# Patient Record
Sex: MALE | Race: WHITE | NOT HISPANIC OR LATINO | Employment: FULL TIME | URBAN - METROPOLITAN AREA
[De-identification: names, ages, dates, MRNs, and addresses within clinical notes are randomized per-mention and may not be internally consistent; named-entity substitution may affect disease eponyms.]

---

## 2017-02-01 ENCOUNTER — OFFICE VISIT (OUTPATIENT)
Dept: FAMILY MEDICINE | Facility: CLINIC | Age: 37
End: 2017-02-01
Payer: COMMERCIAL

## 2017-02-01 VITALS
WEIGHT: 315 LBS | HEART RATE: 73 BPM | HEIGHT: 77 IN | TEMPERATURE: 97.6 F | SYSTOLIC BLOOD PRESSURE: 140 MMHG | DIASTOLIC BLOOD PRESSURE: 92 MMHG | BODY MASS INDEX: 37.19 KG/M2 | OXYGEN SATURATION: 97 %

## 2017-02-01 DIAGNOSIS — F32.A DEPRESSION, UNSPECIFIED DEPRESSION TYPE: ICD-10-CM

## 2017-02-01 DIAGNOSIS — R03.0 ELEVATED BLOOD PRESSURE READING WITHOUT DIAGNOSIS OF HYPERTENSION: ICD-10-CM

## 2017-02-01 DIAGNOSIS — M54.42 CHRONIC LEFT-SIDED LOW BACK PAIN WITH LEFT-SIDED SCIATICA: ICD-10-CM

## 2017-02-01 DIAGNOSIS — G89.29 CHRONIC LEFT-SIDED LOW BACK PAIN WITH LEFT-SIDED SCIATICA: ICD-10-CM

## 2017-02-01 DIAGNOSIS — E66.812 OBESITY, CLASS II, BMI 35-39.9: ICD-10-CM

## 2017-02-01 DIAGNOSIS — Z23 NEED FOR PROPHYLACTIC VACCINATION AND INOCULATION AGAINST INFLUENZA: ICD-10-CM

## 2017-02-01 DIAGNOSIS — R79.89 ELEVATED TSH: ICD-10-CM

## 2017-02-01 DIAGNOSIS — R94.5 ABNORMAL RESULTS OF LIVER FUNCTION STUDIES: Primary | ICD-10-CM

## 2017-02-01 DIAGNOSIS — L40.50 PSORIATIC ARTHRITIS (H): ICD-10-CM

## 2017-02-01 DIAGNOSIS — F41.1 ANXIETY STATE: ICD-10-CM

## 2017-02-01 LAB
ALBUMIN SERPL-MCNC: 4.3 G/DL (ref 3.4–5)
ALP SERPL-CCNC: 80 U/L (ref 40–150)
ALT SERPL W P-5'-P-CCNC: 135 U/L (ref 0–70)
ANION GAP SERPL CALCULATED.3IONS-SCNC: 10 MMOL/L (ref 3–14)
AST SERPL W P-5'-P-CCNC: 51 U/L (ref 0–45)
BILIRUB SERPL-MCNC: 0.5 MG/DL (ref 0.2–1.3)
BUN SERPL-MCNC: 13 MG/DL (ref 7–30)
CALCIUM SERPL-MCNC: 9.2 MG/DL (ref 8.5–10.1)
CHLORIDE SERPL-SCNC: 100 MMOL/L (ref 94–109)
CO2 SERPL-SCNC: 26 MMOL/L (ref 20–32)
CREAT SERPL-MCNC: 0.87 MG/DL (ref 0.66–1.25)
CREAT UR-MCNC: 135 MG/DL
GFR SERPL CREATININE-BSD FRML MDRD: ABNORMAL ML/MIN/1.7M2
GLUCOSE SERPL-MCNC: 104 MG/DL (ref 70–99)
MICROALBUMIN UR-MCNC: 14 MG/L
MICROALBUMIN/CREAT UR: 10.74 MG/G CR (ref 0–17)
POTASSIUM SERPL-SCNC: 4.2 MMOL/L (ref 3.4–5.3)
PROT SERPL-MCNC: 7.9 G/DL (ref 6.8–8.8)
SODIUM SERPL-SCNC: 136 MMOL/L (ref 133–144)
TSH SERPL DL<=0.005 MIU/L-ACNC: 2.03 MU/L (ref 0.4–4)

## 2017-02-01 PROCEDURE — 84443 ASSAY THYROID STIM HORMONE: CPT | Performed by: NURSE PRACTITIONER

## 2017-02-01 PROCEDURE — 90471 IMMUNIZATION ADMIN: CPT | Performed by: NURSE PRACTITIONER

## 2017-02-01 PROCEDURE — 36415 COLL VENOUS BLD VENIPUNCTURE: CPT | Performed by: NURSE PRACTITIONER

## 2017-02-01 PROCEDURE — 90686 IIV4 VACC NO PRSV 0.5 ML IM: CPT | Performed by: NURSE PRACTITIONER

## 2017-02-01 PROCEDURE — 99214 OFFICE O/P EST MOD 30 MIN: CPT | Mod: 25 | Performed by: NURSE PRACTITIONER

## 2017-02-01 PROCEDURE — 82043 UR ALBUMIN QUANTITATIVE: CPT | Performed by: NURSE PRACTITIONER

## 2017-02-01 PROCEDURE — 82977 ASSAY OF GGT: CPT | Performed by: NURSE PRACTITIONER

## 2017-02-01 PROCEDURE — 80053 COMPREHEN METABOLIC PANEL: CPT | Performed by: NURSE PRACTITIONER

## 2017-02-01 RX ORDER — OXYCODONE AND ACETAMINOPHEN 5; 325 MG/1; MG/1
1 TABLET ORAL EVERY 6 HOURS PRN
Qty: 20 TABLET | Refills: 0 | Status: SHIPPED | OUTPATIENT
Start: 2017-02-01 | End: 2017-02-27

## 2017-02-01 RX ORDER — NAPROXEN 500 MG/1
500 TABLET ORAL 2 TIMES DAILY WITH MEALS
Qty: 60 TABLET | Refills: 1 | Status: SHIPPED | OUTPATIENT
Start: 2017-02-01 | End: 2017-06-07

## 2017-02-01 ASSESSMENT — ANXIETY QUESTIONNAIRES
5. BEING SO RESTLESS THAT IT IS HARD TO SIT STILL: SEVERAL DAYS
1. FEELING NERVOUS, ANXIOUS, OR ON EDGE: NOT AT ALL
GAD7 TOTAL SCORE: 3
7. FEELING AFRAID AS IF SOMETHING AWFUL MIGHT HAPPEN: NOT AT ALL
IF YOU CHECKED OFF ANY PROBLEMS ON THIS QUESTIONNAIRE, HOW DIFFICULT HAVE THESE PROBLEMS MADE IT FOR YOU TO DO YOUR WORK, TAKE CARE OF THINGS AT HOME, OR GET ALONG WITH OTHER PEOPLE: NOT DIFFICULT AT ALL
2. NOT BEING ABLE TO STOP OR CONTROL WORRYING: NOT AT ALL
3. WORRYING TOO MUCH ABOUT DIFFERENT THINGS: NOT AT ALL
6. BECOMING EASILY ANNOYED OR IRRITABLE: SEVERAL DAYS

## 2017-02-01 ASSESSMENT — PATIENT HEALTH QUESTIONNAIRE - PHQ9: 5. POOR APPETITE OR OVEREATING: SEVERAL DAYS

## 2017-02-01 NOTE — MR AVS SNAPSHOT
After Visit Summary   2/1/2017    Lakhwinder Jones    MRN: [id number]           Patient Information     Date Of Birth          1980        Visit Information        Provider Department      2/1/2017 9:20 AM Rhonda Rodriguez APRN CNP [facility]        Today's Diagnoses     Abnormal results of liver function studies    -  1     Chronic left-sided low back pain with left-sided sciatica         Psoriatic arthritis (H)         Elevated blood pressure reading without diagnosis of hypertension         Elevated TSH         Anxiety state         Depression, unspecified depression type         Need for prophylactic vaccination and inoculation against influenza           Care Instructions    Based on your medical history and these are the current health maintenance or preventive care services that you are due for (some may have been done at this visit)  Health Maintenance Due   Topic Date Due     URINE DRUG SCREEN Q1 YR  10/13/1996     INFLUENZA VACCINE (SYSTEM ASSIGNED)  09/01/2016         At [facility], we strive to deliver an exceptional experience to you, every time we see you.    If you receive a survey in the mail, please send us back your thoughts. We really do value your feedback.    Your care team's suggested websites for health information:  Www.oragenics.org : Up to date and easily searchable information on multiple topics.  Www.medlineplus.gov : medication info, interactive tutorials, watch real surgeries online  Www.familydoctor.org : good info from the Academy of Family Physicians  Www.cdc.gov : public health info, travel advisories, epidemics (H1N1)  Www.aap.org : children's health info, normal development, vaccinations  Www.health.[facility].[place].us : [place] dept of health, public health issues in [place], N1N1    How to contact your care team:   Team Radha/Spirit [phone]         Pharmacy [phone]    Dr. Chester, Jacinta Prasad PA-C, Dr. Muñoz,  "Stephie YARBROUGH CNP, Sandi Cornelius PA-C, Dr. Vides, and LINNEA Serrano CNP    Team RNs: Lavern & Steph      Clinic hours  M-Th 7 am-7 pm   Fri 7 am-5 pm.   Urgent care M-F 11 am-9 pm,   Sat/Sun 9 am-5 pm.  Pharmacy M-Th 8 am-8 pm Fri 8 am-6 pm  Sat/Sun 9 am-5 pm.     All password changes, disabled accounts, or ID changes in [employer]/MyHealth will be done by our Access Services Department.    If you need help with your account or password, call: [phone]. Clinic staff no longer has the ability to change passwords.       * Sciatica    Sciatica (\"Lumbar Radiculopathy\") causes a pain that spreads from the lower back down into the buttock, hip and leg. Sometimes leg pain can occur without any back pain. Sciatica is due to irritation or pressure on a spinal nerve as it comes out of the spinal canal. This is most often due to a bulge or rupture of a nearby spinal disk (the cartilage cushion between each spinal bone), which presses on a nearby nerve. Other causes include spinal stenosis (narrowing of the spinal canal) and spasm of the piriformis muscle (a muscle in the buttocks that the sciatic nerve passes through).  Sciatica may begin after a sudden twisting/bending force (such as in a car accident), or sometimes after a simple awkward movement. In either case, muscle spasm is commonly present and contributes to the pain.  The diagnosis of sciatica is made from the symptoms and physical exam. Unless you had a physical injury (such as a car accident or fall), X-rays are usually not ordered for the initial evaluation of sciatica because the nerves and disks cannot be seen on an X-ray. Most sciatica (80-90%) gets better with time.  What can I do about my low back pain?  There are three main things you can do to ease low back pain and help it go away.    Use heat or cold packs.    Take medicine as directed.    Use positions, movements and exercises. Stay active! Too much rest can make your symptoms " worse.  Using heat or cold packs  Try cold packs or gentle heat to ease your pain. Use whichever gives the most relief. Apply the cold pack or heat for 15 minutes at a time, as often as needed.  Taking medicine  If taking over-the-counter medicine:    Take ibuprofen (Advil, Motrin) 600 mg. three times a day as needed for pain.  OR    Take Aleve (naproxen sodium) 220 to 440 mg. two times a day as needed for pain  If your doctor prescribed a muscle relaxant (cyclobenzapine 10 mg.):    Take one half ( ) to 1 tablet at bedtime    Do not drive when taking this medicine. This drug may make you sleepy.  Using positions, movements and exercises  Research tells us that moving your joints and muscles can help you recover from back pain. Such activity should be simple and gentle.  Use the positions below as well as walking to help relieve your discomfort. Try taking a short walk every 3 to 4 hours during the day. Walk for a few minutes inside your home or take longer walks outside, on a treadmill or at a mall. Slowly increase the amount of time you walk. Expect discomfort when you begin, but it should lessen as your back starts to recover.  Finding a position that is comfortable  When your back pain is new, you may find that certain positions will ease your pain. Gently try each of the following positions until you find one that eases your pain. Once you find a position of comfort, use it as often as you like while you recover. Return to your daily routine as soon as possible.     Lie on your back with your legs bent. You can do this by placing a pillow under your knees or lie on the floor and rest your lower legs on the seat of a chair.    Lie on your side with your knees bent and place a pillow between your knees.    Lie on your stomach over pillows.  When should I call my doctor?  Your back pain should improve over the first couple of weeks. As it improves, you should be able to return to your normal activities. But call  your doctor if:    You have a sudden change in your ability to control? your bladder or bowels.    You begin to feel tingling in your groin or legs.    The pain spreads down your leg and into your foot.    Your toes, feet or leg muscles begin to feel weak.    You feel generally unwell or sick.    Your pain gets worse.    6352-2707 [employer]. [address] [place] [address]. All rights reserved. This information is not intended as a substitute for professional medical care. Always follow your healthcare professional's instructions.              Follow-ups after your visit        Additional Services     ORTHO  REFERRAL       [facility] is referring you to the Orthopedic  Services at [facility].       The  Representative will assist you in the coordination of your Orthopedic and Musculoskeletal Care as prescribed by your physician.    The  Representative will call you within 1 business day to help schedule your appointment, or you may contact the  Representative at:    All areas ~ [phone]     Type of Referral : Non Surgical - consider injection for chronic low back pain      Timeframe requested: Routine    Coverage of these services is subject to the terms and limitations of your health insurance plan.  Please call member services at your health plan with any benefit or coverage questions.      If X-rays, CT or MRI's have been performed, please contact the facility where they were done to arrange for , prior to your scheduled appointment.  Please bring this referral request to your appointment and present it to your specialist.                  Who to contact     If you have questions or need follow up information about today's clinic visit or your schedule please contact [facility] directly at [phone].  Normal or non-critical lab and imaging results will be communicated  "to you by [employer], letter or phone within 4 business days after the clinic has received the results. If you do not hear from us within 7 days, please contact the clinic through [employer] or phone. If you have a critical or abnormal lab result, we will notify you by phone as soon as possible.  Submit refill requests through [employer] or call your pharmacy and they will forward the refill request to us. Please allow 3 business days for your refill to be completed.          Additional Information About Your Visit        [employer] Information     [employer] gives you secure access to your electronic health record. If you see a primary care provider, you can also send messages to your care team and make appointments. If you have questions, please call your primary care clinic.  If you do not have a primary care provider, please call [phone] and they will assist you.        Care EveryWhere ID     This is your Care EveryWhere ID. This could be used by other organizations to access your [facility] medical records  [id number]        Your Vitals Were     Pulse Temperature Height BMI (Body Mass Index) Pulse Oximetry       73 97.6  F (36.4  C) (Oral) 6' 5\" (1.956 m) 39.29 kg/m2 97%        Blood Pressure from Last 3 Encounters:   02/01/17 140/92   12/06/16 134/86   10/17/16 145/85    Weight from Last 3 Encounters:   02/01/17 331 lb 6.4 oz (150.322 kg)   12/06/16 320 lb 6.4 oz (145.332 kg)   10/17/16 332 lb 3.2 oz (150.685 kg)              We Performed the Following     Albumin Random Urine Quantitative     Comprehensive metabolic panel (BMP + Alb, Alk Phos, ALT, AST, Total. Bili, TP)     HC FLU VAC PRESRV FREE QUAD SPLIT VIR 3+YRS IM     ORTHO  REFERRAL     TSH with free T4 reflex          Where to get your medicines      These medications were sent to [facility] [place] - [address]  [address], [place] [address]     Phone:  [phone]    - naproxen 500 MG tablet    "   Some of these will need a paper prescription and others can be bought over the counter.  Ask your nurse if you have questions.     Bring a paper prescription for each of these medications    - oxyCODONE-acetaminophen 5-325 MG per tablet       Primary Care Provider Office Phone # Fax #    Simona Prasad PA-C [phone]       [facility] [address]  [facility] [address]        Thank you!     Thank you for choosing [facility]  for your care. Our goal is always to provide you with excellent care. Hearing back from our patients is one way we can continue to improve our services. Please take a few minutes to complete the written survey that you may receive in the mail after your visit with us. Thank you!             Your Updated Medication List - Protect others around you: Learn how to safely use, store and throw away your medicines at www.disposemymeds.org.          This list is accurate as of: 2/1/17 10:15 AM.  Always use your most recent med list.                   Brand Name Dispense Instructions for use    citalopram 40 MG tablet    celeXA    90 tablet    Take 1 tablet (40 mg) by mouth daily       LORazepam 1 MG tablet    ATIVAN    30 tablet    Take 1 tablet (1 mg) by mouth 2 times daily as needed for anxiety       methocarbamol 750 MG tablet    ROBAXIN    60 tablet    Take 1 tablet (750 mg) by mouth 3 times daily as needed for muscle spasms       methylPREDNISolone 4 MG tablet    MEDROL DOSEPAK    21 tablet    Follow package instructions       naproxen 500 MG tablet    NAPROSYN    60 tablet    Take 1 tablet (500 mg) by mouth 2 times daily (with meals)       * oxyCODONE-acetaminophen 5-325 MG per tablet    PERCOCET    20 tablet    Take 1-2 tablets by mouth every 6 hours as needed for moderate to severe pain       * oxyCODONE-acetaminophen 5-325 MG per tablet    PERCOCET    20 tablet    Take 1 tablet by mouth every 6 hours as needed for  pain (maximum 6 tablets per day)       * Notice:  This list has 2 medication(s) that are the same as other medications prescribed for you. Read the directions carefully, and ask your doctor or other care provider to review them with you.

## 2017-02-01 NOTE — PATIENT INSTRUCTIONS
"Based on your medical history and these are the current health maintenance or preventive care services that you are due for (some may have been done at this visit)  Health Maintenance Due   Topic Date Due     URINE DRUG SCREEN Q1 YR  10/13/1996     INFLUENZA VACCINE (SYSTEM ASSIGNED)  09/01/2016         At [facility], we strive to deliver an exceptional experience to you, every time we see you.    If you receive a survey in the mail, please send us back your thoughts. We really do value your feedback.    Your care team's suggested websites for health information:  Www.[employer].org : Up to date and easily searchable information on multiple topics.  Www.medlineplus.gov : medication info, interactive tutorials, watch real surgeries online  Www.familydoctor.org : good info from the Academy of Family Physicians  Www.cdc.gov : public health info, travel advisories, epidemics (H1N1)  Www.aap.org : children's health info, normal development, vaccinations  Www.health.[facility].[place].us : [place] dept of health, public health issues in [place], N1N1    How to contact your care team:   Chan Garcia/Tee [phone]         Pharmacy [phone]    Dr. Chester, Jacinta Prasad PA-C, Dr. Muñoz, Stephie YARBROUGH CNP, Sandi Cornelius PA-C, Dr. Vides, and LINNEA Serrano CNP    Team RNs: Lavern & Steph      Clinic hours  M-Th 7 am-7 pm   Fri 7 am-5 pm.   Urgent care M-F 11 am-9 pm,   Sat/Sun 9 am-5 pm.  Pharmacy M-Th 8 am-8 pm Fri 8 am-6 pm  Sat/Sun 9 am-5 pm.     All password changes, disabled accounts, or ID changes in [facility] [employer]/MyHealth will be done by our Access Services Department.    If you need help with your account or password, call: [phone]. Clinic staff no longer has the ability to change passwords.       * Sciatica    Sciatica (\"Lumbar Radiculopathy\") causes a pain that spreads from the lower back down into the buttock, hip and leg. Sometimes leg pain can occur without any back pain. " Sciatica is due to irritation or pressure on a spinal nerve as it comes out of the spinal canal. This is most often due to a bulge or rupture of a nearby spinal disk (the cartilage cushion between each spinal bone), which presses on a nearby nerve. Other causes include spinal stenosis (narrowing of the spinal canal) and spasm of the piriformis muscle (a muscle in the buttocks that the sciatic nerve passes through).  Sciatica may begin after a sudden twisting/bending force (such as in a car accident), or sometimes after a simple awkward movement. In either case, muscle spasm is commonly present and contributes to the pain.  The diagnosis of sciatica is made from the symptoms and physical exam. Unless you had a physical injury (such as a car accident or fall), X-rays are usually not ordered for the initial evaluation of sciatica because the nerves and disks cannot be seen on an X-ray. Most sciatica (80-90%) gets better with time.  What can I do about my low back pain?  There are three main things you can do to ease low back pain and help it go away.    Use heat or cold packs.    Take medicine as directed.    Use positions, movements and exercises. Stay active! Too much rest can make your symptoms worse.  Using heat or cold packs  Try cold packs or gentle heat to ease your pain. Use whichever gives the most relief. Apply the cold pack or heat for 15 minutes at a time, as often as needed.  Taking medicine  If taking over-the-counter medicine:    Take ibuprofen (Advil, Motrin) 600 mg. three times a day as needed for pain.  OR    Take Aleve (naproxen sodium) 220 to 440 mg. two times a day as needed for pain  If your doctor prescribed a muscle relaxant (cyclobenzapine 10 mg.):    Take one half ( ) to 1 tablet at bedtime    Do not drive when taking this medicine. This drug may make you sleepy.  Using positions, movements and exercises  Research tells us that moving your joints and muscles can help you recover from back  pain. Such activity should be simple and gentle.  Use the positions below as well as walking to help relieve your discomfort. Try taking a short walk every 3 to 4 hours during the day. Walk for a few minutes inside your home or take longer walks outside, on a treadmill or at a mall. Slowly increase the amount of time you walk. Expect discomfort when you begin, but it should lessen as your back starts to recover.  Finding a position that is comfortable  When your back pain is new, you may find that certain positions will ease your pain. Gently try each of the following positions until you find one that eases your pain. Once you find a position of comfort, use it as often as you like while you recover. Return to your daily routine as soon as possible.     Lie on your back with your legs bent. You can do this by placing a pillow under your knees or lie on the floor and rest your lower legs on the seat of a chair.    Lie on your side with your knees bent and place a pillow between your knees.    Lie on your stomach over pillows.  When should I call my doctor?  Your back pain should improve over the first couple of weeks. As it improves, you should be able to return to your normal activities. But call your doctor if:    You have a sudden change in your ability to control? your bladder or bowels.    You begin to feel tingling in your groin or legs.    The pain spreads down your leg and into your foot.    Your toes, feet or leg muscles begin to feel weak.    You feel generally unwell or sick.    Your pain gets worse.    3280-4150 [employer]. [address] [place] [address], [place] [address]. All rights reserved. This information is not intended as a substitute for professional medical care. Always follow your healthcare professional's instructions.

## 2017-02-01 NOTE — PROGRESS NOTES
SUBJECTIVE:                                                    Lakhwinder Jones is a 36 year old male who presents to clinic today for the following health issues:    Concern: Recheck liver enzymes. Patient had elevated ALT of 79 on 6/3/16. He denies alcohol use, drug use other than his prescribed prescriptions, no supplement use.  No RUQ abdominal pain, nausea, vomiting, early satiety, skin changes.    Back Pain Follow Up       Description:   Location of pain:  bilateral  Character of pain: sharp and cramping  Pain radiation: radiates below the knee   Since last visit, pain is:  unchanged  New numbness or weakness in legs, not attributed to pain:  YES- feels weaker in the morning    Intensity: Currently 7/10, moderate, severe    History:   Pain interferes with job: YES  Therapies tried without relief: yoga, cold and heat  Therapies tried with relief: Naproxen         Accompanying Signs & Symptoms:  Risk of Fracture:  None  Risk of Cauda Equina:  None  Risk of Infection:  None  Risk of Cancer:  None       Patient has had steroid injections in the past with good response, is interested in another injection now.      Psoriatic arthritis- has generalized  psoriatic lesions with arthritic changes.      Amount of exercise or physical activity: 6-7 days/week for an average of 45-60 minutes    Problems taking medications regularly: No    Medication side effects: none    Diet: regular (no restrictions)      Problem list and histories reviewed & adjusted, as indicated.  Additional history: as documented    BP Readings from Last 3 Encounters:   02/01/17 140/92   12/06/16 134/86   10/17/16 145/85    Wt Readings from Last 3 Encounters:   02/01/17 331 lb 6.4 oz (150.322 kg)   12/06/16 320 lb 6.4 oz (145.332 kg)   10/17/16 332 lb 3.2 oz (150.685 kg)                  Labs reviewed in EPIC  Problem list, Medication list, Allergies, and Medical/Social/Surgical histories reviewed in EPIC and updated as  "appropriate.    ROS:  Constitutional, HEENT, cardiovascular, pulmonary, gi and gu systems are negative, except as otherwise noted.    OBJECTIVE:                                                    /92 mmHg  Pulse 73  Temp(Src) 97.6  F (36.4  C) (Oral)  Ht 6' 5\" (1.956 m)  Wt 331 lb 6.4 oz (150.322 kg)  BMI 39.29 kg/m2  SpO2 97%  Body mass index is 39.29 kg/(m^2).  GENERAL: healthy, alert and no distress  EYES: Eyes grossly normal to inspection, PERRL and conjunctivae and sclerae normal  HENT: ear canals and TM's normal, nose and mouth without ulcers or lesions  NECK: no adenopathy, no asymmetry, masses, or scars and thyroid normal to palpation  RESP: lungs clear to auscultation - no rales, rhonchi or wheezes  CV: regular rate and rhythm, normal S1 S2, no S3 or S4, no murmur, click or rub, no peripheral edema and peripheral pulses strong  ABDOMEN: soft, nontender, no hepatosplenomegaly, no masses and bowel sounds normal  MS: no gross musculoskeletal defects noted, no edema  SKIN: generalized erythematous psoriatic plaques otherwise, no concerning skin lesions  NEURO: Normal strength and tone, mentation intact and speech normal  BACK: no CVA tenderness, left sided  paralumbar tenderness and bilateral SI joint TTP, SLR+ on left    Diagnostic Test Results:  Results for orders placed or performed in visit on 02/01/17 (from the past 24 hour(s))   Albumin Random Urine Quantitative   Result Value Ref Range    Creatinine Urine 135 mg/dL    Albumin Urine mg/L 14 mg/L    Albumin Urine mg/g Cr 10.74 0 - 17 mg/g Cr        ASSESSMENT/PLAN:                                                        BP Screening:   Last 3 BP Readings:    BP Readings from Last 3 Encounters:   02/01/17 140/92   12/06/16 134/86   10/17/16 145/85       The following was recommended to the patient:  Re-screen within 4 weeks and recommend lifestyle modifications    BMI:   Estimated body mass index is 39.29 kg/(m^2) as calculated from the " "following:    Height as of this encounter: 6' 5\" (1.956 m).    Weight as of this encounter: 331 lb 6.4 oz (150.322 kg).   Weight management plan: Discussed healthy diet and exercise guidelines and patient will follow up in 6 months in clinic to re-evaluate.      1. Abnormal results of liver function studies  Recheck LFT's today.  - Comprehensive metabolic panel (BMP + Alb, Alk Phos, ALT, AST, Total. Bili, TP)    2. Chronic left-sided low back pain with left-sided sciatica      The patient was advised to apply ice for the first 2-3 days and then switch to heat  (avoid sleeping on heating pad).  Lifting mechanics discussed  Analgesics such as Tylenol and/or ibuprofen, see epic for orders.  Sacroiliac exercises, instruction sheet given  Aerobic exercise program, this was strongly encouraged as one of the best ways to manage chronic back moshe  Referring to Orthopedics for possible steroid injection.  Patient was instructed to f/u if symptoms worsen or fail to improve as anticipated    - ORTHO  REFERRAL    3. Psoriatic arthritis (H)  Patient advised to schedule back with Rheumatology.  - oxyCODONE-acetaminophen (PERCOCET) 5-325 MG per tablet; Take 1 tablet by mouth every 6 hours as needed for pain (maximum 6 tablets per day)  Dispense: 20 tablet; Refill: 0      4. Obesity, Class II, BMI 35-39.9 (H)  .Benefits of weight loss reviewed in detail, encouraged him to cut back on the carbohydrates in the diet, consume more fruits and vegetables, drink plenty of water, avoid fruit juices, sodas, get 150 min moderate exercise/week.  Recheck weight in 6 months.    5. Elevated blood pressure reading without diagnosis of hypertension  Recheck BP in 3 weeks.  - Albumin Random Urine Quantitative  -CMP ordered    6. Elevated TSH  Recheck TSH today.  - TSH with free T4 reflex    7. Anxiety state  Well controlled, BISMARK-7=3.  Continue present management.    8. Depression, unspecified depression type  Well controlled, PHQ-9=3.  " Continue present management.    9. Need for prophylactic vaccination and inoculation against influenza    - HC FLU VAC PRESRV FREE QUAD SPLIT VIR 3+YRS IM  - ADMIN 1st VACCINE    See Patient Instructions    LINNEA Krishna [facility]

## 2017-02-01 NOTE — NURSING NOTE
.Injectable Influenza Immunization Documentation    1.  Is the person to be vaccinated sick today?  No    2. Does the person to be vaccinated have an allergy to eggs or to a component of the vaccine?  No    3. Has the person to be vaccinated today ever had a serious reaction to influenza vaccine in the past?  No    4. Has the person to be vaccinated ever had Guillain-[place] syndrome?  No     Form completed by Nikole Thompson MA

## 2017-02-01 NOTE — NURSING NOTE
"Chief Complaint   Patient presents with     Labs Only     Back Pain     Refill Request       Initial /90 mmHg  Pulse 73  Temp(Src) 97.6  F (36.4  C) (Oral)  Ht 6' 5\" (1.956 m)  Wt 331 lb 6.4 oz (150.322 kg)  BMI 39.29 kg/m2  SpO2 97% Estimated body mass index is 39.29 kg/(m^2) as calculated from the following:    Height as of this encounter: 6' 5\" (1.956 m).    Weight as of this encounter: 331 lb 6.4 oz (150.322 kg).  BP completed using cuff size: felipe Thompson MA      "

## 2017-02-02 LAB — GGT SERPL-CCNC: 176 U/L (ref 0–75)

## 2017-02-02 ASSESSMENT — ANXIETY QUESTIONNAIRES: GAD7 TOTAL SCORE: 3

## 2017-02-02 ASSESSMENT — PATIENT HEALTH QUESTIONNAIRE - PHQ9: SUM OF ALL RESPONSES TO PHQ QUESTIONS 1-9: 3

## 2017-02-06 ENCOUNTER — RADIANT APPOINTMENT (OUTPATIENT)
Dept: ULTRASOUND IMAGING | Facility: CLINIC | Age: 37
End: 2017-02-06
Attending: NURSE PRACTITIONER
Payer: COMMERCIAL

## 2017-02-06 DIAGNOSIS — R94.5 ABNORMAL RESULTS OF LIVER FUNCTION STUDIES: Primary | ICD-10-CM

## 2017-02-06 DIAGNOSIS — R16.0 HEPATOMEGALY: ICD-10-CM

## 2017-02-06 PROBLEM — E66.812 OBESITY, CLASS II, BMI 35-39.9: Status: ACTIVE | Noted: 2017-02-06

## 2017-02-06 PROCEDURE — 76705 ECHO EXAM OF ABDOMEN: CPT | Performed by: RADIOLOGY

## 2017-02-10 ENCOUNTER — MYC MEDICAL ADVICE (OUTPATIENT)
Dept: FAMILY MEDICINE | Facility: CLINIC | Age: 37
End: 2017-02-10

## 2017-02-27 ENCOUNTER — OFFICE VISIT (OUTPATIENT)
Dept: GASTROENTEROLOGY | Facility: CLINIC | Age: 37
End: 2017-02-27
Attending: INTERNAL MEDICINE
Payer: COMMERCIAL

## 2017-02-27 VITALS
TEMPERATURE: 97.8 F | WEIGHT: 315 LBS | HEART RATE: 66 BPM | HEIGHT: 77 IN | DIASTOLIC BLOOD PRESSURE: 77 MMHG | OXYGEN SATURATION: 99 % | SYSTOLIC BLOOD PRESSURE: 128 MMHG | BODY MASS INDEX: 37.19 KG/M2

## 2017-02-27 DIAGNOSIS — L40.50 PSORIATIC ARTHRITIS (H): ICD-10-CM

## 2017-02-27 DIAGNOSIS — E66.812 OBESITY, CLASS II, BMI 35-39.9: Primary | ICD-10-CM

## 2017-02-27 DIAGNOSIS — R79.89 ABNORMAL LIVER FUNCTION TESTS: ICD-10-CM

## 2017-02-27 DIAGNOSIS — E66.812 OBESITY, CLASS II, BMI 35-39.9: ICD-10-CM

## 2017-02-27 LAB
ALBUMIN SERPL-MCNC: 4.3 G/DL (ref 3.4–5)
ALP SERPL-CCNC: 75 U/L (ref 40–150)
ALT SERPL W P-5'-P-CCNC: 72 U/L (ref 0–70)
ANION GAP SERPL CALCULATED.3IONS-SCNC: 10 MMOL/L (ref 3–14)
AST SERPL W P-5'-P-CCNC: 22 U/L (ref 0–45)
BILIRUB SERPL-MCNC: 0.6 MG/DL (ref 0.2–1.3)
BUN SERPL-MCNC: 11 MG/DL (ref 7–30)
CALCIUM SERPL-MCNC: 8.8 MG/DL (ref 8.5–10.1)
CHLORIDE SERPL-SCNC: 105 MMOL/L (ref 94–109)
CO2 SERPL-SCNC: 27 MMOL/L (ref 20–32)
CREAT SERPL-MCNC: 0.94 MG/DL (ref 0.66–1.25)
ERYTHROCYTE [DISTWIDTH] IN BLOOD BY AUTOMATED COUNT: 11.7 % (ref 10–15)
FERRITIN SERPL-MCNC: 191 NG/ML (ref 26–388)
GFR SERPL CREATININE-BSD FRML MDRD: ABNORMAL ML/MIN/1.7M2
GLUCOSE SERPL-MCNC: 98 MG/DL (ref 70–99)
HAV IGG SER QL IA: NORMAL
HCT VFR BLD AUTO: 43.6 % (ref 40–53)
HGB BLD-MCNC: 15.1 G/DL (ref 13.3–17.7)
IGA SERPL-MCNC: 328 MG/DL (ref 70–380)
IGG SERPL-MCNC: 917 MG/DL (ref 695–1620)
IGM SERPL-MCNC: 64 MG/DL (ref 60–265)
INR PPP: 1.05 (ref 0.86–1.14)
IRON SATN MFR SERPL: 28 % (ref 15–46)
IRON SERPL-MCNC: 85 UG/DL (ref 35–180)
MCH RBC QN AUTO: 30.6 PG (ref 26.5–33)
MCHC RBC AUTO-ENTMCNC: 34.6 G/DL (ref 31.5–36.5)
MCV RBC AUTO: 88 FL (ref 78–100)
PLATELET # BLD AUTO: 237 10E9/L (ref 150–450)
POTASSIUM SERPL-SCNC: 4 MMOL/L (ref 3.4–5.3)
PROT SERPL-MCNC: 7.7 G/DL (ref 6.8–8.8)
RBC # BLD AUTO: 4.93 10E12/L (ref 4.4–5.9)
SODIUM SERPL-SCNC: 142 MMOL/L (ref 133–144)
TIBC SERPL-MCNC: 302 UG/DL (ref 240–430)
WBC # BLD AUTO: 6.8 10E9/L (ref 4–11)

## 2017-02-27 PROCEDURE — 83516 IMMUNOASSAY NONANTIBODY: CPT | Mod: 91 | Performed by: INTERNAL MEDICINE

## 2017-02-27 PROCEDURE — 81332 SERPINA1 GENE: CPT | Performed by: INTERNAL MEDICINE

## 2017-02-27 PROCEDURE — 83516 IMMUNOASSAY NONANTIBODY: CPT | Performed by: INTERNAL MEDICINE

## 2017-02-27 PROCEDURE — 86038 ANTINUCLEAR ANTIBODIES: CPT | Performed by: INTERNAL MEDICINE

## 2017-02-27 PROCEDURE — 85610 PROTHROMBIN TIME: CPT | Performed by: INTERNAL MEDICINE

## 2017-02-27 PROCEDURE — 83550 IRON BINDING TEST: CPT | Performed by: INTERNAL MEDICINE

## 2017-02-27 PROCEDURE — 36415 COLL VENOUS BLD VENIPUNCTURE: CPT | Performed by: INTERNAL MEDICINE

## 2017-02-27 PROCEDURE — 99212 OFFICE O/P EST SF 10 MIN: CPT | Mod: ZF

## 2017-02-27 PROCEDURE — 85027 COMPLETE CBC AUTOMATED: CPT | Performed by: INTERNAL MEDICINE

## 2017-02-27 PROCEDURE — 80053 COMPREHEN METABOLIC PANEL: CPT | Performed by: INTERNAL MEDICINE

## 2017-02-27 PROCEDURE — 86708 HEPATITIS A ANTIBODY: CPT | Performed by: INTERNAL MEDICINE

## 2017-02-27 PROCEDURE — 82784 ASSAY IGA/IGD/IGG/IGM EACH: CPT | Performed by: INTERNAL MEDICINE

## 2017-02-27 PROCEDURE — 82728 ASSAY OF FERRITIN: CPT | Performed by: INTERNAL MEDICINE

## 2017-02-27 PROCEDURE — 83540 ASSAY OF IRON: CPT | Performed by: INTERNAL MEDICINE

## 2017-02-27 PROCEDURE — 82103 ALPHA-1-ANTITRYPSIN TOTAL: CPT | Performed by: INTERNAL MEDICINE

## 2017-02-27 ASSESSMENT — PAIN SCALES - GENERAL: PAINLEVEL: NO PAIN (0)

## 2017-02-27 NOTE — LETTER
2/27/2017      RE: Lakhwinder Jones  [address]  [facility] [address]       [facility]    Hepatology New Patient Visit    Referring provider:  Rhonda Rodriguez      36 year old male    Chief complaint:  Abnormal liver function tests    HPI:  Patient presents to clinic with his fiance for evaluation of abnormal liver function tests.  Abnormal liver function tests were first noticed in 2009 but were checked in the context of heavy alcohol use.  Liver function tests were last noted to be normal in 2005.  The patient has previously tested negative for hepatitis B and C in the past as recently as 12/2015.  He underwent a liver ultrasound last month which showed hepatomegaly and changes consistent with hepatic steatosis.      Patient is feeling well.  He denies any signs or symptoms specific to liver disease.      The patient denies jaundice, abdominal distention, lower extremity edema, lethargy or confusion.      No history of melanoma, hematemesis or hematochezia.      The patient denies any fevers, sweats or chills.      The patient does report an 8-pound intentional weight loss since early 02/2017.  This has been achieved through a combination of dietary modification and regular exercise.  He has weighed approximately 330 pounds in the last several years.  His ideal weight is around 220 pounds.      History of psoriatic arthritis noted.  Diagnosed at age 19.  History of methotrexate use in 2016.  History of PUVA therapy.  The patient has never used biologic agents.  Distribution is in the knees, elbows and hands.     The patient denies any new medications or antibiotics over the last 6-12 months.  He is currently taking apple cider vinegar and turmeric supplements to help with his liver function.  He was on methotrexate for a short period in early 2016 but this was discontinued due to abnormal liver tests.  In addition, the patient did not feel it helped his psoriasis.      The  patient last drank alcohol 4 weeks ago.  Prior to this, he was drinking twice a week, approximately 5-6 beers each time.  He does note a prior history of alcohol abuse.  Up until 7 years ago, he was drinking a half a liter of vodka every day.  He has had 1 DUI in the past.      The patient is a rare smoker.  He smokes 2 cigarettes per month.  He has a distant history of cocaine use in 2000.  He occasionally smokes marijuana.  He denies any history of intravenous drug use.  The patient has 4 tattoos, all done by professionals.  He denies any history of blood transfusions.       Medical hx Surgical hx   Past Medical History   Diagnosis Date     Anxiety      Back pain      Obesity      Psoriatic arthritis (H)       Past Surgical History   Procedure Laterality Date     Testicle surgery       23 yo     Right elbow surgey       12 yo     Nose surgery       3 times          Medications  Prior to Admission medications    Medication Sig Start Date End Date Taking? Authorizing Provider   citalopram (CELEXA) 40 MG tablet Take 1 tablet (40 mg) by mouth daily 12/6/16  Yes Simona Prasad PA-C   naproxen (NAPROSYN) 500 MG tablet Take 1 tablet (500 mg) by mouth 2 times daily (with meals)  Patient not taking: Reported on 2/27/2017 2/1/17   Rhonda Rodriguez APRN CNP   LORazepam (ATIVAN) 1 MG tablet Take 1 tablet (1 mg) by mouth 2 times daily as needed for anxiety  Patient not taking: Reported on 2/27/2017 12/6/16   Simona Prasad PA-C       Allergies  Allergies   Allergen Reactions     Tramadol Other (See Comments)     Shellfish-Derived Products        Family hx Social hx   Family History   Problem Relation Age of Onset     Obesity Mother      DIABETES Father      Coronary Artery Disease Father      Hypertension Father      Hyperlipidemia Father      Depression Father      Anxiety Disorder Father      MENTAL ILLNESS Father      Substance Abuse Father      Breast Cancer Maternal Grandmother       "Depression Maternal Grandmother      MENTAL ILLNESS Maternal Grandmother      Substance Abuse Maternal Grandmother      Prostate Cancer Maternal Grandfather      DIABETES Paternal Grandmother      Breast Cancer Paternal Grandmother      Depression Paternal Grandmother      MENTAL ILLNESS Paternal Grandmother      DIABETES Paternal Grandfather      Asthma Brother      DIABETES Paternal Uncle      CEREBROVASCULAR DISEASE No family hx of      Anesthesia Reaction No family hx of      OSTEOPOROSIS No family hx of      Genetic Disorder No family hx of      Thyroid Disease No family hx of      Liver Disease No family hx of       Social History   Substance Use Topics     Smoking status: Former Smoker     Types: Cigarettes     Smokeless tobacco: Never Used      Comment: 1-2 cigarettes per day to cope with stress     Alcohol use 4.8 oz/week     8 Standard drinks or equivalent per week      Comment: not for a month     Lives in [place] with fiancee and dog.  ([place], age 1.5 years).  Works as salesman (furniture).       Review of systems  A 10-point review of systems was negative.    Examination  /77  Pulse 66  Temp 97.8  F (36.6  C) (Oral)  Ht 1.956 m (6' 5\")  Wt (!) 146.8 kg (323 lb 9.6 oz)  SpO2 99%  BMI 38.37 kg/m2  Body mass index is 38.37 kg/(m^2).    Gen- well, NAD, A+Ox3, normal color  Eye- EOMI  ENT- MMM, normal oropharynx  Lym- no palpable lymphadenopathy  CVS- S1, S2 normal, no added sounds, RRR  RS- CTA  Abd- obese, soft, non-tender, palpable liver below R costal margin, no splenomegaly or ascites on palpation or percussion, BS+  Extr- pulses good, no DAYLIN  MS- nail pitting bilaterally, no clubbing  Neuro- A+Ox3, no asterixis  Skin- psoriatic plaques on lower trunk, extremities and scalp, no jaundice  Psych- normal mood    Laboratory  Lab Results   Component Value Date     02/01/2017    POTASSIUM 4.2 02/01/2017    CHLORIDE 100 02/01/2017    CO2 26 02/01/2017    BUN 13 02/01/2017    CR 0.87 " 02/01/2017       Lab Results   Component Value Date    BILITOTAL 0.5 02/01/2017     02/01/2017    AST 51 02/01/2017    ALKPHOS 80 02/01/2017       Lab Results   Component Value Date    ALBUMIN 4.3 02/01/2017    PROTTOTAL 7.9 02/01/2017        Lab Results   Component Value Date    WBC 6.4 06/03/2016    HGB 14.8 06/03/2016    MCV 87 06/03/2016     06/03/2016     Results for MICHELLE AMOS (MRN [id number]) as of 2/27/2017 09:09   Ref. Range 12/18/2015 09:07   Hep B Surface Agn Latest Ref Range: NR  Nonreactive   Hepatitis B Surface Antibody Latest Ref Range: <8.00 m[IU]/mL 252.17 (H)   Hepatitis B Core Joanne Latest Ref Range: NR  Nonreactive   Hepatitis C Antibody Latest Ref Range: NR  Nonreactive...       Radiology  Liver U/S 2/6/17 reviewed    Assessment  36-year-old obese male with a history of psoriatic arthritis who presents for further evaluation and management of abnormal liver function tests most likely related to fatty liver disease.  No clinical, biochemical or radiographic evidence of cirrhosis.  Will rule out other etiologies of chronic liver disease including celiac disease, autoimmune hepatitis, hemochromatosis and alpha-1 antitrypsin deficiency particularly in the context of history of immune disease.  Will repeat liver function tests today in the context of recent weight loss to evaluate for improvement.    We discussed the importance of ongoing weight loss with diet and exercise for overall health in addition to improvement of potential fatty liver disease.  We also discussed the potential need for liver biopsy if any of the above testing returned positive.      Biologic therapy for psoriatic arthritis is not absolutely contraindicated in patients with chronic liver disease if liver function tests are closely monitored at the time of initiation    Plan  1.  Check CMP, INR, CBC  2.  Check HAV Ab, anti-TTG ab, A1-AT phenotype  3.  Check ASMA, DANYA, serum immunoglobulins  4.  Weight loss  with diet and exercise  5.  Discontinue turmeric and apple cider vinegar  6.  OK to take ibuprofen or naproxen for joint symptoms  7.  Follow-up in 6 months    Devyn Torres MD  Hepatology  [facility]

## 2017-02-27 NOTE — MR AVS SNAPSHOT
After Visit Summary   2/27/2017    Lakhwinder Jones    MRN: [id number]           Patient Information     Date Of Birth          1980        Visit Information        Provider Department      2/27/2017 9:00 AM Devyn Duenas MD [facility] Hepatology        Today's Diagnoses     Obesity, Class II, BMI 35-39.9 (H)    -  1    Abnormal liver function tests        Psoriatic arthritis (H)           Follow-ups after your visit        Follow-up notes from your care team     Return in about 6 months (around 8/27/2017).      Your next 10 appointments already scheduled     Feb 27, 2017 11:00 AM CST   Lab with  LAB    [facility] Lab ([facility])    [address] [place] [address]  [address] [facility] [address]   [phone]            Aug 28, 2017  8:00 AM CDT   Lab with  LAB   [facility] ([facility])    [address] [place] [id number]   [phone]            Aug 28, 2017  9:00 AM CDT   (Arrive by 8:45 AM)   Return General Liver with Devyn Britt MD   [facility] Hepatology ([facility])    [address] [place] [address]   [phone]              Future tests that were ordered for you today     Open Future Orders        Priority Expected Expires Ordered    IgA Routine  3/29/2017 2/27/2017    IgG Routine  3/29/2017 2/27/2017    Alpha 1 antitryp vasiliy reflex to pheno Routine  3/29/2017 2/27/2017    Tissue transglutaminase camila IgA and IgG Routine  3/29/2017 2/27/2017    Comprehensive metabolic panel Routine  3/29/2017 2/27/2017    CBC with platelets Routine  3/29/2017 2/27/2017    Hepatitis Antibody A IgG Routine  3/29/2017 2/27/2017    F Actin EAI with reflex Routine  3/29/2017 2/27/2017    Antinuclear antibody screen by EIA Routine  3/29/2017 2/27/2017    IRON Routine  3/29/2017 2/27/2017    IRON AND IRON BINDING CAPACITY Routine  3/29/2017 2/27/2017    FERRITIN  "Routine  3/29/2017 2/27/2017    INR Routine  3/29/2017 2/27/2017    IgM Routine  3/29/2017 2/27/2017            Who to contact     If you have questions or need follow up information about today's clinic visit or your schedule please contact [facility] HEPATOLOGY directly at [phone].  Normal or non-critical lab and imaging results will be communicated to you by MyChart, letter or phone within 4 business days after the clinic has received the results. If you do not hear from us within 7 days, please contact the clinic through [employer] or phone. If you have a critical or abnormal lab result, we will notify you by phone as soon as possible.  Submit refill requests through [employer] or call your pharmacy and they will forward the refill request to us. Please allow 3 business days for your refill to be completed.          Additional Information About Your Visit        [employer] Information     [employer] gives you secure access to your electronic health record. If you see a primary care provider, you can also send messages to your care team and make appointments. If you have questions, please call your primary care clinic.  If you do not have a primary care provider, please call [phone] and they will assist you.        Care EveryWhere ID     This is your Care EveryWhere ID. This could be used by other organizations to access your [facility] medical records  [id number]        Your Vitals Were     Pulse Temperature Height Pulse Oximetry BMI (Body Mass Index)       66 97.8  F (36.6  C) (Oral) 1.956 m (6' 5\") 99% 38.37 kg/m2        Blood Pressure from Last 3 Encounters:   02/27/17 128/77   02/01/17 (!) 140/92   12/06/16 134/86    Weight from Last 3 Encounters:   02/27/17 (!) 146.8 kg (323 lb 9.6 oz)   02/01/17 (!) 150.3 kg (331 lb 6.4 oz)   12/06/16 (!) 145.3 kg (320 lb 6.4 oz)               Primary Care Provider Office Phone # Fax #    Simona Prasad PA-C [phone]       [facility] [address]  [facility] [address]        Thank you!     Thank you for choosing [facility] HEPATOLOGY  for your care. Our goal is always to provide you with excellent care. Hearing back from our patients is one way we can continue to improve our services. Please take a few minutes to complete the written survey that you may receive in the mail after your visit with us. Thank you!             Your Updated Medication List - Protect others around you: Learn how to safely use, store and throw away your medicines at www.disposemymeds.org.          This list is accurate as of: 2/27/17 10:00 AM.  Always use your most recent med list.                   Brand Name Dispense Instructions for use    citalopram 40 MG tablet    celeXA    90 tablet    Take 1 tablet (40 mg) by mouth daily       LORazepam 1 MG tablet    ATIVAN    30 tablet    Take 1 tablet (1 mg) by mouth 2 times daily as needed for anxiety       naproxen 500 MG tablet    NAPROSYN    60 tablet    Take 1 tablet (500 mg) by mouth 2 times daily (with meals)

## 2017-02-27 NOTE — NURSING NOTE
Chief Complaint   Patient presents with     Consult     Abnormal Liver Tests   Pt roomed, vitals, meds, and allergies reviewed with pt. Pt ready for provider.  Sammy Rodriguez, CMA

## 2017-02-27 NOTE — PROGRESS NOTES
[facility]    Hepatology New Patient Visit    Referring provider:  Rhonda Rodriguez      36 year old male    Chief complaint:  Abnormal liver function tests    HPI:  Patient presents to clinic with his fiance for evaluation of abnormal liver function tests.  Abnormal liver function tests were first noticed in 2009 but were checked in the context of heavy alcohol use.  Liver function tests were last noted to be normal in 2005.  The patient has previously tested negative for hepatitis B and C in the past as recently as 12/2015.  He underwent a liver ultrasound last month which showed hepatomegaly and changes consistent with hepatic steatosis.      Patient is feeling well.  He denies any signs or symptoms specific to liver disease.      The patient denies jaundice, abdominal distention, lower extremity edema, lethargy or confusion.      No history of melanoma, hematemesis or hematochezia.      The patient denies any fevers, sweats or chills.      The patient does report an 8-pound intentional weight loss since early 02/2017.  This has been achieved through a combination of dietary modification and regular exercise.  He has weighed approximately 330 pounds in the last several years.  His ideal weight is around 220 pounds.      History of psoriatic arthritis noted.  Diagnosed at age 19.  History of methotrexate use in 2016.  History of PUVA therapy.  The patient has never used biologic agents.  Distribution is in the knees, elbows and hands.     The patient denies any new medications or antibiotics over the last 6-12 months.  He is currently taking apple cider vinegar and turmeric supplements to help with his liver function.  He was on methotrexate for a short period in early 2016 but this was discontinued due to abnormal liver tests.  In addition, the patient did not feel it helped his psoriasis.      The patient last drank alcohol 4 weeks ago.  Prior to this, he was drinking twice a week,  approximately 5-6 beers each time.  He does note a prior history of alcohol abuse.  Up until 7 years ago, he was drinking a half a liter of vodka every day.  He has had 1 DUI in the past.      The patient is a rare smoker.  He smokes 2 cigarettes per month.  He has a distant history of cocaine use in 2000.  He occasionally smokes marijuana.  He denies any history of intravenous drug use.  The patient has 4 tattoos, all done by professionals.  He denies any history of blood transfusions.       Medical hx Surgical hx   Past Medical History   Diagnosis Date     Anxiety      Back pain      Obesity      Psoriatic arthritis (H)       Past Surgical History   Procedure Laterality Date     Testicle surgery       21 yo     Right elbow surgey       12 yo     Nose surgery       3 times          Medications  Prior to Admission medications    Medication Sig Start Date End Date Taking? Authorizing Provider   citalopram (CELEXA) 40 MG tablet Take 1 tablet (40 mg) by mouth daily 12/6/16  Yes Simona Prasad PA-C   naproxen (NAPROSYN) 500 MG tablet Take 1 tablet (500 mg) by mouth 2 times daily (with meals)  Patient not taking: Reported on 2/27/2017 2/1/17   Rhonda Rodriguez, LINNEA CNP   LORazepam (ATIVAN) 1 MG tablet Take 1 tablet (1 mg) by mouth 2 times daily as needed for anxiety  Patient not taking: Reported on 2/27/2017 12/6/16   Simona Prasad PA-C       Allergies  Allergies   Allergen Reactions     Tramadol Other (See Comments)     Shellfish-Derived Products        Family hx Social hx   Family History   Problem Relation Age of Onset     Obesity Mother      DIABETES Father      Coronary Artery Disease Father      Hypertension Father      Hyperlipidemia Father      Depression Father      Anxiety Disorder Father      MENTAL ILLNESS Father      Substance Abuse Father      Breast Cancer Maternal Grandmother      Depression Maternal Grandmother      MENTAL ILLNESS Maternal Grandmother      Substance  "Abuse Maternal Grandmother      Prostate Cancer Maternal Grandfather      DIABETES Paternal Grandmother      Breast Cancer Paternal Grandmother      Depression Paternal Grandmother      MENTAL ILLNESS Paternal Grandmother      DIABETES Paternal Grandfather      Asthma Brother      DIABETES Paternal Uncle      CEREBROVASCULAR DISEASE No family hx of      Anesthesia Reaction No family hx of      OSTEOPOROSIS No family hx of      Genetic Disorder No family hx of      Thyroid Disease No family hx of      Liver Disease No family hx of       Social History   Substance Use Topics     Smoking status: Former Smoker     Types: Cigarettes     Smokeless tobacco: Never Used      Comment: 1-2 cigarettes per day to cope with stress     Alcohol use 4.8 oz/week     8 Standard drinks or equivalent per week      Comment: not for a month     Lives in [place] with fiancee and dog.  ([place], age 1.5 years).  Works as salesman (furniture).       Review of systems  A 10-point review of systems was negative.    Examination  /77  Pulse 66  Temp 97.8  F (36.6  C) (Oral)  Ht 1.956 m (6' 5\")  Wt (!) 146.8 kg (323 lb 9.6 oz)  SpO2 99%  BMI 38.37 kg/m2  Body mass index is 38.37 kg/(m^2).    Gen- well, NAD, A+Ox3, normal color  Eye- EOMI  ENT- MMM, normal oropharynx  Lym- no palpable lymphadenopathy  CVS- S1, S2 normal, no added sounds, RRR  RS- CTA  Abd- obese, soft, non-tender, palpable liver below R costal margin, no splenomegaly or ascites on palpation or percussion, BS+  Extr- pulses good, no DAYLIN  MS- nail pitting bilaterally, no clubbing  Neuro- A+Ox3, no asterixis  Skin- psoriatic plaques on lower trunk, extremities and scalp, no jaundice  Psych- normal mood    Laboratory  Lab Results   Component Value Date     02/01/2017    POTASSIUM 4.2 02/01/2017    CHLORIDE 100 02/01/2017    CO2 26 02/01/2017    BUN 13 02/01/2017    CR 0.87 02/01/2017       Lab Results   Component Value Date    BILITOTAL 0.5 02/01/2017    ALT " 135 02/01/2017    AST 51 02/01/2017    ALKPHOS 80 02/01/2017       Lab Results   Component Value Date    ALBUMIN 4.3 02/01/2017    PROTTOTAL 7.9 02/01/2017        Lab Results   Component Value Date    WBC 6.4 06/03/2016    HGB 14.8 06/03/2016    MCV 87 06/03/2016     06/03/2016     Results for MICHELLE AMOS (MRN [id number]) as of 2/27/2017 09:09   Ref. Range 12/18/2015 09:07   Hep B Surface Agn Latest Ref Range: NR  Nonreactive   Hepatitis B Surface Antibody Latest Ref Range: <8.00 m[IU]/mL 252.17 (H)   Hepatitis B Core Joanne Latest Ref Range: NR  Nonreactive   Hepatitis C Antibody Latest Ref Range: NR  Nonreactive...       Radiology  Liver U/S 2/6/17 reviewed    Assessment  36-year-old obese male with a history of psoriatic arthritis who presents for further evaluation and management of abnormal liver function tests most likely related to fatty liver disease.  No clinical, biochemical or radiographic evidence of cirrhosis.  Will rule out other etiologies of chronic liver disease including celiac disease, autoimmune hepatitis, hemochromatosis and alpha-1 antitrypsin deficiency particularly in the context of history of immune disease.  Will repeat liver function tests today in the context of recent weight loss to evaluate for improvement.    We discussed the importance of ongoing weight loss with diet and exercise for overall health in addition to improvement of potential fatty liver disease.  We also discussed the potential need for liver biopsy if any of the above testing returned positive.      Biologic therapy for psoriatic arthritis is not absolutely contraindicated in patients with chronic liver disease if liver function tests are closely monitored at the time of initiation    Plan  1.  Check CMP, INR, CBC  2.  Check HAV Ab, anti-TTG ab, A1-AT phenotype  3.  Check ASMA, DANYA, serum immunoglobulins  4.  Weight loss with diet and exercise  5.  Discontinue turmeric and apple cider vinegar  6.  OK to take  ibuprofen or naproxen for joint symptoms  7.  Follow-up in 6 months    Devyn Torres MD  Hepatology  [facility]

## 2017-02-28 LAB
ANA SER QL IA: NORMAL
TTG IGA SER-ACNC: 1 U/ML
TTG IGG SER-ACNC: NORMAL U/ML

## 2017-03-01 LAB — SMA IGG SER-ACNC: 9

## 2017-03-03 LAB
A1AT PHENOTYP SERPL-IMP: ABNORMAL
A1AT SERPL-MCNC: 104 MG/DL
A1AT SS SERPL-MCNC: ABNORMAL G/L
A1AT SZ SERPL-MCNC: ABNORMAL G/L
A1AT ZZ SERPL-MCNC: ABNORMAL G/L
SPECIMEN SOURCE: ABNORMAL

## 2017-04-23 ENCOUNTER — RADIANT APPOINTMENT (OUTPATIENT)
Dept: GENERAL RADIOLOGY | Facility: CLINIC | Age: 37
End: 2017-04-23
Payer: COMMERCIAL

## 2017-04-23 ENCOUNTER — OFFICE VISIT (OUTPATIENT)
Dept: URGENT CARE | Facility: URGENT CARE | Age: 37
End: 2017-04-23
Payer: COMMERCIAL

## 2017-04-23 VITALS
SYSTOLIC BLOOD PRESSURE: 145 MMHG | OXYGEN SATURATION: 98 % | BODY MASS INDEX: 37.95 KG/M2 | TEMPERATURE: 97.5 F | HEART RATE: 69 BPM | WEIGHT: 315 LBS | DIASTOLIC BLOOD PRESSURE: 95 MMHG

## 2017-04-23 DIAGNOSIS — S69.91XA HAND INJURY, RIGHT, INITIAL ENCOUNTER: ICD-10-CM

## 2017-04-23 DIAGNOSIS — S69.91XA HAND INJURY, RIGHT, INITIAL ENCOUNTER: Primary | ICD-10-CM

## 2017-04-23 PROCEDURE — 99213 OFFICE O/P EST LOW 20 MIN: CPT | Performed by: PHYSICIAN ASSISTANT

## 2017-04-23 PROCEDURE — 73130 X-RAY EXAM OF HAND: CPT | Mod: RT

## 2017-04-23 RX ORDER — HYDROCODONE BITARTRATE AND ACETAMINOPHEN 5; 325 MG/1; MG/1
1 TABLET ORAL EVERY 6 HOURS PRN
Qty: 12 TABLET | Refills: 0 | Status: SHIPPED | OUTPATIENT
Start: 2017-04-23 | End: 2017-10-31 | Stop reason: ALTCHOICE

## 2017-04-23 ASSESSMENT — PAIN SCALES - GENERAL: PAINLEVEL: EXTREME PAIN (8)

## 2017-04-23 NOTE — MR AVS SNAPSHOT
After Visit Summary   4/23/2017    Lakhwinder Jones    MRN: [id number]           Patient Information     Date Of Birth          1980        Visit Information        Provider Department      4/23/2017 1:35 PM Favio Watts PA [facility]        Today's Diagnoses     Hand injury, right, initial encounter    -  1      Care Instructions      Hand Sprain  A sprain is a stretching or tearing of the ligaments that hold a joint together. There are no broken bones. Sprains take 3 to 6 weeks to heal. A sprained hand may be treated with a splint or elastic wrap for support.  Home care    Keep your arm elevated to reduce pain and swelling. This is most important during the first 48 hours.    Apply an ice pack over the injured area for 15 to 20 minutes every 3 to 6 hours. You should do this for the first 24 to 48 hours. You can make an ice pack by filling a plastic bag that seals at the top with ice cubes and then wrapping it with a thin towel. Continue the use of ice packs for relief of pain and swelling as needed. As the ice melts, be careful to avoid getting any wrap or splint wet. After 48 hours, apply heat (warm shower or warm bath) for 15 to 20 minutes several times a day, or alternate ice and heat.    You may use over-the-counter pain medicine to control pain, unless another pain medicine was prescribed. If you have chronic liver or kidney disease or ever had a stomach ulcer or GI bleeding, talk with your healthcare provider before using these medicines.    If you were given a splint or elastic wrap, wear it until your pain improves.  Follow-up care  Follow up with your healthcare provider as advised. Sometimes fractures don t show up on the first X-ray. Bruises and sprains can sometimes hurt as much as a fracture. These injuries can take time to heal completely. If your symptoms don t improve or they get worse, talk with your healthcare provider. You may need a repeat  X-ray.  When to seek medical advice  Call your healthcare provider right away if any of these occur:    Pain or swelling increases    Fingers or hand becomes cold, blue, numb, or tingly    0165-2819 [employer]. [address] [place] [address], [place] [address]. All rights reserved. This information is not intended as a substitute for professional medical care. Always follow your healthcare professional's instructions.              Follow-ups after your visit        Follow-up notes from your care team     Return if symptoms worsen or fail to improve.      Your next 10 appointments already scheduled     Aug 28, 2017  8:00 AM CDT   Lab with  LAB   [facility] ([facility])    [address] [place] [id number] [phone]            Aug 28, 2017  9:00 AM CDT   (Arrive by 8:45 AM)   Return General Liver with Devyn Britt MD   [facility] ([facility])    [address] [place] [id number] [phone]              Who to contact     If you have questions or need follow up information about today's clinic visit or your schedule please contact [facility] directly at [phone].  Normal or non-critical lab and imaging results will be communicated to you by LIFT12hart, letter or phone within 4 business days after the clinic has received the results. If you do not hear from us within 7 days, please contact the clinic through [email or web address] or phone. If you have a critical or abnormal lab result, we will notify you by phone as soon as possible.  Submit refill requests through [employer] or call your pharmacy and they will forward the refill request to us. Please allow 3 business days for your refill to be completed.          Additional Information About Your Visit        [employer] Information     [employer] gives you secure access to your electronic health record. If you see a primary care  provider, you can also send messages to your care team and make appointments. If you have questions, please call your primary care clinic.  If you do not have a primary care provider, please call [phone] and they will assist you.        Care EveryWhere ID     This is your Care EveryWhere ID. This could be used by other organizations to access your [place] medical records  [id number]        Your Vitals Were     Pulse Temperature Pulse Oximetry BMI (Body Mass Index)          69 97.5  F (36.4  C) (Oral) 98% 37.95 kg/m2         Blood Pressure from Last 3 Encounters:   04/23/17 (!) 145/95   02/27/17 128/77   02/01/17 (!) 140/92    Weight from Last 3 Encounters:   04/23/17 (!) 320 lb (145.2 kg)   02/27/17 (!) 323 lb 9.6 oz (146.8 kg)   02/01/17 (!) 331 lb 6.4 oz (150.3 kg)                 Today's Medication Changes          These changes are accurate as of: 4/23/17  2:06 PM.  If you have any questions, ask your nurse or doctor.               Start taking these medicines.        Dose/Directions    HYDROcodone-acetaminophen 5-325 MG per tablet   Commonly known as:  NORCO   Used for:  Hand injury, right, initial encounter   Started by:  Favio Watts PA        Dose:  1 tablet   Take 1 tablet by mouth every 6 hours as needed for moderate to severe pain   Quantity:  12 tablet   Refills:  0       order for DME   Used for:  Hand injury, right, initial encounter   Started by:  Favio Watts PA        Dose:  1 Device   1 Device by Device route once for 1 dose Right wrist brace - use as instructed   Quantity:  1 Device   Refills:  0            Where to get your medicines      Some of these will need a paper prescription and others can be bought over the counter.  Ask your nurse if you have questions.     Bring a paper prescription for each of these medications     HYDROcodone-acetaminophen 5-325 MG per tablet       You don't need a prescription for these medications     order for DME                 Primary Care Provider Office Phone # Fax #    Simona Prasad PA-C [phone]       [facility] [address]  [facility] [address]        Thank you!     Thank you for choosing [facility]  for your care. Our goal is always to provide you with excellent care. Hearing back from our patients is one way we can continue to improve our services. Please take a few minutes to complete the written survey that you may receive in the mail after your visit with us. Thank you!             Your Updated Medication List - Protect others around you: Learn how to safely use, store and throw away your medicines at www.disposemymeds.org.          This list is accurate as of: 4/23/17  2:06 PM.  Always use your most recent med list.                   Brand Name Dispense Instructions for use    citalopram 40 MG tablet    celeXA    90 tablet    Take 1 tablet (40 mg) by mouth daily       HYDROcodone-acetaminophen 5-325 MG per tablet    NORCO    12 tablet    Take 1 tablet by mouth every 6 hours as needed for moderate to severe pain       LORazepam 1 MG tablet    ATIVAN    30 tablet    Take 1 tablet (1 mg) by mouth 2 times daily as needed for anxiety       naproxen 500 MG tablet    NAPROSYN    60 tablet    Take 1 tablet (500 mg) by mouth 2 times daily (with meals)       order for DME     1 Device    1 Device by Device route once for 1 dose Right wrist brace - use as instructed

## 2017-04-23 NOTE — PATIENT INSTRUCTIONS
Hand Sprain  A sprain is a stretching or tearing of the ligaments that hold a joint together. There are no broken bones. Sprains take 3 to 6 weeks to heal. A sprained hand may be treated with a splint or elastic wrap for support.  Home care    Keep your arm elevated to reduce pain and swelling. This is most important during the first 48 hours.    Apply an ice pack over the injured area for 15 to 20 minutes every 3 to 6 hours. You should do this for the first 24 to 48 hours. You can make an ice pack by filling a plastic bag that seals at the top with ice cubes and then wrapping it with a thin towel. Continue the use of ice packs for relief of pain and swelling as needed. As the ice melts, be careful to avoid getting any wrap or splint wet. After 48 hours, apply heat (warm shower or warm bath) for 15 to 20 minutes several times a day, or alternate ice and heat.    You may use over-the-counter pain medicine to control pain, unless another pain medicine was prescribed. If you have chronic liver or kidney disease or ever had a stomach ulcer or GI bleeding, talk with your healthcare provider before using these medicines.    If you were given a splint or elastic wrap, wear it until your pain improves.  Follow-up care  Follow up with your healthcare provider as advised. Sometimes fractures don t show up on the first X-ray. Bruises and sprains can sometimes hurt as much as a fracture. These injuries can take time to heal completely. If your symptoms don t improve or they get worse, talk with your healthcare provider. You may need a repeat X-ray.  When to seek medical advice  Call your healthcare provider right away if any of these occur:    Pain or swelling increases    Fingers or hand becomes cold, blue, numb, or tingly    [id number] [employer]. [address] [place] [address], [place] [address]. All rights reserved. This information is not intended as a substitute for professional medical care. Always follow your  healthcare professional's instructions.

## 2017-04-23 NOTE — NURSING NOTE
"Chief Complaint   Patient presents with     Hand Injury     fell down stairs today       Initial BP (!) 145/95 (BP Location: Left arm, Patient Position: Chair, Cuff Size: Adult Large)  Pulse 69  Temp 97.5  F (36.4  C) (Oral)  Wt (!) 320 lb (145.2 kg)  SpO2 98%  BMI 37.95 kg/m2 Estimated body mass index is 37.95 kg/(m^2) as calculated from the following:    Height as of 2/27/17: 6' 5\" (1.956 m).    Weight as of this encounter: 320 lb (145.2 kg).  Medication Reconciliation: complete  Cara Lancaster CMA    "

## 2017-04-23 NOTE — PROGRESS NOTES
SUBJECTIVE:                                                    Lakhwinder Jones is a 36 year old male who presents to clinic today for the following health issues:      Musculoskeletal problem/pain      Duration: today    Description  Location: right hand    Intensity:  8/10    Accompanying signs and symptoms: radiation of pain to up to the forearm from the wrist, numbness, tingling and swelling    History  Previous similar problem: no   Previous evaluation:  none    Precipitating or alleviating factors:  Trauma or overuse: YES  Aggravating factors include: overuse    Therapies tried and outcome: rest/inactivity, ice and acetaminophen      fell down a couple stairs and hit hand on concrete            Allergies   Allergen Reactions     Tramadol Other (See Comments)     Shellfish-Derived Products        Past Medical History:   Diagnosis Date     Anxiety      Back pain     chronic     Obesity      Psoriatic arthritis (H)          Current Outpatient Prescriptions on File Prior to Visit:  naproxen (NAPROSYN) 500 MG tablet Take 1 tablet (500 mg) by mouth 2 times daily (with meals)   citalopram (CELEXA) 40 MG tablet Take 1 tablet (40 mg) by mouth daily   LORazepam (ATIVAN) 1 MG tablet Take 1 tablet (1 mg) by mouth 2 times daily as needed for anxiety     No current facility-administered medications on file prior to visit.     Social History   Substance Use Topics     Smoking status: Former Smoker     Types: Cigarettes     Smokeless tobacco: Never Used      Comment: 1-2 cigarettes per day to cope with stress     Alcohol use 4.8 oz/week     8 Standard drinks or equivalent per week      Comment: not for a month       ROS:  Gen: np fevers  Musculoskel: + as above  Skin: as above    OBJECTIVE:  BP (!) 145/95 (BP Location: Left arm, Patient Position: Chair, Cuff Size: Adult Large)  Pulse 69  Temp 97.5  F (36.4  C) (Oral)  Wt (!) 320 lb (145.2 kg)  SpO2 98%  BMI 37.95 kg/m2   General:   awake, alert, and cooperative.  NAD.    Head: Normocephalic, atraumatic.  Eyes: Conjunctiva clear,   MS:  Rt hand, ROMmildly decreased to flexion 2ns and 3rd digits, wrist mild decrease ROM, +  TTP , swelling and bruising around the 1st and 2nd MCs. cap refill intact,    Neuro: Alert and oriented - normal speech.    XR no fx/fb    My  account not currently working    ASSESSMENT:    ICD-10-CM    1. Hand injury, right, initial encounter S69.91XA XR Hand Right G/E 3 Views     HYDROcodone-acetaminophen (NORCO) 5-325 MG per tablet     order for DME           PLAN:   Advised about symptoms which might herald more serious problems.

## 2017-06-07 ENCOUNTER — OFFICE VISIT (OUTPATIENT)
Dept: FAMILY MEDICINE | Facility: CLINIC | Age: 37
End: 2017-06-07
Payer: COMMERCIAL

## 2017-06-07 VITALS
SYSTOLIC BLOOD PRESSURE: 125 MMHG | BODY MASS INDEX: 37.19 KG/M2 | HEART RATE: 72 BPM | DIASTOLIC BLOOD PRESSURE: 80 MMHG | OXYGEN SATURATION: 98 % | HEIGHT: 77 IN | TEMPERATURE: 97.2 F | WEIGHT: 315 LBS

## 2017-06-07 DIAGNOSIS — E66.812 OBESITY, CLASS II, BMI 35-39.9: ICD-10-CM

## 2017-06-07 DIAGNOSIS — L40.50 PSORIATIC ARTHRITIS (H): ICD-10-CM

## 2017-06-07 DIAGNOSIS — M54.41 ACUTE RIGHT-SIDED LOW BACK PAIN WITH RIGHT-SIDED SCIATICA: Primary | ICD-10-CM

## 2017-06-07 PROCEDURE — 99214 OFFICE O/P EST MOD 30 MIN: CPT | Performed by: PREVENTIVE MEDICINE

## 2017-06-07 RX ORDER — CYCLOBENZAPRINE HCL 5 MG
5 TABLET ORAL 3 TIMES DAILY PRN
Qty: 30 TABLET | Refills: 1 | Status: SHIPPED | OUTPATIENT
Start: 2017-06-07 | End: 2017-07-25

## 2017-06-07 RX ORDER — GABAPENTIN 300 MG/1
300 CAPSULE ORAL AT BEDTIME
Qty: 30 CAPSULE | Refills: 1 | Status: SHIPPED | OUTPATIENT
Start: 2017-06-07 | End: 2018-02-20

## 2017-06-07 RX ORDER — PREDNISONE 20 MG/1
20 TABLET ORAL 2 TIMES DAILY
Qty: 10 TABLET | Refills: 0 | Status: SHIPPED | OUTPATIENT
Start: 2017-06-07 | End: 2017-10-31

## 2017-06-07 ASSESSMENT — PAIN SCALES - GENERAL: PAINLEVEL: EXTREME PAIN (8)

## 2017-06-07 NOTE — PATIENT INSTRUCTIONS
At [facility], we strive to deliver an exceptional experience to you, every time we see you.    If you receive a survey in the mail, please send us back your thoughts. We really do value your feedback.    Thank you for visiting [facility]    Normal or non-critical lab and imaging results will be communicated to you by MyChart, letter or phone within 7 days.  If you do not hear from us within 10 days, please call the clinic. If you have a critical or abnormal lab result, we will notify you by phone as soon as possible.     If you have any questions regarding your visit please contact:     Team Radha[facility]Spirit  Clinic Hours Telephone Number   Dr. Nemo Suárez   7am-7pm  Monday through Thursday  7am-5pm Friday [phone]  Lavern MONTALVO RN   Pharmacy 8:30am-9pm Monday-Friday    9am-5pm Saturday-Sunday [phone]   Urgent Care 11am-9pm Monday-Friday        9am-5pm Saturday-Sunday [phone]     After hours, weekend or if you need to make an appointment with your primary provider please call [phone].   After Hours nurse advise: call [facility]: [phone]    Use [employer] (secure email communication and access to your chart) to send your primary care provider a message or make an appointment. Ask someone on your Team how to sign up for [employer]. To log on to [place] or for more information in [place] please visit the website at [email or web address].   As of October 8, 2013, all password changes, disabled accounts, or ID changes in [employer]/MyHealth will be done by our Access Services Department.   If you need help with your account or password, call: [phone]. Clinic staff no longer has the ability to change passwords.

## 2017-06-07 NOTE — PROGRESS NOTES
SUBJECTIVE:                                                    Lakhwinder Jones is a 36 year old male who presents to clinic today for the following health issues:      Back Pain      Duration: x 3 days        Specific cause: lifting    Description:   Location of pain: low back right and middle of back right  Character of pain: sharp, cramping and constant  Pain radiation:radiates into the right buttocks and radiates into the right leg  New numbness or weakness in legs, not attributed to pain:  no     Intensity: Currently 8/10    History:   Pain interferes with job: YES  History of back problems: previous herniated disc  Any previous MRI or X-rays: Yes- at [facility].  Date 2015  Sees a specialist for back pain:  No  Therapies tried without relief: cold, heat, NSAIDs, rest and stretch    Alleviating factors:   Improved by: muscle relaxants, opioids and stretch      Precipitating factors:  Worsened by: Bending and standing    Functional and Psychosocial Screen ([facility] Antonio STarT Back):      Not performed today       Accompanying Signs & Symptoms:  Risk of Fracture:  None  Risk of Cauda Equina:  None  Risk of Infection:  None  Risk of Cancer:  None  Risk of Ankylosing Spondylitis:  Onset at age <35, male, AND morning back stiffness. no            Radiates down to right knee, started after lifting furniture.  The patient does not have any focal weakness or numbness, no urine or stool incontinence, no hematuria, no saddle anesthesia and no gait abnormalities.   No fever or chills, no emesis.   Pain is increasing. Has had cortisone injections in his back in the past and that helped quite a bit.     Problem list and histories reviewed & adjusted, as indicated.  Additional history: as documented    Patient Active Problem List   Diagnosis     Anxiety state     Chronic back pain     Depression     Panic disorder without agoraphobia     Psoriasis     CARDIOVASCULAR SCREENING; LDL GOAL LESS THAN 160     Abnormal results of liver  function studies     Psoriatic arthritis (H)     Obesity, Class II, BMI 35-39.9 (H)     Past Surgical History:   Procedure Laterality Date     NOSE SURGERY      3 times     right elbow surgey      14 yo     TESTICLE SURGERY      23 yo       Social History   Substance Use Topics     Smoking status: Former Smoker     Types: Cigarettes     Smokeless tobacco: Never Used      Comment: 1-2 cigarettes per day to cope with stress     Alcohol use 4.8 oz/week     8 Standard drinks or equivalent per week      Comment: not for a month     Family History   Problem Relation Age of Onset     Obesity Mother      DIABETES Father      Coronary Artery Disease Father      Hypertension Father      Hyperlipidemia Father      Depression Father      Anxiety Disorder Father      MENTAL ILLNESS Father      Substance Abuse Father      Breast Cancer Maternal Grandmother      Depression Maternal Grandmother      MENTAL ILLNESS Maternal Grandmother      Substance Abuse Maternal Grandmother      Prostate Cancer Maternal Grandfather      DIABETES Paternal Grandmother      Breast Cancer Paternal Grandmother      Depression Paternal Grandmother      MENTAL ILLNESS Paternal Grandmother      DIABETES Paternal Grandfather      Asthma Brother      DIABETES Paternal Uncle      CEREBROVASCULAR DISEASE No family hx of      Anesthesia Reaction No family hx of      OSTEOPOROSIS No family hx of      Genetic Disorder No family hx of      Thyroid Disease No family hx of      Liver Disease No family hx of          Current Outpatient Prescriptions   Medication Sig Dispense Refill     predniSONE (DELTASONE) 20 MG tablet Take 1 tablet (20 mg) by mouth 2 times daily 10 tablet 0     cyclobenzaprine (FLEXERIL) 5 MG tablet Take 1 tablet (5 mg) by mouth 3 times daily as needed for muscle spasms (Will cause sedation) 30 tablet 1     gabapentin (NEURONTIN) 300 MG capsule Take 1 capsule (300 mg) by mouth At Bedtime 30 capsule 1     HYDROcodone-acetaminophen (NORCO) 5-325  "MG per tablet Take 1 tablet by mouth every 6 hours as needed for moderate to severe pain 12 tablet 0     citalopram (CELEXA) 40 MG tablet Take 1 tablet (40 mg) by mouth daily 90 tablet 1     Allergies   Allergen Reactions     Tramadol Other (See Comments)     Shellfish-Derived Products      BP Readings from Last 3 Encounters:   06/07/17 125/80   04/23/17 (!) 145/95   02/27/17 128/77    Wt Readings from Last 3 Encounters:   06/07/17 (!) 322 lb (146.1 kg)   04/23/17 (!) 320 lb (145.2 kg)   02/27/17 (!) 323 lb 9.6 oz (146.8 kg)                    Reviewed and updated as needed this visit by clinical staff  Tobacco  Allergies  Meds       Reviewed and updated as needed this visit by Provider         ROS:  Constitutional, neuro, ENT, endocrine, pulmonary, cardiac, gastrointestinal, genitourinary, musculoskeletal, integument and psychiatric systems are negative, except as otherwise noted.    OBJECTIVE:                                                    /80  Pulse 72  Temp 97.2  F (36.2  C) (Oral)  Ht 6' 5\" (1.956 m)  Wt (!) 322 lb (146.1 kg)  SpO2 98%  BMI 38.18 kg/m2  Body mass index is 38.18 kg/(m^2).  GENERAL APPEARANCE: healthy, alert and antalgic gait  EYES: Eyes grossly normal to inspection and conjunctivae and sclerae normal  NECK: no adenopathy, no asymmetry, masses, or scars and trachea midline and normal to palpation  RESP: lungs clear to auscultation - no rales, rhonchi or wheezes  CV: regular rates and rhythm and normal S1 S2, no S3 or S4  ABDOMEN: No rebound or guarding   MS: extremities normal- no gross deformities noted and peripheral pulses normal  SKIN: Generalized Psoriatic plaques   NEURO: Normal strength and tone, mentation intact, speech normal, DTR symmetrically normal in lower extremities and Difficulty with heel walk  PSYCH: mentation appears normal    Lumbar spine: No swelling, bruising, erythema atrophy or deformity.  Tenderness noted on palpation of spinous processes.  Range of " motion of the lumbar spine is decreased in all directions without focal deficit.  Strength is full.  SLR POSITIVE right side.  Distal motor and sensory exam is intact.  Reflexes are 2+ and symmetrical.  Distal pulses intact. No sacroiliac tenderness bilaterally.  Great toe extension normal.       Diagnostic test results:  Diagnostic Test Results:  No results found for this or any previous visit (from the past 24 hour(s)).     ASSESSMENT/PLAN:                                                    1. Acute right-sided low back pain with right-sided sciatica  -Sedation side effect of medication reviewed  -Alternating heat and ice  -Work note provided  -ER precautions reviewed, if increased pain, incontinence, focal weakness then needs to be seen in ER  - predniSONE (DELTASONE) 20 MG tablet; Take 1 tablet (20 mg) by mouth 2 times daily  Dispense: 10 tablet; Refill: 0  - cyclobenzaprine (FLEXERIL) 5 MG tablet; Take 1 tablet (5 mg) by mouth 3 times daily as needed for muscle spasms (Will cause sedation)  Dispense: 30 tablet; Refill: 1  - gabapentin (NEURONTIN) 300 MG capsule; Take 1 capsule (300 mg) by mouth At Bedtime  Dispense: 30 capsule; Refill: 1  - ORTHO  REFERRAL  - [facility]    2. Psoriatic arthritis (H)  -Past history  -Generalized rash  -Not on any medication at this time  -Needs to establish with DERM     3. Obesity, Class II, BMI 35-39.9 (H)  -Weight stable    I ended our visit today by discussing the patient's diagnoses and recommended treatment. Please refer to today's diagnoses and orders for further details. I briefly discussed the pathophysiology of these conditions and outlined their expected course. I discussed the warning symptoms and signs that indicate an atypical course that would need urgent or emergent care. I also discussed self care strategies for symptom relief.  Patient voiced complete understanding of plan of care and was in full agreement to proceed. After  visit summary discussed and handed to patient.    Common side effects of medications prescribed at this visit were discussed with the patient. Severe side effects, including current applicable black box warnings, were discussed.     Total time spent was 25 minutes, and more than 50% of face to face time was spent in counseling and/or coordination of care regarding principles of care, medication management, and appropriate interventional options.      Follow up with Provider - As needed      Jessy Muñoz MD MPH    [facility]

## 2017-06-07 NOTE — MR AVS SNAPSHOT
After Visit Summary   6/7/2017    Lakhwinder Jones    MRN: [id number]           Patient Information     Date Of Birth          1980        Visit Information        Provider Department      6/7/2017 9:20 AM Jessy Muñoz MD [facility]        Today's Diagnoses     Acute right-sided low back pain with right-sided sciatica    -  1    Psoriatic arthritis (H)        Obesity, Class II, BMI 35-39.9 (H)          Care Instructions    At [facility], we strive to deliver an exceptional experience to you, every time we see you.    If you receive a survey in the mail, please send us back your thoughts. We really do value your feedback.    Thank you for visiting [facility]    Normal or non-critical lab and imaging results will be communicated to you by MyChart, letter or phone within 7 days.  If you do not hear from us within 10 days, please call the clinic. If you have a critical or abnormal lab result, we will notify you by phone as soon as possible.     If you have any questions regarding your visit please contact:     Team Radha/Spirit  Clinic Hours Telephone Number   Dr. Nemo Suárez   7am-7pm  Monday through Thursday  7am-5pm Friday [phone]  Lavern MONTALVO RN   Pharmacy 8:30am-9pm Monday-Friday    9am-5pm Saturday-Sunday [phone]   Urgent Care 11am-9pm Monday-Friday        9am-5pm Saturday-Sunday [phone]     After hours, weekend or if you need to make an appointment with your primary provider please call [phone].   After Hours nurse advise: call [facility]: [phone]    Use Stampt (secure email communication and access to your chart) to send your primary care provider a message or make an appointment. Ask someone on your Team how to sign up for Stampt. To log on to [employer] or for more information in  [employer] please visit the website at [email or web address].   As of October 8, 2013, all password changes, disabled accounts, or ID changes in [employer]/MyHealth will be done by our Access Services Department.   If you need help with your account or password, call: [phone]. Clinic staff no longer has the ability to change passwords.             Follow-ups after your visit        Additional Services     [facility] PT, HAND, AND CHIROPRACTIC REFERRAL       **This order will print in the [facility] Scheduling Office**    Physical Therapy, Hand Therapy and Chiropractic Care are available through:    *[facility]  *[facility]  *[facility]    Call one number to schedule at any of the above locations: [phone].    Your provider has referred you to: As Indicated:     Indication/Reason for Referral: Low Back Pain  Onset of Illness: 3 days, has had back pain in 2015 as well  Therapy Orders: Evaluate and Treat  Special Programs: None  Special Request: None    Jose Antonio Brady      Additional Comments for the Therapist or Chiropractor:     Please be aware that coverage of these services is subject to the terms and limitations of your health insurance plan.  Call member services at your health plan with any benefit or coverage questions.      Please bring the following to your appointment:    *Your personal calendar for scheduling future appointments  *Comfortable clothing            ORTHO  REFERRAL       [facility] is referring you to the Orthopedic  Services at [facility].       The  Representative will assist you in the coordination of your Orthopedic and Musculoskeletal Care as prescribed by your physician.    The  Representative will call you within 1 business day to help schedule your appointment, or you may contact the  Representative at:    All areas ~ [phone]     Type of Referral  : Spine: Lumbar  **Choose Medical Spine Specialist (unless patient was seen by a Medical Spine Specialist within the past 6 months).**  Surgical Evaluation is advised if the patient presents with one or more of the following red flags: Evidence of Spinal Tumor, Infection or Fracture, Cauda Equina Syndrome, Sudden or Progressive Weakness, Loss of Bowel or Bladder Control, or any other documented emergent neurological condition resulting from a Lumbar Spinal Condition. Medical Spine Specialist        Timeframe requested: Within 2 weeks    Coverage of these services is subject to the terms and limitations of your health insurance plan.  Please call member services at your health plan with any benefit or coverage questions.      If X-rays, CT or MRI's have been performed, please contact the facility where they were done to arrange for , prior to your scheduled appointment.  Please bring this referral request to your appointment and present it to your specialist.                  Your next 10 appointments already scheduled     Aug 28, 2017  8:00 AM CDT   Lab with  LAB   [facility] Lab ([facility])    [address] [place] [address]  [address] [facility] [address] [phone]            Aug 28, 2017  9:00 AM CDT   (Arrive by 8:45 AM)   Return General Liver with Devyn Britt MD   [facility] Hepatology ([facility])    [address] [place] [address] [phone]              Who to contact     If you have questions or need follow up information about today's clinic visit or your schedule please contact [facility] directly at [phone].  Normal or non-critical lab and imaging results will be communicated to you by MyChart, letter or phone within 4 business days after the clinic has received the results. If you do not hear from us within 7 days, please contact the clinic through MyChart or phone. If you have a  "critical or abnormal lab result, we will notify you by phone as soon as possible.  Submit refill requests through [facility] or call your pharmacy and they will forward the refill request to us. Please allow 3 business days for your refill to be completed.          Additional Information About Your Visit        [employer] Information     [facility] gives you secure access to your electronic health record. If you see a primary care provider, you can also send messages to your care team and make appointments. If you have questions, please call your primary care clinic.  If you do not have a primary care provider, please call [phone] and they will assist you.        Care EveryWhere ID     This is your Care EveryWhere ID. This could be used by other organizations to access your [place] medical records  [id number]        Your Vitals Were     Pulse Temperature Height Pulse Oximetry BMI (Body Mass Index)       72 97.2  F (36.2  C) (Oral) 6' 5\" (1.956 m) 98% 38.18 kg/m2        Blood Pressure from Last 3 Encounters:   06/07/17 125/80   04/23/17 (!) 145/95   02/27/17 128/77    Weight from Last 3 Encounters:   06/07/17 (!) 322 lb (146.1 kg)   04/23/17 (!) 320 lb (145.2 kg)   02/27/17 (!) 323 lb 9.6 oz (146.8 kg)              We Performed the Following     [facility] PT, HAND, AND CHIROPRACTIC REFERRAL     ORTHO  REFERRAL          Today's Medication Changes          These changes are accurate as of: 6/7/17  9:41 AM.  If you have any questions, ask your nurse or doctor.               Start taking these medicines.        Dose/Directions    cyclobenzaprine 5 MG tablet   Commonly known as:  FLEXERIL   Used for:  Acute right-sided low back pain with right-sided sciatica   Started by:  Jessy Muñoz MD        Dose:  5 mg   Take 1 tablet (5 mg) by mouth 3 times daily as needed for muscle spasms (Will cause sedation)   Quantity:  30 tablet   Refills:  1       gabapentin 300 MG capsule   Commonly known as:  NEURONTIN   Used for:  " Acute right-sided low back pain with right-sided sciatica   Started by:  Jessy Muñoz MD        Dose:  300 mg   Take 1 capsule (300 mg) by mouth At Bedtime   Quantity:  30 capsule   Refills:  1       predniSONE 20 MG tablet   Commonly known as:  DELTASONE   Used for:  Acute right-sided low back pain with right-sided sciatica   Started by:  Jessy Muñoz MD        Dose:  20 mg   Take 1 tablet (20 mg) by mouth 2 times daily   Quantity:  10 tablet   Refills:  0         Stop taking these medicines if you haven't already. Please contact your care team if you have questions.     LORazepam 1 MG tablet   Commonly known as:  ATIVAN   Stopped by:  Jessy Muñoz MD           naproxen 500 MG tablet   Commonly known as:  NAPROSYN   Stopped by:  Jessy Muñoz MD                Where to get your medicines      These medications were sent to [facility] - [place], [place] - [address]  [address], [facility] [address]     Phone:  [phone]     cyclobenzaprine 5 MG tablet    gabapentin 300 MG capsule    predniSONE 20 MG tablet                Primary Care Provider Office Phone # Fax #    Simona Prasad PA-C [phone]       [facility] [address]  [facility] [address]        Thank you!     Thank you for choosing [facility]  for your care. Our goal is always to provide you with excellent care. Hearing back from our patients is one way we can continue to improve our services. Please take a few minutes to complete the written survey that you may receive in the mail after your visit with us. Thank you!             Your Updated Medication List - Protect others around you: Learn how to safely use, store and throw away your medicines at www.disposemymeds.org.          This list is accurate as of: 6/7/17  9:41 AM.  Always use your most recent med list.                   Brand Name Dispense Instructions for use     citalopram 40 MG tablet    celeXA    90 tablet    Take 1 tablet (40 mg) by mouth daily       cyclobenzaprine 5 MG tablet    FLEXERIL    30 tablet    Take 1 tablet (5 mg) by mouth 3 times daily as needed for muscle spasms (Will cause sedation)       gabapentin 300 MG capsule    NEURONTIN    30 capsule    Take 1 capsule (300 mg) by mouth At Bedtime       HYDROcodone-acetaminophen 5-325 MG per tablet    NORCO    12 tablet    Take 1 tablet by mouth every 6 hours as needed for moderate to severe pain       predniSONE 20 MG tablet    DELTASONE    10 tablet    Take 1 tablet (20 mg) by mouth 2 times daily

## 2017-06-07 NOTE — NURSING NOTE
"Chief Complaint   Patient presents with     Back Pain       Initial /80  Pulse 72  Temp 97.2  F (36.2  C) (Oral)  Ht 6' 5\" (1.956 m)  Wt (!) 322 lb (146.1 kg)  SpO2 98%  BMI 38.18 kg/m2 Estimated body mass index is 38.18 kg/(m^2) as calculated from the following:    Height as of this encounter: 6' 5\" (1.956 m).    Weight as of this encounter: 322 lb (146.1 kg).  Medication Reconciliation: complete   Stefanie DALY        "

## 2017-06-07 NOTE — LETTER
[address] [address]  [phone]  Dept: [phone]      6/7/2017    Re: Lakhwinder Jones      TO WHOM IT MAY CONCERN:    Lakhwinder Jones  was seen on 6/7/17.  Please excuse him  until 6/9/17 due to illness.    Cordially,          Jessy Muñoz MD MPH    [facility]

## 2017-07-18 ENCOUNTER — RADIANT APPOINTMENT (OUTPATIENT)
Dept: GENERAL RADIOLOGY | Facility: CLINIC | Age: 37
End: 2017-07-18
Attending: PHYSICIAN ASSISTANT
Payer: COMMERCIAL

## 2017-07-18 ENCOUNTER — OFFICE VISIT (OUTPATIENT)
Dept: FAMILY MEDICINE | Facility: CLINIC | Age: 37
End: 2017-07-18
Payer: COMMERCIAL

## 2017-07-18 VITALS
WEIGHT: 315 LBS | TEMPERATURE: 97.7 F | BODY MASS INDEX: 37.19 KG/M2 | SYSTOLIC BLOOD PRESSURE: 135 MMHG | HEART RATE: 74 BPM | RESPIRATION RATE: 16 BRPM | OXYGEN SATURATION: 98 % | DIASTOLIC BLOOD PRESSURE: 86 MMHG | HEIGHT: 77 IN

## 2017-07-18 DIAGNOSIS — R10.32 LLQ ABDOMINAL PAIN: ICD-10-CM

## 2017-07-18 DIAGNOSIS — R19.7 DIARRHEA OF PRESUMED INFECTIOUS ORIGIN: ICD-10-CM

## 2017-07-18 DIAGNOSIS — R10.9 FLANK PAIN: ICD-10-CM

## 2017-07-18 DIAGNOSIS — R10.32 LLQ ABDOMINAL PAIN: Primary | ICD-10-CM

## 2017-07-18 DIAGNOSIS — R11.0 NAUSEA: ICD-10-CM

## 2017-07-18 LAB
ALBUMIN UR-MCNC: NEGATIVE MG/DL
APPEARANCE UR: CLEAR
BILIRUB UR QL STRIP: NEGATIVE
COLOR UR AUTO: YELLOW
ERYTHROCYTE [DISTWIDTH] IN BLOOD BY AUTOMATED COUNT: 12.7 % (ref 10–15)
GLUCOSE UR STRIP-MCNC: NEGATIVE MG/DL
HCT VFR BLD AUTO: 42.2 % (ref 40–53)
HGB BLD-MCNC: 15.4 G/DL (ref 13.3–17.7)
HGB UR QL STRIP: NEGATIVE
KETONES UR STRIP-MCNC: NEGATIVE MG/DL
LEUKOCYTE ESTERASE UR QL STRIP: NEGATIVE
MCH RBC QN AUTO: 31.2 PG (ref 26.5–33)
MCHC RBC AUTO-ENTMCNC: 36.5 G/DL (ref 31.5–36.5)
MCV RBC AUTO: 85 FL (ref 78–100)
NITRATE UR QL: NEGATIVE
PH UR STRIP: 6 PH (ref 5–7)
PLATELET # BLD AUTO: 203 10E9/L (ref 150–450)
RBC # BLD AUTO: 4.94 10E12/L (ref 4.4–5.9)
RBC #/AREA URNS AUTO: NORMAL /HPF (ref 0–2)
SP GR UR STRIP: 1.01 (ref 1–1.03)
URN SPEC COLLECT METH UR: NORMAL
UROBILINOGEN UR STRIP-ACNC: 0.2 EU/DL (ref 0.2–1)
WBC # BLD AUTO: 8.2 10E9/L (ref 4–11)
WBC #/AREA URNS AUTO: NORMAL /HPF (ref 0–2)

## 2017-07-18 PROCEDURE — 99214 OFFICE O/P EST MOD 30 MIN: CPT | Performed by: PHYSICIAN ASSISTANT

## 2017-07-18 PROCEDURE — 85027 COMPLETE CBC AUTOMATED: CPT | Performed by: PHYSICIAN ASSISTANT

## 2017-07-18 PROCEDURE — 74020 XR ABDOMEN 2 VW: CPT

## 2017-07-18 PROCEDURE — 81001 URINALYSIS AUTO W/SCOPE: CPT | Performed by: PHYSICIAN ASSISTANT

## 2017-07-18 PROCEDURE — 36415 COLL VENOUS BLD VENIPUNCTURE: CPT | Performed by: PHYSICIAN ASSISTANT

## 2017-07-18 RX ORDER — ALUMINA, MAGNESIA, AND SIMETHICONE 2400; 2400; 240 MG/30ML; MG/30ML; MG/30ML
30 SUSPENSION ORAL EVERY 4 HOURS PRN
Qty: 355 ML | Refills: 0 | Status: SHIPPED | OUTPATIENT
Start: 2017-07-18 | End: 2017-10-31

## 2017-07-18 RX ORDER — ONDANSETRON 4 MG/1
4-8 TABLET, ORALLY DISINTEGRATING ORAL EVERY 8 HOURS PRN
Qty: 20 TABLET | Refills: 1 | Status: SHIPPED | OUTPATIENT
Start: 2017-07-18 | End: 2017-10-31

## 2017-07-18 ASSESSMENT — PAIN SCALES - GENERAL: PAINLEVEL: EXTREME PAIN (8)

## 2017-07-18 NOTE — PATIENT INSTRUCTIONS
Mylanta take 30 ml every 4 hours until better  Zofran take 1-2 tablets every 6 hours as needed for nausea    Drink more water to stay hydrated    Follow up if not better in 3-5 days   Viral Gastroenteritis (Adult)    Gastroenteritis is commonly called the stomach flu. It is most often caused by a virus that affects the stomach and intestinal tract and usually lasts from 2 to 7 days. Common viruses causing gastroenteritis include norovirus, rotavirus, and hepatitis A. Non-viral causes of gastroenteritis include bacteria, parasites, and toxins.  The danger from repeated vomiting or diarrhea is dehydration. This is the loss of too much fluid from the body. When this occurs, body fluids must be replaced. Antibiotics do not help with this illness because it is usually viral.Simple home treatment will be helpful.  Symptoms of viral gastroenteritis may include:    Watery, loose stools    Stomach pain or abdominal cramps    Fever and chills    Nausea and vomiting    Loss of bowel control    Headache  Home care  Gastroenteritis is transmitted by contact with the stool or vomit of an infected person. This can occur from person to person or from contact with a contaminated surface.  Follow these guidelines when caring for yourself at home:    If symptoms are severe, rest at home for the next 24 hours or until you are feeling better.    Wash your hands with soap and water or use alcohol-based  to prevent the spread of infection. Wash your hands after touching anyone who is sick.    Wash your hands or use alcohol-based  after using the toilet and before meals. Clean the toilet after each use.  Remember these tips when preparing food:    People with diarrhea should not prepare or serve food to others. When preparing foods, wash your hands before and after.    Wash your hands after using cutting boards, countertops, knives, or utensils that have been in contact with raw food.    Keep uncooked meats away from  cooked and ready-to-eat foods.  Medicine  You may use acetaminophen or NSAID medicines like ibuprofen or naproxen to control fever unless another medicine was given. If you have chronic liver or kidney disease, talk with your healthcare provider before using these medicines. Also talk with your provider if you've had a stomach ulcer or gastrointestinal bleeding. Don't give aspirin to anyone under 18 years of age who is ill with a fever. It may cause severe liver damage. Don't use NSAIDS is you are already taking one for another condition (like arthritis) or are on aspirin (such as for heart disease or after a stroke).  If medicine for vomiting or diarrhea are prescribed, take these only as directed. Do not take over-the-counter medicines for vomiting or diarrhea unless instructed by your healthcare provider.  Diet  Follow these guidelines for food:    Water and liquids are important so you don't get dehydrated. Drink a small amount at a time or suck on ice chips if you are vomiting.    If you eat, avoid fatty, greasy, spicy, or fried foods.    Don't eat dairy if you have diarrhea. This can make diarrhea worse.    Avoid tobacco, alcohol, and caffeine which may worsen symptoms.  During the first 24 hours (the first full day), follow the diet below:    Beverages. Sports drinks, soft drinks without caffeine, ginger ale, mineral water (plain or flavored), decaffeinated tea and coffee. If you are very dehydrated, sports drinks aren't a good choice. They have too much sugar and not enough electrolytes. In this case, commercially available products called oral rehydration solutions, are best.    Soups. Eat clear broth, consommé, and bouillon.    Desserts. Eat gelatin, popsicles, and fruit juice bars.  During the next 24 hours (the second day), you may add the following to the above:    Hot cereal, plain toast, bread, rolls, and crackers    Plain noodles, rice, mashed potatoes, chicken noodle or rice soup    Unsweetened  canned fruit (avoid pineapple), bananas    Limit fat intake to less than 15 grams per day. Do this by avoiding margarine, butter, oils, mayonnaise, sauces, gravies, fried foods, peanut butter, meat, poultry, and fish.    Limit fiber and avoid raw or cooked vegetables, fresh fruits (except bananas), and bran cereals.    Limit caffeine and chocolate. Don't use spices or seasonings other than salt.    Limit dairy products.    Avoid alcohol.  During the next 24 hours:    Gradually resume a normal diet as you feel better and your symptoms improve.    If at any time it starts getting worse again, go back to clear liquids until you feel better.  Follow-up care  Follow up with your healthcare provider, or as advised. Call your provider if you don't get better within 24 hours or if diarrhea lasts more than a week. Also follow up if you are unable to keep down liquids and get dehydrated. If a stool (diarrhea) sample was taken, call as directed for the results.  Call 911  Call 911 if any of these occur:    Trouble breathing    Chest pain    Confused    Severe drowsiness or trouble awakening    Fainting or loss of consciousness    Rapid heart rate    Seizure    Stiff neck  When to seek medical advice  Call your healthcare provider right away if any of these occur:    Abdominal pain that gets worse    Continued vomiting (unable to keep liquids down)    Frequent diarrhea (more than 5 times a day)    Blood in vomit or stool (black or red color)    Dark urine, reduced urine output, or extreme thirst    Weakness or dizziness    Drowsiness    Fever of 100.4 F (38 C) oral or higher that does not get better with fever medicine    New rash  Date Last Reviewed: 1/3/2016    [id number] [employer]. [address] [place] [address], [place] [address]. All rights reserved. This information is not intended as a substitute for professional medical care. Always follow your healthcare professional's instructions.
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
## 2017-07-18 NOTE — MR AVS SNAPSHOT
After Visit Summary   7/18/2017    Lakhwinder Jones    MRN: [id number]           Patient Information     Date Of Birth          1980        Visit Information        Provider Department      7/18/2017 9:20 AM Simona Prasad PA-C [facility]        Today's Diagnoses     LLQ abdominal pain    -  1    Flank pain        Diarrhea of presumed infectious origin        Nausea          Care Instructions    Mylanta take 30 ml every 4 hours until better  Zofran take 1-2 tablets every 6 hours as needed for nausea    Drink more water to stay hydrated    Follow up if not better in 3-5 days   Viral Gastroenteritis (Adult)    Gastroenteritis is commonly called the stomach flu. It is most often caused by a virus that affects the stomach and intestinal tract and usually lasts from 2 to 7 days. Common viruses causing gastroenteritis include norovirus, rotavirus, and hepatitis A. Non-viral causes of gastroenteritis include bacteria, parasites, and toxins.  The danger from repeated vomiting or diarrhea is dehydration. This is the loss of too much fluid from the body. When this occurs, body fluids must be replaced. Antibiotics do not help with this illness because it is usually viral.Simple home treatment will be helpful.  Symptoms of viral gastroenteritis may include:    Watery, loose stools    Stomach pain or abdominal cramps    Fever and chills    Nausea and vomiting    Loss of bowel control    Headache  Home care  Gastroenteritis is transmitted by contact with the stool or vomit of an infected person. This can occur from person to person or from contact with a contaminated surface.  Follow these guidelines when caring for yourself at home:    If symptoms are severe, rest at home for the next 24 hours or until you are feeling better.    Wash your hands with soap and water or use alcohol-based  to prevent the spread of infection. Wash your hands after touching anyone who  is sick.    Wash your hands or use alcohol-based  after using the toilet and before meals. Clean the toilet after each use.  Remember these tips when preparing food:    People with diarrhea should not prepare or serve food to others. When preparing foods, wash your hands before and after.    Wash your hands after using cutting boards, countertops, knives, or utensils that have been in contact with raw food.    Keep uncooked meats away from cooked and ready-to-eat foods.  Medicine  You may use acetaminophen or NSAID medicines like ibuprofen or naproxen to control fever unless another medicine was given. If you have chronic liver or kidney disease, talk with your healthcare provider before using these medicines. Also talk with your provider if you've had a stomach ulcer or gastrointestinal bleeding. Don't give aspirin to anyone under 18 years of age who is ill with a fever. It may cause severe liver damage. Don't use NSAIDS is you are already taking one for another condition (like arthritis) or are on aspirin (such as for heart disease or after a stroke).  If medicine for vomiting or diarrhea are prescribed, take these only as directed. Do not take over-the-counter medicines for vomiting or diarrhea unless instructed by your healthcare provider.  Diet  Follow these guidelines for food:    Water and liquids are important so you don't get dehydrated. Drink a small amount at a time or suck on ice chips if you are vomiting.    If you eat, avoid fatty, greasy, spicy, or fried foods.    Don't eat dairy if you have diarrhea. This can make diarrhea worse.    Avoid tobacco, alcohol, and caffeine which may worsen symptoms.  During the first 24 hours (the first full day), follow the diet below:    Beverages. Sports drinks, soft drinks without caffeine, ginger ale, mineral water (plain or flavored), decaffeinated tea and coffee. If you are very dehydrated, sports drinks aren't a good choice. They have too much sugar and  not enough electrolytes. In this case, commercially available products called oral rehydration solutions, are best.    Soups. Eat clear broth, consommé, and bouillon.    Desserts. Eat gelatin, popsicles, and fruit juice bars.  During the next 24 hours (the second day), you may add the following to the above:    Hot cereal, plain toast, bread, rolls, and crackers    Plain noodles, rice, mashed potatoes, chicken noodle or rice soup    Unsweetened canned fruit (avoid pineapple), bananas    Limit fat intake to less than 15 grams per day. Do this by avoiding margarine, butter, oils, mayonnaise, sauces, gravies, fried foods, peanut butter, meat, poultry, and fish.    Limit fiber and avoid raw or cooked vegetables, fresh fruits (except bananas), and bran cereals.    Limit caffeine and chocolate. Don't use spices or seasonings other than salt.    Limit dairy products.    Avoid alcohol.  During the next 24 hours:    Gradually resume a normal diet as you feel better and your symptoms improve.    If at any time it starts getting worse again, go back to clear liquids until you feel better.  Follow-up care  Follow up with your healthcare provider, or as advised. Call your provider if you don't get better within 24 hours or if diarrhea lasts more than a week. Also follow up if you are unable to keep down liquids and get dehydrated. If a stool (diarrhea) sample was taken, call as directed for the results.  Call 911  Call 911 if any of these occur:    Trouble breathing    Chest pain    Confused    Severe drowsiness or trouble awakening    Fainting or loss of consciousness    Rapid heart rate    Seizure    Stiff neck  When to seek medical advice  Call your healthcare provider right away if any of these occur:    Abdominal pain that gets worse    Continued vomiting (unable to keep liquids down)    Frequent diarrhea (more than 5 times a day)    Blood in vomit or stool (black or red color)    Dark urine, reduced urine output, or  "extreme thirst    Weakness or dizziness    Drowsiness    Fever of 100.4 F (38 C) oral or higher that does not get better with fever medicine    New rash  Date Last Reviewed: 1/3/2016    4456-6167 [employer]. [address] [place] [address], [place] [address]. All rights reserved. This information is not intended as a substitute for professional medical care. Always follow your healthcare professional's instructions.                Follow-ups after your visit        Who to contact     If you have questions or need follow up information about today's clinic visit or your schedule please contact [facility] directly at [phone].  Normal or non-critical lab and imaging results will be communicated to you by MyChart, letter or phone within 4 business days after the clinic has received the results. If you do not hear from us within 7 days, please contact the clinic through [employer] or phone. If you have a critical or abnormal lab result, we will notify you by phone as soon as possible.  Submit refill requests through [employer] or call your pharmacy and they will forward the refill request to us. Please allow 3 business days for your refill to be completed.          Additional Information About Your Visit        [employer] Information     [employer] gives you secure access to your electronic health record. If you see a primary care provider, you can also send messages to your care team and make appointments. If you have questions, please call your primary care clinic.  If you do not have a primary care provider, please call [phone] and they will assist you.        Care EveryWhere ID     This is your Care EveryWhere ID. This could be used by other organizations to access your [place] medical records  [id number]        Your Vitals Were     Pulse Temperature Respirations Height Pulse Oximetry BMI (Body Mass Index)    74 97.7  F (36.5  C) (Oral) 16 6' 5\" (1.956 m) 98% 37.76 kg/m2       Blood " Pressure from Last 3 Encounters:   07/18/17 135/86   06/07/17 125/80   04/23/17 (!) 145/95    Weight from Last 3 Encounters:   07/18/17 (!) 318 lb 6.4 oz (144.4 kg)   06/07/17 (!) 322 lb (146.1 kg)   04/23/17 (!) 320 lb (145.2 kg)              We Performed the Following     CBC with platelets     UA with Microscopic          Today's Medication Changes          These changes are accurate as of: 7/18/17 10:28 AM.  If you have any questions, ask your nurse or doctor.               Start taking these medicines.        Dose/Directions    alum & mag hydroxide-simethicone 400-400-40 MG/5ML Susp suspension   Commonly known as:  MYLANTA ES/MAALOX  ES   Used for:  Nausea, Diarrhea of presumed infectious origin   Started by:  Simona Prasad PA-C        Dose:  30 mL   Take 30 mLs by mouth every 4 hours as needed for indigestion   Quantity:  355 mL   Refills:  0       ondansetron 4 MG ODT tab   Commonly known as:  ZOFRAN ODT   Used for:  Nausea   Started by:  Simona Prasad PA-C        Dose:  4-8 mg   Take 1-2 tablets (4-8 mg) by mouth every 8 hours as needed for nausea   Quantity:  20 tablet   Refills:  1            Where to get your medicines      [place] [facility] [place] [address]  [address], [facility] [address]     Phone:  [phone]     alum & mag hydroxide-simethicone 400-400-40 MG/5ML Susp suspension    ondansetron 4 MG ODT tab                Primary Care Provider Office Phone # Fax #    Simona Prasad PA-C [phone]       [facility] [address]  [facility] [address]        Equal Access to Services     KENTON POLLOCK AH: Morro larose Soomaali, waaxda luqadaha, qaybta kaalchristen davey, adina keller ah. [facility] [phone].    ATENCIÓN: Si habla español, tiene a rose disposición servicios gratuitos de asistencia lingüística.  Mary Beth kennedy [phone].    We comply with applicable federal civil rights laws and [place] laws. We do not discriminate on the basis of race, color, national origin, age, disability sex, sexual orientation or gender identity.            Thank you!     Thank you for choosing [facility]  for your care. Our goal is always to provide you with excellent care. Hearing back from our patients is one way we can continue to improve our services. Please take a few minutes to complete the written survey that you may receive in the mail after your visit with us. Thank you!             Your Updated Medication List - Protect others around you: Learn how to safely use, store and throw away your medicines at www.disposemymeds.org.          This list is accurate as of: 7/18/17 10:28 AM.  Always use your most recent med list.                   Brand Name Dispense Instructions for use Diagnosis    alum & mag hydroxide-simethicone 400-400-40 MG/5ML Susp suspension    MYLANTA ES/MAALOX  ES    355 mL    Take 30 mLs by mouth every 4 hours as needed for indigestion    Nausea, Diarrhea of presumed infectious origin       citalopram 40 MG tablet    celeXA    90 tablet    Take 1 tablet (40 mg) by mouth daily    Anxiety state       cyclobenzaprine 5 MG tablet    FLEXERIL    30 tablet    Take 1 tablet (5 mg) by mouth 3 times daily as needed for muscle spasms (Will cause sedation)    Acute right-sided low back pain with right-sided sciatica       gabapentin 300 MG capsule    NEURONTIN    30 capsule    Take 1 capsule (300 mg) by mouth At Bedtime    Acute right-sided low back pain with right-sided sciatica       HYDROcodone-acetaminophen 5-325 MG per tablet    NORCO    12 tablet    Take 1 tablet by mouth every 6 hours as needed for moderate to severe pain    Hand injury, right, initial encounter       ondansetron 4 MG ODT tab    ZOFRAN ODT    20 tablet    Take 1-2 tablets (4-8 mg) by mouth every 8 hours as needed for nausea     Nausea       predniSONE 20 MG tablet    DELTASONE    10 tablet    Take 1 tablet (20 mg) by mouth 2 times daily    Acute right-sided low back pain with right-sided sciatica

## 2017-07-18 NOTE — NURSING NOTE
"Chief Complaint   Patient presents with     Abdominal Pain     intense with diarrhea, times 4 days       Initial /86 (BP Location: Left arm, Patient Position: Chair, Cuff Size: Adult Large)  Pulse 74  Temp 97.7  F (36.5  C) (Oral)  Resp 16  Ht 6' 5\" (1.956 m)  Wt (!) 318 lb 6.4 oz (144.4 kg)  SpO2 98%  BMI 37.76 kg/m2 Estimated body mass index is 37.76 kg/(m^2) as calculated from the following:    Height as of this encounter: 6' 5\" (1.956 m).    Weight as of this encounter: 318 lb 6.4 oz (144.4 kg).  Medication Reconciliation: complete   Joceline Maldonado CMA      "

## 2017-07-25 DIAGNOSIS — M54.41 ACUTE RIGHT-SIDED LOW BACK PAIN WITH RIGHT-SIDED SCIATICA: ICD-10-CM

## 2017-07-26 RX ORDER — CYCLOBENZAPRINE HCL 5 MG
TABLET ORAL
Qty: 30 TABLET | Refills: 1 | Status: SHIPPED | OUTPATIENT
Start: 2017-07-26 | End: 2017-10-31

## 2017-07-26 NOTE — TELEPHONE ENCOUNTER
cyclobenzaprine (FLEXERIL) 5 MG tablet      Last Written Prescription Date:  6/7/17  Last Fill Quantity: 30,   # refills: 1  Last Office Visit with [facility], [facility] or [facility] prescribing provider: 7/18/17  Future Office visit:       Routing refill request to provider for review/approval because:  Drug not on the [facility], [facility] or [facility] refill protocol or controlled substance          Reinaldo Faarax  [facility]

## 2017-08-21 ENCOUNTER — OFFICE VISIT (OUTPATIENT)
Dept: FAMILY MEDICINE | Facility: CLINIC | Age: 37
End: 2017-08-21
Payer: COMMERCIAL

## 2017-08-21 VITALS
SYSTOLIC BLOOD PRESSURE: 150 MMHG | BODY MASS INDEX: 37.82 KG/M2 | HEART RATE: 72 BPM | OXYGEN SATURATION: 98 % | WEIGHT: 315 LBS | DIASTOLIC BLOOD PRESSURE: 88 MMHG | TEMPERATURE: 97.3 F

## 2017-08-21 DIAGNOSIS — F41.0 PANIC DISORDER WITHOUT AGORAPHOBIA: ICD-10-CM

## 2017-08-21 DIAGNOSIS — R03.0 ELEVATED BLOOD-PRESSURE READING WITHOUT DIAGNOSIS OF HYPERTENSION: ICD-10-CM

## 2017-08-21 DIAGNOSIS — L40.9 PSORIASIS: Primary | ICD-10-CM

## 2017-08-21 DIAGNOSIS — F41.1 ANXIETY STATE: ICD-10-CM

## 2017-08-21 PROCEDURE — 99214 OFFICE O/P EST MOD 30 MIN: CPT | Performed by: PHYSICIAN ASSISTANT

## 2017-08-21 RX ORDER — CITALOPRAM HYDROBROMIDE 40 MG/1
40 TABLET ORAL DAILY
Qty: 90 TABLET | Refills: 1 | Status: SHIPPED | OUTPATIENT
Start: 2017-08-21 | End: 2018-08-01

## 2017-08-21 RX ORDER — TRIAMCINOLONE ACETONIDE 1 MG/G
CREAM TOPICAL
Qty: 45 G | Refills: 0 | Status: SHIPPED | OUTPATIENT
Start: 2017-08-21 | End: 2017-10-31

## 2017-08-21 RX ORDER — LORAZEPAM 1 MG/1
1 TABLET ORAL 2 TIMES DAILY PRN
Qty: 30 TABLET | Refills: 0 | Status: SHIPPED | OUTPATIENT
Start: 2017-08-21 | End: 2018-02-20

## 2017-08-21 ASSESSMENT — ANXIETY QUESTIONNAIRES
GAD7 TOTAL SCORE: 19
2. NOT BEING ABLE TO STOP OR CONTROL WORRYING: NEARLY EVERY DAY
IF YOU CHECKED OFF ANY PROBLEMS ON THIS QUESTIONNAIRE, HOW DIFFICULT HAVE THESE PROBLEMS MADE IT FOR YOU TO DO YOUR WORK, TAKE CARE OF THINGS AT HOME, OR GET ALONG WITH OTHER PEOPLE: SOMEWHAT DIFFICULT
1. FEELING NERVOUS, ANXIOUS, OR ON EDGE: NEARLY EVERY DAY
3. WORRYING TOO MUCH ABOUT DIFFERENT THINGS: MORE THAN HALF THE DAYS
5. BEING SO RESTLESS THAT IT IS HARD TO SIT STILL: MORE THAN HALF THE DAYS
7. FEELING AFRAID AS IF SOMETHING AWFUL MIGHT HAPPEN: NEARLY EVERY DAY
6. BECOMING EASILY ANNOYED OR IRRITABLE: NEARLY EVERY DAY

## 2017-08-21 ASSESSMENT — PATIENT HEALTH QUESTIONNAIRE - PHQ9
5. POOR APPETITE OR OVEREATING: NEARLY EVERY DAY
SUM OF ALL RESPONSES TO PHQ QUESTIONS 1-9: 16

## 2017-08-21 NOTE — PROGRESS NOTES
SUBJECTIVE:   Lakhwinder Jones is a 36 year old male who presents to clinic today for the following health issues:      Abnormal Mood Symptoms      Duration: 2 days    Description:  Depression: no   Anxiety: YES  Panic attacks: YES     Accompanying signs and symptoms: see PHQ-9 and BISMARK scores    History (similar episodes/previous evaluation): Yes    Precipitating or alleviating factors: None  Therapies tried and outcome: out of Celexa for five days.     Has used ativan in the past with relief of panic.      Does not have any norco currently (he will only use this when his back pain flares and does not have a current script or pills at home)    Psoriasis:  He would like a medication for his psoriasis.  Has not seen derm recently.  No meds tried thus far.     Problem list and histories reviewed & adjusted, as indicated.  Additional history: as documented    Patient Active Problem List   Diagnosis     Anxiety state     Chronic back pain     Depression     Panic disorder without agoraphobia     Psoriasis     CARDIOVASCULAR SCREENING; LDL GOAL LESS THAN 160     Abnormal results of liver function studies     Psoriatic arthritis (H)     Obesity, Class II, BMI 35-39.9     Past Surgical History:   Procedure Laterality Date     NOSE SURGERY      3 times     right elbow surgey      14 yo     TESTICLE SURGERY      23 yo       Social History   Substance Use Topics     Smoking status: Former Smoker     Types: Cigarettes     Smokeless tobacco: Never Used      Comment: 1-2 cigarettes per day to cope with stress     Alcohol use 4.8 oz/week     8 Standard drinks or equivalent per week      Comment: not for a month     Family History   Problem Relation Age of Onset     Obesity Mother      DIABETES Father      Coronary Artery Disease Father      Hypertension Father      Hyperlipidemia Father      Depression Father      Anxiety Disorder Father      MENTAL ILLNESS Father      Substance Abuse Father      Breast Cancer Maternal  Grandmother      Depression Maternal Grandmother      MENTAL ILLNESS Maternal Grandmother      Substance Abuse Maternal Grandmother      Prostate Cancer Maternal Grandfather      DIABETES Paternal Grandmother      Breast Cancer Paternal Grandmother      Depression Paternal Grandmother      MENTAL ILLNESS Paternal Grandmother      DIABETES Paternal Grandfather      Asthma Brother      DIABETES Paternal Uncle      CEREBROVASCULAR DISEASE No family hx of      Anesthesia Reaction No family hx of      OSTEOPOROSIS No family hx of      Genetic Disorder No family hx of      Thyroid Disease No family hx of      Liver Disease No family hx of          Current Outpatient Prescriptions   Medication Sig Dispense Refill     citalopram (CELEXA) 40 MG tablet Take 1 tablet (40 mg) by mouth daily 90 tablet 1     LORazepam (ATIVAN) 1 MG tablet Take 1 tablet (1 mg) by mouth 2 times daily as needed for anxiety 30 tablet 0     triamcinolone (KENALOG) 0.1 % cream Apply sparingly to affected area two times daily for 10-14 days. 45 g 0     cyclobenzaprine (FLEXERIL) 5 MG tablet TAKE ONE TABLET BY MOUTH THREE TIMES A DAY AS NEEDED FOR MUSCLE SPASMS (WILL CAUSE SEDATION) 30 tablet 1     alum & mag hydroxide-simethicone (MYLANTA ES/MAALOX  ES) 400-400-40 MG/5ML SUSP suspension Take 30 mLs by mouth every 4 hours as needed for indigestion 355 mL 0     gabapentin (NEURONTIN) 300 MG capsule Take 1 capsule (300 mg) by mouth At Bedtime 30 capsule 1     ondansetron (ZOFRAN ODT) 4 MG ODT tab Take 1-2 tablets (4-8 mg) by mouth every 8 hours as needed for nausea (Patient not taking: Reported on 8/21/2017) 20 tablet 1     predniSONE (DELTASONE) 20 MG tablet Take 1 tablet (20 mg) by mouth 2 times daily (Patient not taking: Reported on 7/18/2017) 10 tablet 0     HYDROcodone-acetaminophen (NORCO) 5-325 MG per tablet Take 1 tablet by mouth every 6 hours as needed for moderate to severe pain (Patient not taking: Reported on 7/18/2017) 12 tablet 0      [DISCONTINUED] citalopram (CELEXA) 40 MG tablet Take 1 tablet (40 mg) by mouth daily 90 tablet 1     BP Readings from Last 3 Encounters:   08/21/17 150/88   07/18/17 135/86   06/07/17 125/80    Wt Readings from Last 3 Encounters:   08/21/17 (!) 318 lb 14.4 oz (144.7 kg)   07/18/17 (!) 318 lb 6.4 oz (144.4 kg)   06/07/17 (!) 322 lb (146.1 kg)                        Reviewed and updated as needed this visit by clinical staffTobacco       Reviewed and updated as needed this visit by Provider         ROS:  Constitutional, HEENT, cardiovascular, pulmonary, gi and gu systems are negative, except as otherwise noted.      OBJECTIVE:   /88  Pulse 72  Temp 97.3  F (36.3  C) (Oral)  Wt (!) 318 lb 14.4 oz (144.7 kg)  SpO2 98%  BMI 37.82 kg/m2  Body mass index is 37.82 kg/(m^2).  GENERAL APPEARANCE: healthy, alert, moderate distress and very anxious  SKIN: psoriasis plaques noted on legs and arms, areas on arms with excoriations and scabbing, no signs of secondary bacterial infection.     Diagnostic Test Results:  none     ASSESSMENT/PLAN:         1. Panic disorder without agoraphobia  Will restart celexa today (as he is clearing going through withdrawal symptoms from discontinuing this abruptly).    Will use ativan PRN to help with panic/anxiety.    I discussed with him that he should never take the ativan along with any opioids.  He does not have a current script for norco at home (therefore naloxone was not prescribed).        - citalopram (CELEXA) 40 MG tablet; Take 1 tablet (40 mg) by mouth daily  Dispense: 90 tablet; Refill: 1  - LORazepam (ATIVAN) 1 MG tablet; Take 1 tablet (1 mg) by mouth 2 times daily as needed for anxiety  Dispense: 30 tablet; Refill: 0    2. Anxiety state  As above.   - citalopram (CELEXA) 40 MG tablet; Take 1 tablet (40 mg) by mouth daily  Dispense: 90 tablet; Refill: 1  - LORazepam (ATIVAN) 1 MG tablet; Take 1 tablet (1 mg) by mouth 2 times daily as needed for anxiety  Dispense: 30  tablet; Refill: 0    3. Psoriasis  Will use kenalog to help control the itching short term, however I suggest he schedule with derm to discuss long term treatment of his psoriasis.   - DERMATOLOGY REFERRAL  - triamcinolone (KENALOG) 0.1 % cream; Apply sparingly to affected area two times daily for 10-14 days.  Dispense: 45 g; Refill: 0    4. Elevated blood-pressure reading without diagnosis of hypertension  BP elevated today, however he is quite anxious.  Will monitor BP at next visit, last BP's in office were controlled.  BP Readings from Last 3 Encounters:   08/21/17 150/88   07/18/17 135/86   06/07/17 125/80          FUTURE APPOINTMENTS:       - Follow-up visit if symptoms worsen or fail to improve as anticipated.     Sandi Cornelius PA-C  [facility]

## 2017-08-21 NOTE — NURSING NOTE
"Chief Complaint   Patient presents with     Anxiety       Initial /83 (BP Location: Left arm, Patient Position: Sitting, Cuff Size: Adult Large)  Pulse 72  Temp 97.3  F (36.3  C) (Oral)  Wt (!) 318 lb 14.4 oz (144.7 kg)  SpO2 98%  BMI 37.82 kg/m2 Estimated body mass index is 37.82 kg/(m^2) as calculated from the following:    Height as of 7/18/17: 6' 5\" (1.956 m).    Weight as of this encounter: 318 lb 14.4 oz (144.7 kg).  Medication Reconciliation: complete   Shelton WARD    "

## 2017-08-21 NOTE — PATIENT INSTRUCTIONS
Based on your medical history and these are the current health maintenance or preventive care services that you are due for (some may have been done at this visit)  Health Maintenance Due   Topic Date Due     URINE DRUG SCREEN Q1 YR  10/13/1996         At [facility], we strive to deliver an exceptional experience to you, every time we see you.    If you receive a survey in the mail, please send us back your thoughts. We really do value your feedback.    Your care team's suggested websites for health information:  Www.[employer].org : Up to date and easily searchable information on multiple topics.  Www.medlineplus.gov : medication info, interactive tutorials, watch real surgeries online  Www.familydoctor.org : good info from the Academy of Family Physicians  Www.cdc.gov : public health info, travel advisories, epidemics (H1N1)  Www.aap.org : children's health info, normal development, vaccinations  Www.health[facility]us : [place] dept of health, public health issues in [place], N1N1    How to contact your care team:   Team [facility] [phone]         Pharmacy [phone]    Dr. Chester, Jacinta Prasad PA-C, Dr. Muñoz, Stephie YARBROUGH CNP, Sandi Cornelius PA-C, Dr. Vides, and LINNEA Serrano CNP    Team RNs: Steph Carolina      Clinic hours  M-Th 7 am-7 pm   Fri 7 am-5 pm.   Urgent care M-F 11 am-9 pm,   Sat/Sun 9 am-5 pm.  Pharmacy M-Th 8 am-8 pm Fri 8 am-6 pm  Sat/Sun 9 am-5 pm.     All password changes, disabled accounts, or ID changes in [facility] will be done by our Access Services Department.    If you need help with your account or password, call: [phone]. Clinic staff no longer has the ability to change passwords.

## 2017-08-21 NOTE — MR AVS SNAPSHOT
After Visit Summary   8/21/2017    Lakhwinder Jones    MRN: [id number]           Patient Information     Date Of Birth          1980        Visit Information        Provider Department      8/21/2017 9:40 AM Sandi Cornelius PA-C [facility]        Today's Diagnoses     Psoriasis    -  1    Anxiety state          Care Instructions    Based on your medical history and these are the current health maintenance or preventive care services that you are due for (some may have been done at this visit)  Health Maintenance Due   Topic Date Due     URINE DRUG SCREEN Q1 YR  10/13/1996         At [facility], we strive to deliver an exceptional experience to you, every time we see you.    If you receive a survey in the mail, please send us back your thoughts. We really do value your feedback.    Your care team's suggested websites for health information:  Www.[employer].[facility] : Up to date and easily searchable information on multiple topics.  Www.medlineplus.gov : medication info, interactive tutorials, watch real surgeries online  Www.familydoctor.org : good info from the Academy of Family Physicians  Www.cdc.gov : public health info, travel advisories, epidemics (H1N1)  Www.aap.org : children's health info, normal development, vaccinations  Www.health.[facility] : [place] dept of health, public health issues in [place], N1N1    How to contact your care team:   Chan Garcia/Tee [phone]         Pharmacy [phone]    Dr. Chester, Jacinta Prasad PA-C, Dr. Muñoz, Stephie YARBROUGH CNP, Sandi Cornelius PA-C, Dr. Vides, and LINNEA Serrano CNP    Team RNs: Steph & Caity      Clinic hours  M-Th 7 am-7 pm   Fri 7 am-5 pm.   Urgent care M-F 11 am-9 pm,   Sat/Sun 9 am-5 pm.  Pharmacy M-Th 8 am-8 pm Fri 8 am-6 pm  Sat/Sun 9 am-5 pm.     All password changes, disabled accounts, or ID changes in MyChart/MyHealth will be done by our Access Services  Department.    If you need help with your account or password, call: [phone]. Clinic staff no longer has the ability to change passwords.             Follow-ups after your visit        Additional Services     DERMATOLOGY REFERRAL       Your provider has referred you to: [facility]: [facility] [phone]   [email or web address]    Please be aware that coverage of these services is subject to the terms and limitations of your health insurance plan.  Call member services at your health plan with any benefit or coverage questions.      Please bring the following with you to your appointment:    (1) Any X-Rays, CTs or MRIs which have been performed.  Contact the facility where they were done to arrange for  prior to your scheduled appointment.    (2) List of current medications  (3) This referral request   (4) Any documents/labs given to you for this referral                  Who to contact     If you have questions or need follow up information about today's clinic visit or your schedule please contact [facility] directly at [phone].  Normal or non-critical lab and imaging results will be communicated to you by MyChart, letter or phone within 4 business days after the clinic has received the results. If you do not hear from us within 7 days, please contact the clinic through [employer] or phone. If you have a critical or abnormal lab result, we will notify you by phone as soon as possible.  Submit refill requests through SprinkleBit or call your pharmacy and they will forward the refill request to us. Please allow 3 business days for your refill to be completed.          Additional Information About Your Visit        MyChart Information     SprinkleBit gives you secure access to your electronic health record. If you see a primary care provider, you can also send messages to your care team and make appointments. If  you have questions, please call your primary care clinic.  If you do not have a primary care provider, please call [phone] and they will assist you.        Care EveryWhere ID     This is your Care EveryWhere ID. This could be used by other organizations to access your [facility] medical records  [id number]        Your Vitals Were     Pulse Temperature Pulse Oximetry BMI (Body Mass Index)          72 97.3  F (36.3  C) (Oral) 98% 37.82 kg/m2         Blood Pressure from Last 3 Encounters:   08/21/17 150/88   07/18/17 135/86   06/07/17 125/80    Weight from Last 3 Encounters:   08/21/17 (!) 318 lb 14.4 oz (144.7 kg)   07/18/17 (!) 318 lb 6.4 oz (144.4 kg)   06/07/17 (!) 322 lb (146.1 kg)              We Performed the Following     DERMATOLOGY REFERRAL          Today's Medication Changes          These changes are accurate as of: 8/21/17 10:19 AM.  If you have any questions, ask your nurse or doctor.               Start taking these medicines.        Dose/Directions    LORazepam 1 MG tablet   Commonly known as:  ATIVAN   Used for:  Anxiety state   Started by:  Sandi Cornelius PA-C        Dose:  1 mg   Take 1 tablet (1 mg) by mouth 2 times daily as needed for anxiety   Quantity:  30 tablet   Refills:  0       triamcinolone 0.1 % cream   Commonly known as:  KENALOG   Used for:  Psoriasis   Started by:  Sandi Cornelius PA-C        Apply sparingly to affected area two times daily for 10-14 days.   Quantity:  45 g   Refills:  0            Where to get your medicines      These medications were sent to [facility] [place], [place] - [address]  [address], [facility] [address]     Phone:  [phone]     citalopram 40 MG tablet    triamcinolone 0.1 % cream         Some of these will need a paper prescription and others can be bought over the counter.  Ask your nurse if you have questions.     Bring a paper prescription for each of these medications     LORazepam 1 MG tablet                 Primary Care Provider Office Phone # Fax #    Simona Prasad PA-C [phone]       [address]  [facility] [address]        Equal Access to Services     KENTON POLLOCK : Hadii rashid ku hadcrowo Soomaali, waaxda luqadaha, qaybta kaalmada adeegyada, waxsuellen shaliniin hayaan ortegabob gregory arianna guerrero. So [facility] [phone].    ATENCIÓN: Si habla español, tiene a rose disposición servicios gratuitos de asistencia lingüística. Llame al [phone].    We comply with applicable federal civil rights laws and [place] laws. We do not discriminate on the basis of race, color, national origin, age, disability sex, sexual orientation or gender identity.            Thank you!     Thank you for choosing [facility]  for your care. Our goal is always to provide you with excellent care. Hearing back from our patients is one way we can continue to improve our services. Please take a few minutes to complete the written survey that you may receive in the mail after your visit with us. Thank you!             Your Updated Medication List - Protect others around you: Learn how to safely use, store and throw away your medicines at www.disposemymeds.org.          This list is accurate as of: 8/21/17 10:19 AM.  Always use your most recent med list.                   Brand Name Dispense Instructions for use Diagnosis    alum & mag hydroxide-simethicone 400-400-40 MG/5ML Susp suspension    MYLANTA ES/MAALOX  ES    355 mL    Take 30 mLs by mouth every 4 hours as needed for indigestion    Nausea, Diarrhea of presumed infectious origin       citalopram 40 MG tablet    celeXA    90 tablet    Take 1 tablet (40 mg) by mouth daily    Anxiety state       cyclobenzaprine 5 MG tablet    FLEXERIL    30 tablet    TAKE ONE TABLET BY MOUTH THREE TIMES A DAY AS NEEDED FOR MUSCLE SPASMS (WILL CAUSE SEDATION)    Acute right-sided low back pain with right-sided sciatica       gabapentin 300 MG capsule     NEURONTIN    30 capsule    Take 1 capsule (300 mg) by mouth At Bedtime    Acute right-sided low back pain with right-sided sciatica       HYDROcodone-acetaminophen 5-325 MG per tablet    NORCO    12 tablet    Take 1 tablet by mouth every 6 hours as needed for moderate to severe pain    Hand injury, right, initial encounter       LORazepam 1 MG tablet    ATIVAN    30 tablet    Take 1 tablet (1 mg) by mouth 2 times daily as needed for anxiety    Anxiety state       ondansetron 4 MG ODT tab    ZOFRAN ODT    20 tablet    Take 1-2 tablets (4-8 mg) by mouth every 8 hours as needed for nausea    Nausea       predniSONE 20 MG tablet    DELTASONE    10 tablet    Take 1 tablet (20 mg) by mouth 2 times daily    Acute right-sided low back pain with right-sided sciatica       triamcinolone 0.1 % cream    KENALOG    45 g    Apply sparingly to affected area two times daily for 10-14 days.    Psoriasis

## 2017-08-22 ASSESSMENT — ANXIETY QUESTIONNAIRES: GAD7 TOTAL SCORE: 19

## 2017-10-31 ENCOUNTER — OFFICE VISIT (OUTPATIENT)
Dept: FAMILY MEDICINE | Facility: CLINIC | Age: 37
End: 2017-10-31
Payer: COMMERCIAL

## 2017-10-31 VITALS
SYSTOLIC BLOOD PRESSURE: 138 MMHG | TEMPERATURE: 97.2 F | OXYGEN SATURATION: 96 % | WEIGHT: 315 LBS | BODY MASS INDEX: 37.19 KG/M2 | DIASTOLIC BLOOD PRESSURE: 76 MMHG | HEIGHT: 77 IN | HEART RATE: 72 BPM

## 2017-10-31 DIAGNOSIS — S39.012S LOW BACK STRAIN, SEQUELA: ICD-10-CM

## 2017-10-31 DIAGNOSIS — S92.354A CLOSED NONDISPLACED FRACTURE OF FIFTH METATARSAL BONE OF RIGHT FOOT, INITIAL ENCOUNTER: Primary | ICD-10-CM

## 2017-10-31 DIAGNOSIS — Z23 NEED FOR PROPHYLACTIC VACCINATION AND INOCULATION AGAINST INFLUENZA: ICD-10-CM

## 2017-10-31 PROCEDURE — 90686 IIV4 VACC NO PRSV 0.5 ML IM: CPT | Performed by: PHYSICIAN ASSISTANT

## 2017-10-31 PROCEDURE — 90471 IMMUNIZATION ADMIN: CPT | Performed by: PHYSICIAN ASSISTANT

## 2017-10-31 PROCEDURE — 99214 OFFICE O/P EST MOD 30 MIN: CPT | Mod: 25 | Performed by: PHYSICIAN ASSISTANT

## 2017-10-31 RX ORDER — GABAPENTIN 300 MG/1
300 CAPSULE ORAL AT BEDTIME
Qty: 30 CAPSULE | Refills: 1 | Status: CANCELLED | OUTPATIENT
Start: 2017-10-31

## 2017-10-31 RX ORDER — CYCLOBENZAPRINE HCL 5 MG
10 TABLET ORAL 3 TIMES DAILY PRN
Qty: 30 TABLET | Refills: 3 | Status: SHIPPED | OUTPATIENT
Start: 2017-10-31 | End: 2018-02-20

## 2017-10-31 RX ORDER — HYDROCODONE BITARTRATE AND ACETAMINOPHEN 5; 325 MG/1; MG/1
1-2 TABLET ORAL EVERY 6 HOURS PRN
Qty: 30 TABLET | Refills: 0 | Status: SHIPPED | OUTPATIENT
Start: 2017-10-31 | End: 2017-11-07

## 2017-10-31 RX ORDER — HYDROCODONE BITARTRATE AND ACETAMINOPHEN 5; 325 MG/1; MG/1
1-2 TABLET ORAL
COMMUNITY
Start: 2017-10-29 | End: 2017-10-31

## 2017-10-31 RX ORDER — LORAZEPAM 1 MG/1
1 TABLET ORAL 2 TIMES DAILY PRN
Qty: 30 TABLET | Refills: 0 | Status: CANCELLED | OUTPATIENT
Start: 2017-10-31

## 2017-10-31 ASSESSMENT — PAIN SCALES - GENERAL: PAINLEVEL: SEVERE PAIN (6)

## 2017-10-31 NOTE — PATIENT INSTRUCTIONS
At [facility], we strive to deliver an exceptional experience to you, every time we see you.  If you receive a survey in the mail, please send us back your thoughts. We really do value your feedback.    Based on your medical history, these are the current health maintenance/preventive care services that you are due for (some may have been done at this visit.)  Health Maintenance Due   Topic Date Due     URINE DRUG SCREEN Q1 YR  10/13/1996     INFLUENZA VACCINE (SYSTEM ASSIGNED)  09/01/2017         Suggested websites for health information:  Www.SkillHound.FanGo : Up to date and easily searchable information on multiple topics.  Www.medlineplus.gov : medication info, interactive tutorials, watch real surgeries online  Www.familydoctor.org : good info from the Academy of Family Physicians  Www.cdc.gov : public health info, travel advisories, epidemics (H1N1)  Www.aap.org : children's health info, normal development, vaccinations  Www.health.[facility].[place].us : [place] dept of health, public health issues in [place], N1N1    Your care team:                            Family Medicine Internal Medicine   MD Constantino Walker MD Shantel Branch-Fleming, MD Katya Georgiev PA-C Nam Ho, MD Pediatrics   NIRU Isaac, PRESTON Suárez APRN CNP   MD Ann Winkler MD Deborah Mielke, MD Kim Thein, APRN CNP      Clinic hours: Monday - Thursday 7 am-7 pm; Fridays 7 am-5 pm.   Urgent care: Monday - Friday 11 am-9 pm; Saturday and Sunday 9 am-5 pm.  Pharmacy : Monday -Thursday 8 am-8 pm; Friday 8 am-6 pm; Saturday and Sunday 9 am-5 pm.     Clinic: [phone]   Pharmacy: [phone]

## 2017-10-31 NOTE — NURSING NOTE
"Injectable Influenza Immunization Documentation    1.  Has the patient received the information for the injectable influenza vaccine? YES     2. Is the patient 6 months of age or older? YES     3. Does the patient have any of the following contraindications?         Severe allergy to eggs? No     Severe allergic reaction to previous influenza vaccines? No   Severe allergy to latex? No       History of Guillain-[place] syndrome? No     Currently have a temperature greater than 100.4F? No        4.  Severely egg allergic patients should have flu vaccine eligibility assessed by an MD, RN, or pharmacist, and those who received flu vaccine should be observed for 15 min by an MD, RN, Pharmacist, Medical Technician, or member of clinic staff.\": YES    5. Latex-allergic patients should be given latex-free influenza vaccine Yes. Please reference the Vaccine latex table to determine if your clinic s product is latex-containing.       Vaccination given by patient     Joceline Maldonado CMA             "

## 2017-10-31 NOTE — MR AVS SNAPSHOT
After Visit Summary   10/31/2017    Lakhwinder Jones    MRN: [id number]           Patient Information     Date Of Birth          1980        Visit Information        Provider Department      10/31/2017 11:00 AM Simona Prasad PA-C [facility]        Today's Diagnoses     Closed nondisplaced fracture of fifth metatarsal bone of right foot, initial encounter    -  1    Need for prophylactic vaccination and inoculation against influenza        Low back strain, sequela          Care Instructions    At [facility], we strive to deliver an exceptional experience to you, every time we see you.  If you receive a survey in the mail, please send us back your thoughts. We really do value your feedback.    Based on your medical history, these are the current health maintenance/preventive care services that you are due for (some may have been done at this visit.)  Health Maintenance Due   Topic Date Due     URINE DRUG SCREEN Q1 YR  10/13/1996     INFLUENZA VACCINE (SYSTEM ASSIGNED)  09/01/2017         Suggested websites for health information:  Www.[employer].org : Up to date and easily searchable information on multiple topics.  Www.medlineplus.gov : medication info, interactive tutorials, watch real surgeries online  Www.familydoctor.org : good info from the Academy of Family Physicians  Www.cdc.gov : public health info, travel advisories, epidemics (H1N1)  Www.aap.org : children's health info, normal development, vaccinations  Www.health.state.[place].us : [place] dept of health, public health issues in [place], N1N1    Your care team:                            Family Medicine Internal Medicine   MD Constantino Walker MD Shantel Branch-Fleming, MD Katya Georgiev PA-C Nam Ho, MD Pediatrics   NIRU Isaac, PRESTON Suárez APRN MD Ann Bee MD Deborah Mielke, MD Kim Thein, APRN CNP       Clinic hours: Monday - Thursday 7 am-7 pm; Fridays 7 am-5 pm.   Urgent care: Monday - Friday 11 am-9 pm; Saturday and Sunday 9 am-5 pm.  Pharmacy : Monday -Thursday 8 am-8 pm; Friday 8 am-6 pm; Saturday and Sunday 9 am-5 pm.     Clinic: [phone]   Pharmacy: [phone]            Follow-ups after your visit        Additional Services     ORTHO  REFERRAL       [facility] is referring you to the Orthopedic  Services at [facility].       The  Representative will assist you in the coordination of your Orthopedic and Musculoskeletal Care as prescribed by your physician.    The  Representative will call you within 1 business day to help schedule your appointment, or you may contact the  Representative at:    All areas ~ [phone]     Type of Referral : Surgical / Specialist       Timeframe requested: 1 - 2 days    Coverage of these services is subject to the terms and limitations of your health insurance plan.  Please call member services at your health plan with any benefit or coverage questions.      If X-rays, CT or MRI's have been performed, please contact the facility where they were done to arrange for , prior to your scheduled appointment.  Please bring this referral request to your appointment and present it to your specialist.                  Who to contact     If you have questions or need follow up information about today's clinic visit or your schedule please contact [facility] directly at [phone].  Normal or non-critical lab and imaging results will be communicated to you by MyChart, letter or phone within 4 business days after the clinic has received the results. If you do not hear from us within 7 days, please contact the clinic through MyChart or phone. If you have a critical or abnormal lab result, we will notify you by phone as soon as possible.  Submit refill requests  "through langtaojin or call your pharmacy and they will forward the refill request to us. Please allow 3 business days for your refill to be completed.          Additional Information About Your Visit        Hithruhart Information     langtaojin gives you secure access to your electronic health record. If you see a primary care provider, you can also send messages to your care team and make appointments. If you have questions, please call your primary care clinic.  If you do not have a primary care provider, please call [phone] and they will assist you.        Care EveryWhere ID     This is your Care EveryWhere ID. This could be used by other organizations to access your [facility] medical records  [id number]        Your Vitals Were     Pulse Temperature Height Pulse Oximetry BMI (Body Mass Index)       72 97.2  F (36.2  C) (Oral) 6' 5\" (1.956 m) 96% 38.66 kg/m2        Blood Pressure from Last 3 Encounters:   10/31/17 138/76   08/21/17 150/88   07/18/17 135/86    Weight from Last 3 Encounters:   10/31/17 (!) 326 lb (147.9 kg)   08/21/17 (!) 318 lb 14.4 oz (144.7 kg)   07/18/17 (!) 318 lb 6.4 oz (144.4 kg)              We Performed the Following          ADMIN VACCINE, FIRST [64239]     HC FLU VAC PRESRV FREE QUAD SPLIT VIR 3+YRS IM  [96742]     ORTHO  REFERRAL          Today's Medication Changes          These changes are accurate as of: 10/31/17  1:52 PM.  If you have any questions, ask your nurse or doctor.               Start taking these medicines.        Dose/Directions    order for DME   Started by:  Simona Prasad PA-C        Walking boot- biggest size   Quantity:  1 Device   Refills:  0         These medicines have changed or have updated prescriptions.        Dose/Directions    cyclobenzaprine 5 MG tablet   Commonly known as:  FLEXERIL   This may have changed:  See the new instructions.   Changed by:  Simona Prasad PA-C        Dose:  10 mg   Take 2 tablets (10 mg) by " mouth 3 times daily as needed for muscle spasms   Quantity:  30 tablet   Refills:  3       HYDROcodone-acetaminophen 5-325 MG per tablet   Commonly known as:  NORCO   This may have changed:    - when to take this  - reasons to take this  - Another medication with the same name was removed. Continue taking this medication, and follow the directions you see here.   Changed by:  Simona Prasad PA-C        Dose:  1-2 tablet   Take 1-2 tablets by mouth every 6 hours as needed for moderate to severe pain   Quantity:  30 tablet   Refills:  0            Where to get your medicines      These medications [place] [facility] [place] [address]  [address], [place] [address]     Phone:  [phone]     cyclobenzaprine 5 MG tablet         Some of these will need a paper prescription and others can be bought over the counter.  Ask your nurse if you have questions.     Bring a paper prescription for each of these medications     HYDROcodone-acetaminophen 5-325 MG per tablet    order for DME                Primary Care Provider Office Phone # Fax #    Simona Prasad PA-C [phone]       [address]  [facility] [place] [address]        Equal Access to Services     KENTON POLLOCK AH: Hadii rashid ku hadasho Soomaali, waaxda luqadaha, qaybta kaalmada adeegyada, waxay idiin hayadalgisan valentin guerrero. So [facility] [phone].    ATENCIÓN: Si habla español, tiene a rose disposición servicios gratuitos de asistencia lingüística. Llame al [phone].    We comply with applicable federal civil rights laws and [place] laws. We do not discriminate on the basis of race, color, national origin, age, disability, sex, sexual orientation, or gender identity.            Thank you!     Thank you for choosing [facility]  for your care. Our goal is always to provide you with excellent care. Hearing back from our patients is one way  we can continue to improve our services. Please take a few minutes to complete the written survey that you may receive in the mail after your visit with us. Thank you!             Your Updated Medication List - Protect others around you: Learn how to safely use, store and throw away your medicines at www.disposemymeds.org.          This list is accurate as of: 10/31/17  1:52 PM.  Always use your most recent med list.                   Brand Name Dispense Instructions for use Diagnosis    citalopram 40 MG tablet    celeXA    90 tablet    Take 1 tablet (40 mg) by mouth daily    Anxiety state, Panic disorder without agoraphobia       cyclobenzaprine 5 MG tablet    FLEXERIL    30 tablet    Take 2 tablets (10 mg) by mouth 3 times daily as needed for muscle spasms        gabapentin 300 MG capsule    NEURONTIN    30 capsule    Take 1 capsule (300 mg) by mouth At Bedtime    Acute right-sided low back pain with right-sided sciatica       HYDROcodone-acetaminophen 5-325 MG per tablet    NORCO    30 tablet    Take 1-2 tablets by mouth every 6 hours as needed for moderate to severe pain        LORazepam 1 MG tablet    ATIVAN    30 tablet    Take 1 tablet (1 mg) by mouth 2 times daily as needed for anxiety    Anxiety state, Panic disorder without agoraphobia       order for DME     1 Device    Walking boot- biggest size

## 2017-10-31 NOTE — PROGRESS NOTES
SUBJECTIVE:   Lakhwinder Jones is a 37 year old male who presents to clinic today for the following health issues:    ED/UC Followup:    Facility:  [facility]  Date of visit: 10/29/17  Reason for visit: Broken right foot  Current Status: Same - Patient requesting a different foot support. Swelling and persistent pain. 1 tablet left for pain control.     Patient slipped and twisted ankle that resulted in fracture on Saturday.   Patient was seen at  ED, was placed in post-op shoe. Patient reports that shoe does not provide enough support. He developed a lot of pain, swelling, and redness since day of injury.       Problem list and histories reviewed & adjusted, as indicated.  Additional history: patient also needs refill of Flexeril for low back spasms    Patient Active Problem List   Diagnosis     Anxiety state     Chronic back pain     Depression     Panic disorder without agoraphobia     Psoriasis     CARDIOVASCULAR SCREENING; LDL GOAL LESS THAN 160     Abnormal results of liver function studies     Psoriatic arthritis (H)     Obesity, Class II, BMI 35-39.9     Elevated blood-pressure reading without diagnosis of hypertension     Past Surgical History:   Procedure Laterality Date     NOSE SURGERY      3 times     right elbow surgey      14 yo     TESTICLE SURGERY      23 yo       Social History   Substance Use Topics     Smoking status: Former Smoker     Types: Cigarettes     Smokeless tobacco: Never Used      Comment: 1-2 cigarettes per day to cope with stress     Alcohol use 4.8 oz/week     8 Standard drinks or equivalent per week      Comment: not for a month     Family History   Problem Relation Age of Onset     Obesity Mother      DIABETES Father      Coronary Artery Disease Father      Hypertension Father      Hyperlipidemia Father      Depression Father      Anxiety Disorder Father      MENTAL ILLNESS Father      Substance Abuse Father      Breast Cancer Maternal Grandmother      Depression  Maternal Grandmother      MENTAL ILLNESS Maternal Grandmother      Substance Abuse Maternal Grandmother      Prostate Cancer Maternal Grandfather      DIABETES Paternal Grandmother      Breast Cancer Paternal Grandmother      Depression Paternal Grandmother      MENTAL ILLNESS Paternal Grandmother      DIABETES Paternal Grandfather      Asthma Brother      DIABETES Paternal Uncle      CEREBROVASCULAR DISEASE No family hx of      Anesthesia Reaction No family hx of      OSTEOPOROSIS No family hx of      Genetic Disorder No family hx of      Thyroid Disease No family hx of      Liver Disease No family hx of          Current Outpatient Prescriptions   Medication Sig Dispense Refill     cyclobenzaprine (FLEXERIL) 5 MG tablet Take 2 tablets (10 mg) by mouth 3 times daily as needed for muscle spasms 30 tablet 3     HYDROcodone-acetaminophen (NORCO) 5-325 MG per tablet Take 1-2 tablets by mouth every 6 hours as needed for moderate to severe pain 30 tablet 0     order for DME Walking boot- biggest size 1 Device 0     citalopram (CELEXA) 40 MG tablet Take 1 tablet (40 mg) by mouth daily 90 tablet 1     LORazepam (ATIVAN) 1 MG tablet Take 1 tablet (1 mg) by mouth 2 times daily as needed for anxiety (Patient not taking: Reported on 10/31/2017) 30 tablet 0     gabapentin (NEURONTIN) 300 MG capsule Take 1 capsule (300 mg) by mouth At Bedtime (Patient not taking: Reported on 10/31/2017) 30 capsule 1     Allergies   Allergen Reactions     Tramadol Other (See Comments)     Shellfish-Derived Products          Reviewed and updated as needed this visit by clinical staff  Tobacco  Allergies  Meds  Med Hx  Surg Hx  Fam Hx  Soc Hx      Reviewed and updated as needed this visit by Provider         ROS:  Constitutional, HEENT, cardiovascular, pulmonary, gi and gu systems are negative, except as otherwise noted.      OBJECTIVE:   /76 (BP Location: Right arm, Patient Position: Chair, Cuff Size: Adult Large)  Pulse 72  Temp 97.2  " F (36.2  C) (Oral)  Ht 6' 5\" (1.956 m)  Wt (!) 326 lb (147.9 kg)  SpO2 96%  BMI 38.66 kg/m2  Body mass index is 38.66 kg/(m^2).  GENERAL: healthy, alert and no distress  MS: right foot-swelling, erythema, LROM at the ankle, moderate tenderness at the base of the 5th metatrsal    Diagnostic Test Results:  Non.  Reviewed xrays and radiology report from  ED.     ASSESSMENT/PLAN:       ICD-10-CM    1. Closed nondisplaced fracture of fifth metatarsal bone of right foot, initial encounter S92.354A    2. Need for prophylactic vaccination and inoculation against influenza Z23      ADMIN VACCINE, FIRST [83988]     HC FLU VAC PRESRV FREE QUAD SPLIT VIR 3+YRS IM  [08477]   3. Low back strain, sequela S39.012S      Discussed case and reviewed xrays with Dr. Garcia in office today. This type of injury does not need surgical intervention and will heal slowly with appropriate foot support.   Patient will wear walking boot for 6-8 weeks then follow up, at that point he may be switched to regular shoes if xray demonstrates well progressed healing and patient feels better.   Full recovery will take 3.5 months   Norco 1 tab every 6-8 hours as needed for pain  Ibuprofen or Tylenol may be used as well  Elevate and ice frequently the first week.       Simona Prasad PA-C  [facility]  "

## 2017-10-31 NOTE — NURSING NOTE
"Chief Complaint   Patient presents with     Hospital F/U     [facility] right foot broken       Initial /76 (BP Location: Right arm, Patient Position: Chair, Cuff Size: Adult Large)  Pulse 72  Temp 97.2  F (36.2  C) (Oral)  Ht 6' 5\" (1.956 m)  Wt (!) 326 lb (147.9 kg)  SpO2 96%  BMI 38.66 kg/m2 Estimated body mass index is 38.66 kg/(m^2) as calculated from the following:    Height as of this encounter: 6' 5\" (1.956 m).    Weight as of this encounter: 326 lb (147.9 kg).  Medication Reconciliation: complete     Will Bauman CMA    "

## 2017-11-07 ENCOUNTER — OFFICE VISIT (OUTPATIENT)
Dept: FAMILY MEDICINE | Facility: CLINIC | Age: 37
End: 2017-11-07
Payer: COMMERCIAL

## 2017-11-07 VITALS
SYSTOLIC BLOOD PRESSURE: 141 MMHG | OXYGEN SATURATION: 95 % | HEART RATE: 87 BPM | DIASTOLIC BLOOD PRESSURE: 89 MMHG | TEMPERATURE: 97.6 F

## 2017-11-07 DIAGNOSIS — S92.354A CLOSED NONDISPLACED FRACTURE OF FIFTH METATARSAL BONE OF RIGHT FOOT, INITIAL ENCOUNTER: Primary | ICD-10-CM

## 2017-11-07 PROCEDURE — 99213 OFFICE O/P EST LOW 20 MIN: CPT | Performed by: PHYSICIAN ASSISTANT

## 2017-11-07 RX ORDER — HYDROCODONE BITARTRATE AND ACETAMINOPHEN 5; 325 MG/1; MG/1
1-2 TABLET ORAL EVERY 6 HOURS PRN
Qty: 30 TABLET | Refills: 0 | Status: SHIPPED | OUTPATIENT
Start: 2017-11-07 | End: 2017-11-21

## 2017-11-07 NOTE — LETTER
[address] [address]  Phone: [phone]    November 7, 2017        Lakhwinder ANIVAL Fariaangeli  [address]  [place] [address]          To whom it may concern:    RE: Lakhwinder FLORIAN Dimasjaydonangeli    Patient was seen and treated today at our clinic. Patient is unable to perform his work duties due to foot fracture. No work 11/8/17-11/22/17.   Follow up in clinic in 2 weeks to determine is able to return to work after 11/22/17.     Please contact me for questions or concerns.      Sincerely,      Jacinta Prasad PAC

## 2017-11-07 NOTE — NURSING NOTE
"Chief Complaint   Patient presents with     Follow Up For     Closed nondisplaced fracture of fifth metatarsal bone of right foot       Initial /89 (BP Location: Left arm, Patient Position: Sitting, Cuff Size: Adult Large)  Pulse 87  Temp 97.6  F (36.4  C) (Oral)  SpO2 95% Estimated body mass index is 38.66 kg/(m^2) as calculated from the following:    Height as of 10/31/17: 6' 5\" (1.956 m).    Weight as of 10/31/17: 326 lb (147.9 kg).  Medication Reconciliation: complete. SIDNEY Hernandez      "

## 2017-11-07 NOTE — PATIENT INSTRUCTIONS
At [facility], we strive to deliver an exceptional experience to you, every time we see you.  If you receive a survey in the mail, please send us back your thoughts. We really do value your feedback.    Your care team:                            Family Medicine Internal Medicine   MD Constantino Walker MD Shantel Branch-Fleming, MD Katya Georgiev PA-C Nam Ho, MD Pediatrics   NIRU Isaac, MD Ann Wilkes CNP, MD Deborah Mielke, MD Kim Thein, APRN CNP      Clinic hours: Monday - Thursday 7 am-7 pm; Fridays 7 am-5 pm.   Urgent care: Monday - Friday 11 am-9 pm; Saturday and Sunday 9 am-5 pm.  Pharmacy : Monday -Thursday 8 am-8 pm; Friday 8 am-6 pm; Saturday and Sunday 9 am-5 pm.     Clinic: [phone]   Pharmacy: [phone]

## 2017-11-07 NOTE — MR AVS SNAPSHOT
After Visit Summary   11/7/2017    Lakhwinder Jones    MRN: [id number]           Patient Information     Date Of Birth          1980        Visit Information        Provider Department      11/7/2017 2:00 PM Simona Prasad PA-C [facility]        Today's Diagnoses     Closed nondisplaced fracture of fifth metatarsal bone of right foot, initial encounter    -  1      Care Instructions    At [facility], we strive to deliver an exceptional experience to you, every time we see you.  If you receive a survey in the mail, please send us back your thoughts. We really do value your feedback.    Your care team:                            Family Medicine Internal Medicine   MD Constantino Walker MD Shantel Branch-Fleming, MD Katya Georgiev PA-C Nam Ho, MD Pediatrics   NIRU Isaac, MD Ann Wilkes CNP, MD Deborah Mielke, MD Kim Thein, APRN [facility]      Clinic hours: Monday - Thursday 7 am-7 pm; Fridays 7 am-5 pm.   Urgent care: Monday - Friday 11 am-9 pm; Saturday and Sunday 9 am-5 pm.  Pharmacy : Monday -Thursday 8 am-8 pm; Friday 8 am-6 pm; Saturday and Sunday 9 am-5 pm.     Clinic: [phone]   Pharmacy: [phone]            Follow-ups after your visit        Who to contact     If you have questions or need follow up information about today's clinic visit or your schedule please contact [facility] directly at [phone].  Normal or non-critical lab and imaging results will be communicated to you by MyChart, letter or phone within 4 business days after the clinic has received the results. If you do not hear from us within 7 days, please contact the clinic through MyChart or phone. If you have a critical or abnormal lab result, we will notify you by phone as soon as possible.  Submit refill requests through MyChart or call your  pharmacy and they will forward the refill request to us. Please allow 3 business days for your refill to be completed.          Additional Information About Your Visit        MyChart Information     [employer] gives you secure access to your electronic health record. If you see a primary care provider, you can also send messages to your care team and make appointments. If you have questions, please call your primary care clinic.  If you do not have a primary care provider, please call [phone] and they will assist you.        Care EveryWhere ID     This is your Care EveryWhere ID. This could be used by other organizations to access your [facility] medical records  [id number]        Your Vitals Were     Pulse Temperature Pulse Oximetry             87 97.6  F (36.4  C) (Oral) 95%          Blood Pressure from Last 3 Encounters:   11/07/17 141/89   10/31/17 138/76   08/21/17 150/88    Weight from Last 3 Encounters:   10/31/17 (!) 326 lb (147.9 kg)   08/21/17 (!) 318 lb 14.4 oz (144.7 kg)   07/18/17 (!) 318 lb 6.4 oz (144.4 kg)              Today, you had the following     No orders found for display         Where to get your medicines      Some of these will need a paper prescription and others can be bought over the counter.  Ask your nurse if you have questions.     Bring a paper prescription for each of these medications     HYDROcodone-acetaminophen 5-325 MG per tablet          Primary Care Provider Office Phone # Fax #    Simona Prasad PA-C [phone]       [address]  [facility] [address]        Equal Access to Services     [facility]: Hadii aad ku hadasho Soomaali, waaxda luqadaha, qaybta kaalmada adeegyada, adina keller . So [facility] [phone].    ATENCIÓN: Si habla español, tiene a rose disposición servicios gratuitos de asistencia lingüística. Llame al [phone].    We comply with applicable federal civil rights laws and [place] laws. We  do not discriminate on the basis of race, color, national origin, age, disability, sex, sexual orientation, or gender identity.            Thank you!     Thank you for choosing [facility]  for your care. Our goal is always to provide you with excellent care. Hearing back from our patients is one way we can continue to improve our services. Please take a few minutes to complete the written survey that you may receive in the mail after your visit with us. Thank you!             Your Updated Medication List - Protect others around you: Learn how to safely use, store and throw away your medicines at www.disposemymeds.org.          This list is accurate as of: 11/7/17 11:59 PM.  Always use your most recent med list.                   Brand Name Dispense Instructions for use Diagnosis    citalopram 40 MG tablet    celeXA    90 tablet    Take 1 tablet (40 mg) by mouth daily    Anxiety state, Panic disorder without agoraphobia       cyclobenzaprine 5 MG tablet    FLEXERIL    30 tablet    Take 2 tablets (10 mg) by mouth 3 times daily as needed for muscle spasms        gabapentin 300 MG capsule    NEURONTIN    30 capsule    Take 1 capsule (300 mg) by mouth At Bedtime    Acute right-sided low back pain with right-sided sciatica       HYDROcodone-acetaminophen 5-325 MG per tablet    NORCO    30 tablet    Take 1-2 tablets by mouth every 6 hours as needed for moderate to severe pain    Closed nondisplaced fracture of fifth metatarsal bone of right foot, initial encounter       LORazepam 1 MG tablet    ATIVAN    30 tablet    Take 1 tablet (1 mg) by mouth 2 times daily as needed for anxiety    Anxiety state, Panic disorder without agoraphobia       order for DME     1 Device    Walking boot- biggest size

## 2017-11-07 NOTE — PROGRESS NOTES
SUBJECTIVE:   Lakhwinder Jones is a 37 year old male who presents to clinic today for the following health issues:      FOLLOW UP: Closed nondisplaced fracture of fifth metatarsal bone of right foot,  Pt reached out to work for short term leave paper work but they haven't responded.  Pt request a note for work. Pt is suppose to go back to work tomorrow  Onset: Pt was seen for ER f/u on 10/31/2017. Pt is here for his second f/u due to they following symptoms:       Description:   Pt states he walks on his feet 12 hrs a day at work     Intensity:     Progression of Symptoms:  worsening    Accompanying Signs & Symptoms:   Pt states there is swelling. Pt can have a dull ache and intermittent sharp pain.    Precipitating factors:   Worsened by: standing/bearting weight     Alleviating factors:  Improved by: hydrocodone   Therapies Tried and outcome: Advil, ice          Problem list and histories reviewed & adjusted, as indicated.  Additional history: as documented    Patient Active Problem List   Diagnosis     Anxiety state     Chronic back pain     Depression     Panic disorder without agoraphobia     Psoriasis     CARDIOVASCULAR SCREENING; LDL GOAL LESS THAN 160     Abnormal results of liver function studies     Psoriatic arthritis (H)     Obesity, Class II, BMI 35-39.9     Elevated blood-pressure reading without diagnosis of hypertension     Closed nondisplaced fracture of fifth metatarsal bone of right foot, initial encounter     Past Surgical History:   Procedure Laterality Date     NOSE SURGERY      3 times     right elbow surgey      14 yo     TESTICLE SURGERY      21 yo       Social History   Substance Use Topics     Smoking status: Former Smoker     Types: Cigarettes     Smokeless tobacco: Never Used      Comment: 1-2 cigarettes per day to cope with stress     Alcohol use 4.8 oz/week     8 Standard drinks or equivalent per week      Comment: not for a month     Family History   Problem Relation Age of Onset      Obesity Mother      DIABETES Father      Coronary Artery Disease Father      Hypertension Father      Hyperlipidemia Father      Depression Father      Anxiety Disorder Father      MENTAL ILLNESS Father      Substance Abuse Father      Breast Cancer Maternal Grandmother      Depression Maternal Grandmother      MENTAL ILLNESS Maternal Grandmother      Substance Abuse Maternal Grandmother      Prostate Cancer Maternal Grandfather      DIABETES Paternal Grandmother      Breast Cancer Paternal Grandmother      Depression Paternal Grandmother      MENTAL ILLNESS Paternal Grandmother      DIABETES Paternal Grandfather      Asthma Brother      DIABETES Paternal Uncle      CEREBROVASCULAR DISEASE No family hx of      Anesthesia Reaction No family hx of      OSTEOPOROSIS No family hx of      Genetic Disorder No family hx of      Thyroid Disease No family hx of      Liver Disease No family hx of          Current Outpatient Prescriptions   Medication Sig Dispense Refill     HYDROcodone-acetaminophen (NORCO) 5-325 MG per tablet Take 1-2 tablets by mouth every 6 hours as needed for moderate to severe pain 30 tablet 0     cyclobenzaprine (FLEXERIL) 5 MG tablet Take 2 tablets (10 mg) by mouth 3 times daily as needed for muscle spasms 30 tablet 3     order for DME Walking boot- biggest size 1 Device 0     citalopram (CELEXA) 40 MG tablet Take 1 tablet (40 mg) by mouth daily 90 tablet 1     LORazepam (ATIVAN) 1 MG tablet Take 1 tablet (1 mg) by mouth 2 times daily as needed for anxiety 30 tablet 0     gabapentin (NEURONTIN) 300 MG capsule Take 1 capsule (300 mg) by mouth At Bedtime 30 capsule 1     Allergies   Allergen Reactions     Tramadol Other (See Comments)     Shellfish-Derived Products          Reviewed and updated as needed this visit by clinical staff     Reviewed and updated as needed this visit by Provider         ROS:  Constitutional, HEENT, cardiovascular, pulmonary, gi and gu systems are negative, except as  otherwise noted.      OBJECTIVE:   /89 (BP Location: Left arm, Patient Position: Sitting, Cuff Size: Adult Large)  Pulse 87  Temp 97.6  F (36.4  C) (Oral)  SpO2 95%  There is no height or weight on file to calculate BMI.  GENERAL: healthy, alert and no distress  MS: right foot: slightly improved lateral aspect swelling and erythema when compared to the previous exam, still very tender.     Diagnostic Test Results:  none     ASSESSMENT/PLAN:       ICD-10-CM    1. Closed nondisplaced fracture of fifth metatarsal bone of right foot, initial encounter S92.354A HYDROcodone-acetaminophen (NORCO) 5-325 MG per tablet   continue to wear walking boot  Elevate  Ice  Rest  Hernandez was refilled.  Note for work was provided.   Follow up in 2 weeks         Simona Prasad PA-C  [facility]

## 2017-11-08 ENCOUNTER — TELEPHONE (OUTPATIENT)
Dept: FAMILY MEDICINE | Facility: CLINIC | Age: 37
End: 2017-11-08

## 2017-11-08 NOTE — TELEPHONE ENCOUNTER
What type of form? FMLA FORMS  What day did you drop off your forms? Wednesday Nov 8th 2017  Is there a due date? Wednesday Nov 22nd 2017 (7-10 business day to compete forms)   How would you like to receive these forms? Fax forms to [phone]  Provider? Jacinta Prasad  Which clinic was the form dropped off at? [place]  What is the best number to contact you? Pt. Can be reached at [phone]  What time works best to contact you with in 4 hrs?   Is it okay to leave a message? Yes     FORM IN TEAM HEART BOX    Sánchez Stacy, Patient Rep  [facility]

## 2017-11-08 NOTE — TELEPHONE ENCOUNTER
Form is received from the  and forward to Jacinta to address when she is in clinic.  Artem Beckman,  [employer]

## 2017-11-21 ENCOUNTER — OFFICE VISIT (OUTPATIENT)
Dept: FAMILY MEDICINE | Facility: CLINIC | Age: 37
End: 2017-11-21
Payer: COMMERCIAL

## 2017-11-21 VITALS
HEART RATE: 84 BPM | DIASTOLIC BLOOD PRESSURE: 90 MMHG | TEMPERATURE: 98 F | WEIGHT: 315 LBS | SYSTOLIC BLOOD PRESSURE: 150 MMHG | HEIGHT: 77 IN | BODY MASS INDEX: 37.19 KG/M2 | OXYGEN SATURATION: 97 %

## 2017-11-21 DIAGNOSIS — S92.354A CLOSED NONDISPLACED FRACTURE OF FIFTH METATARSAL BONE OF RIGHT FOOT, INITIAL ENCOUNTER: ICD-10-CM

## 2017-11-21 PROCEDURE — 99214 OFFICE O/P EST MOD 30 MIN: CPT | Performed by: PHYSICIAN ASSISTANT

## 2017-11-21 RX ORDER — HYDROCODONE BITARTRATE AND ACETAMINOPHEN 5; 325 MG/1; MG/1
1-2 TABLET ORAL EVERY 6 HOURS PRN
Qty: 30 TABLET | Refills: 0 | Status: SHIPPED | OUTPATIENT
Start: 2017-11-21 | End: 2017-12-11

## 2017-11-21 ASSESSMENT — PAIN SCALES - GENERAL: PAINLEVEL: SEVERE PAIN (7)

## 2017-11-21 NOTE — PROGRESS NOTES
SUBJECTIVE:   Lakhwinder Jones is a 37 year old male who presents to clinic today for the following health issues:    Concern - Foot pain follow up  Onset: Persistent since last visit    Description:     Patient follow up on bone fracture in right foot from fall. Patient needs new FMLA paper work done. Patient needs to extend his no work period since he can't return to full work duty yet. Patient is unable to bear weight for longer than 15 minutes.     Intensity: moderate    Progression of Symptoms:  same    Accompanying Signs & Symptoms:  Swelling    Previous history of similar problem:   yes    Precipitating factors:   Worsened by: Pressure on foot    Alleviating factors:  Improved by: Resting    Therapies Tried and outcome: Elevation, ice, pain prescription    Problem list and histories reviewed & adjusted, as indicated.  Additional history: as documented    Patient Active Problem List   Diagnosis     Anxiety state     Chronic back pain     Depression     Panic disorder without agoraphobia     Psoriasis     CARDIOVASCULAR SCREENING; LDL GOAL LESS THAN 160     Abnormal results of liver function studies     Psoriatic arthritis (H)     Obesity, Class II, BMI 35-39.9     Elevated blood-pressure reading without diagnosis of hypertension     Closed nondisplaced fracture of fifth metatarsal bone of right foot, initial encounter     Past Surgical History:   Procedure Laterality Date     NOSE SURGERY      3 times     right elbow surgey      14 yo     TESTICLE SURGERY      21 yo       Social History   Substance Use Topics     Smoking status: Former Smoker     Types: Cigarettes     Smokeless tobacco: Never Used      Comment: 1-2 cigarettes per day to cope with stress     Alcohol use 4.8 oz/week     8 Standard drinks or equivalent per week      Comment: not for a month     Family History   Problem Relation Age of Onset     Obesity Mother      DIABETES Father      Coronary Artery Disease Father      Hypertension Father       Hyperlipidemia Father      Depression Father      Anxiety Disorder Father      MENTAL ILLNESS Father      Substance Abuse Father      Breast Cancer Maternal Grandmother      Depression Maternal Grandmother      MENTAL ILLNESS Maternal Grandmother      Substance Abuse Maternal Grandmother      Prostate Cancer Maternal Grandfather      DIABETES Paternal Grandmother      Breast Cancer Paternal Grandmother      Depression Paternal Grandmother      MENTAL ILLNESS Paternal Grandmother      DIABETES Paternal Grandfather      Asthma Brother      DIABETES Paternal Uncle      CEREBROVASCULAR DISEASE No family hx of      Anesthesia Reaction No family hx of      OSTEOPOROSIS No family hx of      Genetic Disorder No family hx of      Thyroid Disease No family hx of      Liver Disease No family hx of          Current Outpatient Prescriptions   Medication Sig Dispense Refill     HYDROcodone-acetaminophen (NORCO) 5-325 MG per tablet Take 1-2 tablets by mouth every 6 hours as needed for moderate to severe pain 30 tablet 0     cyclobenzaprine (FLEXERIL) 5 MG tablet Take 2 tablets (10 mg) by mouth 3 times daily as needed for muscle spasms 30 tablet 3     order for DME Walking boot- biggest size 1 Device 0     citalopram (CELEXA) 40 MG tablet Take 1 tablet (40 mg) by mouth daily 90 tablet 1     LORazepam (ATIVAN) 1 MG tablet Take 1 tablet (1 mg) by mouth 2 times daily as needed for anxiety 30 tablet 0     gabapentin (NEURONTIN) 300 MG capsule Take 1 capsule (300 mg) by mouth At Bedtime 30 capsule 1     Allergies   Allergen Reactions     Tramadol Other (See Comments)     Shellfish-Derived Products          Reviewed and updated as needed this visit by clinical staffTobacco  Allergies  Meds  Med Hx  Surg Hx  Fam Hx  Soc Hx      Reviewed and updated as needed this visit by Provider         ROS:  Constitutional, HEENT, cardiovascular, pulmonary, gi and gu systems are negative, except as otherwise noted.      OBJECTIVE:   /90  "(BP Location: Right arm, Patient Position: Chair, Cuff Size: Adult Large)  Pulse 84  Temp 98  F (36.7  C) (Oral)  Ht 6' 5\" (1.956 m)  Wt (!) 340 lb (154.2 kg)  SpO2 97%  BMI 40.32 kg/m2  Body mass index is 40.32 kg/(m^2).  GENERAL: healthy, alert and no distress  MS: right foot-mild swelling (improved since last exam) no bruising, moderate tenderness to palpation at the base of 5th metatarsal    Diagnostic Test Results:  none     ASSESSMENT/PLAN:         ICD-10-CM    1. Closed nondisplaced fracture of fifth metatarsal bone of right foot, initial encounter S92.354A HYDROcodone-acetaminophen (NORCO) 5-325 MG per tablet     [employer] paperwork was filled and faxed to patient's HR team.   Patient received original.   Continue wearing walking boot   limit weight bearing  Ice  Norco for pain     Simona Prasad PA-C  [facility]    "

## 2017-11-21 NOTE — MR AVS SNAPSHOT
After Visit Summary   11/21/2017    Lakhwinder Jones    MRN: [id number]           Patient Information     Date Of Birth          1980        Visit Information        Provider Department      11/21/2017 2:00 PM Simona Prasad PA-C [facility]        Today's Diagnoses     Closed nondisplaced fracture of fifth metatarsal bone of right foot, initial encounter          Care Instructions    At [facility], we strive to deliver an exceptional experience to you, every time we see you.  If you receive a survey in the mail, please send us back your thoughts. We really do value your feedback.    Based on your medical history, these are the current health maintenance/preventive care services that you are due for (some may have been done at this visit.)  Health Maintenance Due   Topic Date Due     URINE DRUG SCREEN Q1 YR  10/13/1996         Suggested websites for health information:  Www.Cerephex : Up to date and easily searchable information on multiple topics.  Www.medlineplus.gov : medication info, interactive tutorials, watch real surgeries online  Www.familydoctor.org : good info from the Academy of Family Physicians  Www.cdc.gov : public health info, travel advisories, epidemics (H1N1)  Www.aap.org : children's health info, normal development, vaccinations  Www.health.state[place] : [place] dept of health, public health issues in [place], N1N1    Your care team:                            Family Medicine Internal Medicine   MD Constantino Walker MD Shantel Branch-Fleming, MD Katya Georgiev PA-C Nam Ho, MD Pediatrics   NIRU Isaac CNP Amelia Massimini APRN CNP Shaista Malik, MD Bethany Templen, MD Deborah Mielke, MD Kim Thein, APRN CNP      Clinic hours: Monday - Thursday 7 am-7 pm; Fridays 7 am-5 pm.   Urgent care: Monday - Friday 11 am-9 pm; Saturday and Sunday 9 am-5 pm.  Pharmacy : Monday -Thursday 8  "am-8 pm; Friday 8 am-6 pm; Saturday and Sunday 9 am-5 pm.     Clinic: [phone]   Pharmacy: [phone]            Follow-ups after your visit        Who to contact     If you have questions or need follow up information about today's clinic visit or your schedule please contact [facility] directly at [phone].  Normal or non-critical lab and imaging results will be communicated to you by [email or web address].comhart, letter or phone within 4 business days after the clinic has received the results. If you do not hear from us within 7 days, please contact the clinic through Kandu or phone. If you have a critical or abnormal lab result, we will notify you by phone as soon as possible.  Submit refill requests through Kandu or call your pharmacy and they will forward the refill request to us. Please allow 3 business days for your refill to be completed.          Additional Information About Your Visit        [email or web address] Information     Kandu gives you secure access to your electronic health record. If you see a primary care provider, you can also send messages to your care team and make appointments. If you have questions, please call your primary care clinic.  If you do not have a primary care provider, please call [phone] and they will assist you.        Care EveryWhere ID     This is your Care EveryWhere ID. This could be used by other organizations to access your [facility] medical records  [id number]        Your Vitals Were     Pulse Temperature Height Pulse Oximetry BMI (Body Mass Index)       84 98  F (36.7  C) (Oral) 6' 5\" (1.956 m) 97% 40.32 kg/m2        Blood Pressure from Last 3 Encounters:   11/21/17 150/90   11/07/17 141/89   10/31/17 138/76    Weight from Last 3 Encounters:   11/21/17 (!) 340 lb (154.2 kg)   10/31/17 (!) 326 lb (147.9 kg)   08/21/17 (!) 318 lb 14.4 oz (144.7 kg)              Today, you had the following     No orders found for display         Where to get your medicines "      Some of these will need a paper prescription and others can be bought over the counter.  Ask your nurse if you have questions.     Bring a paper prescription for each of these medications     HYDROcodone-acetaminophen 5-325 MG per tablet          Primary Care Provider Office Phone # Fax #    Simona Prasad PA-C [phone]       [address]  [facility] [address]        Equal Access to Services     [place] : Hadii aad ku hadasho Soomaali, waaxda luqadaha, qaybta kaalmada adeegyada, waxay idiin hayaan adeeg kharash la'leah . So [facility] [phone].    ATENCIÓN: Si deliala esplolita, tiene a rose disposición servicios gratuitos de asistencia lingüística. Lucilaame al [phone].    We comply with applicable federal civil rights laws and [place] laws. We do not discriminate on the basis of race, color, national origin, age, disability, sex, sexual orientation, or gender identity.            Thank you!     Thank you for choosing [facility]  for your care. Our goal is always to provide you with excellent care. Hearing back from our patients is one way we can continue to improve our services. Please take a few minutes to complete the written survey that you may receive in the mail after your visit with us. Thank you!             Your Updated Medication List - Protect others around you: Learn how to safely use, store and throw away your medicines at www.disposemymeds.org.          This list is accurate as of: 11/21/17  2:43 PM.  Always use your most recent med list.                   Brand Name Dispense Instructions for use Diagnosis    citalopram 40 MG tablet    celeXA    90 tablet    Take 1 tablet (40 mg) by mouth daily    Anxiety state, Panic disorder without agoraphobia       cyclobenzaprine 5 MG tablet    FLEXERIL    30 tablet    Take 2 tablets (10 mg) by mouth 3 times daily as needed for muscle spasms        gabapentin 300 MG capsule    NEURONTIN    30 capsule     Take 1 capsule (300 mg) by mouth At Bedtime    Acute right-sided low back pain with right-sided sciatica       HYDROcodone-acetaminophen 5-325 MG per tablet    NORCO    30 tablet    Take 1-2 tablets by mouth every 6 hours as needed for moderate to severe pain    Closed nondisplaced fracture of fifth metatarsal bone of right foot, initial encounter       LORazepam 1 MG tablet    ATIVAN    30 tablet    Take 1 tablet (1 mg) by mouth 2 times daily as needed for anxiety    Anxiety state, Panic disorder without agoraphobia       order for DME     1 Device    Walking boot- biggest size

## 2017-11-21 NOTE — PATIENT INSTRUCTIONS
At [facility], we strive to deliver an exceptional experience to you, every time we see you.  If you receive a survey in the mail, please send us back your thoughts. We really do value your feedback.    Based on your medical history, these are the current health maintenance/preventive care services that you are due for (some may have been done at this visit.)  Health Maintenance Due   Topic Date Due     URINE DRUG SCREEN Q1 YR  10/13/1996         Suggested websites for health information:  Www.[email or web address].[email or web address] : Up to date and easily searchable information on multiple topics.  Www.medlineplus.gov : medication info, interactive tutorials, watch real surgeries online  Www.familydoctor.org : good info from the Academy of Family Physicians  Www.cdc.gov : public health info, travel advisories, epidemics (H1N1)  Www.aap.org : children's health info, normal development, vaccinations  Www.health.[facility].[place].us : [place] dept of health, public health issues in [place], N1N1    Your care team:                            Family Medicine Internal Medicine   MD Constantino Walker MD Shantel Branch-Fleming, MD Katya Georgiev PA-C Nam Ho, MD Pediatrics   NIRU Isaac, MD Ann Wilkes CNP, MD Deborah Mielke, MD Kim Thein, APRN CNP      Clinic hours: Monday - Thursday 7 am-7 pm; Fridays 7 am-5 pm.   Urgent care: Monday - Friday 11 am-9 pm; Saturday and Sunday 9 am-5 pm.  Pharmacy : Monday -Thursday 8 am-8 pm; Friday 8 am-6 pm; Saturday and Sunday 9 am-5 pm.     Clinic: [phone]   Pharmacy: [phone]

## 2017-11-21 NOTE — NURSING NOTE
"Chief Complaint   Patient presents with     Foot Pain     Right foot follow up       Initial /90 (BP Location: Right arm, Patient Position: Chair, Cuff Size: Adult Large)  Pulse 84  Temp 98  F (36.7  C) (Oral)  Ht 6' 5\" (1.956 m)  Wt (!) 340 lb (154.2 kg)  SpO2 97%  BMI 40.32 kg/m2 Estimated body mass index is 40.32 kg/(m^2) as calculated from the following:    Height as of this encounter: 6' 5\" (1.956 m).    Weight as of this encounter: 340 lb (154.2 kg).  Medication Reconciliation: complete     Will Bauman CMA    "

## 2017-12-05 ENCOUNTER — OFFICE VISIT (OUTPATIENT)
Dept: FAMILY MEDICINE | Facility: CLINIC | Age: 37
End: 2017-12-05
Payer: COMMERCIAL

## 2017-12-05 VITALS
WEIGHT: 315 LBS | SYSTOLIC BLOOD PRESSURE: 146 MMHG | HEART RATE: 79 BPM | DIASTOLIC BLOOD PRESSURE: 90 MMHG | BODY MASS INDEX: 40.4 KG/M2

## 2017-12-05 DIAGNOSIS — S92.354A CLOSED NONDISPLACED FRACTURE OF FIFTH METATARSAL BONE OF RIGHT FOOT, INITIAL ENCOUNTER: Primary | ICD-10-CM

## 2017-12-05 PROCEDURE — 99213 OFFICE O/P EST LOW 20 MIN: CPT | Performed by: PHYSICIAN ASSISTANT

## 2017-12-05 NOTE — PROGRESS NOTES
SUBJECTIVE:   Lakhwinder Jones is a 37 year old male who presents to clinic today for the following health issues:      Return to work note  Patient report that foot is healing well. He is able to wear post op shoues and walk in it with minimal pain and is wanting to go back to work with minimal restrictions         Problem list and histories reviewed & adjusted, as indicated.  Additional history: as documented    Patient Active Problem List   Diagnosis     Anxiety state     Chronic back pain     Depression     Panic disorder without agoraphobia     Psoriasis     CARDIOVASCULAR SCREENING; LDL GOAL LESS THAN 160     Abnormal results of liver function studies     Psoriatic arthritis (H)     Obesity, Class II, BMI 35-39.9     Elevated blood-pressure reading without diagnosis of hypertension     Closed nondisplaced fracture of fifth metatarsal bone of right foot, initial encounter     Past Surgical History:   Procedure Laterality Date     NOSE SURGERY      3 times     right elbow surgey      14 yo     TESTICLE SURGERY      23 yo       Social History   Substance Use Topics     Smoking status: Former Smoker     Types: Cigarettes     Smokeless tobacco: Never Used      Comment: 1-2 cigarettes per day to cope with stress     Alcohol use 4.8 oz/week     8 Standard drinks or equivalent per week      Comment: not for a month     Family History   Problem Relation Age of Onset     Obesity Mother      DIABETES Father      Coronary Artery Disease Father      Hypertension Father      Hyperlipidemia Father      Depression Father      Anxiety Disorder Father      MENTAL ILLNESS Father      Substance Abuse Father      Breast Cancer Maternal Grandmother      Depression Maternal Grandmother      MENTAL ILLNESS Maternal Grandmother      Substance Abuse Maternal Grandmother      Prostate Cancer Maternal Grandfather      DIABETES Paternal Grandmother      Breast Cancer Paternal Grandmother      Depression Paternal Grandmother      MENTAL  ILLNESS Paternal Grandmother      DIABETES Paternal Grandfather      Asthma Brother      DIABETES Paternal Uncle      CEREBROVASCULAR DISEASE No family hx of      Anesthesia Reaction No family hx of      OSTEOPOROSIS No family hx of      Genetic Disorder No family hx of      Thyroid Disease No family hx of      Liver Disease No family hx of          Current Outpatient Prescriptions   Medication Sig Dispense Refill     cyclobenzaprine (FLEXERIL) 5 MG tablet Take 2 tablets (10 mg) by mouth 3 times daily as needed for muscle spasms 30 tablet 3     order for DME Walking boot- biggest size 1 Device 0     citalopram (CELEXA) 40 MG tablet Take 1 tablet (40 mg) by mouth daily 90 tablet 1     LORazepam (ATIVAN) 1 MG tablet Take 1 tablet (1 mg) by mouth 2 times daily as needed for anxiety 30 tablet 0     gabapentin (NEURONTIN) 300 MG capsule Take 1 capsule (300 mg) by mouth At Bedtime 30 capsule 1     HYDROcodone-acetaminophen (NORCO) 5-325 MG per tablet Take 1-2 tablets by mouth every 6 hours as needed for moderate to severe pain (Patient not taking: Reported on 12/5/2017) 30 tablet 0     Allergies   Allergen Reactions     Tramadol Other (See Comments)     Shellfish-Derived Products          Reviewed and updated as needed this visit by clinical staffTobacco  Allergies  Meds       Reviewed and updated as needed this visit by Provider         ROS:  Constitutional, HEENT, cardiovascular, pulmonary, gi and gu systems are negative, except as otherwise noted.      OBJECTIVE:   /90 (BP Location: Left arm, Patient Position: Chair, Cuff Size: Adult Large)  Pulse 79  Wt (!) 340 lb 11.2 oz (154.5 kg)  BMI 40.4 kg/m2  Body mass index is 40.4 kg/(m^2).  GENERAL: healthy, alert and no distress  MS: right foot in post op shoe. There is minimal swelling at the base of 5th metatarsal     Diagnostic Test Results:  none     ASSESSMENT/PLAN:       ICD-10-CM    1. Closed nondisplaced fracture of fifth metatarsal bone of right foot,  initial encounter S92.354A      Continue wearing flat sole shoe  Ibuprofen for pain as needed  Patient may return to work with following limitations-Please allow patient to sit down and rest his foot for 15 min each hour for the next 4 weeks 12/15/17-1/2/17      Simona Prasad PA-C  [facility]

## 2017-12-05 NOTE — MR AVS SNAPSHOT
After Visit Summary   12/5/2017    Lakhwinder Jones    MRN: [id number]           Patient Information     Date Of Birth          1980        Visit Information        Provider Department      12/5/2017 10:20 AM Simona Prasad PA-C [facility]        Today's Diagnoses     Closed nondisplaced fracture of fifth metatarsal bone of right foot, initial encounter    -  1       Follow-ups after your visit        Your next 10 appointments already scheduled     Jan 05, 2018  8:20 AM CST   Office Visit with Suellen Page MD   [facility] ([facility] )    [address] [place] [address]  [address] [place] [id number] [phone]           Bring a current list of meds and any records pertaining to this visit. For Physicals, please bring immunization records and any forms needing to be filled out. Please arrive 10 minutes early to complete paperwork.              Who to contact     If you have questions or need follow up information about today's clinic visit or your schedule please contact [facility] directly at [phone].  Normal or non-critical lab and imaging results will be communicated to you by MyChart, letter or phone within 4 business days after the clinic has received the results. If you do not hear from us within 7 days, please contact the clinic through Slingrhart or phone. If you have a critical or abnormal lab result, we will notify you by phone as soon as possible.  Submit refill requests through Solovis or call your pharmacy and they will forward the refill request to us. Please allow 3 business days for your refill to be completed.          Additional Information About Your Visit        MyChart Information     Solovis gives you secure access to your electronic health record. If you see a primary care provider, you can also send messages to your care team and make appointments.  If you have questions, please call your primary care clinic.  If you do not have a primary care provider, please call [phone] and they will assist you.        Care EveryWhere ID     This is your Care EveryWhere ID. This could be used by other organizations to access your [facility] medical records  [id number]        Your Vitals Were     Pulse BMI (Body Mass Index)                79 40.4 kg/m2           Blood Pressure from Last 3 Encounters:   12/05/17 146/90   11/21/17 150/90   11/07/17 141/89    Weight from Last 3 Encounters:   12/05/17 (!) 340 lb 11.2 oz (154.5 kg)   11/21/17 (!) 340 lb (154.2 kg)   10/31/17 (!) 326 lb (147.9 kg)              Today, you had the following     No orders found for display       Primary Care Provider Office Phone # Fax #    Simona Prasad PA-C [phone]       [address] [facility] [address]        Equal Access to Services     [facility]: Hadii aad ku hadasho Soomaali, waaxda luqadaha, qaybta kaalmada adeegyada, waxay lida hayleah keller . So [facility] [phone].    ATENCIÓN: Si habla español, tiene a rose disposición servicios gratuitos de asistencia lingüística. Llame al [phone].    We comply with applicable federal civil rights laws and [place] laws. We do not discriminate on the basis of race, color, national origin, age, disability, sex, sexual orientation, or gender identity.            Thank you!     Thank you for choosing [facility]  for your care. Our goal is always to provide you with excellent care. Hearing back from our patients is one way we can continue to improve our services. Please take a few minutes to complete the written survey that you may receive in the mail after your visit with us. Thank you!             Your Updated Medication List - Protect others around you: Learn how to safely use, store and throw away your medicines at www.disposemymeds.org.          This list is accurate  as of: 12/5/17 11:59 PM.  Always use your most recent med list.                   Brand Name Dispense Instructions for use Diagnosis    citalopram 40 MG tablet    celeXA    90 tablet    Take 1 tablet (40 mg) by mouth daily    Anxiety state, Panic disorder without agoraphobia       cyclobenzaprine 5 MG tablet    FLEXERIL    30 tablet    Take 2 tablets (10 mg) by mouth 3 times daily as needed for muscle spasms        gabapentin 300 MG capsule    NEURONTIN    30 capsule    Take 1 capsule (300 mg) by mouth At Bedtime    Acute right-sided low back pain with right-sided sciatica       HYDROcodone-acetaminophen 5-325 MG per tablet    NORCO    30 tablet    Take 1-2 tablets by mouth every 6 hours as needed for moderate to severe pain    Closed nondisplaced fracture of fifth metatarsal bone of right foot, initial encounter       LORazepam 1 MG tablet    ATIVAN    30 tablet    Take 1 tablet (1 mg) by mouth 2 times daily as needed for anxiety    Anxiety state, Panic disorder without agoraphobia       order for DME     1 Device    Walking boot- biggest size

## 2017-12-05 NOTE — LETTER
[address] [address]  Phone: [phone]    December 5, 2017        Lakhwinder Jones  [address]  [place] [address]          To whom it may concern:    RE: Lakhwinder Jones    Patient was seen and treated today at our clinic.  Patient may return to work 12/5/17 with the following:  Please allow patient to sit down and rest his foot for 15 min each hour for the next 4 weeks 12/15/17-1/2/17    Please contact me for questions or concerns.      Sincerely,        Jacinta Prasad PAC

## 2017-12-11 ENCOUNTER — OFFICE VISIT (OUTPATIENT)
Dept: FAMILY MEDICINE | Facility: CLINIC | Age: 37
End: 2017-12-11
Payer: COMMERCIAL

## 2017-12-11 VITALS
TEMPERATURE: 98.8 F | HEART RATE: 92 BPM | WEIGHT: 315 LBS | OXYGEN SATURATION: 97 % | HEIGHT: 77 IN | DIASTOLIC BLOOD PRESSURE: 82 MMHG | BODY MASS INDEX: 37.19 KG/M2 | SYSTOLIC BLOOD PRESSURE: 136 MMHG

## 2017-12-11 DIAGNOSIS — S92.354A CLOSED NONDISPLACED FRACTURE OF FIFTH METATARSAL BONE OF RIGHT FOOT, INITIAL ENCOUNTER: ICD-10-CM

## 2017-12-11 DIAGNOSIS — E66.01 MORBID OBESITY (H): Primary | ICD-10-CM

## 2017-12-11 PROCEDURE — 99213 OFFICE O/P EST LOW 20 MIN: CPT | Performed by: NURSE PRACTITIONER

## 2017-12-11 RX ORDER — HYDROCODONE BITARTRATE AND ACETAMINOPHEN 5; 325 MG/1; MG/1
1 TABLET ORAL EVERY 6 HOURS PRN
Qty: 21 TABLET | Refills: 0 | Status: SHIPPED | OUTPATIENT
Start: 2017-12-11 | End: 2018-02-20

## 2017-12-11 ASSESSMENT — PAIN SCALES - GENERAL: PAINLEVEL: SEVERE PAIN (7)

## 2017-12-11 NOTE — PATIENT INSTRUCTIONS
At [facility], we strive to deliver an exceptional experience to you, every time we see you.  If you receive a survey in the mail, please send us back your thoughts. We really do value your feedback.    Based on your medical history, these are the current health maintenance/preventive care services that you are due for (some may have been done at this visit.)  Health Maintenance Due   Topic Date Due     URINE DRUG SCREEN Q1 YR  10/13/1996         Suggested websites for health information:  Www.LaunchKey.org : Up to date and easily searchable information on multiple topics.  Www.Crew.gov : medication info, interactive tutorials, watch real surgeries online  Www.familydoctor.org : good info from the Academy of Family Physicians  Www.cdc.gov : public health info, travel advisories, epidemics (H1N1)  Www.aap.org : children's health info, normal development, vaccinations  Www.health.[facility].[place].us : [place] dept of health, public health issues in [place], N1N1    Your care team:                            Family Medicine Internal Medicine   MD Constantino Walker MD Shantel Branch-Fleming, MD Katya Georgiev PA-C Nam Ho, MD Pediatrics   NIRU Isaac, CNP Stephie YARBROUGH CNP   MD Ann Winkler MD Deborah Mielke, MD Kim Thein, APRN CNP      Clinic hours: Monday - Thursday 7 am-7 pm; Fridays 7 am-5 pm.   Urgent care: Monday - Friday 11 am-9 pm; Saturday and Sunday 9 am-5 pm.  Pharmacy : Monday -Thursday 8 am-8 pm; Friday 8 am-6 pm; Saturday and Sunday 9 am-5 pm.     Clinic: [phone]   Pharmacy: [phone]      Understanding Fifth Metatarsal Fracture    A fifth metatarsal fracture is a type of broken bone in your foot. You have 5 metatarsals. They are the middle bones in your feet, between your toes and your anklebones (tarsals). The fifth metatarsal connects your smallest toe to your ankle. These bones help with arch support and  balance.     How to say it  met-ah-TAHR-sal   What causes a fifth metatarsal fracture?  A direct blow to the bone is often the cause of a fracture of the fifth metatarsal. That may happen if you drop a heavy object on your foot or land wrong on your foot or ankle. Twisting activities can also break the bone. Pivoting while playing basketball is one example.  Repeatedly placing too much stress on the bone can also cause a fracture of the fifth metatarsal. This is called a stress fracture. People who do physical activities like dancing or running tend to be more prone to stress fractures.  Symptoms of a fifth metatarsal fracture  Sudden pain along the outside of your foot is the main symptom. A stress fracture may develop more slowly. You may feel chronic pain for a period of time. Your foot may also swell up and bruise. You may have trouble walking.  Treatment for a fifth metatarsal fracture  Treatment for this type of fracture depends on where the bone is broken and how severe the breakage is. Healing can take up to several months. Treatment may include:    Cold therapy. Putting ice on the area may reduce swelling and pain, especially in the first few days after injury.    Elevation. Propping up the foot so it is above the level of your heart may ease swelling.    Prescription or over-the-counter pain medicines. These help reduce pain and swelling.    Immobilization. Devices such as a splint, cast, or walking boot can protect the bone and ease pain. They can help keep the bone in place so it heals properly. You may need to avoid putting any weight on the broken bone for a period of time. Severe fractures usually need a longer limit on weight-bearing activities.    Stretching and strengthening exercises. Certain exercises can help you regain flexibility and strength in your foot.    Surgery. You usually will not need surgery. But you may need it if the bone is broken into 2 or more pieces and is not aligned  (displaced), doesn t heal properly, or takes a long time to heal.  Possible complications of a fifth metatarsal fracture    The bone doesn t heal correctly    Acute compartment syndrome. This is when pressure builds up in the muscles of the foot and affects blood flow.     When to call your healthcare provider  Call your healthcare provider right away if you have any of these:    Fever of 100.4 F (38 C) or higher, or as directed    Symptoms that don t get better, or get worse    Numbness or coldness in your foot    Toe nails that turn blue or grey in color    New symptoms   Date Last Reviewed: 3/10/2016    5652-3365 [employer]. [address] [place] [address], [place] [address]. All rights reserved. This information is not intended as a substitute for professional medical care. Always follow your healthcare professional's instructions.

## 2017-12-11 NOTE — MR AVS SNAPSHOT
After Visit Summary   12/11/2017    Lakhwinder Jones    MRN: [id number]           Patient Information     Date Of Birth          1980        Visit Information        Provider Department      12/11/2017 10:20 AM Rhonda Rodriguez APRN CNP [facility]        Today's Diagnoses     Closed nondisplaced fracture of fifth metatarsal bone of right foot, initial encounter          Care Instructions    At [facility], we strive to deliver an exceptional experience to you, every time we see you.  If you receive a survey in the mail, please send us back your thoughts. We really do value your feedback.    Based on your medical history, these are the current health maintenance/preventive care services that you are due for (some may have been done at this visit.)  Health Maintenance Due   Topic Date Due     URINE DRUG SCREEN Q1 YR  10/13/1996         Suggested websites for health information:  WwwHiMom : Up to date and easily searchable information on multiple topics.  Www.[employer].gov : medication info, interactive tutorials, watch real surgeries online  Www.familydoctor.org : good info from the Academy of Family Physicians  Www.cdc.gov : public health info, travel advisories, epidemics (H1N1)  Www.aap.org : children's health info, normal development, vaccinations  Www.health.[facility].[place].us : [place] dept of health, public health issues in [place], N1N1    Your care team:                            Family Medicine Internal Medicine   MD Constantino Walker MD Shantel Branch-Fleming, MD Katya Georgiev PA-C Nam Ho, MD Pediatrics   NIRU Isaac CNP Amelia Massimini APRN CNP Shaista Malik, MD Bethany Templen, MD Deborah Mielke, MD Kim Thein, APRN CNP      Clinic hours: Monday - Thursday 7 am-7 pm; Fridays 7 am-5 pm.   Urgent care: Monday - Friday 11 am-9 pm; Saturday and Sunday 9 am-5 pm.  Pharmacy : Monday -Thursday 8 am-8 pm;  Friday 8 am-6 pm; Saturday and Sunday 9 am-5 pm.     Clinic: [phone]   Pharmacy: [phone]      Understanding Fifth Metatarsal Fracture    A fifth metatarsal fracture is a type of broken bone in your foot. You have 5 metatarsals. They are the middle bones in your feet, between your toes and your anklebones (tarsals). The fifth metatarsal connects your smallest toe to your ankle. These bones help with arch support and balance.     How to say it  met-[facility]-sal   What causes a fifth metatarsal fracture?  A direct blow to the bone is often the cause of a fracture of the fifth metatarsal. That may happen if you drop a heavy object on your foot or land wrong on your foot or ankle. Twisting activities can also break the bone. Pivoting while playing basketball is one example.  Repeatedly placing too much stress on the bone can also cause a fracture of the fifth metatarsal. This is called a stress fracture. People who do physical activities like dancing or running tend to be more prone to stress fractures.  Symptoms of a fifth metatarsal fracture  Sudden pain along the outside of your foot is the main symptom. A stress fracture may develop more slowly. You may feel chronic pain for a period of time. Your foot may also swell up and bruise. You may have trouble walking.  Treatment for a fifth metatarsal fracture  Treatment for this type of fracture depends on where the bone is broken and how severe the breakage is. Healing can take up to several months. Treatment may include:    Cold therapy. Putting ice on the area may reduce swelling and pain, especially in the first few days after injury.    Elevation. Propping up the foot so it is above the level of your heart may ease swelling.    Prescription or over-the-counter pain medicines. These help reduce pain and swelling.    Immobilization. Devices such as a splint, cast, or walking boot can protect the bone and ease pain. They can help keep the bone in place so it  heals properly. You may need to avoid putting any weight on the broken bone for a period of time. Severe fractures usually need a longer limit on weight-bearing activities.    Stretching and strengthening exercises. Certain exercises can help you regain flexibility and strength in your foot.    Surgery. You usually will not need surgery. But you may need it if the bone is broken into 2 or more pieces and is not aligned (displaced), doesn t heal properly, or takes a long time to heal.  Possible complications of a fifth metatarsal fracture    The bone doesn t heal correctly    Acute compartment syndrome. This is when pressure builds up in the muscles of the foot and affects blood flow.     When to call your healthcare provider  Call your healthcare provider right away if you have any of these:    Fever of 100.4 F (38 C) or higher, or as directed    Symptoms that don t get better, or get worse    Numbness or coldness in your foot    Toe nails that turn blue or grey in color    New symptoms   Date Last Reviewed: 3/10/2016    1906-6565 [employer]. [address] [place] [address]. All rights reserved. This information is not intended as a substitute for professional medical care. Always follow your healthcare professional's instructions.                Follow-ups after your visit        Your next 10 appointments already scheduled     Jan 05, 2018  8:20 AM CST   Office Visit with Suellen Page MD   [facility] ([facility] )    [address] [place] [id number] [phone]           Bring a current list of meds and any records pertaining to this visit. For Physicals, please bring immunization records and any forms needing to be filled out. Please arrive 10 minutes early to complete paperwork.              Who to contact     If you have questions or need follow up information about today's clinic visit or  "your schedule please contact [facility] directly at [phone].  Normal or non-critical lab and imaging results will be communicated to you by Buccaneerhart, letter or phone within 4 business days after the clinic has received the results. If you do not hear from us within 7 days, please contact the clinic through Statuslyt or phone. If you have a critical or abnormal lab result, we will notify you by phone as soon as possible.  Submit refill requests through ScramblerMail or call your pharmacy and they will forward the refill request to us. Please allow 3 business days for your refill to be completed.          Additional Information About Your Visit        ScramblerMail Information     ScramblerMail gives you secure access to your electronic health record. If you see a primary care provider, you can also send messages to your care team and make appointments. If you have questions, please call your primary care clinic.  If you do not have a primary care provider, please call [phone] and they will assist you.        Care EveryWhere ID     This is your Care EveryWhere ID. This could be used by other organizations to access your [facility] medical records  [id number]        Your Vitals Were     Pulse Temperature Height Pulse Oximetry BMI (Body Mass Index)       92 98.8  F (37.1  C) (Oral) 6' 5\" (1.956 m) 97% 40.51 kg/m2        Blood Pressure from Last 3 Encounters:   12/11/17 136/82   12/05/17 146/90   11/21/17 150/90    Weight from Last 3 Encounters:   12/11/17 (!) 341 lb 9.6 oz (154.9 kg)   12/05/17 (!) 340 lb 11.2 oz (154.5 kg)   11/21/17 (!) 340 lb (154.2 kg)              Today, you had the following     No orders found for display         Today's Medication Changes          These changes are accurate as of: 12/11/17 10:52 AM.  If you have any questions, ask your nurse or doctor.               These medicines have changed or have updated prescriptions.        Dose/Directions    HYDROcodone-acetaminophen 5-325 MG " per tablet   Commonly known as:  NORCO   This may have changed:  how much to take   Used for:  Closed nondisplaced fracture of fifth metatarsal bone of right foot, initial encounter   Changed by:  Rhonda Rodriguez APRN CNP        Dose:  1 tablet   Take 1 tablet by mouth every 6 hours as needed for moderate to severe pain   Quantity:  21 tablet   Refills:  0            Where to get your medicines      Some of these will need a paper prescription and others can be bought over the counter.  Ask your nurse if you have questions.     Bring a paper prescription for each of these medications     HYDROcodone-acetaminophen 5-325 MG per tablet                Primary Care Provider Office Phone # Fax #    Simona Tigre Prasad PA-C [phone]       [address]  [facility] [address]        Equal Access to Services     KENTON POLLOCK : Hadii aad ku hadasho Soomaali, waaxda luqadaha, qaybta kaalmada adeegyada, adina keller . So [facility] [phone].    ATENCIÓN: Si habla español, tiene a rose disposición servicios gratuitos de asistencia lingüística. Llame al [phone].    We comply with applicable federal civil rights laws and [place] laws. We do not discriminate on the basis of race, color, national origin, age, disability, sex, sexual orientation, or gender identity.            Thank you!     Thank you for choosing [facility]  for your care. Our goal is always to provide you with excellent care. Hearing back from our patients is one way we can continue to improve our services. Please take a few minutes to complete the written survey that you may receive in the mail after your visit with us. Thank you!             Your Updated Medication List - Protect others around you: Learn how to safely use, store and throw away your medicines at www.disposemymeds.org.          This list is accurate as of: 12/11/17 10:52 AM.  Always use your most recent med list.                    Brand Name Dispense Instructions for use Diagnosis    citalopram 40 MG tablet    celeXA    90 tablet    Take 1 tablet (40 mg) by mouth daily    Anxiety state, Panic disorder without agoraphobia       cyclobenzaprine 5 MG tablet    FLEXERIL    30 tablet    Take 2 tablets (10 mg) by mouth 3 times daily as needed for muscle spasms        gabapentin 300 MG capsule    NEURONTIN    30 capsule    Take 1 capsule (300 mg) by mouth At Bedtime    Acute right-sided low back pain with right-sided sciatica       HYDROcodone-acetaminophen 5-325 MG per tablet    NORCO    21 tablet    Take 1 tablet by mouth every 6 hours as needed for moderate to severe pain    Closed nondisplaced fracture of fifth metatarsal bone of right foot, initial encounter       LORazepam 1 MG tablet    ATIVAN    30 tablet    Take 1 tablet (1 mg) by mouth 2 times daily as needed for anxiety    Anxiety state, Panic disorder without agoraphobia       order for DME     1 Device    Walking boot- biggest size

## 2017-12-11 NOTE — NURSING NOTE
"Chief Complaint   Patient presents with     Musculoskeletal Problem     Refill Request       Initial /82 (BP Location: Left arm, Patient Position: Sitting, Cuff Size: Adult Large)  Pulse 92  Temp 98.8  F (37.1  C) (Oral)  Ht 6' 5\" (1.956 m)  Wt (!) 341 lb 9.6 oz (154.9 kg)  SpO2 97%  BMI 40.51 kg/m2 Estimated body mass index is 40.51 kg/(m^2) as calculated from the following:    Height as of this encounter: 6' 5\" (1.956 m).    Weight as of this encounter: 341 lb 9.6 oz (154.9 kg).  Medication Reconciliation: complete   Nikole Thompson MA    "

## 2017-12-11 NOTE — PROGRESS NOTES
SUBJECTIVE:   Lakhwinder Jones is a 37 year old male who presents to clinic today for the following health issues:      Joint Pain    Onset: 10/29/2017    Description:   Location: Right foot, right ankle   Character: Dull ache    Intensity: moderate, severe    Progression of Symptoms: worse, same    Accompanying Signs & Symptoms:  Other symptoms: numbness, tingling and swelling    History:   Previous similar pain: no       Precipitating factors:   Trauma or overuse: YES- Pt slipped in the bathroom on 10/29/2017    Alleviating factors:  Improved by: rest/inactivity and pain medications    Therapies Tried and outcome: Advil, Aleve, Tylenol- no improvement     Patient went back to work last week, has been resting for 15 min every hour while at work but continues to have swelling, throbbing pain. He is no longer wearing the walking boot and is back in a regular shoe.       Problem list and histories reviewed & adjusted, as indicated.  Additional history: as documented    Patient Active Problem List   Diagnosis     Anxiety state     Chronic back pain     Depression     Panic disorder without agoraphobia     Psoriasis     CARDIOVASCULAR SCREENING; LDL GOAL LESS THAN 160     Abnormal results of liver function studies     Psoriatic arthritis (H)     Obesity, Class II, BMI 35-39.9     Elevated blood-pressure reading without diagnosis of hypertension     Closed nondisplaced fracture of fifth metatarsal bone of right foot, initial encounter     Past Surgical History:   Procedure Laterality Date     NOSE SURGERY      3 times     right elbow surgey      12 yo     TESTICLE SURGERY      21 yo       Social History   Substance Use Topics     Smoking status: Former Smoker     Types: Cigarettes     Smokeless tobacco: Never Used      Comment: 1-2 cigarettes per day to cope with stress     Alcohol use 4.8 oz/week     8 Standard drinks or equivalent per week      Comment: not for a month     Family History   Problem Relation Age of  "Onset     Obesity Mother      DIABETES Father      Coronary Artery Disease Father      Hypertension Father      Hyperlipidemia Father      Depression Father      Anxiety Disorder Father      MENTAL ILLNESS Father      Substance Abuse Father      Breast Cancer Maternal Grandmother      Depression Maternal Grandmother      MENTAL ILLNESS Maternal Grandmother      Substance Abuse Maternal Grandmother      Prostate Cancer Maternal Grandfather      DIABETES Paternal Grandmother      Breast Cancer Paternal Grandmother      Depression Paternal Grandmother      MENTAL ILLNESS Paternal Grandmother      DIABETES Paternal Grandfather      Asthma Brother      DIABETES Paternal Uncle      CEREBROVASCULAR DISEASE No family hx of      Anesthesia Reaction No family hx of      OSTEOPOROSIS No family hx of      Genetic Disorder No family hx of      Thyroid Disease No family hx of      Liver Disease No family hx of          BP Readings from Last 3 Encounters:   12/11/17 136/82   12/05/17 146/90   11/21/17 150/90    Wt Readings from Last 3 Encounters:   12/11/17 (!) 341 lb 9.6 oz (154.9 kg)   12/05/17 (!) 340 lb 11.2 oz (154.5 kg)   11/21/17 (!) 340 lb (154.2 kg)                  Labs reviewed in EPIC        Reviewed and updated as needed this visit by clinical staff       Reviewed and updated as needed this visit by Provider         ROS:  Constitutional, HEENT, cardiovascular, pulmonary, gi and gu systems are negative, except as otherwise noted.      OBJECTIVE:   /82 (BP Location: Left arm, Patient Position: Sitting, Cuff Size: Adult Large)  Pulse 92  Temp 98.8  F (37.1  C) (Oral)  Ht 6' 5\" (1.956 m)  Wt (!) 341 lb 9.6 oz (154.9 kg)  SpO2 97%  BMI 40.51 kg/m2  Body mass index is 40.51 kg/(m^2).  GENERAL: healthy, alert and no distress  EYES: Eyes grossly normal to inspection, PERRL and conjunctivae and sclerae normal  HENT: ear canals and TM's normal, nose and mouth without ulcers or lesions  NECK: no adenopathy, no " "asymmetry, masses, or scars and thyroid normal to palpation  RESP: lungs clear to auscultation - no rales, rhonchi or wheezes  CV: regular rate and rhythm, normal S1 S2, no S3 or S4, no murmur, click or rub, no peripheral edema and peripheral pulses strong  ABDOMEN: soft, nontender, no hepatosplenomegaly, no masses and bowel sounds normal  MS: no gross musculoskeletal defects noted, no edema  SKIN: no suspicious lesions or rashes  NEURO: Normal strength and tone, mentation intact and speech normal  PSYCH: mentation appears normal, affect normal/bright    Diagnostic Test Results:      ASSESSMENT/PLAN:       BP Screening:   Last 3 BP Readings:    BP Readings from Last 3 Encounters:   12/11/17 136/82   12/05/17 146/90   11/21/17 150/90       The following was recommended to the patient:  Re-screen BP within a year and recommended lifestyle modifications  BMI:   Estimated body mass index is 40.51 kg/(m^2) as calculated from the following:    Height as of this encounter: 6' 5\" (1.956 m).    Weight as of this encounter: 341 lb 9.6 oz (154.9 kg).   Weight management plan: Discussed healthy diet and exercise guidelines and patient will follow up in 12 months in clinic to re-evaluate.        1. Closed nondisplaced fracture of fifth metatarsal bone of right foot, initial encounter  Patient needs to continue to elevate/rest his leg, cannot overdo it as he has been doing.  He agrees to elevate his leg while at work refilled small number of Norco for prn use and we discussed that I will not refill this for him.  Benefits of weight loss discussed in detail. Return to clinic 4 weeks, if not improved   - HYDROcodone-acetaminophen (NORCO) 5-325 MG per tablet; Take 1 tablet by mouth every 6 hours as needed for moderate to severe pain  Dispense: 21 tablet; Refill: 0    2. Morbid obesity (H)  Benefits of weight loss reviewed in detail, encouraged him to cut back on the carbohydrates in the diet, consume more fruits and vegetables, " drink plenty of water, avoid fruit juices, sodas, get 150 min moderate exercise/week.  Recheck weight in 6 months.          See Patient Instructions    LINNEA Krishna [facility]

## 2017-12-18 PROBLEM — E66.01 MORBID OBESITY (H): Status: ACTIVE | Noted: 2017-02-06

## 2018-01-05 ENCOUNTER — OFFICE VISIT (OUTPATIENT)
Dept: FAMILY MEDICINE | Facility: CLINIC | Age: 38
End: 2018-01-05
Payer: COMMERCIAL

## 2018-01-05 DIAGNOSIS — Z79.899 ENCOUNTER FOR LONG-TERM (CURRENT) USE OF HIGH-RISK MEDICATION: ICD-10-CM

## 2018-01-05 DIAGNOSIS — L40.50 PSORIASIS WITH ARTHROPATHY (H): ICD-10-CM

## 2018-01-05 DIAGNOSIS — L40.50 PSORIASIS WITH ARTHROPATHY (H): Primary | ICD-10-CM

## 2018-01-05 LAB
ALBUMIN SERPL-MCNC: 4.3 G/DL (ref 3.4–5)
ALP SERPL-CCNC: 86 U/L (ref 40–150)
ALT SERPL W P-5'-P-CCNC: 115 U/L (ref 0–70)
ANION GAP SERPL CALCULATED.3IONS-SCNC: 9 MMOL/L (ref 3–14)
AST SERPL W P-5'-P-CCNC: 49 U/L (ref 0–45)
BASOPHILS # BLD AUTO: 0.1 10E9/L (ref 0–0.2)
BASOPHILS NFR BLD AUTO: 0.8 %
BILIRUB SERPL-MCNC: 0.6 MG/DL (ref 0.2–1.3)
BUN SERPL-MCNC: 15 MG/DL (ref 7–30)
CALCIUM SERPL-MCNC: 9.5 MG/DL (ref 8.5–10.1)
CHLORIDE SERPL-SCNC: 107 MMOL/L (ref 94–109)
CO2 SERPL-SCNC: 25 MMOL/L (ref 20–32)
CREAT SERPL-MCNC: 0.8 MG/DL (ref 0.66–1.25)
DIFFERENTIAL METHOD BLD: NORMAL
EOSINOPHIL # BLD AUTO: 0.2 10E9/L (ref 0–0.7)
EOSINOPHIL NFR BLD AUTO: 2 %
ERYTHROCYTE [DISTWIDTH] IN BLOOD BY AUTOMATED COUNT: 12.1 % (ref 10–15)
GFR SERPL CREATININE-BSD FRML MDRD: >90 ML/MIN/1.7M2
GLUCOSE SERPL-MCNC: 86 MG/DL (ref 70–99)
HCT VFR BLD AUTO: 42.9 % (ref 40–53)
HGB BLD-MCNC: 15 G/DL (ref 13.3–17.7)
LYMPHOCYTES # BLD AUTO: 1.5 10E9/L (ref 0.8–5.3)
LYMPHOCYTES NFR BLD AUTO: 20.3 %
MCH RBC QN AUTO: 30.3 PG (ref 26.5–33)
MCHC RBC AUTO-ENTMCNC: 35 G/DL (ref 31.5–36.5)
MCV RBC AUTO: 87 FL (ref 78–100)
MONOCYTES # BLD AUTO: 0.7 10E9/L (ref 0–1.3)
MONOCYTES NFR BLD AUTO: 8.9 %
NEUTROPHILS # BLD AUTO: 5 10E9/L (ref 1.6–8.3)
NEUTROPHILS NFR BLD AUTO: 68 %
PLATELET # BLD AUTO: 218 10E9/L (ref 150–450)
POTASSIUM SERPL-SCNC: 4.4 MMOL/L (ref 3.4–5.3)
PROT SERPL-MCNC: 7.7 G/DL (ref 6.8–8.8)
RBC # BLD AUTO: 4.95 10E12/L (ref 4.4–5.9)
SODIUM SERPL-SCNC: 141 MMOL/L (ref 133–144)
WBC # BLD AUTO: 7.4 10E9/L (ref 4–11)

## 2018-01-05 PROCEDURE — 80053 COMPREHEN METABOLIC PANEL: CPT | Performed by: FAMILY MEDICINE

## 2018-01-05 PROCEDURE — 99214 OFFICE O/P EST MOD 30 MIN: CPT | Performed by: FAMILY MEDICINE

## 2018-01-05 PROCEDURE — 85025 COMPLETE CBC W/AUTO DIFF WBC: CPT | Performed by: FAMILY MEDICINE

## 2018-01-05 PROCEDURE — 86480 TB TEST CELL IMMUN MEASURE: CPT | Performed by: FAMILY MEDICINE

## 2018-01-05 PROCEDURE — 36415 COLL VENOUS BLD VENIPUNCTURE: CPT | Performed by: FAMILY MEDICINE

## 2018-01-05 NOTE — PATIENT INSTRUCTIONS
FUTURE APPOINTMENTS  Follow up in 4 week(s) for administration of Stelara.    Consider inquiring about cost coverage from pharmaceutical  at  [email or web address]

## 2018-01-05 NOTE — PROGRESS NOTES
[facility] - PRIMARY CARE SKIN    CC : Psoriasis   SUBJECTIVE:                                                    Lakhwinder Jones is a 37 year old male who presents to clinic today because of chronic issues with psoriasis that started in adolescence at age 19. Joint aches and muscles are also a constant issue. He has a formal diagnosis of psoriatic arthritis.    Therapies tried : methotrexate, Enbrel when younger, phototherapy, various topical creams. Enbrel has been most effective for control but it was too expensive.    Locations : Face, Neck, Trunk, Arms, Legs, Buttocks and Groin.  Onset : 18 years ago.  Pruritic : YES.  Associated symptoms : itching, redness and scaling.  Symptoms appear to be : worsening and uncontrolled.    Personal Medical History  Skin Cancer : NO  Eczema Psoriasis Rosacea Autoimmune   NO YES - history of psoriasis with arthropathy with previous diagnosis by rheumatology NO NO     Family Medical History  Skin Cancer : YES - in grandfather  Eczema Psoriasis Rosacea Autoimmune   NO YES - in uncle NO YES - in grandmother but not psoriatic arthritis     Occupation : sales (indoor).    Patient Active Problem List   Diagnosis     Anxiety state     Chronic back pain     Depression     Panic disorder without agoraphobia     Psoriasis     CARDIOVASCULAR SCREENING; LDL GOAL LESS THAN 160     Abnormal results of liver function studies     Psoriatic arthritis (H)     Morbid obesity (H)     Elevated blood-pressure reading without diagnosis of hypertension     Closed nondisplaced fracture of fifth metatarsal bone of right foot, initial encounter       Past Medical History:   Diagnosis Date     Anxiety      Back pain     chronic     Obesity      Psoriatic arthritis (H)     Past Surgical History:   Procedure Laterality Date     NOSE SURGERY      3 times     right elbow surgey      12 yo     TESTICLE SURGERY      21 yo      Social History   Substance Use Topics     Smoking status: Former Smoker      Types: Cigarettes     Smokeless tobacco: Never Used      Comment: 1-2 cigarettes per day to cope with stress     Alcohol use 4.8 oz/week     8 Standard drinks or equivalent per week      Comment: not for a month    Family History     Problem (# of Occurrences) Relation (Name,Age of Onset)    Anxiety Disorder (1) Father    Asthma (1) Brother    Breast Cancer (2) Maternal Grandmother, Paternal Grandmother    Coronary Artery Disease (1) Father    DIABETES (4) Father, Paternal Grandmother, Paternal Grandfather, Paternal Uncle    Depression (3) Father, Maternal Grandmother, Paternal Grandmother    Hyperlipidemia (1) Father    Hypertension (1) Father    MENTAL ILLNESS (3) Father, Maternal Grandmother, Paternal Grandmother    Obesity (1) Mother    Prostate Cancer (1) Maternal Grandfather    Substance Abuse (2) Father, Maternal Grandmother       Negative family history of: CEREBROVASCULAR DISEASE, Anesthesia Reaction, OSTEOPOROSIS, Genetic Disorder, Thyroid Disease, Liver Disease           Prescription Medications as of 1/5/2018             ustekinumab (STELARA) 90 MG/ML Inject 1 ml subcut at 0, 4 weeks then q 12 weeks    HYDROcodone-acetaminophen (NORCO) 5-325 MG per tablet Take 1 tablet by mouth every 6 hours as needed for moderate to severe pain    cyclobenzaprine (FLEXERIL) 5 MG tablet Take 2 tablets (10 mg) by mouth 3 times daily as needed for muscle spasms    order for DME Walking boot- biggest size    citalopram (CELEXA) 40 MG tablet Take 1 tablet (40 mg) by mouth daily    LORazepam (ATIVAN) 1 MG tablet Take 1 tablet (1 mg) by mouth 2 times daily as needed for anxiety    gabapentin (NEURONTIN) 300 MG capsule Take 1 capsule (300 mg) by mouth At Bedtime            Allergies   Allergen Reactions     Tramadol Other (See Comments)     Shellfish-Derived Products         INTEGUMENTARY/SKIN: POSITIVE for pruritis, rash and scaling  MUSCULOSKELETAL: POSITIVE  for arthralgias and myalgias  ROS : 14 point review of systems was  negative except the symptoms listed above in the HPI.    This document serves as a record of the services and decisions personally performed and made by Pearl Page MD. It was created on her behalf by Eron Abarca, a trained medical scribe.  The creation of this document is based on the scribe's personal observations and the provider's statements to the medical scribe.  Eron Abarca, January 5, 2018 8:52 AM      OBJECTIVE:                                                      Wt Readings from Last 2 Encounters:   12/11/17 (!) 341 lb 9.6 oz (154.9 kg)   12/05/17 (!) 340 lb 11.2 oz (154.5 kg)     GENERAL: healthy, alert, in moderate distress and obese  SKIN: Victoria Skin Type - I.  Face, Neck, Trunk, Arms, Legs, Buttocks and Groin were examined. The dermatoscope was used to help evaluate pigmented lesions.  Skin Pertinent Findings:  Diffuse large erythematous scaly plaques involving 90% of arms, 15% of trunk, groin, buttocks, 30% of legs, and feet.    Diagnostic Test Results:  No results found for this or any previous visit (from the past 24 hour(s)).    MDM : because of the amount of surface area involved, discussed biologic treatment to control the psoriasis.      ASSESSMENT:                                                      Encounter Diagnoses   Name Primary?     Psoriasis with arthropathy (H) Yes     Encounter for long-term (current) use of high-risk medication          PLAN:                                                    Patient Instructions   FUTURE APPOINTMENTS  Follow up in 4 week(s) for administration of Stelara.    Consider inquiring about cost coverage from pharmaceutical  at  [email or web address]-information        PROCEDURES:                                                    None.    TT : 25 minutes.  CT : 20 minutes. Discussion regarding etiology, spectrum of psoriasis, treatment options, aggravating factors.      The information in this document,  created by the medical scribe for me, accurately reflects the services I personally performed and the decisions made by me. I have reviewed and approved this document for accuracy prior to leaving the patient care area.  Pearl Page MD January 5, 2018 8:52 AM  [facility] - PRIMARY CARE SKIN

## 2018-01-05 NOTE — MR AVS SNAPSHOT
After Visit Summary   1/5/2018    Lakhwinder Jones    MRN: [id number]           Patient Information     Date Of Birth          1980        Visit Information        Provider Department      1/5/2018 8:20 AM Suellen Page MD [facility]        Today's Diagnoses     Psoriasis with arthropathy (H)    -  1      Care Instructions    FUTURE APPOINTMENTS  Follow up in 4 week(s) for administration of Stelara.    Consider inquiring about cost coverage from pharmaceutical  at  [email or web address]/cost-[email or web address]-[email or web address]-information          Follow-ups after your visit        Future tests that were ordered for you today     Open Standing Orders        Priority Remaining Interval Expires Ordered    CBC with platelets differential Routine 12/12 4 weeks 1/5/2019 1/5/2018            Who to contact     If you have questions or need follow up information about today's clinic visit or your schedule please contact [facility] PR[facility] C[facility] SK[facility] directly at [phone].  Normal or non-critical lab and imaging results will be communicated to you by Mal[employer]art, letter or phone within 4 business days after the clinic has received the results. If you do not hear from us within 7 days, please contact the clinic through Silkt or phone. If you have a critical or abnormal lab result, we will notify you by phone as soon as possible.  Submit refill requests through Nethub or call your pharmacy and they will forward the refill request to us. Please allow 3 business days for your refill to be completed.          Additional Information About Your Visit        MyChart Information     Nethub gives you secure access to your electronic health record. If you see a primary care provider, you can also send messages to your care team and make appointments. If you have questions, please call your primary care clinic.  If you do not have a primary care provider, please call  [phone] and they will assist you.        Care EveryWhere ID     This is your Care EveryWhere ID. This could be used by other organizations to access your [place] medical records  [id number]         Blood Pressure from Last 3 Encounters:   12/11/17 136/82   12/05/17 146/90   11/21/17 150/90    Weight from Last 3 Encounters:   12/11/17 (!) 341 lb 9.6 oz (154.9 kg)   12/05/17 (!) 340 lb 11.2 oz (154.5 kg)   11/21/17 (!) 340 lb (154.2 kg)              We Performed the Following     Comprehensive metabolic panel     Hepatitis B core antibody     Hepatitis B Surface Antibody     Hepatitis B surface antigen     Hepatitis C antibody     M Tuberculosis by Quantiferon          Today's Medication Changes          These changes are accurate as of: 1/5/18  9:09 AM.  If you have any questions, ask your nurse or doctor.               Start taking these medicines.        Dose/Directions    ustekinumab 90 MG/ML   Commonly known as:  STELARA   Used for:  Psoriasis with arthropathy (H)   Started by:  Suellen Page MD        Inject 1 ml subcut at 0, 4 weeks then q 12 weeks   Quantity:  1 mL   Refills:  3            Where to get your medicines      Some of these will need a paper prescription and others can be bought over the counter.  Ask your nurse if you have questions.     You don't need a prescription for these medications     ustekinumab 90 MG/ML                Primary Care Provider Office Phone # Fax #    Simona Tigre Prasad PA-C [phone]       [address]  [place] [facility] [address]        Equal Access to Services     [facility] AH: Hadii aad ku hadasho Soomaali, waaxda luqadaha, qaybta kaalmada tamica, adina keller . So [facility] [phone].    ATENCIÓN: Si habla español, tiene a rose disposición servicios gratuitos de asistencia lingüística. Llame al [phone].    We comply with applicable federal civil rights laws and [place] laws. We do not  discriminate on the basis of race, color, national origin, age, disability, sex, sexual orientation, or gender identity.            Thank you!     Thank you for choosing [facility] - PRIMARY CARE [facility]  for your care. Our goal is always to provide you with excellent care. Hearing back from our patients is one way we can continue to improve our services. Please take a few minutes to complete the written survey that you may receive in the mail after your visit with us. Thank you!             Your Updated Medication List - Protect others around you: Learn how to safely use, store and throw away your medicines at www.disposemymeds.org.          This list is accurate as of: 1/5/18  9:09 AM.  Always use your most recent med list.                   Brand Name Dispense Instructions for use Diagnosis    citalopram 40 MG tablet    celeXA    90 tablet    Take 1 tablet (40 mg) by mouth daily    Anxiety state, Panic disorder without agoraphobia       cyclobenzaprine 5 MG tablet    FLEXERIL    30 tablet    Take 2 tablets (10 mg) by mouth 3 times daily as needed for muscle spasms        gabapentin 300 MG capsule    NEURONTIN    30 capsule    Take 1 capsule (300 mg) by mouth At Bedtime    Acute right-sided low back pain with right-sided sciatica       HYDROcodone-acetaminophen 5-325 MG per tablet    NORCO    21 tablet    Take 1 tablet by mouth every 6 hours as needed for moderate to severe pain    Closed nondisplaced fracture of fifth metatarsal bone of right foot, initial encounter       LORazepam 1 MG tablet    ATIVAN    30 tablet    Take 1 tablet (1 mg) by mouth 2 times daily as needed for anxiety    Anxiety state, Panic disorder without agoraphobia       order for DME     1 Device    Walking boot- biggest size        ustekinumab 90 MG/ML    STELARA    1 mL    Inject 1 ml subcut at 0, 4 weeks then q 12 weeks    Psoriasis with arthropathy (H)

## 2018-01-05 NOTE — LETTER
1/5/2018         RE: Lakhwinder Jones  [address]  [facility] [address]        Dear Colleague,    Thank you for referring your patient, Lakhwinder Jones, to the [facility] - PRIMARY CARE SK[facility]. Please see a copy of my visit note below.    [facility] PRIMARY [facility]    CC : Psoriasis   SUBJECTIVE:                                                    Lakhwinder Jones is a 37 year old male who presents to clinic today because of chronic issues with psoriasis that started in adolescence at age 19. Joint aches and muscles are also a constant issue. He has a formal diagnosis of psoriatic arthritis.    Therapies tried : methotrexate, Enbrel when younger, phototherapy, various topical creams. Enbrel has been most effective for control but it was too expensive.    Locations : Face, Neck, Trunk, Arms, Legs, Buttocks and Groin.  Onset : 19 years ago.  Pruritic : YES.  Associated symptoms : itching, redness and scaling.  Symptoms appear to be : worsening and uncontrolled.    Personal Medical History  Skin Cancer : NO  Eczema Psoriasis Rosacea Autoimmune   NO YES - history of psoriasis with arthropathy with previous diagnosis by rheumatology NO NO     Family Medical History  Skin Cancer : YES - in grandfather  Eczema Psoriasis Rosacea Autoimmune   NO YES - in uncle NO YES - in grandmother but not psoriatic arthritis     Occupation : sales (indoor).    Patient Active Problem List   Diagnosis     Anxiety state     Chronic back pain     Depression     Panic disorder without agoraphobia     Psoriasis     CARDIOVASCULAR SCREENING; LDL GOAL LESS THAN 160     Abnormal results of liver function studies     Psoriatic arthritis (H)     Morbid obesity (H)     Elevated blood-pressure reading without diagnosis of hypertension     Closed nondisplaced fracture of fifth metatarsal bone of right foot, initial encounter       Past Medical History:   Diagnosis Date     Anxiety      Back pain     chronic     Obesity       Psoriatic arthritis (H)     Past Surgical History:   Procedure Laterality Date     NOSE SURGERY      3 times     right elbow surgey      12 yo     TESTICLE SURGERY      21 yo      Social History   Substance Use Topics     Smoking status: Former Smoker     Types: Cigarettes     Smokeless tobacco: Never Used      Comment: 1-2 cigarettes per day to cope with stress     Alcohol use 4.8 oz/week     8 Standard drinks or equivalent per week      Comment: not for a month    Family History     Problem (# of Occurrences) Relation (Name,Age of Onset)    Anxiety Disorder (1) Father    Asthma (1) Brother    Breast Cancer (2) Maternal Grandmother, Paternal Grandmother    Coronary Artery Disease (1) Father    DIABETES (4) Father, Paternal Grandmother, Paternal Grandfather, Paternal Uncle    Depression (3) Father, Maternal Grandmother, Paternal Grandmother    Hyperlipidemia (1) Father    Hypertension (1) Father    MENTAL ILLNESS (3) Father, Maternal Grandmother, Paternal Grandmother    Obesity (1) Mother    Prostate Cancer (1) Maternal Grandfather    Substance Abuse (2) Father, Maternal Grandmother       Negative family history of: CEREBROVASCULAR DISEASE, Anesthesia Reaction, OSTEOPOROSIS, Genetic Disorder, Thyroid Disease, Liver Disease           Prescription Medications as of 1/5/2018             ustekinumab (STELARA) 90 MG/ML Inject 1 ml subcut at 0, 4 weeks then q 12 weeks    HYDROcodone-acetaminophen (NORCO) 5-325 MG per tablet Take 1 tablet by mouth every 6 hours as needed for moderate to severe pain    cyclobenzaprine (FLEXERIL) 5 MG tablet Take 2 tablets (10 mg) by mouth 3 times daily as needed for muscle spasms    order for DME Walking boot- biggest size    citalopram (CELEXA) 40 MG tablet Take 1 tablet (40 mg) by mouth daily    LORazepam (ATIVAN) 1 MG tablet Take 1 tablet (1 mg) by mouth 2 times daily as needed for anxiety    gabapentin (NEURONTIN) 300 MG capsule Take 1 capsule (300 mg) by mouth At Bedtime             Allergies   Allergen Reactions     Tramadol Other (See Comments)     Shellfish-Derived Products         INTEGUMENTARY/SKIN: POSITIVE for pruritis, rash and scaling  MUSCULOSKELETAL: POSITIVE  for arthralgias and myalgias  ROS : 14 point review of systems was negative except the symptoms listed above in the HPI.    This document serves as a record of the services and decisions personally performed and made by Pearl Page MD. It was created on her behalf by Eron Abarca, a trained medical scribe.  The creation of this document is based on the scribe's personal observations and the provider's statements to the medical scribe.  Eron Abarca, January 5, 2018 8:52 AM      OBJECTIVE:                                                      Wt Readings from Last 2 Encounters:   12/11/17 (!) 341 lb 9.6 oz (154.9 kg)   12/05/17 (!) 340 lb 11.2 oz (154.5 kg)     GENERAL: healthy, alert, in moderate distress and obese  SKIN: Victoria Skin Type - I.  Face, Neck, Trunk, Arms, Legs, Buttocks and Groin were examined. The dermatoscope was used to help evaluate pigmented lesions.  Skin Pertinent Findings:  Diffuse large erythematous scaly plaques involving 90% of arms, 15% of trunk, groin, buttocks, 30% of legs, and feet.    Diagnostic Test Results:  No results found for this or any previous visit (from the past 24 hour(s)).    MDM : because of the amount of surface area involved, discussed biologic treatment to control the psoriasis.      ASSESSMENT:                                                      Encounter Diagnoses   Name Primary?     Psoriasis with arthropathy (H) Yes     Encounter for long-term (current) use of high-risk medication          PLAN:                                                    Patient Instructions   FUTURE APPOINTMENTS  Follow up in 4 week(s) for administration of Stelara.    Consider inquiring about cost coverage from pharmaceutical  at   [email or web address]/cost-[email or web address]-[email or web address]-information        PROCEDURES:                                                    None.    TT : 25 minutes.  CT : 20 minutes. Discussion regarding etiology, spectrum of psoriasis, treatment options, aggravating factors.      The information in this document, created by the medical scribe for me, accurately reflects the services I personally performed and the decisions made by me. I have reviewed and approved this document for accuracy prior to leaving the patient care area.  Pearl Page MD January 5, 2018 8:52 AM  [facility] - PRIMARY CARE SKIN    Again, thank you for allowing me to participate in the care of your patient.        Sincerely,        Suellen Page MD

## 2018-01-08 LAB
M TB TUBERC IFN-G BLD QL: NEGATIVE
M TB TUBERC IFN-G/MITOGEN IGNF BLD: 0.02 IU/ML

## 2018-01-12 DIAGNOSIS — L40.50 PSORIASIS WITH ARTHROPATHY (H): ICD-10-CM

## 2018-01-15 DIAGNOSIS — L40.50 PSORIASIS WITH ARTHROPATHY (H): ICD-10-CM

## 2018-01-15 NOTE — TELEPHONE ENCOUNTER
"Received fax from [facility]. \"They are not able to dispense this product. Rx will need to be faxed to [facility]. They do not accept transfers. Medication and pharmacy pended.     Nirmala Weiss MA      "

## 2018-01-19 ENCOUNTER — TELEPHONE (OUTPATIENT)
Dept: FAMILY MEDICINE | Facility: CLINIC | Age: 38
End: 2018-01-19

## 2018-01-19 DIAGNOSIS — L40.50 PSORIATIC ARTHRITIS (H): ICD-10-CM

## 2018-01-19 DIAGNOSIS — L40.0 PLAQUE PSORIASIS: Primary | ICD-10-CM

## 2018-01-19 NOTE — TELEPHONE ENCOUNTER
Needs a PA   Telephone for PA: [facility] [phone]    Prior Authorization Retail Medication Request  Medication/Dose: ustekinumab (STELARA) 90 MG/ML  Diagnosis and ICD code: Psoriasis with arthropathy (H) [L40.50]   New/Renewal/Insurance Change PA:   Previously Tried and Failed Therapies: methotrexate, Enbrel when younger, phototherapy, various topical creams. Enbrel has been most effective for control but it was too expensive.       Insurance ID (if provided):   Coverage information:     Subscriber: [id number] AMY KING     Rel to sub: 02 - Spouse     Member ID: [id number]     Payor: 13-MEDICA Ph: [phone]     Benefit plan: 1575-MEDICA ESSENTIAL Ph: [phone]     Group number: [id number]     Member effective dates: from 01/01/18        Insurance Phone (if provided): [employer]  [phone]    Any additional info from fax request:     If you received a fax notification from an outside Pharmacy:  Pharmacy Name:[address]  Pharmacy #:[phone]  Pharmacy Fax:[phone]  Jennifer STAPLES RN  [facility]  [phone]

## 2018-01-19 NOTE — TELEPHONE ENCOUNTER
PA Initiation    Medication: ustekinumab (STELARA) 90 MG/ML  Insurance Company: [employer] - Phone [phone] Fax [phone]  Pharmacy Filling the Rx: [address]  Filling Pharmacy Phone: [phone]  Filling Pharmacy Fax: [phone]  Start Date: 1/19/2018

## 2018-01-22 NOTE — TELEPHONE ENCOUNTER
PRIOR AUTHORIZATION DENIED    Medication: ustekinumab (STELARA) 90 MG/ML- DENIED    Denial Date: 1/12/2018    Denial Rational: Denied due to patient needing to try and fail all preferred products: Cosentyx, Enbrel, Humira, and Otezla; or medical reason why they cannot try these medications.            Appeal Information:

## 2018-01-22 NOTE — TELEPHONE ENCOUNTER
Left VM for Lakhwinder regarding failed PA for Stelara. Resend for Enbrel 50 mg SC q 2weeks for 3 months and then q week. He was on Enbrel in the past with good results.   I have submitted a new PA for Enbrel with [facility].

## 2018-01-22 NOTE — TELEPHONE ENCOUNTER
Annette Cedillo   [facility] 1 hour ago (8:09 AM)                  Hello, the PA has been denied, please see rational. If an appeal is desired, please submit a written letter of medical necessity to our PA Team and we can initiate the appeal. Please close encounter if finished. Thank you, Annetet   (Routing comment)

## 2018-01-24 ENCOUNTER — MYC MEDICAL ADVICE (OUTPATIENT)
Dept: FAMILY MEDICINE | Facility: CLINIC | Age: 38
End: 2018-01-24

## 2018-01-24 DIAGNOSIS — L40.0 PLAQUE PSORIASIS: Primary | ICD-10-CM

## 2018-01-30 NOTE — TELEPHONE ENCOUNTER
Please see other encounter.  Jennifer STAPLES RN  [facility]  [phone]  [facility]   [phone]

## 2018-01-30 NOTE — TELEPHONE ENCOUNTER
Incoming call from Vanesa @ [employer]. Please call at [phone]. Case#[id number]

## 2018-01-30 NOTE — TELEPHONE ENCOUNTER
[place] prior auth from [employer]. Must try and fail 1st: Humira, and then Moon STAPLES RN  [facility]  [phone]  [facility]   [phone]

## 2018-02-05 ENCOUNTER — TELEPHONE (OUTPATIENT)
Dept: FAMILY MEDICINE | Facility: CLINIC | Age: 38
End: 2018-02-05

## 2018-02-05 NOTE — TELEPHONE ENCOUNTER
[facility] calling to verify starter dose for Humira and they would also like the maintenance dose sent in as well.    Jennifer STAPLES RN  [facility]  [phone]  [facility]   [phone]

## 2018-02-05 NOTE — TELEPHONE ENCOUNTER
patient states that he is comfortable giving himself the injections if they are sub Q - advised that they are Sub q  He will schedule an appointment 2 weeks after stating the medication.    Jennifer STAPLES RN  [facility]  [phone]  [facility]   [phone]

## 2018-02-05 NOTE — TELEPHONE ENCOUNTER
Please call Lakhwinder and let him know we have the approval for Humira. I know he did injections previously with ENBREL but would he like to come in and be refreshed on the injections ?   I need to see him 2 weeks after he has started it.

## 2018-02-06 NOTE — TELEPHONE ENCOUNTER
Patient left voicemessage stating specialty pharmacy did not receive his RX. He is requesting the clinic to call the pharmacy to make sure they received the RX, because there seems to be a miscommunication and he is upset. Please call him in the morning, when everything is figured out.   Patient ph# [phone]  Thank you  Jessica Lee

## 2018-02-06 NOTE — TELEPHONE ENCOUNTER
Called [facility] [place] [facility] specialty pharmacy at [phone] they state patient has 2 accounts and they need to look at the second account when patient calls in for the medication. Sent a [place] message for patient with this information.    Jennifer STAPLES RN  [facility]  [phone]  [facility]   [phone]

## 2018-02-07 NOTE — TELEPHONE ENCOUNTER
[facility] called stating that there is no prior auth initiated for Humira -   Called [facility] at [phone] - this was approved over the phone  Called [facility] back [phone] and gave this information to them so they could rerun this prescription.   [facility] verified that the prescription is approved and called the patient to notify him as well- he did not answer- so the rep left a message to call them back.    Jennifer STAPLES RN  [facility]  [phone]  [facility]   [phone]

## 2018-02-20 ENCOUNTER — RADIANT APPOINTMENT (OUTPATIENT)
Dept: GENERAL RADIOLOGY | Facility: CLINIC | Age: 38
End: 2018-02-20
Payer: COMMERCIAL

## 2018-02-20 ENCOUNTER — OFFICE VISIT (OUTPATIENT)
Dept: FAMILY MEDICINE | Facility: CLINIC | Age: 38
End: 2018-02-20
Payer: COMMERCIAL

## 2018-02-20 VITALS
HEIGHT: 77 IN | BODY MASS INDEX: 37.19 KG/M2 | OXYGEN SATURATION: 96 % | TEMPERATURE: 97 F | HEART RATE: 90 BPM | DIASTOLIC BLOOD PRESSURE: 80 MMHG | WEIGHT: 315 LBS | SYSTOLIC BLOOD PRESSURE: 132 MMHG

## 2018-02-20 DIAGNOSIS — R05.9 COUGH: ICD-10-CM

## 2018-02-20 DIAGNOSIS — S92.354S NONDISPLACED FRACTURE OF FIFTH METATARSAL BONE, RIGHT FOOT, SEQUELA: ICD-10-CM

## 2018-02-20 DIAGNOSIS — E66.01 MORBID OBESITY (H): ICD-10-CM

## 2018-02-20 DIAGNOSIS — L40.50 PSORIATIC ARTHRITIS (H): ICD-10-CM

## 2018-02-20 DIAGNOSIS — J06.9 VIRAL URI WITH COUGH: Primary | ICD-10-CM

## 2018-02-20 DIAGNOSIS — J06.9 VIRAL URI WITH COUGH: ICD-10-CM

## 2018-02-20 LAB
FLUAV+FLUBV AG SPEC QL: NEGATIVE
FLUAV+FLUBV AG SPEC QL: NEGATIVE
SPECIMEN SOURCE: NORMAL

## 2018-02-20 PROCEDURE — 73630 X-RAY EXAM OF FOOT: CPT | Mod: RT

## 2018-02-20 PROCEDURE — 99214 OFFICE O/P EST MOD 30 MIN: CPT | Performed by: PREVENTIVE MEDICINE

## 2018-02-20 PROCEDURE — 71046 X-RAY EXAM CHEST 2 VIEWS: CPT | Mod: FY

## 2018-02-20 PROCEDURE — 73610 X-RAY EXAM OF ANKLE: CPT | Mod: RT

## 2018-02-20 PROCEDURE — 87804 INFLUENZA ASSAY W/OPTIC: CPT | Mod: 59 | Performed by: PREVENTIVE MEDICINE

## 2018-02-20 RX ORDER — CYCLOBENZAPRINE HCL 10 MG
10 TABLET ORAL 2 TIMES DAILY PRN
Qty: 30 TABLET | Refills: 1 | Status: SHIPPED | OUTPATIENT
Start: 2018-02-20 | End: 2018-06-26

## 2018-02-20 RX ORDER — BENZONATATE 100 MG/1
100 CAPSULE ORAL 3 TIMES DAILY PRN
Qty: 20 CAPSULE | Refills: 0 | Status: SHIPPED | OUTPATIENT
Start: 2018-02-20 | End: 2018-04-13

## 2018-02-20 ASSESSMENT — PAIN SCALES - GENERAL: PAINLEVEL: NO PAIN (0)

## 2018-02-20 NOTE — PROGRESS NOTES
Lakhwinder,    X rays of the foot and ankle showed a slightly displaced fracture in the foot.  This needs to be further evaluated by Podiatry. I had placed a referral in clinic for this.     Please do not hesitate to call us at [phone] if you have any questions or concerns.    Thank you,    Jessy Muñoz MD MPH

## 2018-02-20 NOTE — PATIENT INSTRUCTIONS
At [facility], we strive to deliver an exceptional experience to you, every time we see you.  If you receive a survey in the mail, please send us back your thoughts. We really do value your feedback.    Based on your medical history, these are the current health maintenance/preventive care services that you are due for (some may have been done at this visit.)  Health Maintenance Due   Topic Date Due     URINE DRUG SCREEN Q1 YR  10/13/1996         Suggested websites for health information:  Www.Rank By Search.org : Up to date and easily searchable information on multiple topics.  Www.Crunch Accounting.gov : medication info, interactive tutorials, watch real surgeries online  Www.familydoctor.org : good info from the Academy of Family Physicians  Www.cdc.gov : public health info, travel advisories, epidemics (H1N1)  Www.aap.org : children's health info, normal development, vaccinations  Www.health.[facility].[place].us : [place] dept of health, public health issues in [place], N1N1    Your care team:                            Family Medicine Internal Medicine   MD Constantino Walker MD Shantel Branch-Fleming, MD Katya Georgiev PA-C Megan Hill, APRN CNP    Luke Tyler MD Pediatrics   Agustin Watts, PAMargaritaC  Montse Steen, CNP Stephie Suárez APRN CNP   MD Ann Winkler MD Deborah Mielke, MD Kim Thein, APRN [facility]      Clinic hours: Monday - Thursday 7 am-7 pm; Fridays 7 am-5 pm.   Urgent care: Monday - Friday 11 am-9 pm; Saturday and Sunday 9 am-5 pm.  Pharmacy : Monday -Thursday 8 am-8 pm; Friday 8 am-6 pm; Saturday and Sunday 9 am-5 pm.     Clinic: [phone]   Pharmacy: [phone]      Viral Upper Respiratory Illness (Adult)  You have a viral upper respiratory illness (URI), which is another term for the common cold. This illness is contagious during the first few days. It is spread through the air by coughing and sneezing. It may also be spread by direct contact (touching the sick  person and then touching your own eyes, nose, or mouth). Frequent handwashing will decrease risk of spread. Most viral illnesses go away within 7 to 10 days with rest and simple home remedies. Sometimes the illness may last for several weeks. Antibiotics will not kill a virus, and they are generally not prescribed for this condition.    Home care    If symptoms are severe, rest at home for the first 2 to 3 days. When you resume activity, don't let yourself get too tired.    Avoid being exposed to cigarette smoke (yours or others ).    You may use acetaminophen or ibuprofen to control pain and fever, unless another medicine was prescribed. (Note: If you have chronic liver or kidney disease, have ever had a stomach ulcer or gastrointestinal bleeding, or are taking blood-thinning medicines, talk with your healthcare provider before using these medicines.) Aspirin should never be given to anyone under 18 years of age who is ill with a viral infection or fever. It may cause severe liver or brain damage.    Your appetite may be poor, so a light diet is fine. Avoid dehydration by drinking 6 to 8 glasses of fluids per day (water, soft drinks, juices, tea, or soup). Extra fluids will help loosen secretions in the nose and lungs.    Over-the-counter cold medicines will not shorten the length of time you re sick, but they may be helpful for the following symptoms: cough, sore throat, and nasal and sinus congestion. (Note: Do not use decongestants if you have high blood pressure.)  Follow-up care  Follow up with your healthcare provider, or as advised.  When to seek medical advice  Call your healthcare provider right away if any of these occur:    Cough with lots of colored sputum (mucus)    Severe headache; face, neck, or ear pain    Difficulty swallowing due to throat pain    Fever of 100.4 F (38 C)  Call 911, or get immediate medical care  Call emergency services right away if any of these occur:    Chest pain, shortness of  breath, wheezing, or difficulty breathing    Coughing up blood    Inability to swallow due to throat pain  Date Last Reviewed: 9/13/2015 2000-2017 [employer]. [address] [place] [address], [place] [address]. All rights reserved. This information is not intended as a substitute for professional medical care. Always follow your healthcare professional's instructions.

## 2018-02-20 NOTE — PROGRESS NOTES
Lakhwinder,     Chest X ray did not show any infections or pneumonias in the lungs. Plan of care and follow up as discussed in clinic.     Please do not hesitate to call us at [phone] if you have any questions or concerns.    Thank you,    Jessy Muñoz MD MPH

## 2018-02-20 NOTE — PROGRESS NOTES
Lakhwinder,    X rays of the foot and ankle showed a slightly displaced fracture in the foot.  This needs to be further evaluated by Podiatry. I had placed a referral in clinic for this.     Please do not hesitate to call us at [phone] if you have any questions or concerns.    Thank you,    Jessy Muñoz MD MPH

## 2018-02-20 NOTE — LETTER
February 20, 2018      Lakhwinder Jones  [address]  [facility] [address]        To Whom It May Concern:    Lakhwinder Jones  was seen on 2/20/18.  Please excuse him until 2/21/18 due to illness.        Sincerely,        Jessy Muñoz MD MPH

## 2018-02-20 NOTE — PROGRESS NOTES
SUBJECTIVE:   Lakhwinder Jones is a 37 year old male who presents to clinic today for the following health issues:      RESPIRATORY SYMPTOMS      Duration: x 4 days    Description  nasal congestion, rhinorrhea, cough, fever, chills, fatigue/malaise, hoarse voice, myalgias and diarrhea    Severity: moderate    Accompanying signs and symptoms: None    History (predisposing factors):  none    Precipitating or alleviating factors: None    Therapies tried and outcome:  OTC cold    Symptoms started with fatigue  Cough, and symptoms progressed  Just feels very worn down  Fever+ Tmax unsure, better with Tylenol  No new rash  No some loose stools 3-4 times a day  Some decrease in urine output  No emesis  Started Humira about 2 weeks ago for Psoriasis     Right foot and ankle pain:  History of closed non displaced metatarsal fracture last year 10/2017. Since then has been having pain, thought pain would improve by now. Pain is more at the end of the day and along the lateral aspect of the foot, also having pain with ambulation. No skin changes, mild edema, no focal weakness or numbness.       Problem list and histories reviewed & adjusted, as indicated.  Additional history: as documented    Patient Active Problem List   Diagnosis     Anxiety state     Chronic back pain     Depression     Panic disorder without agoraphobia     Psoriasis     CARDIOVASCULAR SCREENING; LDL GOAL LESS THAN 160     Abnormal results of liver function studies     Psoriatic arthritis (H)     Morbid obesity (H)     Elevated blood-pressure reading without diagnosis of hypertension     Closed nondisplaced fracture of fifth metatarsal bone of right foot, initial encounter     Past Surgical History:   Procedure Laterality Date     NOSE SURGERY      3 times     right elbow surgey      14 yo     TESTICLE SURGERY      21 yo       Social History   Substance Use Topics     Smoking status: Former Smoker     Types: Cigarettes     Smokeless tobacco: Never Used       Comment: 1-2 cigarettes per day to cope with stress     Alcohol use 4.8 oz/week     8 Standard drinks or equivalent per week      Comment: not for a month     Family History   Problem Relation Age of Onset     Obesity Mother      DIABETES Father      Coronary Artery Disease Father      Hypertension Father      Hyperlipidemia Father      Depression Father      Anxiety Disorder Father      MENTAL ILLNESS Father      Substance Abuse Father      Breast Cancer Maternal Grandmother      Depression Maternal Grandmother      MENTAL ILLNESS Maternal Grandmother      Substance Abuse Maternal Grandmother      Prostate Cancer Maternal Grandfather      DIABETES Paternal Grandmother      Breast Cancer Paternal Grandmother      Depression Paternal Grandmother      MENTAL ILLNESS Paternal Grandmother      DIABETES Paternal Grandfather      Asthma Brother      DIABETES Paternal Uncle      CEREBROVASCULAR DISEASE No family hx of      Anesthesia Reaction No family hx of      OSTEOPOROSIS No family hx of      Genetic Disorder No family hx of      Thyroid Disease No family hx of      Liver Disease No family hx of          Current Outpatient Prescriptions   Medication Sig Dispense Refill     benzonatate (TESSALON) 100 MG capsule Take 1 capsule (100 mg) by mouth 3 times daily as needed 20 capsule 0     diclofenac (VOLTAREN) 50 MG EC tablet Take 1 tablet (50 mg) by mouth 3 times daily as needed for moderate pain 30 tablet 1     cyclobenzaprine (FLEXERIL) 10 MG tablet Take 1 tablet (10 mg) by mouth 2 times daily as needed for muscle spasms 30 tablet 1     adalimumab (HUMIRA) 40 MG/0.8ML prefilled syringe kit Inject 2 40 mg pens first day, inject 1 40 mg pen day 8 and day 22, then 40mg every other week 6 Syringe 0     citalopram (CELEXA) 40 MG tablet Take 1 tablet (40 mg) by mouth daily 90 tablet 1     Allergies   Allergen Reactions     Tramadol Other (See Comments)     Shellfish-Derived Products      BP Readings from Last 3 Encounters:  "  02/20/18 132/80   12/11/17 136/82   12/05/17 146/90    Wt Readings from Last 3 Encounters:   02/20/18 (!) 344 lb (156 kg)   12/11/17 (!) 341 lb 9.6 oz (154.9 kg)   12/05/17 (!) 340 lb 11.2 oz (154.5 kg)                  Labs reviewed in EPIC    Reviewed and updated as needed this visit by clinical staff  Allergies  Meds       Reviewed and updated as needed this visit by Provider         ROS:  Constitutional, neuro, ENT, endocrine, pulmonary, cardiac, gastrointestinal, genitourinary, musculoskeletal, integument and psychiatric systems are negative, except as otherwise noted.    OBJECTIVE:                                                    /80  Pulse 90  Temp 97  F (36.1  C) (Oral)  Ht 6' 5\" (1.956 m)  Wt (!) 344 lb (156 kg)  SpO2 96%  BMI 40.79 kg/m2  Body mass index is 40.79 kg/(m^2).  GENERAL APPEARANCE: healthy, alert and no distress  EYES: Eyes grossly normal to inspection and conjunctivae and sclerae normal  HENT: ear canals and TM's normal, nose and mouth without ulcers or lesions and no pharyngeal exudates or pus points, no uvular deviation   NECK: trachea midline and normal to palpation and few enlarged cervical nodes   RESP: lungs clear to auscultation - no rales, rhonchi or wheezes  CV: regular rates and rhythm, normal S1 S2, no S3 or S4 and no murmur, click or rub  ABDOMEN: soft, non-tender  MS: extremities normal- no gross deformities noted  Right foot: no erythema, swelling+ along the lateral aspect   SKIN: generalized psoriatic plaques   NEURO: Normal strength and tone, mentation intact and speech normal  PSYCH: mentation appears normal    Diagnostic test results:  Diagnostic Test Results:  Results for orders placed or performed in visit on 02/20/18 (from the past 24 hour(s))   Influenza A/B antigen   Result Value Ref Range    Influenza A/B Agn Specimen Nasal     Influenza A Negative NEG^Negative    Influenza B Negative NEG^Negative        CHEST TWO VIEWS   2/20/2018 11:10 AM "      HISTORY:  Viral URI with cough. Psoriatic arthritis (H).     COMPARISON: None.     FINDINGS: Cardiac and mediastinal silhouettes are within normal  limits. Lungs and costophrenic angles are clear. No pneumothorax. No  acute osseous abnormality.         IMPRESSION: No acute cardiopulmonary process.     JOSÉM ANUEL MCKINNON DO    ASSESSMENT/PLAN:                                                    1. Viral URI with cough  -Chest X ray negative for pneumonias   - XR Chest 2 Views; Future  - benzonatate (TESSALON) 100 MG capsule; Take 1 capsule (100 mg) by mouth 3 times daily as needed  Dispense: 20 capsule; Refill: 0  Your symptoms and exam today indicate that you have a viral upper respiratory illness.  This includes viral rhinosinusitis and viral bronchitis.  Antibiotics do not help viral illnesses; the best remedies treat the symptoms (see below).  The typical course of a viral illness is that you feel miserable for the first few days - with sore throat, runny nose/nasal congestion, cough, and sometimes fever and body aches.  You should start to feel better after about 5-7 days and much better by 10-14 days.  If you develop sudden worsening of symptoms or fever after the first 5-7 days, or if you have persistence of your symptoms beyond 14 days, let us know as you may have developed a secondary bacterial infection.  Get plenty of rest, especially while you have a fever. Stop smoking and avoid secondhand smoke. Drink lots of fluids such as water and clear soups. Fluids help loosen mucus. Fluids are also important because they help prevent dehydration. Gargle with warm salt water a few times a day to relieve a sore throat. Throat sprays or lozenges may also help relieve the pain.Avoid alcohol. Use saline (salt water) nose drops to help loosen mucus and moisten the tender skin in your nose.    2. Cough  -Negative for Influenza   - Influenza A/B antigen  -work note provided     3. Psoriatic arthritis (H)  -On Humira    - XR Chest 2 Views; Future    4. Morbid obesity (H)  -No weight loss     5. Nondisplaced fracture of fifth metatarsal bone, right foot, sequela  -Await final results of X rays   - XR Foot Right G/E 3 Views; Future  - XR Ankle Right G/E 3 Views; Future  - ORTHO  REFERRAL  - diclofenac (VOLTAREN) 50 MG EC tablet; Take 1 tablet (50 mg) by mouth 3 times daily as needed for moderate pain  Dispense: 30 tablet; Refill: 1, GI side effects reviewed, take with food, no history of renal disease or GI bleeds   - cyclobenzaprine (FLEXERIL) 10 MG tablet; Take 1 tablet (10 mg) by mouth 2 times daily as needed for muscle spasms  Dispense: 30 tablet; Refill: 1      Follow up with Provider - if not better in one week or fever over 101 F     Jessy Muñoz MD MPH    [facility]

## 2018-02-20 NOTE — MR AVS SNAPSHOT
After Visit Summary   2/20/2018    Lakhwinder Jones    MRN: [id number]           Patient Information     Date Of Birth          1980        Visit Information        Provider Department      2/20/2018 10:20 AM Jessy Muñoz MD [facility]        Today's Diagnoses     Viral URI with cough    -  1    Cough        Psoriatic arthritis (H)        Morbid obesity (H)        Nondisplaced fracture of fifth metatarsal bone, right foot, sequela          Care Instructions    At [facility], we strive to deliver an exceptional experience to you, every time we see you.  If you receive a survey in the mail, please send us back your thoughts. We really do value your feedback.    Based on your medical history, these are the current health maintenance/preventive care services that you are due for (some may have been done at this visit.)  Health Maintenance Due   Topic Date Due     URINE DRUG SCREEN Q1 YR  10/13/1996         Suggested websites for health information:  Www.[employer] : Up to date and easily searchable information on multiple topics.  Www.medlineplus.gov : medication info, interactive tutorials, watch real surgeries online  Www.familydoctor.org : good info from the Academy of Family Physicians  Www.cdc.gov : public health info, travel advisories, epidemics (H1N1)  Www.aap.org : children's health info, normal development, vaccinations  Www.health.state.[place].us : [place] dept of health, public health issues in [place], N1N1    Your care team:                            Family Medicine Internal Medicine   MD Constantino Walker MD Shantel Branch-Fleming, MD Katya Georgiev PA-C Megan Hill, LINNEA Tyler MD Pediatrics   NIRU Isaac, PRESTON Suárez APRN MD Ann Bee MD Deborah Mielke, MD Kim Thein, APRN PRESTON      Clinic hours: Monday - Thursday 7 am-7 pm; Fridays 7 am-5 pm.   Urgent care:  Monday - Friday 11 am-9 pm; Saturday and Sunday 9 am-5 pm.  Pharmacy : Monday -Thursday 8 am-8 pm; Friday 8 am-6 pm; Saturday and Sunday 9 am-5 pm.     Clinic: [phone]   Pharmacy: [phone]      Viral Upper Respiratory Illness (Adult)  You have a viral upper respiratory illness (URI), which is another term for the common cold. This illness is contagious during the first few days. It is spread through the air by coughing and sneezing. It may also be spread by direct contact (touching the sick person and then touching your own eyes, nose, or mouth). Frequent handwashing will decrease risk of spread. Most viral illnesses go away within 7 to 10 days with rest and simple home remedies. Sometimes the illness may last for several weeks. Antibiotics will not kill a virus, and they are generally not prescribed for this condition.    Home care    If symptoms are severe, rest at home for the first 2 to 3 days. When you resume activity, don't let yourself get too tired.    Avoid being exposed to cigarette smoke (yours or others ).    You may use acetaminophen or ibuprofen to control pain and fever, unless another medicine was prescribed. (Note: If you have chronic liver or kidney disease, have ever had a stomach ulcer or gastrointestinal bleeding, or are taking blood-thinning medicines, talk with your healthcare provider before using these medicines.) Aspirin should never be given to anyone under 18 years of age who is ill with a viral infection or fever. It may cause severe liver or brain damage.    Your appetite may be poor, so a light diet is fine. Avoid dehydration by drinking 6 to 8 glasses of fluids per day (water, soft drinks, juices, tea, or soup). Extra fluids will help loosen secretions in the nose and lungs.    Over-the-counter cold medicines will not shorten the length of time you re sick, but they may be helpful for the following symptoms: cough, sore throat, and nasal and sinus congestion. (Note: Do  not use decongestants if you have high blood pressure.)  Follow-up care  Follow up with your healthcare provider, or as advised.  When to seek medical advice  Call your healthcare provider right away if any of these occur:    Cough with lots of colored sputum (mucus)    Severe headache; face, neck, or ear pain    Difficulty swallowing due to throat pain    Fever of 100.4 F (38 C)  Call 911, or get immediate medical care  Call emergency services right away if any of these occur:    Chest pain, shortness of breath, wheezing, or difficulty breathing    Coughing up blood    Inability to swallow due to throat pain  Date Last Reviewed: 9/13/2015 2000-2017 [employer]. [address] [place] [address]. All rights reserved. This information is not intended as a substitute for professional medical care. Always follow your healthcare professional's instructions.                Follow-ups after your visit        Additional Services     ORTHO  REFERRAL       [facility] is referring you to the Orthopedic  Services at [facility].       The  Representative will assist you in the coordination of your Orthopedic and Musculoskeletal Care as prescribed by your physician.    The  Representative will call you within 1 business day to help schedule your appointment, or you may contact the  Representative at:    All areas ~ [phone]     Type of Referral : [facility]       Timeframe requested: Routine    Coverage of these services is subject to the terms and limitations of your health insurance plan.  Please call member services at your health plan with any benefit or coverage questions.      If X-rays, CT or MRI's have been performed, please contact the facility where they were done to arrange for , prior to your scheduled appointment.  Please bring this referral request to your  "appointment and present it to your specialist.                  Follow-up notes from your care team     Return if symptoms worsen or fail to improve.      Who to contact     If you have questions or need follow up information about today's clinic visit or your schedule please contact [facility] directly at [phone].  Normal or non-critical lab and imaging results will be communicated to you by MyChart, letter or phone within 4 business days after the clinic has received the results. If you do not hear from us within 7 days, please contact the clinic through [employer] or phone. If you have a critical or abnormal lab result, we will notify you by phone as soon as possible.  Submit refill requests through [employer] or call your pharmacy and they will forward the refill request to us. Please allow 3 business days for your refill to be completed.          Additional Information About Your Visit        MyChart Information     [employer] gives you secure access to your electronic health record. If you see a primary care provider, you can also send messages to your care team and make appointments. If you have questions, please call your primary care clinic.  If you do not have a primary care provider, please call [phone] and they will assist you.        Care EveryWhere ID     This is your Care EveryWhere ID. This could be used by other organizations to access your [place] medical records  [id number]        Your Vitals Were     Pulse Temperature Height Pulse Oximetry BMI (Body Mass Index)       90 97  F (36.1  C) (Oral) 6' 5\" (1.956 m) 96% 40.79 kg/m2        Blood Pressure from Last 3 Encounters:   02/20/18 132/80   12/11/17 136/82   12/05/17 146/90    Weight from Last 3 Encounters:   02/20/18 (!) 344 lb (156 kg)   12/11/17 (!) 341 lb 9.6 oz (154.9 kg)   12/05/17 (!) 340 lb 11.2 oz (154.5 kg)              We Performed the Following     Influenza A/B antigen     ORTHO  REFERRAL        "   Today's Medication Changes          These changes are accurate as of 2/20/18 12:51 PM.  If you have any questions, ask your nurse or doctor.               Start taking these medicines.        Dose/Directions    benzonatate 100 MG capsule   Commonly known as:  TESSALON   Used for:  Viral URI with cough   Started by:  Jessy Muñoz MD        Dose:  100 mg   Take 1 capsule (100 mg) by mouth 3 times daily as needed   Quantity:  20 capsule   Refills:  0       diclofenac 50 MG EC tablet   Commonly known as:  VOLTAREN   Used for:  Nondisplaced fracture of fifth metatarsal bone, right foot, sequela   Started by:  Jessy Muñoz MD        Dose:  50 mg   Take 1 tablet (50 mg) by mouth 3 times daily as needed for moderate pain   Quantity:  30 tablet   Refills:  1         These medicines have changed or have updated prescriptions.        Dose/Directions    cyclobenzaprine 10 MG tablet   Commonly known as:  FLEXERIL   This may have changed:    - medication strength  - when to take this   Used for:  Nondisplaced fracture of fifth metatarsal bone, right foot, sequela   Changed by:  Jessy Muñoz MD        Dose:  10 mg   Take 1 tablet (10 mg) by mouth 2 times daily as needed for muscle spasms   Quantity:  30 tablet   Refills:  1         Stop taking these medicines if you haven't already. Please contact your care team if you have questions.     etanercept 50 MG/ML injection   Commonly known as:  ENBREL   Stopped by:  Jessy Muñoz MD           gabapentin 300 MG capsule   Commonly known as:  NEURONTIN   Stopped by:  Jessy Muñoz MD           HYDROcodone-acetaminophen 5-325 MG per tablet   Commonly known as:  NORCO   Stopped by:  Jessy Muñoz MD           LORazepam 1 MG tablet   Commonly known as:  ATIVAN   Stopped by:  Jessy Muñoz MD           order for DME   Stopped by:  Jessy Muñoz MD                Where to get your medicines      These medications were sent to [facility]  [place], [place] - [address]  [address], [place] [address]     Phone:  [phone]     benzonatate 100 MG capsule    cyclobenzaprine 10 MG tablet    diclofenac 50 MG EC tablet                Primary Care Provider Office Phone # Fax #    Simona Tigre Prasad PA-C [phone]       [address]  [place] [address]        Equal Access to Services     KAL POLLOCK : Hadii aad ku hadasho Soomaali, waaxda luqadaha, qaybta kaalmada adeegyada, waxay idiin hayaan adeeg kharash la'aan . So [facility] [phone].    ATENCIÓN: Si habla español, tiene a rose disposición servicios gratuitos de asistencia lingüística. [facility] [phone].    We comply with applicable federal civil rights laws and [place] laws. We do not discriminate on the basis of race, color, national origin, age, disability, sex, sexual orientation, or gender identity.            Thank you!     Thank you for choosing [facility] [place]  for your care. Our goal is always to provide you with excellent care. Hearing back from our patients is one way we can continue to improve our services. Please take a few minutes to complete the written survey that you may receive in the mail after your visit with us. Thank you!             Your Updated Medication List - Protect others around you: Learn how to safely use, store and throw away your medicines at www.disposemymeds.org.          This list is accurate as of 2/20/18 12:51 PM.  Always use your most recent med list.                   Brand Name Dispense Instructions for use Diagnosis    adalimumab 40 MG/0.8ML prefilled syringe kit    HUMIRA    6 Syringe    Inject 2 40 mg pens first day, inject 1 40 mg pen day 8 and day 22, then 40mg every other week    Plaque psoriasis       benzonatate 100 MG capsule    TESSALON    20 capsule    Take 1 capsule (100 mg) by mouth 3 times daily as needed    Viral URI with cough       citalopram 40 MG tablet    celeXA    90 tablet     Take 1 tablet (40 mg) by mouth daily    Anxiety state, Panic disorder without agoraphobia       cyclobenzaprine 10 MG tablet    FLEXERIL    30 tablet    Take 1 tablet (10 mg) by mouth 2 times daily as needed for muscle spasms    Nondisplaced fracture of fifth metatarsal bone, right foot, sequela       diclofenac 50 MG EC tablet    VOLTAREN    30 tablet    Take 1 tablet (50 mg) by mouth 3 times daily as needed for moderate pain    Nondisplaced fracture of fifth metatarsal bone, right foot, sequela

## 2018-02-21 ENCOUNTER — TELEPHONE (OUTPATIENT)
Dept: FAMILY MEDICINE | Facility: CLINIC | Age: 38
End: 2018-02-21

## 2018-02-21 NOTE — TELEPHONE ENCOUNTER
Sent mychart to see if patient has started the Humira yet.    Jennifer STAPLES RN  [facility]  [phone]  [facility]   [phone]

## 2018-02-23 ENCOUNTER — OFFICE VISIT (OUTPATIENT)
Dept: PODIATRY | Facility: CLINIC | Age: 38
End: 2018-02-23
Payer: COMMERCIAL

## 2018-02-23 VITALS
SYSTOLIC BLOOD PRESSURE: 130 MMHG | HEART RATE: 83 BPM | WEIGHT: 315 LBS | BODY MASS INDEX: 37.19 KG/M2 | DIASTOLIC BLOOD PRESSURE: 90 MMHG | HEIGHT: 77 IN | OXYGEN SATURATION: 98 %

## 2018-02-23 DIAGNOSIS — S92.354A CLOSED NONDISPLACED FRACTURE OF FIFTH METATARSAL BONE OF RIGHT FOOT, INITIAL ENCOUNTER: Primary | ICD-10-CM

## 2018-02-23 PROCEDURE — 99202 OFFICE O/P NEW SF 15 MIN: CPT | Performed by: PODIATRIST

## 2018-02-23 RX ORDER — HYDROCODONE BITARTRATE AND ACETAMINOPHEN 5; 325 MG/1; MG/1
1-2 TABLET ORAL EVERY 8 HOURS PRN
Qty: 30 TABLET | Refills: 0 | Status: SHIPPED | OUTPATIENT
Start: 2018-02-23 | End: 2018-03-09

## 2018-02-23 NOTE — MR AVS SNAPSHOT
After Visit Summary   2/23/2018    Lakhwinder Jones    MRN: [id number]           Patient Information     Date Of Birth          1980        Visit Information        Provider Department      2/23/2018 7:00 AM Juan Mcneil DPM [facility]        Today's Diagnoses     Closed nondisplaced fracture of fifth metatarsal bone of right foot, initial encounter    -  1      Care Instructions    Thanks for coming today.  Ortho/Sports Medicine Clinic  [address] [place] [address]    To schedule future appointments in Ortho Clinic, you may call [phone].    To schedule ordered imaging by your Provider: Call [facility] at [phone]    [email or web address] available online at:   [email or web address]    Please call if any further questions or concerns [phone] and ask for the Orthopedic Department. Clinic hours 8 am to 5 pm.    Return to clinic if symptoms worsen.    [address] [address]  Phone: [phone]  Hours: Monday - Friday: 8:30-5:00            Follow-ups after your visit        Who to contact     If you have questions or need follow up information about today's clinic visit or your schedule please contact [facility] directly at [phone].  Normal or non-critical lab and imaging results will be communicated to you by mobME Solutionshart, letter or phone within 4 business days after the clinic has received the results. If you do not hear from us within 7 days, please contact the clinic through mobME Solutionshart or phone. If you have a critical or abnormal lab result, we will notify you by phone as soon as possible.  Submit refill requests through [email or web address] or call your pharmacy and they will forward the refill request to us. Please allow 3 business days for your refill to be completed.          Additional Information About Your Visit        [email or web address] Information     [email or web address] gives you secure access to your electronic health  "record. If you see a primary care provider, you can also send messages to your care team and make appointments. If you have questions, please call your primary care clinic.  If you do not have a primary care provider, please call [phone] and they will assist you.      Wrapp is an electronic gateway that provides easy, online access to your medical records. With Wrapp, you can request a clinic appointment, read your test results, renew a prescription or communicate with your care team.     To access your existing account, please contact your [facility] or call [phone] for assistance.        Care EveryWhere ID     This is your Care EveryWhere ID. This could be used by other organizations to access your [facility] medical records  [id number]        Your Vitals Were     Pulse Height Pulse Oximetry BMI (Body Mass Index)          83 1.956 m (6' 5\") 98% 40.79 kg/m2         Blood Pressure from Last 3 Encounters:   02/23/18 130/90   02/20/18 132/80   12/11/17 136/82    Weight from Last 3 Encounters:   02/23/18 (!) 156 kg (344 lb)   02/20/18 (!) 156 kg (344 lb)   12/11/17 (!) 154.9 kg (341 lb 9.6 oz)              Today, you had the following     No orders found for display       Primary Care Provider Office Phone # Fax #    Simona Prasad PA-C [phone]       [address] [facility] [address]        Equal Access to Services     [facility]: Hadii aad ku hadasho Soomaali, waaxda luqadaha, qaybta kaalmada adeegyada, waxay idiin hayleah keller . So [facility] [phone].    ATENCIÓN: Si deliala nigel, tiene a rose disposición servicios gratuitos de asistencia lingüística. Llame al [phone].    We comply with applicable federal civil rights laws and [place] laws. We do not discriminate on the basis of race, color, national origin, age, disability, sex, sexual orientation, or gender identity.            Thank you!     Thank you for " choosing [facility]  for your care. Our goal is always to provide you with excellent care. Hearing back from our patients is one way we can continue to improve our services. Please take a few minutes to complete the written survey that you may receive in the mail after your visit with us. Thank you!             Your Updated Medication List - Protect others around you: Learn how to safely use, store and throw away your medicines at www.disposemymeds.org.          This list is accurate as of 2/23/18  7:21 AM.  Always use your most recent med list.                   Brand Name Dispense Instructions for use Diagnosis    adalimumab 40 MG/0.8ML prefilled syringe kit    HUMIRA    6 Syringe    Inject 2 40 mg pens first day, inject 1 40 mg pen day 8 and day 22, then 40mg every other week    Plaque psoriasis       benzonatate 100 MG capsule    TESSALON    20 capsule    Take 1 capsule (100 mg) by mouth 3 times daily as needed    Viral URI with cough       citalopram 40 MG tablet    celeXA    90 tablet    Take 1 tablet (40 mg) by mouth daily    Anxiety state, Panic disorder without agoraphobia       cyclobenzaprine 10 MG tablet    FLEXERIL    30 tablet    Take 1 tablet (10 mg) by mouth 2 times daily as needed for muscle spasms    Nondisplaced fracture of fifth metatarsal bone, right foot, sequela

## 2018-02-23 NOTE — LETTER
2/23/2018         RE: Lakhwinder Jones  [address]  [place] [address]        Dear Colleague,    Thank you for referring your patient, Lakhwinder Jones, to the [facility]. Please see a copy of my visit note below.    Past Medical History:   Diagnosis Date     Anxiety      Back pain     chronic     Obesity      Psoriatic arthritis (H)      Patient Active Problem List   Diagnosis     Anxiety state     Chronic back pain     Depression     Panic disorder without agoraphobia     Psoriasis     CARDIOVASCULAR SCREENING; LDL GOAL LESS THAN 160     Abnormal results of liver function studies     Psoriatic arthritis (H)     Morbid obesity (H)     Elevated blood-pressure reading without diagnosis of hypertension     Closed nondisplaced fracture of fifth metatarsal bone of right foot, initial encounter     Past Surgical History:   Procedure Laterality Date     NOSE SURGERY      3 times     right elbow surgey      12 yo     TESTICLE SURGERY      23 yo     Social History     Social History     Marital status:      Spouse name: Shereen      Number of children: 0     Years of education: N/A     Occupational History     Not on file.     Social History Main Topics     Smoking status: Former Smoker     Types: Cigarettes     Smokeless tobacco: Never Used      Comment: 1-2 cigarettes per day to cope with stress     Alcohol use 4.8 oz/week     8 Standard drinks or equivalent per week      Comment: not for a month     Drug use: Yes     Special: Marijuana     Sexual activity: Yes     Partners: Female     Birth control/ protection: None     Other Topics Concern     Not on file     Social History Narrative     Family History   Problem Relation Age of Onset     Obesity Mother      DIABETES Father      Coronary Artery Disease Father      Hypertension Father      Hyperlipidemia Father      Depression Father      Anxiety Disorder Father      MENTAL ILLNESS Father      Substance Abuse Father      Breast  Cancer Maternal Grandmother      Depression Maternal Grandmother      MENTAL ILLNESS Maternal Grandmother      Substance Abuse Maternal Grandmother      Prostate Cancer Maternal Grandfather      DIABETES Paternal Grandmother      Breast Cancer Paternal Grandmother      Depression Paternal Grandmother      MENTAL ILLNESS Paternal Grandmother      DIABETES Paternal Grandfather      Asthma Brother      DIABETES Paternal Uncle      CEREBROVASCULAR DISEASE No family hx of      Anesthesia Reaction No family hx of      OSTEOPOROSIS No family hx of      Genetic Disorder No family hx of      Thyroid Disease No family hx of      Liver Disease No family hx of      SUBJECTIVE FINDINGS:  A 37-year-old male presents from Dr. Muñoz for right foot fracture.  He relates in October, he slipped in the shower and injured his foot.  He went to the physician.  He took x-rays.  He was put in a cast boot for about 6 weeks.  It got better.  Now recently it started getting worse again.  He relates no specific injuries or reinjuries.  He relates he is on his feet all day at work.  He has taken ibuprofen and that helps a little bit but does not resolve anything.  He relates he was given Voltaren by his primary physician.  That did not help and it gave him a headache, so he stopped it.      PAST SURGICAL HISTORY:  He relates elbow surgery.  He relates no problems with anesthesia in the past.      REVIEW OF SYSTEMS:  He relates no GI ulcers in the past.  He relates to no problems with NSAIDs in the past, other than the headache from the Voltaren.  He relates no kidney disease.  He relates no liver disease.      FAMILY HISTORY:  He relates is unremarkable.      PAST MEDICAL HISTORY:  As noted.  Previous notes reviewed.      OBJECTIVE FINDINGS:  DP and PT are 2/4 right.   He has a right fifth metatarsal base edema.  There is pain on palpation.  There is no erythema, no odor, no calor.  He has what appear to be healed psoriatic lesions, both feet.   No gross tendon voids.  X-rays reviewed with the patient in clinic today.  He has a fifth metatarsal base fracture with some plantar gapping.  There is no gross bony callus present.      ASSESSMENT AND PLAN:  Right fifth metatarsal base fracture.  It is difficult to say whether this is a nonhealed previous fracture or if it is a refracture.  Diagnosis and treatment options discussed with the patient.  CAM Boot dispensed and use discussed with him.  He relates his previous CAM Boot does not fit well.  A prescription for a Roll-A-Bout given and use discussed with him.  A prescription for Vicodin given and use discussed with him.  He relates he has taken Vicodin before without any problems.  He will return to clinic and see me in 2 weeks.         Again, thank you for allowing me to participate in the care of your patient.        Sincerely,        Juan Mcneil DPM

## 2018-02-23 NOTE — PATIENT INSTRUCTIONS
Thanks for coming today.  Ortho/Sports Medicine Clinic  [address] [place] [address]    To schedule future appointments in Ortho Clinic, you may call [phone].    To schedule ordered imaging by your Provider: Call [facility] at [phone]    Farmstr available online at:   [email or web address]    Please call if any further questions or concerns [phone] and ask for the Orthopedic Department. Clinic hours 8 am to 5 pm.    Return to clinic if symptoms worsen.    [address] [address]  Phone: [phone]  Hours: Monday - Friday: 8:30-5:00

## 2018-02-23 NOTE — PROGRESS NOTES
Past Medical History:   Diagnosis Date     Anxiety      Back pain     chronic     Obesity      Psoriatic arthritis (H)      Patient Active Problem List   Diagnosis     Anxiety state     Chronic back pain     Depression     Panic disorder without agoraphobia     Psoriasis     CARDIOVASCULAR SCREENING; LDL GOAL LESS THAN 160     Abnormal results of liver function studies     Psoriatic arthritis (H)     Morbid obesity (H)     Elevated blood-pressure reading without diagnosis of hypertension     Closed nondisplaced fracture of fifth metatarsal bone of right foot, initial encounter     Past Surgical History:   Procedure Laterality Date     NOSE SURGERY      3 times     right elbow surgey      12 yo     TESTICLE SURGERY      23 yo     Social History     Social History     Marital status:      Spouse name: Shereen      Number of children: 0     Years of education: N/A     Occupational History     Not on file.     Social History Main Topics     Smoking status: Former Smoker     Types: Cigarettes     Smokeless tobacco: Never Used      Comment: 1-2 cigarettes per day to cope with stress     Alcohol use 4.8 oz/week     8 Standard drinks or equivalent per week      Comment: not for a month     Drug use: Yes     Special: Marijuana     Sexual activity: Yes     Partners: Female     Birth control/ protection: None     Other Topics Concern     Not on file     Social History Narrative     Family History   Problem Relation Age of Onset     Obesity Mother      DIABETES Father      Coronary Artery Disease Father      Hypertension Father      Hyperlipidemia Father      Depression Father      Anxiety Disorder Father      MENTAL ILLNESS Father      Substance Abuse Father      Breast Cancer Maternal Grandmother      Depression Maternal Grandmother      MENTAL ILLNESS Maternal Grandmother      Substance Abuse Maternal Grandmother      Prostate Cancer Maternal Grandfather      DIABETES Paternal Grandmother      Breast Cancer Paternal  Grandmother      Depression Paternal Grandmother      MENTAL ILLNESS Paternal Grandmother      DIABETES Paternal Grandfather      Asthma Brother      DIABETES Paternal Uncle      CEREBROVASCULAR DISEASE No family hx of      Anesthesia Reaction No family hx of      OSTEOPOROSIS No family hx of      Genetic Disorder No family hx of      Thyroid Disease No family hx of      Liver Disease No family hx of      SUBJECTIVE FINDINGS:  A 37-year-old male presents from Dr. Muñoz for right foot fracture.  He relates in October, he slipped in the shower and injured his foot.  He went to the physician.  He took x-rays.  He was put in a cast boot for about 6 weeks.  It got better.  Now recently it started getting worse again.  He relates no specific injuries or reinjuries.  He relates he is on his feet all day at work.  He has taken ibuprofen and that helps a little bit but does not resolve anything.  He relates he was given Voltaren by his primary physician.  That did not help and it gave him a headache, so he stopped it.      PAST SURGICAL HISTORY:  He relates elbow surgery.  He relates no problems with anesthesia in the past.      REVIEW OF SYSTEMS:  He relates no GI ulcers in the past.  He relates to no problems with NSAIDs in the past, other than the headache from the Voltaren.  He relates no kidney disease.  He relates no liver disease.      FAMILY HISTORY:  He relates is unremarkable.      PAST MEDICAL HISTORY:  As noted.  Previous notes reviewed.      OBJECTIVE FINDINGS:  DP and PT are 2/4 right.   He has a right fifth metatarsal base edema.  There is pain on palpation.  There is no erythema, no odor, no calor.  He has what appear to be healed psoriatic lesions, both feet.  No gross tendon voids.  X-rays reviewed with the patient in clinic today.  He has a fifth metatarsal base fracture with some plantar gapping.  There is no gross bony callus present.      ASSESSMENT AND PLAN:  Right fifth metatarsal base fracture.  It  is difficult to say whether this is a nonhealed previous fracture or if it is a refracture.  Diagnosis and treatment options discussed with the patient.  CAM Boot dispensed and use discussed with him.  He relates his previous CAM Boot does not fit well.  A prescription for a Roll-A-Bout given and use discussed with him.  A prescription for Vicodin given and use discussed with him.  He relates he has taken Vicodin before without any problems.  He will return to clinic and see me in 2 weeks.

## 2018-02-23 NOTE — NURSING NOTE
"Lakhwinder Jones's goals for this visit include: Right foot fracture consult  He requests these members of his care team be copied on today's visit information: yes    PCP: Simona Prasad    Referring Provider:  Jessy Muñoz MD  [address] [place] [address]    Chief Complaint   Patient presents with     Consult     Musculoskeletal Problem     Right foot fracture       Initial /90  Pulse 83  Ht 1.956 m (6' 5\")  Wt (!) 156 kg (344 lb)  SpO2 98%  BMI 40.79 kg/m2 Estimated body mass index is 40.79 kg/(m^2) as calculated from the following:    Height as of this encounter: 1.956 m (6' 5\").    Weight as of this encounter: 156 kg (344 lb).  Medication Reconciliation: complete    "

## 2018-02-28 DIAGNOSIS — L40.0 PLAQUE PSORIASIS: Primary | ICD-10-CM

## 2018-03-09 ENCOUNTER — OFFICE VISIT (OUTPATIENT)
Dept: PODIATRY | Facility: CLINIC | Age: 38
End: 2018-03-09
Payer: COMMERCIAL

## 2018-03-09 VITALS — OXYGEN SATURATION: 98 % | SYSTOLIC BLOOD PRESSURE: 134 MMHG | DIASTOLIC BLOOD PRESSURE: 98 MMHG | HEART RATE: 76 BPM

## 2018-03-09 DIAGNOSIS — S92.354G CLOSED NONDISPLACED FRACTURE OF FIFTH METATARSAL BONE OF RIGHT FOOT WITH DELAYED HEALING, SUBSEQUENT ENCOUNTER: Primary | ICD-10-CM

## 2018-03-09 PROCEDURE — 99213 OFFICE O/P EST LOW 20 MIN: CPT | Performed by: PODIATRIST

## 2018-03-09 RX ORDER — HYDROCODONE BITARTRATE AND ACETAMINOPHEN 5; 325 MG/1; MG/1
1-2 TABLET ORAL EVERY 8 HOURS PRN
Qty: 30 TABLET | Refills: 0 | Status: SHIPPED | OUTPATIENT
Start: 2018-03-09 | End: 2018-03-16

## 2018-03-09 NOTE — MR AVS SNAPSHOT
After Visit Summary   3/9/2018    Lakhwinder Jones    MRN: [id number]           Patient Information     Date Of Birth          1980        Visit Information        Provider Department      3/9/2018 7:00 AM Juan Mcneil DPM [facility]        Today's Diagnoses     Closed nondisplaced fracture of fifth metatarsal bone of right foot with delayed healing, subsequent encounter    -  1    Fracture of base of fifth metatarsal bone          Care Instructions    Thanks for coming today.  Ortho/Sports Medicine Clinic  [address] [place] [address]    To schedule future appointments in Ortho Clinic, you may call [phone].    To schedule ordered imaging by your Provider: Call [facility] at [phone]    Piper available online at:   [email or web address]-Com[email or web address]    Please call if any further questions or concerns [phone] and ask for the Orthopedic Department. Clinic hours 8 am to 5 pm.    Return to clinic if symptoms worsen.            Follow-ups after your visit        Additional Services     PODIATRY/FOOT & ANKLE SURGERY REFERRAL       Your provider has referred you to: [facility]: [address] [place] [address] Patient Scheduling Line: [phone] option 1 (scheduling and directions)    Dr. Rivera    Please be aware that coverage of these services is subject to the terms and limitations of your health insurance plan.  Call member services at your health plan with any benefit or coverage questions.      Please bring the following to your appointment:  >>   Any x-rays, CTs or MRIs which have been performed.  Contact the facility where they were done to arrange for  prior to your scheduled appointment.    >>   List of current medications   >>   This referral request   >>   Any documents/labs given to you for this referral                  Your next 10 appointments already scheduled     Mar 23, 2018  7:30 AM CDT   Return Visit  with Juan Mcneil DPM   [facility] ([facility]    [address] [address] [place] [id number] [phone]              Who to contact     If you have questions or need follow up information about today's clinic visit or your schedule please contact [facility] directly at [phone].  Normal or non-critical lab and imaging results will be communicated to you by ProStor Systemshart, letter or phone within 4 business days after the clinic has received the results. If you do not hear from us within 7 days, please contact the clinic through ProStor Systemshart or phone. If you have a critical or abnormal lab result, we will notify you by phone as soon as possible.  Submit refill requests through GoTaxi(Cabeo) or call your pharmacy and they will forward the refill request to us. Please allow 3 business days for your refill to be completed.          Additional Information About Your Visit        ProStor SystemsharClickTale Information     GoTaxi(Cabeo) gives you secure access to your electronic health record. If you see a primary care provider, you can also send messages to your care team and make appointments. If you have questions, please call your primary care clinic.  If you do not have a primary care provider, please call [phone] and they will assist you.      GoTaxi(Cabeo) is an electronic gateway that provides easy, online access to your medical records. With GoTaxi(Cabeo), you can request a clinic appointment, read your test results, renew a prescription or communicate with your care team.     To access your existing account, please contact your [facility] or call [phone] for assistance.        Care EveryWhere ID     This is your Care EveryWhere ID. This could be used by other organizations to access your [place] medical records  [id number]        Your Vitals Were     Pulse Pulse Oximetry                76 98%           Blood Pressure from Last 3 Encounters:   03/09/18 (!)  134/98   02/23/18 130/90   02/20/18 132/80    Weight from Last 3 Encounters:   02/23/18 (!) 156 kg (344 lb)   02/20/18 (!) 156 kg (344 lb)   12/11/17 (!) 154.9 kg (341 lb 9.6 oz)              We Performed the Following     PODIATRY/FOOT & ANKLE SURGERY REFERRAL          Where to get your medicines      Some of these will need a paper prescription and others can be bought over the counter.  Ask your nurse if you have questions.     Bring a paper prescription for each of these medications     HYDROcodone-acetaminophen 5-325 MG per tablet          Primary Care Provider Office Phone # Fax #    Simona Prasad PA-C [phone]       [address]  [place] [facility] [address]        Equal Access to Services     [facility] : Hadii rashid bosso Somihir, waaxda luqadaha, qaybta kaalmada tamica, adina keller . So [facility] [phone].    ATENCIÓN: Si habla español, tiene a rose disposición servicios gratuitos de asistencia lingüística. Mary Beth al [phone].    We comply with applicable federal civil rights laws and [place] laws. We do not discriminate on the basis of race, color, national origin, age, disability, sex, sexual orientation, or gender identity.            Thank you!     Thank you for choosing [facility]  for your care. Our goal is always to provide you with excellent care. Hearing back from our patients is one way we can continue to improve our services. Please take a few minutes to complete the written survey that you may receive in the mail after your visit with us. Thank you!             Your Updated Medication List - Protect others around you: Learn how to safely use, store and throw away your medicines at www.disposemymeds.org.          This list is accurate as of 3/9/18  7:20 AM.  Always use your most recent med list.                   Brand Name Dispense Instructions for use Diagnosis    * adalimumab 40 MG/0.8ML prefilled syringe  kit    HUMIRA    6 Syringe    Inject 2 40 mg pens first day, inject 1 40 mg pen day 8 and day 22, then 40mg every other week    Plaque psoriasis       * adalimumab 40 MG/0.8ML prefilled syringe kit    HUMIRA    2 Syringe    Inject 0.8 mLs (40 mg) Subcutaneous every 14 days    Plaque psoriasis       benzonatate 100 MG capsule    TESSALON    20 capsule    Take 1 capsule (100 mg) by mouth 3 times daily as needed    Viral URI with cough       citalopram 40 MG tablet    celeXA    90 tablet    Take 1 tablet (40 mg) by mouth daily    Anxiety state, Panic disorder without agoraphobia       cyclobenzaprine 10 MG tablet    FLEXERIL    30 tablet    Take 1 tablet (10 mg) by mouth 2 times daily as needed for muscle spasms    Nondisplaced fracture of fifth metatarsal bone, right foot, sequela       HYDROcodone-acetaminophen 5-325 MG per tablet    NORCO    30 tablet    Take 1-2 tablets by mouth every 8 hours as needed for moderate to severe pain maximum 6 tablet(s) per day    Closed nondisplaced fracture of fifth metatarsal bone of right foot with delayed healing, subsequent encounter       * order for DME     1 Units    Roll-A-Bout Walker. Patient can use for right foot.    Closed nondisplaced fracture of fifth metatarsal bone of right foot, initial encounter       * order for DME     1 Device    Equipment being ordered: [id number] $249  Walking Boot XL Funmilayo Pneumatic    Closed nondisplaced fracture of fifth metatarsal bone of right foot, initial encounter       * Notice:  This list has 4 medication(s) that are the same as other medications prescribed for you. Read the directions carefully, and ask your doctor or other care provider to review them with you.

## 2018-03-09 NOTE — PATIENT INSTRUCTIONS
Thanks for coming today.  Ortho[facility] Medicine Clinic  [address] [place] [address]    To schedule future appointments in Ortho Clinic, you may call [phone].    To schedule ordered imaging by your Provider: Call [facility] at [phone]    [employer] available online at:   [email or web address]    Please call if any further questions or concerns [phone] and ask for the Orthopedic Department. Clinic hours 8 am to 5 pm.    Return to clinic if symptoms worsen.

## 2018-03-09 NOTE — NURSING NOTE
"Lakhwinder Jones's goals for this visit include: Foot fracture check   He requests these members of his care team be copied on today's visit information: yes    PCP: Simona Prasad    Referring Provider:  No referring provider defined for this encounter.    Chief Complaint   Patient presents with     RECHECK     Foot fracture check        Initial BP (!) 134/98  Pulse 76  SpO2 98% Estimated body mass index is 40.79 kg/(m^2) as calculated from the following:    Height as of 2/23/18: 1.956 m (6' 5\").    Weight as of 2/23/18: 156 kg (344 lb).  Medication Reconciliation: complete    "

## 2018-03-09 NOTE — LETTER
3/9/2018         RE: Lakhwinder Jones  [address]  [place] [address]        Dear Colleague,    Thank you for referring your patient, Lakhwinder Jones, to the [facility]. Please see a copy of my visit note below.    Past Medical History:   Diagnosis Date     Anxiety      Back pain     chronic     Obesity      Psoriatic arthritis (H)      Patient Active Problem List   Diagnosis     Anxiety state     Chronic back pain     Depression     Panic disorder without agoraphobia     Psoriasis     CARDIOVASCULAR SCREENING; LDL GOAL LESS THAN 160     Abnormal results of liver function studies     Psoriatic arthritis (H)     Morbid obesity (H)     Elevated blood-pressure reading without diagnosis of hypertension     Closed nondisplaced fracture of fifth metatarsal bone of right foot, initial encounter     Past Surgical History:   Procedure Laterality Date     NOSE SURGERY      3 times     right elbow surgey      12 yo     TESTICLE SURGERY      23 yo     Social History     Social History     Marital status:      Spouse name: Shereen      Number of children: 0     Years of education: N/A     Occupational History     Not on file.     Social History Main Topics     Smoking status: Former Smoker     Types: Cigarettes     Smokeless tobacco: Never Used      Comment: 1-2 cigarettes per day to cope with stress     Alcohol use 4.8 oz/week     8 Standard drinks or equivalent per week      Comment: not for a month     Drug use: Yes     Special: Marijuana     Sexual activity: Yes     Partners: Female     Birth control/ protection: None     Other Topics Concern     Not on file     Social History Narrative     Family History   Problem Relation Age of Onset     Obesity Mother      DIABETES Father      Coronary Artery Disease Father      Hypertension Father      Hyperlipidemia Father      Depression Father      Anxiety Disorder Father      MENTAL ILLNESS Father      Substance Abuse Father      Breast  Cancer Maternal Grandmother      Depression Maternal Grandmother      MENTAL ILLNESS Maternal Grandmother      Substance Abuse Maternal Grandmother      Prostate Cancer Maternal Grandfather      DIABETES Paternal Grandmother      Breast Cancer Paternal Grandmother      Depression Paternal Grandmother      MENTAL ILLNESS Paternal Grandmother      DIABETES Paternal Grandfather      Asthma Brother      DIABETES Paternal Uncle      CEREBROVASCULAR DISEASE No family hx of      Anesthesia Reaction No family hx of      OSTEOPOROSIS No family hx of      Genetic Disorder No family hx of      Thyroid Disease No family hx of      Liver Disease No family hx of      SUBJECTIVE:  A 37-year-old male returns to clinic for a right fifth metatarsal fracture.  He relates it still hurts.  It is not any better.  He has been using a Cam boot all the time and the Roll-A-Bout.  He relates it really hurts.  He has taken the Vicodin and that helps.  He relates this has been bothering him since October when he injured it.      OBJECTIVE:  DP and PT are 2/4 right.  He has a right fifth metatarsal base edema.  There is pain to palpation and a bony prominence present.  There is no erythema, no odor, no calor.      ASSESSMENT AND PLAN:  Right fifth metatarsal base fracture.  Diagnosis and treatment options discussed with the patient.  This is either a reinjury or a nonhealed previous fracture or a refracture.  Diagnosis and treatment options discussed with him.  He will continue the Cam boot, nonweightbearing with the Roll-A-Bout.  Refill for Vicodin given and use discussed with him.  He relates that helps.  He has had no problems taking it.  I gave him a referral to Dr. Rivera to see if there are any surgical options.  He will return as scheduled to see me in 2 weeks.         Again, thank you for allowing me to participate in the care of your patient.        Sincerely,        Juan Mcneil DPM

## 2018-03-09 NOTE — PROGRESS NOTES
Past Medical History:   Diagnosis Date     Anxiety      Back pain     chronic     Obesity      Psoriatic arthritis (H)      Patient Active Problem List   Diagnosis     Anxiety state     Chronic back pain     Depression     Panic disorder without agoraphobia     Psoriasis     CARDIOVASCULAR SCREENING; LDL GOAL LESS THAN 160     Abnormal results of liver function studies     Psoriatic arthritis (H)     Morbid obesity (H)     Elevated blood-pressure reading without diagnosis of hypertension     Closed nondisplaced fracture of fifth metatarsal bone of right foot, initial encounter     Past Surgical History:   Procedure Laterality Date     NOSE SURGERY      3 times     right elbow surgey      14 yo     TESTICLE SURGERY      21 yo     Social History     Social History     Marital status:      Spouse name: Shereen      Number of children: 0     Years of education: N/A     Occupational History     Not on file.     Social History Main Topics     Smoking status: Former Smoker     Types: Cigarettes     Smokeless tobacco: Never Used      Comment: 1-2 cigarettes per day to cope with stress     Alcohol use 4.8 oz/week     8 Standard drinks or equivalent per week      Comment: not for a month     Drug use: Yes     Special: Marijuana     Sexual activity: Yes     Partners: Female     Birth control/ protection: None     Other Topics Concern     Not on file     Social History Narrative     Family History   Problem Relation Age of Onset     Obesity Mother      DIABETES Father      Coronary Artery Disease Father      Hypertension Father      Hyperlipidemia Father      Depression Father      Anxiety Disorder Father      MENTAL ILLNESS Father      Substance Abuse Father      Breast Cancer Maternal Grandmother      Depression Maternal Grandmother      MENTAL ILLNESS Maternal Grandmother      Substance Abuse Maternal Grandmother      Prostate Cancer Maternal Grandfather      DIABETES Paternal Grandmother      Breast Cancer Paternal  Grandmother      Depression Paternal Grandmother      MENTAL ILLNESS Paternal Grandmother      DIABETES Paternal Grandfather      Asthma Brother      DIABETES Paternal Uncle      CEREBROVASCULAR DISEASE No family hx of      Anesthesia Reaction No family hx of      OSTEOPOROSIS No family hx of      Genetic Disorder No family hx of      Thyroid Disease No family hx of      Liver Disease No family hx of      SUBJECTIVE:  A 37-year-old male returns to clinic for a right fifth metatarsal fracture.  He relates it still hurts.  It is not any better.  He has been using a Cam boot all the time and the Roll-A-Bout.  He relates it really hurts.  He has taken the Vicodin and that helps.  He relates this has been bothering him since October when he injured it.      OBJECTIVE:  DP and PT are 2/4 right.  He has a right fifth metatarsal base edema.  There is pain to palpation and a bony prominence present.  There is no erythema, no odor, no calor.      ASSESSMENT AND PLAN:  Right fifth metatarsal base fracture.  Diagnosis and treatment options discussed with the patient.  This is either a reinjury or a nonhealed previous fracture or a refracture.  Diagnosis and treatment options discussed with him.  He will continue the Cam boot, nonweightbearing with the Roll-A-Bout.  Refill for Vicodin given and use discussed with him.  He relates that helps.  He has had no problems taking it.  I gave him a referral to Dr. Rivera to see if there are any surgical options.  He will return as scheduled to see me in 2 weeks.     Ct ordered upon request.

## 2018-03-12 ENCOUNTER — TELEPHONE (OUTPATIENT)
Dept: ORTHOPEDICS | Facility: CLINIC | Age: 38
End: 2018-03-12

## 2018-03-12 NOTE — TELEPHONE ENCOUNTER
Patient is being referred by Dr. Mcneil for evaluation of right foot metatarsal fracture.  Per MORENITA Markham with Dr. Rivera, patient was offered next available appointment.  Shahrzad will reach out to Dr. Rivera to have him review the chart and determine if an earlier appointment is needed.    Emma Ruth LPN

## 2018-03-13 NOTE — TELEPHONE ENCOUNTER
Call placed to let patient know that Dr. Rivera would like him to have a CT scan for further evaluation of his metatarsal fracture. Dr. Mcneil has placed the order for this. Message left for patient to call back to be notified of this request.

## 2018-03-16 ENCOUNTER — RADIANT APPOINTMENT (OUTPATIENT)
Dept: CT IMAGING | Facility: CLINIC | Age: 38
End: 2018-03-16
Attending: PODIATRIST
Payer: COMMERCIAL

## 2018-03-16 ENCOUNTER — TELEPHONE (OUTPATIENT)
Dept: PODIATRY | Facility: CLINIC | Age: 38
End: 2018-03-16

## 2018-03-16 DIAGNOSIS — S92.354G CLOSED NONDISPLACED FRACTURE OF FIFTH METATARSAL BONE OF RIGHT FOOT WITH DELAYED HEALING, SUBSEQUENT ENCOUNTER: ICD-10-CM

## 2018-03-16 PROCEDURE — 73700 CT LOWER EXTREMITY W/O DYE: CPT | Mod: RT | Performed by: RADIOLOGY

## 2018-03-16 RX ORDER — HYDROCODONE BITARTRATE AND ACETAMINOPHEN 5; 325 MG/1; MG/1
1-2 TABLET ORAL EVERY 8 HOURS PRN
Qty: 30 TABLET | Refills: 0 | Status: SHIPPED | OUTPATIENT
Start: 2018-03-16 | End: 2018-03-22

## 2018-03-16 NOTE — TELEPHONE ENCOUNTER
Patient stopped in to clinic today and is asking for a medication refill. Assuming it's for Hydrocodone?  Please advise.   ED Pavon

## 2018-03-19 ENCOUNTER — TELEPHONE (OUTPATIENT)
Dept: ORTHOPEDICS | Facility: CLINIC | Age: 38
End: 2018-03-19

## 2018-03-19 NOTE — TELEPHONE ENCOUNTER
Dr. Rivera reviewed patients CT scan and stated he would like to see him in clinic. Call placed to patient to schedule this appointment. No answer, message left for him to call back. Please schedule an appointment with Dr. Rivera 4/10/18 at 5:40pm upon his return call. Dr. Rivera has approved appointment timeframe.

## 2018-03-22 ENCOUNTER — MYC REFILL (OUTPATIENT)
Dept: ORTHOPEDICS | Facility: CLINIC | Age: 38
End: 2018-03-22

## 2018-03-22 DIAGNOSIS — S92.354G CLOSED NONDISPLACED FRACTURE OF FIFTH METATARSAL BONE OF RIGHT FOOT WITH DELAYED HEALING, SUBSEQUENT ENCOUNTER: ICD-10-CM

## 2018-03-22 RX ORDER — HYDROCODONE BITARTRATE AND ACETAMINOPHEN 5; 325 MG/1; MG/1
1-2 TABLET ORAL EVERY 8 HOURS PRN
Qty: 30 TABLET | Refills: 0 | Status: SHIPPED | OUTPATIENT
Start: 2018-03-22 | End: 2018-03-23

## 2018-03-22 NOTE — TELEPHONE ENCOUNTER
Message from [employer]:  Original authorizing provider: LUDA Monique would like a refill of the following medications:  HYDROcodone-acetaminophen (NORCO) 5-325 MG per tablet [Juan Mcneil DPM]    Preferred pharmacy: [facility], [place] - [address]    Comment:

## 2018-03-23 ENCOUNTER — OFFICE VISIT (OUTPATIENT)
Dept: PODIATRY | Facility: CLINIC | Age: 38
End: 2018-03-23
Payer: COMMERCIAL

## 2018-03-23 VITALS — OXYGEN SATURATION: 97 % | HEART RATE: 53 BPM | DIASTOLIC BLOOD PRESSURE: 88 MMHG | SYSTOLIC BLOOD PRESSURE: 128 MMHG

## 2018-03-23 DIAGNOSIS — S92.354G CLOSED NONDISPLACED FRACTURE OF FIFTH METATARSAL BONE OF RIGHT FOOT WITH DELAYED HEALING, SUBSEQUENT ENCOUNTER: ICD-10-CM

## 2018-03-23 PROCEDURE — 99212 OFFICE O/P EST SF 10 MIN: CPT | Performed by: PODIATRIST

## 2018-03-23 RX ORDER — HYDROCODONE BITARTRATE AND ACETAMINOPHEN 5; 325 MG/1; MG/1
1-2 TABLET ORAL EVERY 8 HOURS PRN
Qty: 30 TABLET | Refills: 0 | Status: SHIPPED | OUTPATIENT
Start: 2018-03-23 | End: 2018-03-28

## 2018-03-23 NOTE — MR AVS SNAPSHOT
After Visit Summary   3/23/2018    Lakhwinder Jones    MRN: [id number]           Patient Information     Date Of Birth          1980        Visit Information        Provider Department      3/23/2018 7:30 AM Juan Mcneil DPM [facility] [place]        Today's Diagnoses     Closed nondisplaced fracture of fifth metatarsal bone of right foot with delayed healing, subsequent encounter          Care Instructions    Thanks for coming today.  Ortho/Sports Medicine Clinic  [address] [place] [address]    To schedule future appointments in Ortho Clinic, you may call [phone].    To schedule ordered imaging by your Provider: Call [facility] at [phone]    [employer] available online at:   [email or web address]    Please call if any further questions or concerns [phone] and ask for the Orthopedic Department. Clinic hours 8 am to 5 pm.    Return to clinic if symptoms worsen.            Follow-ups after your visit        Your next 10 appointments already scheduled     Mar 26, 2018  8:20 AM CDT   Office Visit with Suellen Page MD   [facility] ([address] [address] [phone]           Bring a current list of meds and any records pertaining to this visit. For Physicals, please bring immunization records and any forms needing to be filled out. Please arrive 10 minutes early to complete paperwork.            Apr 10, 2018  5:40 PM CDT   (Arrive by 5:25 PM)   NEW FOOT/ANKLE with Ben Rivera MD   [facility])    [address] [place] [address] [phone]              Who to contact     If you have questions or need follow up information about today's clinic visit or your schedule please contact [facility] directly at [phone].  Normal or  non-critical lab and imaging results will be communicated to you by [employer], letter or phone within 4 business days after the clinic has received the results. If you do not hear from us within 7 days, please contact the clinic through [facility] or phone. If you have a critical or abnormal lab result, we will notify you by phone as soon as possible.  Submit refill requests through [facility] or call your pharmacy and they will forward the refill request to us. Please allow 3 business days for your refill to be completed.          Additional Information About Your Visit        [facility] Information     [facility] gives you secure access to your electronic health record. If you see a primary care provider, you can also send messages to your care team and make appointments. If you have questions, please call your primary care clinic.  If you do not have a primary care provider, please call [phone] and they will assist you.      [facility] is an electronic gateway that provides easy, online access to your medical records. With [facility], you can request a clinic appointment, read your test results, renew a prescription or communicate with your care team.     To access your existing account, please contact your [facility] or call [phone] for assistance.        Care EveryWhere ID     This is your Care EveryWhere ID. This could be used by other organizations to access your [facility] medical records  [id number]        Your Vitals Were     Pulse Pulse Oximetry                53 97%           Blood Pressure from Last 3 Encounters:   03/23/18 128/88   03/09/18 (!) 134/98   02/23/18 130/90    Weight from Last 3 Encounters:   02/23/18 (!) 156 kg (344 lb)   02/20/18 (!) 156 kg (344 lb)   12/11/17 (!) 154.9 kg (341 lb 9.6 oz)              Today, you had the following     No orders found for display         Where to get your medicines      Some of these will need a paper prescription and others can be  bought over the counter.  Ask your nurse if you have questions.     Bring a paper prescription for each of these medications     HYDROcodone-acetaminophen 5-325 MG per tablet          Primary Care Provider Office Phone # Fax #    Simona Prasad PA-C [phone] [address]  [place] [address]        Equal Access to Services     [facility] UCLARAZA : Hadii aad ku hadasho Soomaali, waaxda luqadaha, qaybta kaalmada adeegyada, waxay idiin hayaan adeeg kharash la'aan . So [facility] [phone].    ATENCIÓN: Si habla español, tiene a rose disposición servicios gratuitos de asistencia lingüística. Llame al [phone].    We comply with applicable federal civil rights laws and [place] laws. We do not discriminate on the basis of race, color, national origin, age, disability, sex, sexual orientation, or gender identity.            Thank you!     Thank you for choosing [facility]  for your care. Our goal is always to provide you with excellent care. Hearing back from our patients is one way we can continue to improve our services. Please take a few minutes to complete the written survey that you may receive in the mail after your visit with us. Thank you!             Your Updated Medication List - Protect others around you: Learn how to safely use, store and throw away your medicines at www.disposemymeds.org.          This list is accurate as of 3/23/18  9:08 AM.  Always use your most recent med list.                   Brand Name Dispense Instructions for use Diagnosis    * adalimumab 40 MG/0.8ML prefilled syringe kit    HUMIRA    6 Syringe    Inject 2 40 mg pens first day, inject 1 40 mg pen day 8 and day 22, then 40mg every other week    Plaque psoriasis       * adalimumab 40 MG/0.8ML prefilled syringe kit    HUMIRA    2 Syringe    Inject 0.8 mLs (40 mg) Subcutaneous every 14 days    Plaque psoriasis       benzonatate 100 MG capsule    TESSALON    20 capsule    Take 1  capsule (100 mg) by mouth 3 times daily as needed    Viral URI with cough       citalopram 40 MG tablet    celeXA    90 tablet    Take 1 tablet (40 mg) by mouth daily    Anxiety state, Panic disorder without agoraphobia       cyclobenzaprine 10 MG tablet    FLEXERIL    30 tablet    Take 1 tablet (10 mg) by mouth 2 times daily as needed for muscle spasms    Nondisplaced fracture of fifth metatarsal bone, right foot, sequela       HYDROcodone-acetaminophen 5-325 MG per tablet    NORCO    30 tablet    Take 1-2 tablets by mouth every 8 hours as needed for moderate to severe pain maximum 6 tablet(s) per day    Closed nondisplaced fracture of fifth metatarsal bone of right foot with delayed healing, subsequent encounter       * order for DME     1 Units    Roll-A-Bout Walker. Patient can use for right foot.    Closed nondisplaced fracture of fifth metatarsal bone of right foot, initial encounter       * order for DME     1 Device    Equipment being ordered: [id number] $249  Walking Boot XL [employer] Pneumatic    Closed nondisplaced fracture of fifth metatarsal bone of right foot, initial encounter       * Notice:  This list has 4 medication(s) that are the same as other medications prescribed for you. Read the directions carefully, and ask your doctor or other care provider to review them with you.

## 2018-03-23 NOTE — LETTER
3/23/2018         RE: Lakhwinder Jones  [address]  [place] [address]        Dear Colleague,    Thank you for referring your patient, Lakhwinder Jones, to the [facility]. Please see a copy of my visit note below.    Past Medical History:   Diagnosis Date     Anxiety      Back pain     chronic     Obesity      Psoriatic arthritis (H)      Patient Active Problem List   Diagnosis     Anxiety state     Chronic back pain     Depression     Panic disorder without agoraphobia     Psoriasis     CARDIOVASCULAR SCREENING; LDL GOAL LESS THAN 160     Abnormal results of liver function studies     Psoriatic arthritis (H)     Morbid obesity (H)     Elevated blood-pressure reading without diagnosis of hypertension     Closed nondisplaced fracture of fifth metatarsal bone of right foot, initial encounter     Past Surgical History:   Procedure Laterality Date     NOSE SURGERY      3 times     right elbow surgey      12 yo     TESTICLE SURGERY      21 yo     Social History     Social History     Marital status:      Spouse name: Shereen      Number of children: 0     Years of education: N/A     Occupational History     Not on file.     Social History Main Topics     Smoking status: Former Smoker     Types: Cigarettes     Smokeless tobacco: Never Used      Comment: 1-2 cigarettes per day to cope with stress     Alcohol use 4.8 oz/week     8 Standard drinks or equivalent per week      Comment: not for a month     Drug use: Yes     Special: Marijuana     Sexual activity: Yes     Partners: Female     Birth control/ protection: None     Other Topics Concern     Not on file     Social History Narrative     Family History   Problem Relation Age of Onset     Obesity Mother      DIABETES Father      Coronary Artery Disease Father      Hypertension Father      Hyperlipidemia Father      Depression Father      Anxiety Disorder Father      MENTAL ILLNESS Father      Substance Abuse Father      Breast  Cancer Maternal Grandmother      Depression Maternal Grandmother      MENTAL ILLNESS Maternal Grandmother      Substance Abuse Maternal Grandmother      Prostate Cancer Maternal Grandfather      DIABETES Paternal Grandmother      Breast Cancer Paternal Grandmother      Depression Paternal Grandmother      MENTAL ILLNESS Paternal Grandmother      DIABETES Paternal Grandfather      Asthma Brother      DIABETES Paternal Uncle      CEREBROVASCULAR DISEASE No family hx of      Anesthesia Reaction No family hx of      OSTEOPOROSIS No family hx of      Genetic Disorder No family hx of      Thyroid Disease No family hx of      Liver Disease No family hx of      SUBJECTIVE FINDINGS:  A 37-year-old male returns to clinic for a right fifth metatarsal base fracture.  He relates it still hurts mostly right around the fifth metatarsal base.  He has an appointment in April to see the surgeon.  He needs some more Vicodin.      OBJECTIVE FINDINGS:  DP and PT are 2/4 right.  He has pain on palpation of the right fifth metatarsal base and along the peroneal tendon proximally.  CT scans are reviewed with the patient in clinic today.      ASSESSMENT AND PLAN:   Right fifth metatarsal base fracture.  Diagnosis and treatment options discussed with the patient.  He will continue the Roll-a-Bout and the CAM Boot; he relates he is using both.  Follow up with Dr. Rivera as scheduled.  Refill for Vicodin given and use discussed with him.  Return to clinic and see me pending his appointment with the surgeon.         Again, thank you for allowing me to participate in the care of your patient.        Sincerely,        Juan Mcneil DPM

## 2018-03-23 NOTE — PROGRESS NOTES
Past Medical History:   Diagnosis Date     Anxiety      Back pain     chronic     Obesity      Psoriatic arthritis (H)      Patient Active Problem List   Diagnosis     Anxiety state     Chronic back pain     Depression     Panic disorder without agoraphobia     Psoriasis     CARDIOVASCULAR SCREENING; LDL GOAL LESS THAN 160     Abnormal results of liver function studies     Psoriatic arthritis (H)     Morbid obesity (H)     Elevated blood-pressure reading without diagnosis of hypertension     Closed nondisplaced fracture of fifth metatarsal bone of right foot, initial encounter     Past Surgical History:   Procedure Laterality Date     NOSE SURGERY      3 times     right elbow surgey      14 yo     TESTICLE SURGERY      23 yo     Social History     Social History     Marital status:      Spouse name: Shereen      Number of children: 0     Years of education: N/A     Occupational History     Not on file.     Social History Main Topics     Smoking status: Former Smoker     Types: Cigarettes     Smokeless tobacco: Never Used      Comment: 1-2 cigarettes per day to cope with stress     Alcohol use 4.8 oz/week     8 Standard drinks or equivalent per week      Comment: not for a month     Drug use: Yes     Special: Marijuana     Sexual activity: Yes     Partners: Female     Birth control/ protection: None     Other Topics Concern     Not on file     Social History Narrative     Family History   Problem Relation Age of Onset     Obesity Mother      DIABETES Father      Coronary Artery Disease Father      Hypertension Father      Hyperlipidemia Father      Depression Father      Anxiety Disorder Father      MENTAL ILLNESS Father      Substance Abuse Father      Breast Cancer Maternal Grandmother      Depression Maternal Grandmother      MENTAL ILLNESS Maternal Grandmother      Substance Abuse Maternal Grandmother      Prostate Cancer Maternal Grandfather      DIABETES Paternal Grandmother      Breast Cancer Paternal  Grandmother      Depression Paternal Grandmother      MENTAL ILLNESS Paternal Grandmother      DIABETES Paternal Grandfather      Asthma Brother      DIABETES Paternal Uncle      CEREBROVASCULAR DISEASE No family hx of      Anesthesia Reaction No family hx of      OSTEOPOROSIS No family hx of      Genetic Disorder No family hx of      Thyroid Disease No family hx of      Liver Disease No family hx of      SUBJECTIVE FINDINGS:  A 37-year-old male returns to clinic for a right fifth metatarsal base fracture.  He relates it still hurts mostly right around the fifth metatarsal base.  He has an appointment in April to see the surgeon.  He needs some more Vicodin.      OBJECTIVE FINDINGS:  DP and PT are 2/4 right.  He has pain on palpation of the right fifth metatarsal base and along the peroneal tendon proximally.  CT scans are reviewed with the patient in clinic today.      ASSESSMENT AND PLAN:   Right fifth metatarsal base fracture.  Diagnosis and treatment options discussed with the patient.  He will continue the Roll-a-Bout and the CAM Boot; he relates he is using both.  Follow up with Dr. Rivera as scheduled.  Refill for Vicodin given and use discussed with him.  Return to clinic and see me pending his appointment with the surgeon.

## 2018-03-23 NOTE — NURSING NOTE
"Lakhwinder Jones's goals for this visit include: Foot fracture check   He requests these members of his care team be copied on today's visit information: yes    PCP: Simona Prasad    Referring Provider:  No referring provider defined for this encounter.    Chief Complaint   Patient presents with     RECHECK     foot fracture check       Initial /88  Pulse 53  SpO2 97% Estimated body mass index is 40.79 kg/(m^2) as calculated from the following:    Height as of 2/23/18: 1.956 m (6' 5\").    Weight as of 2/23/18: 156 kg (344 lb).  Medication Reconciliation: complete    "

## 2018-03-23 NOTE — PATIENT INSTRUCTIONS
Thanks for coming today.  [facility] Clinic  [address] [place] [address]    To schedule future appointments in Ortho Clinic, you may call [phone].    To schedule ordered imaging by your Provider: Call [facility] at [phone]    [facility] available online at:   [email or web address]    Please call if any further questions or concerns [phone] and ask for the Orthopedic Department. Clinic hours 8 am to 5 pm.    Return to clinic if symptoms worsen.

## 2018-03-28 ENCOUNTER — MYC REFILL (OUTPATIENT)
Dept: ORTHOPEDICS | Facility: CLINIC | Age: 38
End: 2018-03-28

## 2018-03-28 DIAGNOSIS — S92.354G CLOSED NONDISPLACED FRACTURE OF FIFTH METATARSAL BONE OF RIGHT FOOT WITH DELAYED HEALING, SUBSEQUENT ENCOUNTER: ICD-10-CM

## 2018-03-28 RX ORDER — HYDROCODONE BITARTRATE AND ACETAMINOPHEN 5; 325 MG/1; MG/1
1-2 TABLET ORAL EVERY 8 HOURS PRN
Qty: 30 TABLET | Refills: 0 | Status: SHIPPED | OUTPATIENT
Start: 2018-03-28 | End: 2018-04-03

## 2018-03-28 NOTE — TELEPHONE ENCOUNTER
Message from [email or web address]:  Original authorizing provider: LUDA Monique would like a refill of the following medications:  HYDROcodone-acetaminophen (NORCO) 5-325 MG per tablet [Juan Mcneil DPM]    Preferred pharmacy: [facility] [address], [address]    Comment:

## 2018-03-29 ENCOUNTER — OFFICE VISIT (OUTPATIENT)
Dept: FAMILY MEDICINE | Facility: CLINIC | Age: 38
End: 2018-03-29
Payer: COMMERCIAL

## 2018-03-29 VITALS — DIASTOLIC BLOOD PRESSURE: 80 MMHG | SYSTOLIC BLOOD PRESSURE: 129 MMHG

## 2018-03-29 DIAGNOSIS — L40.50 PSORIASIS WITH ARTHROPATHY (H): Primary | ICD-10-CM

## 2018-03-29 DIAGNOSIS — Z79.899 ENCOUNTER FOR LONG-TERM (CURRENT) USE OF HIGH-RISK MEDICATION: ICD-10-CM

## 2018-03-29 DIAGNOSIS — Z79.620 ADALIMUMAB (HUMIRA) LONG-TERM USE: ICD-10-CM

## 2018-03-29 PROCEDURE — 99213 OFFICE O/P EST LOW 20 MIN: CPT | Performed by: FAMILY MEDICINE

## 2018-03-29 NOTE — PROGRESS NOTES
[facility] - PRIMARY CARE SKIN    CC : Psoriasis with arthropathy, surveillance of Humira  SUBJECTIVE:                                                    Lakhwinder Jones is a 37 year old male who presents to clinic today for follow-up of Humira therapy for chronic psoriasis with arthropathy that started in adolescence at age 19. Humira therapy was initiated on 2/10/18. He has been rotating injection sites every 2 weeks. No irritation at injection sites.    Response to therapy: Slightly decreased flaking of skin and decreased redness  Side effects: No issues with injections.    Previous therapies tried : methotrexate, Enbrel when younger, phototherapy, various topical creams. Enbrel had previously been the most effective therapy for control, but it was too expensive.    Locations : Face, Neck, Trunk, Arms, Legs, Buttocks and Groin.  Onset : at age 19  Pruritic : YES.  Associated symptoms : itching, redness and scaling.  Symptoms appear to be : worsening and uncontrolled.    Personal Medical History  Skin Cancer : NO  Eczema Psoriasis Rosacea Autoimmune   NO YES - history of psoriasis with arthropathy with previous diagnosis by rheumatology NO NO     Family Medical History  Skin Cancer : YES - in grandfather  Eczema Psoriasis Rosacea Autoimmune   NO YES - in uncle NO YES - in grandmother but not psoriatic arthritis     Occupation : sales (indoor).    Patient Active Problem List   Diagnosis     Anxiety state     Chronic back pain     Depression     Panic disorder without agoraphobia     CARDIOVASCULAR SCREENING; LDL GOAL LESS THAN 160     Abnormal results of liver function studies     Psoriatic arthritis (H)     Morbid obesity (H)     Elevated blood-pressure reading without diagnosis of hypertension     Closed nondisplaced fracture of fifth metatarsal bone of right foot, initial encounter       Past Medical History:   Diagnosis Date     Anxiety      Back pain     chronic     Obesity      Psoriatic arthritis (H)      Past Surgical History:   Procedure Laterality Date     NOSE SURGERY      3 times     right elbow surgey      14 yo     TESTICLE SURGERY      21 yo      Social History   Substance Use Topics     Smoking status: Former Smoker     Types: Cigarettes     Smokeless tobacco: Never Used      Comment: 1-2 cigarettes per day to cope with stress     Alcohol use 4.8 oz/week     8 Standard drinks or equivalent per week      Comment: not for a month    Family History     Problem (# of Occurrences) Relation (Name,Age of Onset)    Anxiety Disorder (1) Father    Asthma (1) Brother    Breast Cancer (2) Maternal Grandmother, Paternal Grandmother    Coronary Artery Disease (1) Father    DIABETES (4) Father, Paternal Grandmother, Paternal Grandfather, Paternal Uncle    Depression (3) Father, Maternal Grandmother, Paternal Grandmother    Hyperlipidemia (1) Father    Hypertension (1) Father    MENTAL ILLNESS (3) Father, Maternal Grandmother, Paternal Grandmother    Obesity (1) Mother    Prostate Cancer (1) Maternal Grandfather    Substance Abuse (2) Father, Maternal Grandmother       Negative family history of: CEREBROVASCULAR DISEASE, Anesthesia Reaction, OSTEOPOROSIS, Genetic Disorder, Thyroid Disease, Liver Disease           Prescription Medications as of 3/29/2018             HYDROcodone-acetaminophen (NORCO) 5-325 MG per tablet Take 1-2 tablets by mouth every 8 hours as needed for moderate to severe pain maximum 6 tablet(s) per day    adalimumab (HUMIRA) 40 MG/0.8ML prefilled syringe kit Inject 0.8 mLs (40 mg) Subcutaneous every 14 days    adalimumab (HUMIRA) 40 MG/0.8ML prefilled syringe kit Inject 0.8 mLs (40 mg) Subcutaneous every 14 days    order for DME Equipment being ordered: [id number] $249  Walking Boot XL [employer]    order for DME Roll-A-Bout Walker. Patient can use for right foot.    benzonatate (TESSALON) 100 MG capsule Take 1 capsule (100 mg) by mouth 3 times daily as needed    cyclobenzaprine (FLEXERIL) 10  MG tablet Take 1 tablet (10 mg) by mouth 2 times daily as needed for muscle spasms    adalimumab (HUMIRA) 40 MG/0.8ML prefilled syringe kit Inject 2 40 mg pens first day, inject 1 40 mg pen day 8 and day 22, then 40mg every other week    citalopram (CELEXA) 40 MG tablet Take 1 tablet (40 mg) by mouth daily            Allergies   Allergen Reactions     Tramadol Other (See Comments)     Shellfish-Derived Products         ROS : 14 point review of systems was negative except the symptoms listed above in the HPI.    This document serves as a record of the services and decisions personally performed and made by Pearl Page MD. It was created on her behalf by Eron Abarca, a trained medical scribe.  The creation of this document is based on the scribe's personal observations and the provider's statements to the medical scribe.  Eron Abarca, March 29, 2018 11:42 AM      OBJECTIVE:                                                      Wt Readings from Last 2 Encounters:   02/23/18 (!) 344 lb (156 kg)   02/20/18 (!) 344 lb (156 kg)     GENERAL: healthy, alert, in moderate distress and obese  SKIN: Victoria Skin Type - I.  Face, Neck, Trunk, Arms, Legs, Buttocks and Groin were examined. The dermatoscope was used to help evaluate pigmented lesions.  Skin Pertinent Findings:  Less erythema. Decreased thickness of scaling.  Locations of involvement remain : Face, Neck, Trunk, Arms, Legs, Buttocks and Groin.    Diagnostic Test Results:  No results found for this or any previous visit (from the past 24 hour(s)).      Lab work at next office visit.      ASSESSMENT:                                                      Encounter Diagnoses   Name Primary?     Psoriasis with arthropathy (H) Yes     Adalimumab (Humira) long-term use      Encounter for long-term (current) use of high-risk medication          PLAN:                                                    Patient Instructions   FUTURE APPOINTMENTS  Follow up in 2  month(s)    Continue administering Humira  q 2 weeks        PROCEDURES:                                                    None.    TT : 20 minutes.  CT : 15 minutes. Discussion regarding etiology, spectrum of psoriasis, treatment options, aggravating factors.      The information in this document, created by the medical scribe for me, accurately reflects the services I personally performed and the decisions made by me. I have reviewed and approved this document for accuracy prior to leaving the patient care area.  Pearl Page MD March 29, 2018 11:43 AM  [facility] - PRIMARY CARE SKIN

## 2018-03-29 NOTE — LETTER
3/29/2018         RE: Lakhwinder Jones  [address]  [place] [facility] [address]        Dear Colleague,    Thank you for referring your patient, Lakhwinder Jones, to the [facility]. Please see a copy of my visit note below.    [facility] - PRIMARY CARE SKIN    CC : Psoriasis with arthropathy, surveillance of Humira  SUBJECTIVE:                                                    Lakhwinder Jones is a 37 year old male who presents to clinic today for follow-up of Humira therapy for chronic psoriasis with arthropathy that started in adolescence at age 19. Humira therapy was initiated on 2/10/18. He has been rotating injection sites every 2 weeks. No irritation at injection sites.    Response to therapy: Slightly decreased flaking of skin and decreased redness  Side effects: No issues with injections.    Previous therapies tried : methotrexate, Enbrel when younger, phototherapy, various topical creams. Enbrel had previously been the most effective therapy for control, but it was too expensive.    Locations : Face, Neck, Trunk, Arms, Legs, Buttocks and Groin.  Onset : at age 19  Pruritic : YES.  Associated symptoms : itching, redness and scaling.  Symptoms appear to be : worsening and uncontrolled.    Personal Medical History  Skin Cancer : NO  Eczema Psoriasis Rosacea Autoimmune   NO YES - history of psoriasis with arthropathy with previous diagnosis by rheumatology NO NO     Family Medical History  Skin Cancer : YES - in grandfather  Eczema Psoriasis Rosacea Autoimmune   NO YES - in uncle NO YES - in grandmother but not psoriatic arthritis     Occupation : sales (indoor).    Patient Active Problem List   Diagnosis     Anxiety state     Chronic back pain     Depression     Panic disorder without agoraphobia     CARDIOVASCULAR SCREENING; LDL GOAL LESS THAN 160     Abnormal results of liver function studies     Psoriatic arthritis (H)     Morbid obesity (H)     Elevated blood-pressure  reading without diagnosis of hypertension     Closed nondisplaced fracture of fifth metatarsal bone of right foot, initial encounter       Past Medical History:   Diagnosis Date     Anxiety      Back pain     chronic     Obesity      Psoriatic arthritis (H)     Past Surgical History:   Procedure Laterality Date     NOSE SURGERY      3 times     right elbow surgey      14 yo     TESTICLE SURGERY      23 yo      Social History   Substance Use Topics     Smoking status: Former Smoker     Types: Cigarettes     Smokeless tobacco: Never Used      Comment: 1-2 cigarettes per day to cope with stress     Alcohol use 4.8 oz/week     8 Standard drinks or equivalent per week      Comment: not for a month    Family History     Problem (# of Occurrences) Relation (Name,Age of Onset)    Anxiety Disorder (1) Father    Asthma (1) Brother    Breast Cancer (2) Maternal Grandmother, Paternal Grandmother    Coronary Artery Disease (1) Father    DIABETES (4) Father, Paternal Grandmother, Paternal Grandfather, Paternal Uncle    Depression (3) Father, Maternal Grandmother, Paternal Grandmother    Hyperlipidemia (1) Father    Hypertension (1) Father    MENTAL ILLNESS (3) Father, Maternal Grandmother, Paternal Grandmother    Obesity (1) Mother    Prostate Cancer (1) Maternal Grandfather    Substance Abuse (2) Father, Maternal Grandmother       Negative family history of: CEREBROVASCULAR DISEASE, Anesthesia Reaction, OSTEOPOROSIS, Genetic Disorder, Thyroid Disease, Liver Disease           Prescription Medications as of 3/29/2018             HYDROcodone-acetaminophen (NORCO) 5-325 MG per tablet Take 1-2 tablets by mouth every 8 hours as needed for moderate to severe pain maximum 6 tablet(s) per day    adalimumab (HUMIRA) 40 MG/0.8ML prefilled syringe kit Inject 0.8 mLs (40 mg) Subcutaneous every 14 days    adalimumab (HUMIRA) 40 MG/0.8ML prefilled syringe kit Inject 0.8 mLs (40 mg) Subcutaneous every 14 days    order for DME Equipment being  ordered: [id number] $249  Walking Boot XL [employer] Pneumatic    order for DME Roll-A-Bout Walker. Patient can use for right foot.    benzonatate (TESSALON) 100 MG capsule Take 1 capsule (100 mg) by mouth 3 times daily as needed    cyclobenzaprine (FLEXERIL) 10 MG tablet Take 1 tablet (10 mg) by mouth 2 times daily as needed for muscle spasms    adalimumab (HUMIRA) 40 MG/0.8ML prefilled syringe kit Inject 2 40 mg pens first day, inject 1 40 mg pen day 8 and day 22, then 40mg every other week    citalopram (CELEXA) 40 MG tablet Take 1 tablet (40 mg) by mouth daily            Allergies   Allergen Reactions     Tramadol Other (See Comments)     Shellfish-Derived Products         ROS : 14 point review of systems was negative except the symptoms listed above in the HPI.    This document serves as a record of the services and decisions personally performed and made by Pearl Page MD. It was created on her behalf by Eron Abarca, a trained medical scribe.  The creation of this document is based on the scribe's personal observations and the provider's statements to the medical scribe.  Eron Abarca, March 29, 2018 11:42 AM      OBJECTIVE:                                                      Wt Readings from Last 2 Encounters:   02/23/18 (!) 344 lb (156 kg)   02/20/18 (!) 344 lb (156 kg)     GENERAL: healthy, alert, in moderate distress and obese  SKIN: Victoria Skin Type - I.  Face, Neck, Trunk, Arms, Legs, Buttocks and Groin were examined. The dermatoscope was used to help evaluate pigmented lesions.  Skin Pertinent Findings:  Less erythema. Decreased thickness of scaling.  Locations of involvement remain : Face, Neck, Trunk, Arms, Legs, Buttocks and Groin.    Diagnostic Test Results:  No results found for this or any previous visit (from the past 24 hour(s)).      Lab work at next office visit.      ASSESSMENT:                                                      Encounter Diagnoses   Name Primary?     Psoriasis with  arthropathy (H) Yes     Adalimumab (Humira) long-term use      Encounter for long-term (current) use of high-risk medication          PLAN:                                                    Patient Instructions   FUTURE APPOINTMENTS  Follow up in 2 month(s)    Continue administering Humira  q 2 weeks        PROCEDURES:                                                    None.    TT : 20 minutes.  CT : 15 minutes. Discussion regarding etiology, spectrum of psoriasis, treatment options, aggravating factors.      The information in this document, created by the medical scribe for me, accurately reflects the services I personally performed and the decisions made by me. I have reviewed and approved this document for accuracy prior to leaving the patient care area.  Pearl Page MD March 29, 2018 11:43 AM  [facility] - PRIMARY CARE SKIN    Again, thank you for allowing me to participate in the care of your patient.        Sincerely,        Suellen Page MD

## 2018-04-03 ENCOUNTER — MYC REFILL (OUTPATIENT)
Dept: ORTHOPEDICS | Facility: CLINIC | Age: 38
End: 2018-04-03

## 2018-04-03 DIAGNOSIS — S92.354G CLOSED NONDISPLACED FRACTURE OF FIFTH METATARSAL BONE OF RIGHT FOOT WITH DELAYED HEALING, SUBSEQUENT ENCOUNTER: ICD-10-CM

## 2018-04-04 RX ORDER — HYDROCODONE BITARTRATE AND ACETAMINOPHEN 5; 325 MG/1; MG/1
1-2 TABLET ORAL EVERY 8 HOURS PRN
Qty: 30 TABLET | Refills: 0 | Status: SHIPPED | OUTPATIENT
Start: 2018-04-04 | End: 2018-04-10

## 2018-04-04 NOTE — TELEPHONE ENCOUNTER
Last refill on 3/28/18. 30 tabs lasting about a week.  Check  and patient is compliant.  Will send to Dr. Mcneil.  Appointment with Dr. Rivera on 4/10/18  Veda Hardy RN

## 2018-04-04 NOTE — TELEPHONE ENCOUNTER
Message from [employer]:  Original authorizing provider: LUDA Monique would like a refill of the following medications:  HYDROcodone-acetaminophen (NORCO) 5-325 MG per tablet [Juan Mcneil DPM]    Preferred pharmacy: [facility] [address], [address]    Comment:  I need a refill before next week when I go see the surgern

## 2018-04-09 NOTE — TELEPHONE ENCOUNTER
FUTURE VISIT INFORMATION      FUTURE VISIT INFORMATION:    Date: 4/10/18    Time: 5:40 pm    Location: [facility] [place]  REFERRAL INFORMATION:    Referring provider:  Dr. Mcneil    Referring providers clinic:  [facility]    Reason for visit/diagnosis  Right foot met fx    RECORDS REQUESTED FROM:       Clinic name Comments Records Status Imaging Status   [facility] Dr. Mcneil referral internal                                    RECORDS STATUS

## 2018-04-10 ENCOUNTER — PRE VISIT (OUTPATIENT)
Dept: ORTHOPEDICS | Facility: CLINIC | Age: 38
End: 2018-04-10

## 2018-04-10 ENCOUNTER — OFFICE VISIT (OUTPATIENT)
Dept: ORTHOPEDICS | Facility: CLINIC | Age: 38
End: 2018-04-10
Payer: COMMERCIAL

## 2018-04-10 VITALS — WEIGHT: 315 LBS | HEIGHT: 77 IN | BODY MASS INDEX: 37.19 KG/M2

## 2018-04-10 DIAGNOSIS — S92.354G CLOSED NONDISPLACED FRACTURE OF FIFTH METATARSAL BONE OF RIGHT FOOT WITH DELAYED HEALING, SUBSEQUENT ENCOUNTER: ICD-10-CM

## 2018-04-10 RX ORDER — HYDROCODONE BITARTRATE AND ACETAMINOPHEN 5; 325 MG/1; MG/1
1-2 TABLET ORAL EVERY 8 HOURS PRN
Qty: 30 TABLET | Refills: 0 | Status: SHIPPED | OUTPATIENT
Start: 2018-04-10 | End: 2018-05-29

## 2018-04-10 ASSESSMENT — ENCOUNTER SYMPTOMS
STIFFNESS: 1
JOINT SWELLING: 0
ARTHRALGIAS: 1
BACK PAIN: 1
NECK PAIN: 0
MUSCLE CRAMPS: 1
MYALGIAS: 1

## 2018-04-10 NOTE — MR AVS SNAPSHOT
After Visit Summary   4/10/2018    Lakhwinder Jones    MRN: [id number]           Patient Information     Date Of Birth          1980        Visit Information        Provider Department      4/10/2018 5:40 PM Ben Rivera MD [facility]        Today's Diagnoses     Closed nondisplaced fracture of fifth metatarsal bone of right foot with delayed healing, subsequent encounter           Follow-ups after your visit        Your next 10 appointments already scheduled     May 24, 2018 12:20 PM CDT   Office Visit with Suellen Page MD   [facility] [place] [place] ([facility] [place] [place])    [address] [place] [address] [phone]           Bring a current list of meds and any records pertaining to this visit. For Physicals, please bring immunization records and any forms needing to be filled out. Please arrive 10 minutes early to complete paperwork.              Who to contact     Please call your clinic at [phone] to:    Ask questions about your health    Make or cancel appointments    Discuss your medicines    Learn about your test results    Speak to your doctor            Additional Information About Your Visit        MyChart Information     [employer] gives you secure access to your electronic health record. If you see a primary care provider, you can also send messages to your care team and make appointments. If you have questions, please call your primary care clinic.  If you do not have a primary care provider, please call [phone] and they will assist you.      [employer] is an electronic gateway that provides easy, online access to your medical records. With [employer], you can request a clinic appointment, read your test results, renew a prescription or communicate with your care team.     To access your existing account, please contact your [facility] or call [phone] for  "assistance.        Care EveryWhere ID     This is your Care EveryWhere ID. This could be used by other organizations to access your [place] medical records  [id number]        Your Vitals Were     Height BMI (Body Mass Index)                1.956 m (6' 5\") 40.79 kg/m2           Blood Pressure from Last 3 Encounters:   03/29/18 129/80   03/23/18 128/88   03/09/18 (!) 134/98    Weight from Last 3 Encounters:   04/10/18 (!) 156 kg (344 lb)   02/23/18 (!) 156 kg (344 lb)   02/20/18 (!) 156 kg (344 lb)              Today, you had the following     No orders found for display         Where to get your medicines      Some of these will need a paper prescription and others can be bought over the counter.  Ask your nurse if you have questions.     Bring a paper prescription for each of these medications     HYDROcodone-acetaminophen 5-325 MG per tablet          Primary Care Provider Office Phone # Fax #    Simona Prasad PA-C [phone]       [address] [address] [facility] [address]        Equal Access to Services     [facility]: Hadii aad ku hadasho Somihir, waaxda luqadaha, qaybta kaalmada adebobyada, adina keller . So [place] -[phone].    ATENCIÓN: Si habla español, tiene a rose disposición servicios gratuitos de asistencia lingüística. Llame al [phone].    We comply with applicable federal civil rights laws and [place] laws. We do not discriminate on the basis of race, color, national origin, age, disability, sex, sexual orientation, or gender identity.            Thank you!     Thank you for choosing [facility]  for your care. Our goal is always to provide you with excellent care. Hearing back from our patients is one way we can continue to improve our services. Please take a few minutes to complete the written survey that you may receive in the mail after your visit with us. Thank you!             Your Updated Medication List - " Protect others around you: Learn how to safely use, store and throw away your medicines at www.disposemymeds.org.          This list is accurate as of 4/10/18  7:04 PM.  Always use your most recent med list.                   Brand Name Dispense Instructions for use Diagnosis    * adalimumab 40 MG/0.8ML prefilled syringe kit    HUMIRA    6 Syringe    Inject 2 40 mg pens first day, inject 1 40 mg pen day 8 and day 22, then 40mg every other week    Plaque psoriasis       * adalimumab 40 MG/0.8ML prefilled syringe kit    HUMIRA    2 Syringe    Inject 0.8 mLs (40 mg) Subcutaneous every 14 days    Plaque psoriasis       * adalimumab 40 MG/0.8ML prefilled syringe kit    HUMIRA    2 Syringe    Inject 0.8 mLs (40 mg) Subcutaneous every 14 days    Plaque psoriasis       benzonatate 100 MG capsule    TESSALON    20 capsule    Take 1 capsule (100 mg) by mouth 3 times daily as needed    Viral URI with cough       citalopram 40 MG tablet    celeXA    90 tablet    Take 1 tablet (40 mg) by mouth daily    Anxiety state, Panic disorder without agoraphobia       cyclobenzaprine 10 MG tablet    FLEXERIL    30 tablet    Take 1 tablet (10 mg) by mouth 2 times daily as needed for muscle spasms    Nondisplaced fracture of fifth metatarsal bone, right foot, sequela       HYDROcodone-acetaminophen 5-325 MG per tablet    NORCO    30 tablet    Take 1-2 tablets by mouth every 8 hours as needed for moderate to severe pain maximum 6 tablet(s) per day    Closed nondisplaced fracture of fifth metatarsal bone of right foot with delayed healing, subsequent encounter       * order for DME     1 Units    Roll-A-Bout Walker. Patient can use for right foot.    Closed nondisplaced fracture of fifth metatarsal bone of right foot, initial encounter       * order for DME     1 Device    Equipment being ordered: [id number] $249  Walking Boot XL Funmilayo Pneumatic    Closed nondisplaced fracture of fifth metatarsal bone of right foot, initial encounter        * Notice:  This list has 5 medication(s) that are the same as other medications prescribed for you. Read the directions carefully, and ask your doctor or other care provider to review them with you.

## 2018-04-10 NOTE — NURSING NOTE
"Reason For Visit:   Chief Complaint   Patient presents with     Consult     Right fifth metatarsal base fracture. DOI: 10/27/2017. Referring:  CR SYED       Pain Assessment  Patient Currently in Pain: Yes  0-10 Pain Scale: 7  Primary Pain Location: Foot  Pain Orientation: Right  Pain Descriptors: Discomfort  Alleviating Factors: Rest, Pain medication  Aggravating Factors: Movement, Standing, Walking               HEIGHT: 6' 5\", WEIGHT: 344 lbs 0 oz, BMI: Body mass index is 40.79 kg/(m^2).      Current Outpatient Prescriptions   Medication Sig Dispense Refill     HYDROcodone-acetaminophen (NORCO) 5-325 MG per tablet Take 1-2 tablets by mouth every 8 hours as needed for moderate to severe pain maximum 6 tablet(s) per day 30 tablet 0     adalimumab (HUMIRA) 40 MG/0.8ML prefilled syringe kit Inject 0.8 mLs (40 mg) Subcutaneous every 14 days 2 Syringe 6     adalimumab (HUMIRA) 40 MG/0.8ML prefilled syringe kit Inject 0.8 mLs (40 mg) Subcutaneous every 14 days 2 Syringe 3     order for DME Equipment being ordered: [id number] $249  Walking Boot XL Funmilayo Pneumatic 1 Device 0     order for DME Roll-A-Bout Walker. Patient can use for right foot. 1 Units 0     benzonatate (TESSALON) 100 MG capsule Take 1 capsule (100 mg) by mouth 3 times daily as needed 20 capsule 0     cyclobenzaprine (FLEXERIL) 10 MG tablet Take 1 tablet (10 mg) by mouth 2 times daily as needed for muscle spasms 30 tablet 1     adalimumab (HUMIRA) 40 MG/0.8ML prefilled syringe kit Inject 2 40 mg pens first day, inject 1 40 mg pen day 8 and day 22, then 40mg every other week 6 Syringe 0     citalopram (CELEXA) 40 MG tablet Take 1 tablet (40 mg) by mouth daily 90 tablet 1          Allergies   Allergen Reactions     Tramadol Other (See Comments)     Shellfish-Derived Products                "

## 2018-04-10 NOTE — LETTER
4/10/2018       RE: Lakhwinder Jones  [address]  [place] [address]     Dear Colleague,    Thank you for referring your patient, Lakhwinder Jones, to the [facility] at [facility]. Please see a copy of my visit note below.    CHIEF COMPLAINT:  Status post right fifth metatarsal fracture sustained on 10/27/2017.      HISTORY OF PRESENT ILLNESS:  Mr. Jones is a 37-year-old male who presents today for evaluation of his right foot.  The patient reports to have sustained an injury on his right foot when he sustained a fifth metatarsal base fracture.  The patient has been treated conservatively and in spite of his best efforts, continues having problems and difficulties.  There is a concern that he has developed a nonunion at that level.  Eventually, a CT scan has been obtained which has confirmed the nonunion and presents today for discussion of treatment options.      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  He presents in the accompaniment of his wife.  Denies to have any other regular physical activities going on.      PAST MEDICAL HISTORY:  Obesity, depression, panic disorder, high cholesterol, among others.      PAST SURGICAL HISTORY:  Relates to nose surgery and testicular surgery at the age of 22.      DRUG ALLERGIES: Tramadol.       MEDICATIONS:  Please refer to encounter form.      PHYSICAL EXAMINATION:  On today's visit, he presents as a pleasant male in no apparent distress with a height of 6 feet 5 inches and a weight of 344 pounds with a BMI of 40.8.      Presents with full range of motion of the right ankle, hindfoot and midfoot joints.  CMS is intact.  Skin is intact.  Presents with pain with palpation at the base of the fifth metatarsal.  Inversion and eversion are also painful.      RADIOGRAPHIC EVALUATION:  Three views of the foot as well as a CT scan were obtained  today and they were significant for showing a nonunion at the base of the fifth metatarsal fracture.  Otherwise, the patient presents with some diffuse arthritic changes through the foot.      ASSESSMENT:  Right fifth metatarsal nonunion, chronic.      PLAN:  I discussed with the patient and his wife that at this point the recommendation will be to proceed with open reduction and internal fixation of the right fifth metatarsal with possible fifth metatarsal partial excision.      I discussed with them the most likely postoperative course and complications which include but are not limited to infection, bleeding, nerve damage, residual pain, nonunion and stiffness.      All questions were answered.  The patient was pleased with the discussion.  The patient will proceed with nonweightbearing for 2 weeks followed by full weightbearing with a CAM walker until further notice.      All questions were answered.      TT 30 minutes, CT 20 minutes.         Again, thank you for allowing me to participate in the care of your patient.      Sincerely,    Ben Rivera MD

## 2018-04-11 ENCOUNTER — TELEPHONE (OUTPATIENT)
Dept: ORTHOPEDICS | Facility: CLINIC | Age: 38
End: 2018-04-11

## 2018-04-11 NOTE — PROGRESS NOTES
CHIEF COMPLAINT:  Status post right fifth metatarsal fracture sustained on 10/27/2017.      HISTORY OF PRESENT ILLNESS:  Mr. Jones is a 37-year-old male who presents today for evaluation of his right foot.  The patient reports to have sustained an injury on his right foot when he sustained a fifth metatarsal base fracture.  The patient has been treated conservatively and in spite of his best efforts, continues having problems and difficulties.  There is a concern that he has developed a nonunion at that level.  Eventually, a CT scan has been obtained which has confirmed the nonunion and presents today for discussion of treatment options.      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  He presents in the accompaniment of his wife.  Denies to have any other regular physical activities going on.      PAST MEDICAL HISTORY:  Obesity, depression, panic disorder, high cholesterol, among others.      PAST SURGICAL HISTORY:  Relates to nose surgery and testicular surgery at the age of 22.      DRUG ALLERGIES: Tramadol.       MEDICATIONS:  Please refer to encounter form.      PHYSICAL EXAMINATION:  On today's visit, he presents as a pleasant male in no apparent distress with a height of 6 feet 5 inches and a weight of 344 pounds with a BMI of 40.8.      Presents with full range of motion of the right ankle, hindfoot and midfoot joints.  CMS is intact.  Skin is intact.  Presents with pain with palpation at the base of the fifth metatarsal.  Inversion and eversion are also painful.      RADIOGRAPHIC EVALUATION:  Three views of the foot as well as a CT scan were obtained today and they were significant for showing a nonunion at the base of the fifth metatarsal fracture.  Otherwise, the patient presents with some diffuse arthritic changes through the foot.      ASSESSMENT:  Right fifth metatarsal nonunion, chronic.      PLAN:  I discussed with the patient and his  wife that at this point the recommendation will be to proceed with open reduction and internal fixation of the right fifth metatarsal with possible fifth metatarsal partial excision.      I discussed with them the most likely postoperative course and complications which include but are not limited to infection, bleeding, nerve damage, residual pain, nonunion and stiffness.      All questions were answered.  The patient was pleased with the discussion.  The patient will proceed with nonweightbearing for 2 weeks followed by full weightbearing with a CAM walker until further notice.      All questions were answered.      TT 30 minutes, CT 20 minutes.

## 2018-04-11 NOTE — NURSING NOTE
Teaching Flowsheet   Relevant Diagnosis: Right 5th metatarsal fracture   Teaching Topic: Pre-op Right 5th metatarsal open reduction internal fixation      Person(s) involved in teaching:   Patient and Wife     Motivation Level:  Asks Questions: Yes  Eager to Learn: Yes  Cooperative: Yes  Receptive (willing/able to accept information): Yes  Any cultural factors/Congregational beliefs that may influence understanding or compliance? No       Patient and Family demonstrates understanding of the following:  Reason for the appointment, diagnosis and treatment plan: Yes  Knowledge of proper use of medications and conditions for which they are ordered (with special attention to potential side effects or drug interactions): Yes  Which situations necessitate calling provider and whom to contact: Yes       Teaching Concerns Addressed:   Comments: Patient will get pre-op physical done by his primary care provider.      Proper use and care of roll-a-bout knee walker (medical equip, care aids, etc.): Yes  Nutritional needs and diet plan: Yes  Pain management techniques: Yes  Wound Care: Yes  How and/when to access community resources: NA     Instructional Materials Used/Given: pre-op packet, antiseptic soap, post-operative foot and ankle surgery instructions      Time spent with patient: 15 minutes.

## 2018-04-11 NOTE — TELEPHONE ENCOUNTER
Called patient and spoke with his wife to review surgery information. Lakhwinder has been scheduled for surgery with Dr. Rivera at [facility] for 4/18/18. Patient's wife was told she will receive a call 1-5 days out from surgery with specific instructions and when to arrive. Post op appointments made.

## 2018-04-13 ENCOUNTER — OFFICE VISIT (OUTPATIENT)
Dept: FAMILY MEDICINE | Facility: CLINIC | Age: 38
End: 2018-04-13
Payer: COMMERCIAL

## 2018-04-13 VITALS
BODY MASS INDEX: 37.19 KG/M2 | TEMPERATURE: 98.6 F | WEIGHT: 315 LBS | RESPIRATION RATE: 20 BRPM | SYSTOLIC BLOOD PRESSURE: 130 MMHG | OXYGEN SATURATION: 99 % | HEART RATE: 85 BPM | HEIGHT: 77 IN | DIASTOLIC BLOOD PRESSURE: 84 MMHG

## 2018-04-13 DIAGNOSIS — E66.01 MORBID OBESITY (H): ICD-10-CM

## 2018-04-13 DIAGNOSIS — L40.50 PSORIATIC ARTHRITIS (H): ICD-10-CM

## 2018-04-13 DIAGNOSIS — Z01.818 PREOP GENERAL PHYSICAL EXAM: Primary | ICD-10-CM

## 2018-04-13 DIAGNOSIS — S92.354S CLOSED NONDISPLACED FRACTURE OF FIFTH METATARSAL BONE OF RIGHT FOOT, SEQUELA: ICD-10-CM

## 2018-04-13 LAB
ANION GAP SERPL CALCULATED.3IONS-SCNC: 7 MMOL/L (ref 3–14)
BUN SERPL-MCNC: 16 MG/DL (ref 7–30)
CALCIUM SERPL-MCNC: 9.3 MG/DL (ref 8.5–10.1)
CHLORIDE SERPL-SCNC: 102 MMOL/L (ref 94–109)
CO2 SERPL-SCNC: 27 MMOL/L (ref 20–32)
CREAT SERPL-MCNC: 1.01 MG/DL (ref 0.66–1.25)
ERYTHROCYTE [DISTWIDTH] IN BLOOD BY AUTOMATED COUNT: 12.4 % (ref 10–15)
GFR SERPL CREATININE-BSD FRML MDRD: 83 ML/MIN/1.7M2
GLUCOSE SERPL-MCNC: 93 MG/DL (ref 70–99)
HCT VFR BLD AUTO: 41.7 % (ref 40–53)
HGB BLD-MCNC: 14.7 G/DL (ref 13.3–17.7)
MCH RBC QN AUTO: 30.9 PG (ref 26.5–33)
MCHC RBC AUTO-ENTMCNC: 35.3 G/DL (ref 31.5–36.5)
MCV RBC AUTO: 88 FL (ref 78–100)
PLATELET # BLD AUTO: 225 10E9/L (ref 150–450)
POTASSIUM SERPL-SCNC: 3.8 MMOL/L (ref 3.4–5.3)
RBC # BLD AUTO: 4.76 10E12/L (ref 4.4–5.9)
SODIUM SERPL-SCNC: 136 MMOL/L (ref 133–144)
WBC # BLD AUTO: 7 10E9/L (ref 4–11)

## 2018-04-13 PROCEDURE — 36415 COLL VENOUS BLD VENIPUNCTURE: CPT | Performed by: PREVENTIVE MEDICINE

## 2018-04-13 PROCEDURE — 99214 OFFICE O/P EST MOD 30 MIN: CPT | Performed by: PREVENTIVE MEDICINE

## 2018-04-13 PROCEDURE — 80048 BASIC METABOLIC PNL TOTAL CA: CPT | Performed by: PREVENTIVE MEDICINE

## 2018-04-13 PROCEDURE — 85027 COMPLETE CBC AUTOMATED: CPT | Performed by: PREVENTIVE MEDICINE

## 2018-04-13 ASSESSMENT — PAIN SCALES - GENERAL: PAINLEVEL: NO PAIN (0)

## 2018-04-13 NOTE — PROGRESS NOTES
Lakhwinder,    Basic blood count did not show anemia or infection. Other labs are pending.     Please do not hesitate to call us at [phone] if you have any questions or concerns.    Thank you,    Jessy Muñoz MD MPH

## 2018-04-13 NOTE — MR AVS SNAPSHOT
After Visit Summary   4/13/2018    Lakhwinder Jones    MRN: [id number]           Patient Information     Date Of Birth          1980        Visit Information        Provider Department      4/13/2018 10:00 AM Jessy Muñoz MD [facility]        Today's Diagnoses     Preop general physical exam    -  1    Closed nondisplaced fracture of fifth metatarsal bone of right foot, sequela        Psoriatic arthritis (H)        Morbid obesity (H)          Care Instructions      Before Your Surgery      Call your surgeon if there is any change in your health. This includes signs of a cold or flu (such as a sore throat, runny nose, cough, rash or fever).    Do not smoke, drink alcohol or take over the counter medicine (unless your surgeon or primary care doctor tells you to) for the 24 hours before and after surgery.    If you take prescribed drugs: Follow your doctor s orders about which medicines to take and which to stop until after surgery.    Eating and drinking prior to surgery: follow the instructions from your surgeon    Take a shower or bath the night before surgery. Use the soap your surgeon gave you to gently clean your skin. If you do not have soap from your surgeon, use your regular soap. Do not shave or scrub the surgery site.  Wear clean pajamas and have clean sheets on your bed.           Follow-ups after your visit        Your next 10 appointments already scheduled     May 04, 2018  8:30 AM CDT   (Arrive by 8:15 AM)   Post-Op with [facility] ([facility])    [address] [place] [address] [phone]            May 24, 2018 12:20 PM CDT   Office Visit with Suellen Page MD   [facility] ([facility])    [address] [place] [address] [phone]           Bring a current list of meds and any records pertaining  "to this visit. For Physicals, please bring immunization records and any forms needing to be filled out. Please arrive 10 minutes early to complete paperwork.            May 29, 2018  5:40 PM CDT   (Arrive by 5:25 PM)   POST-OP FOOT/ANKLE with Ben Rivera MD   [facility] ([facility])    [address] [facility]  [address] [facility] [address] [phone]              Who to contact     If you have questions or need follow up information about today's clinic visit or your schedule please contact [facility] directly at [phone].  Normal or non-critical lab and imaging results will be communicated to you by MyChart, letter or phone within 4 business days after the clinic has received the results. If you do not hear from us within 7 days, please contact the clinic through [employer] or phone. If you have a critical or abnormal lab result, we will notify you by phone as soon as possible.  Submit refill requests through [employer] or call your pharmacy and they will forward the refill request to us. Please allow 3 business days for your refill to be completed.          Additional Information About Your Visit        [employer] Information     [employer] gives you secure access to your electronic health record. If you see a primary care provider, you can also send messages to your care team and make appointments. If you have questions, please call your primary care clinic.  If you do not have a primary care provider, please call [phone] and they will assist you.        Care EveryWhere ID     This is your Care EveryWhere ID. This could be used by other organizations to access your [place] medical records  [id number]        Your Vitals Were     Pulse Temperature Respirations Height Pulse Oximetry BMI (Body Mass Index)    85 98.6  F (37  C) (Oral) 20 6' 5\" (1.956 m) 99% 41.39 kg/m2       Blood Pressure from Last 3 Encounters:   04/13/18 130/84 "   03/29/18 129/80   03/23/18 128/88    Weight from Last 3 Encounters:   04/13/18 (!) 349 lb (158.3 kg)   04/10/18 (!) 344 lb (156 kg)   02/23/18 (!) 344 lb (156 kg)              We Performed the Following     Basic metabolic panel     CBC with platelets        Primary Care Provider Office Phone # Fax #    Simona Prasad PA-C [phone]       [address]  [place] [address]        Equal Access to Services     [facility]: Hadii aad ku hadasho Soomaali, waaxda luqadaha, qaybta kaalmada adeegyada, waxay idiin hayaan valentin keller . So [facility] [phone].    ATENCIÓN: Si habla español, tiene a rose disposición servicios gratuitos de asistencia lingüística. [facility] [phone].    We comply with applicable federal civil rights laws and [place] laws. We do not discriminate on the basis of race, color, national origin, age, disability, sex, sexual orientation, or gender identity.            Thank you!     Thank you for choosing [facility]  for your care. Our goal is always to provide you with excellent care. Hearing back from our patients is one way we can continue to improve our services. Please take a few minutes to complete the written survey that you may receive in the mail after your visit with us. Thank you!             Your Updated Medication List - Protect others around you: Learn how to safely use, store and throw away your medicines at www.disposemymeds.org.          This list is accurate as of 4/13/18 12:59 PM.  Always use your most recent med list.                   Brand Name Dispense Instructions for use Diagnosis    * adalimumab 40 MG/0.8ML prefilled syringe kit    HUMIRA    6 Syringe    Inject 2 40 mg pens first day, inject 1 40 mg pen day 8 and day 22, then 40mg every other week    Plaque psoriasis       * adalimumab 40 MG/0.8ML prefilled syringe kit    HUMIRA    2 Syringe    Inject 0.8 mLs (40 mg) Subcutaneous every 14 days    Plaque  psoriasis       * adalimumab 40 MG/0.8ML prefilled syringe kit    HUMIRA    2 Syringe    Inject 0.8 mLs (40 mg) Subcutaneous every 14 days    Plaque psoriasis       citalopram 40 MG tablet    celeXA    90 tablet    Take 1 tablet (40 mg) by mouth daily    Anxiety state, Panic disorder without agoraphobia       cyclobenzaprine 10 MG tablet    FLEXERIL    30 tablet    Take 1 tablet (10 mg) by mouth 2 times daily as needed for muscle spasms    Nondisplaced fracture of fifth metatarsal bone, right foot, sequela       HYDROcodone-acetaminophen 5-325 MG per tablet    NORCO    30 tablet    Take 1-2 tablets by mouth every 8 hours as needed for moderate to severe pain maximum 6 tablet(s) per day    Closed nondisplaced fracture of fifth metatarsal bone of right foot with delayed healing, subsequent encounter       * order for DME     1 Units    Roll-A-Bout Walker. Patient can use for right foot.    Closed nondisplaced fracture of fifth metatarsal bone of right foot, initial encounter       * order for DME     1 Device    Equipment being ordered: [id number] $249  Walking Boot XL Funmilayo Pneumatic    Closed nondisplaced fracture of fifth metatarsal bone of right foot, initial encounter       * Notice:  This list has 5 medication(s) that are the same as other medications prescribed for you. Read the directions carefully, and ask your doctor or other care provider to review them with you.

## 2018-04-13 NOTE — PROGRESS NOTES
[address] [address]  [phone]  Dept: [phone]    PRE-OP EVALUATION:  Today's date: 2018    Lakhwinder Jones (: 1980) presents for pre-operative evaluation assessment as requested by Dr. cazares.  He requires evaluation and anesthesia risk assessment prior to undergoing surgery/procedure for treatment of Fifth Metatarsal fracture .    Proposed Surgery/ Procedure: Open Reduction internal fixation of the right fifth metatarsal with possible fifth metatarsal partial excision.   Date of Surgery/ Procedure: 18  Time of Surgery/ Procedure: [email or web address]  Hospital/Surgical Facility: [facility]    Primary Physician: Simona Prasad  Type of Anesthesia Anticipated: General    Patient has a Health Care Directive or Living Will:  NO    1. NO - Do you have a history of heart attack, stroke, stent, bypass or surgery on an artery in the head, neck, heart or legs?  2. NO - Do you ever have any pain or discomfort in your chest?  3. NO - Do you have a history of  Heart Failure?  4. NO - Are you troubled by shortness of breath when: walking on the level, up a slight hill or at night?  5. NO - Do you currently have a cold, bronchitis or other respiratory infection?  6. NO - Do you have a cough, shortness of breath or wheezing?  7. NO - Do you sometimes get pains in the calves of your legs when you walk?  8. NO - Do you or anyone in your family have previous history of blood clots?  9. NO - Do you or does anyone in your family have a serious bleeding problem such as prolonged bleeding following surgeries or cuts?  10. NO - Have you ever had problems with anemia or been told to take iron pills?  11. NO - Have you had any abnormal blood loss such as black, tarry or bloody stools, or abnormal vaginal bleeding?  12. NO - Have you ever had a blood transfusion?  13. NO - Have you or any of your relatives ever had problems with  anesthesia?  14. NO - Do you have sleep apnea, excessive snoring or daytime drowsiness? Snoring sometimes  15. NO - Do you have any prosthetic heart valves?  16. NO - Do you have prosthetic joints?  17. NO - Is there any chance that you may be pregnant?      HPI:     HPI related to upcoming procedure: History of closed non displaced metatarsal fracture last year 10/2017. Since then has been having pain, thought pain would improve by now. Pain is more at the end of the day and along the lateral aspect of the foot, also having pain with ambulation. Seen by Orthopedics for non union, now planned for open reduction and internal fixation.       See problem list for active medical problems.  Problems all longstanding and stable, except as noted/documented.  See ROS for pertinent symptoms related to these conditions.                                                                                                                                                          .    MEDICAL HISTORY:     Patient Active Problem List    Diagnosis Date Noted     Abnormal results of liver function studies 12/18/2015     Priority: High     Psoriatic arthritis (H) 12/18/2015     Priority: High     Closed nondisplaced fracture of fifth metatarsal bone of right foot, initial encounter 11/07/2017     Priority: Medium     Elevated blood-pressure reading without diagnosis of hypertension 08/21/2017     Priority: Medium     Morbid obesity (H) 02/06/2017     Priority: Medium     Depression 12/07/2015     Priority: Medium     CARDIOVASCULAR SCREENING; LDL GOAL LESS THAN 160 12/07/2015     Priority: Medium     Anxiety state 04/22/2009     Priority: Medium     Overview:   inactive icd-9 diagnosis auto replaced with icd-10, display name retained//mohini       Chronic back pain 04/22/2009     Priority: Medium     Overview:   Lumbar and thoracic; Resulting from sports injuries.  Advil       Panic disorder without agoraphobia 04/22/2009     Priority:  Medium      Past Medical History:   Diagnosis Date     Anxiety      Back pain     chronic     Obesity      Psoriatic arthritis (H)      Past Surgical History:   Procedure Laterality Date     NOSE SURGERY      3 times     right elbow surgey      14 yo     TESTICLE SURGERY      21 yo     Current Outpatient Prescriptions   Medication Sig Dispense Refill     HYDROcodone-acetaminophen (NORCO) 5-325 MG per tablet Take 1-2 tablets by mouth every 8 hours as needed for moderate to severe pain maximum 6 tablet(s) per day 30 tablet 0     adalimumab (HUMIRA) 40 MG/0.8ML prefilled syringe kit Inject 0.8 mLs (40 mg) Subcutaneous every 14 days 2 Syringe 6     adalimumab (HUMIRA) 40 MG/0.8ML prefilled syringe kit Inject 0.8 mLs (40 mg) Subcutaneous every 14 days 2 Syringe 3     order for DME Equipment being ordered: [id number] $249  Walking Boot XL Funmilayo Pneumatic 1 Device 0     order for DME Roll-A-Bout Walker. Patient can use for right foot. 1 Units 0     cyclobenzaprine (FLEXERIL) 10 MG tablet Take 1 tablet (10 mg) by mouth 2 times daily as needed for muscle spasms 30 tablet 1     adalimumab (HUMIRA) 40 MG/0.8ML prefilled syringe kit Inject 2 40 mg pens first day, inject 1 40 mg pen day 8 and day 22, then 40mg every other week 6 Syringe 0     citalopram (CELEXA) 40 MG tablet Take 1 tablet (40 mg) by mouth daily 90 tablet 1     OTC products: None, except as noted above    Allergies   Allergen Reactions     Tramadol Other (See Comments)     Shellfish-Derived Products       Latex Allergy: NO    Social History   Substance Use Topics     Smoking status: Former Smoker     Types: Cigarettes     Smokeless tobacco: Never Used      Comment: 1-2 cigarettes per day to cope with stress     Alcohol use 4.8 oz/week     8 Standard drinks or equivalent per week      Comment: not for a month     History   Drug Use     Yes     Special: Marijuana       REVIEW OF SYSTEMS:   Constitutional, neuro, ENT, endocrine, pulmonary, cardiac,  "gastrointestinal, genitourinary, musculoskeletal, integument and psychiatric systems are negative, except as otherwise noted.    EXAM:   BP (!) 137/91 (BP Location: Left arm, Patient Position: Sitting, Cuff Size: Adult Large)  Pulse 85  Temp 98.6  F (37  C) (Oral)  Resp 20  Ht 1.956 m (6' 5\")  Wt (!) 158.3 kg (349 lb)  SpO2 99%  BMI 41.39 kg/m2    GENERAL APPEARANCE: healthy, alert and no distress     EYES: EOMI,  PERRL     HENT: ear canals and TM's normal and nose and mouth without ulcers or lesions     NECK: no adenopathy, no asymmetry, masses, or scars and thyroid normal to palpation     RESP: lungs clear to auscultation - no rales, rhonchi or wheezes     CV: regular rates and rhythm, normal S1 S2, no S3 or S4 and no murmur, click or rub     ABDOMEN:  soft, nontender, no HSM or masses and bowel sounds normal     MS: extremities normal- no gross deformities noted     SKIN: Generalized Psoriasis patches      NEURO: Normal strength and tone, sensory exam grossly normal, mentation intact and speech normal     PSYCH: mentation appears normal. and affect normal/bright     LYMPHATICS: No cervical adenopathy    DIAGNOSTICS:     EKG: Not indicated due to non-vascular surgery and low risk of event (age <65 and without cardiac risk factors)  Labs Resulted Today:   Results for orders placed or performed in visit on 04/13/18   CBC with platelets   Result Value Ref Range    WBC 7.0 4.0 - 11.0 10e9/L    RBC Count 4.76 4.4 - 5.9 10e12/L    Hemoglobin 14.7 13.3 - 17.7 g/dL    Hematocrit 41.7 40.0 - 53.0 %    MCV 88 78 - 100 fl    MCH 30.9 26.5 - 33.0 pg    MCHC 35.3 31.5 - 36.5 g/dL    RDW 12.4 10.0 - 15.0 %    Platelet Count 225 150 - 450 10e9/L     Labs Drawn and in Process:   Unresulted Labs Ordered in the Past 30 Days of this Admission     Date and Time Order Name Status Description    4/13/2018 1035 BASIC METABOLIC PANEL In process           Recent Labs   Lab Test  01/05/18   0924  07/18/17   0947  02/27/17   1037   " 12/08/15   0834   HGB  15.0  15.4  15.1   < >  15.5   PLT  218  203  237   < >  224   INR   --    --   1.05   --    --    NA  141   --   142   < >  139   POTASSIUM  4.4   --   4.0   < >  4.4   CR  0.80   --   0.94   < >  0.82   A1C   --    --    --    --   5.2    < > = values in this interval not displayed.        IMPRESSION:   Reason for surgery/procedure: Fracture right fifth metatarsal   Diagnosis/reason for consult: Pre operative clearance     The proposed surgical procedure is considered INTERMEDIATE risk.    REVISED CARDIAC RISK INDEX  The patient has the following serious cardiovascular risks for perioperative complications such as (MI, PE, VFib and 3  AV Block):  No serious cardiac risks  INTERPRETATION: 0 risks: Class I (very low risk - 0.4% complication rate)    The patient has the following additional risks for perioperative complications:  No identified additional risks  The ASCVD Risk score ([place] NUSRAT Jr, et al., 2013) failed to calculate for the following reasons:    The 2013 ASCVD risk score is only valid for ages 40 to 79  Morbid obesity    Encounter Diagnoses   Name Primary?     Preop general physical exam Yes     Closed nondisplaced fracture of fifth metatarsal bone of right foot, sequela      Psoriatic arthritis (H)      Morbid obesity (H)          RECOMMENDATIONS:       Obstructive Sleep Apnea (or suspected sleep apnea)  Hospital staff are advised to monitor for sleep related oxygen desaturations due to suspicion of JAMES      --Patient is to take all scheduled medications on the day of surgery EXCEPT for modifications listed below.    Last took Humira 4/5/18, is on a Q2 week schedule for Psoriasis. Next is due on 4/19/18, do not take Humira then, OK to resume the week after.       APPROVAL GIVEN to proceed with proposed procedure, without further diagnostic evaluation       Signed Electronically by: Jessy Muñoz MD MPH    Copy of this evaluation report is provided to requesting  physician.    [facility] Preop Guidelines    Revised Cardiac Risk Index

## 2018-04-16 NOTE — PROGRESS NOTES
Faxed Pre-op notes, labs and no Ekg to [facility],  [phone], right fax confirmed at 9:28 am today and [phone], right fax confirmed at 9:29 am today.  Miriam Ventura MA/  For Teams Rosalind

## 2018-04-16 NOTE — PROGRESS NOTES
Lakhwinder, your test results were within normal limits. Electrolytes, glucose and kidney function were normal.     Please do not hesitate to call us at [phone] if you have any questions or concerns.    Thank you,    Jessy Muñoz MD MPH

## 2018-04-17 ENCOUNTER — TELEPHONE (OUTPATIENT)
Dept: FAMILY MEDICINE | Facility: CLINIC | Age: 38
End: 2018-04-17

## 2018-04-17 NOTE — TELEPHONE ENCOUNTER
Patient needs his pre op faxed to   [phone] Atten: 3rd floor surgery    Surgery is tomorrow morning   They need this today    Thank you

## 2018-04-17 NOTE — TELEPHONE ENCOUNTER
Faxed Pre-op notes, labs and no Ekg to [facility],  [phone], right fax confirmed at 9:28 am today and [phone], right fax confirmed at 9:29 am today.  Miriam Ventura MA/  For Teams Tee and Radha         Electronically signed by Miriam Ventura MA at 4/16/2018  9:54 AM

## 2018-04-18 ENCOUNTER — TRANSFERRED RECORDS (OUTPATIENT)
Dept: HEALTH INFORMATION MANAGEMENT | Facility: CLINIC | Age: 38
End: 2018-04-18

## 2018-04-19 DIAGNOSIS — Z98.890 S/P FOOT JOINT OPERATION: Primary | ICD-10-CM

## 2018-04-19 RX ORDER — OXYCODONE HYDROCHLORIDE 5 MG/1
5-10 TABLET ORAL EVERY 4 HOURS PRN
Qty: 30 TABLET | Refills: 0 | Status: SHIPPED | OUTPATIENT
Start: 2018-04-19 | End: 2018-04-23

## 2018-04-19 NOTE — TELEPHONE ENCOUNTER
Patient is s/p open reduction and internal fixation of the right fifth metatarsal with possible fifth metatarsal partial excision with Dr. Rivera yesterday at [facility].  He was given #20 tabs of oxycodone, 1-2 every 4 hours as needed for pain.  He will run out this weekend and is requesting a refill of oxycodone.  His wife, Shereen Leyva or mother, Giulia Quinn, will  prescription from clinic lobby lock box.      Patient also asked about [facility] paperwork and where it can be sent to.  He was given Raeann's phone number and fax number to get form to.

## 2018-04-23 ENCOUNTER — TELEPHONE (OUTPATIENT)
Dept: ORTHOPEDICS | Facility: CLINIC | Age: 38
End: 2018-04-23

## 2018-04-23 DIAGNOSIS — Z98.890 S/P FOOT JOINT OPERATION: ICD-10-CM

## 2018-04-23 RX ORDER — OXYCODONE HYDROCHLORIDE 5 MG/1
5-10 TABLET ORAL EVERY 4 HOURS PRN
Qty: 30 TABLET | Refills: 0 | Status: SHIPPED | OUTPATIENT
Start: 2018-04-23 | End: 2018-04-30

## 2018-04-23 NOTE — TELEPHONE ENCOUNTER
Health Call Center    Phone Message    May a detailed message be left on voicemail: yes    Reason for Call: Medication Refill Request    Has the patient contacted the pharmacy for the refill? Yes   Name of medication being requested: oxyCODONE IR (ROXICODONE)  Provider who prescribed the medication: Dr. Rivera  Pharmacy:  at [facility]   Date medication is needed: ASAP, pt is completely out.         Action Taken: Message routed to:  [facility] ([facility]): [facility] Med Refill Team

## 2018-04-23 NOTE — TELEPHONE ENCOUNTER
Pt calls ortho triage to report that he fell on 04/21/18 and has increased pain to operative foot. He is requesting refill of oxycodone. Pt notes that toes are warm, he is able to wiggle them, he does not note any excessive draining or bleeding on bandage. He is elevating and icing. Oxycodone refilled per standing order and pt will keep nurse visit appt as sheduled next week. Pt will call back triage if pain continues to increase

## 2018-04-30 ENCOUNTER — TELEPHONE (OUTPATIENT)
Dept: ORTHOPEDICS | Facility: CLINIC | Age: 38
End: 2018-04-30

## 2018-04-30 DIAGNOSIS — Z98.890 S/P FOOT JOINT OPERATION: ICD-10-CM

## 2018-04-30 RX ORDER — OXYCODONE HYDROCHLORIDE 5 MG/1
5-10 TABLET ORAL EVERY 4 HOURS PRN
Qty: 30 TABLET | Refills: 0 | Status: SHIPPED | OUTPATIENT
Start: 2018-04-30 | End: 2018-05-05

## 2018-04-30 NOTE — TELEPHONE ENCOUNTER
"    [facility]:  Nursing Note  SITUATION                                                      Lakhwinder Jones is a 37 year old male who calls with request for refill of pain medication.    BACKGROUND                                                      Patient is s/p ORIF of the right fifth metatarsal on 4/18/2018 at [facility].    Does patient have a future appointment scheduled?  Yes -  next appointment is on: 5/4/18 with nurse    Operative report is not available in our system, and is not available via Care Everywhere    ASSESSMENT      pain/symptom assessment - Patient states pain feels like \"pins and needles\" in his foot and is experiencing a throbbing sensation.  Patient rates his pain as 5/10 without pain medication.  Patient states he ran out of pain medication on 4/28/18.    Patient states his wife, Shereen, will  prescription.  Patient is requesting that the script be delivered to the [facility].    PLAN                                                      Nursing Interventions: Encounter routed to MORENITA Oakes  for futher follow-up.    Patient/family can be reached at: [phone].  Okay to leave a detailed message at this number?  Yes    If further questions/concerns or if symptoms do not improve, worsen or new symptoms develop, patient/family are advised to call [phone], option #3 to speak with a triage nurse, as soon as possible.    Emma Ruth  "

## 2018-04-30 NOTE — TELEPHONE ENCOUNTER
[facility]:  Nursing Note  SITUATION                                                      Lakhwinder Jones is a 37 year old male who calls with complaint of shooting pain up his leg.    BACKGROUND                                                      Patient is s/p ORIF of the right fifth metatarsal  on 4/18/2018 at [facility].    Does patient have a future appointment scheduled?  Yes -  next appointment is on: 5/4/18 with nurse    Operative report is not available in our system, and is not available via Care Everywhere    ASSESSMENT      pain/symptom assessment - Patient states he is experiencing shooting pain to his knee.  Patient denies redness/warmth/swelling/pain to the touch of his calf.  Patient states he has prior issues with low back pain.    PLAN                                                      Nursing Interventions: Patient education: Patient advised to continue to elevate his right leg as the nerves may be irritated due to swelling and post-op recovery.  Patient advised to reposition to alleviate irration on the nerves in his spine.  Patient educated to watch for s/sx of DVT.    Will comply with recommendation: Yes    If further questions/concerns or if symptoms do not improve, worsen or new symptoms develop, patient/family are advised to call [phone], option #3 to speak with a triage nurse, as soon as possible.    Emma Ruth

## 2018-04-30 NOTE — TELEPHONE ENCOUNTER
Pt called [facility] requesting renewal of pain medicine, rating pain 5/10, following ORIF on 4/18/18. Will be seen in clinic on 5/4/18. Medication reorder put [facility] to [facility] per pts request and for wife to . Pt spoken to re: orders.    Champ Davis

## 2018-05-05 ENCOUNTER — OFFICE VISIT (OUTPATIENT)
Dept: URGENT CARE | Facility: URGENT CARE | Age: 38
End: 2018-05-05
Payer: COMMERCIAL

## 2018-05-05 VITALS
BODY MASS INDEX: 39.84 KG/M2 | HEART RATE: 106 BPM | TEMPERATURE: 98.3 F | WEIGHT: 315 LBS | OXYGEN SATURATION: 96 % | SYSTOLIC BLOOD PRESSURE: 145 MMHG | DIASTOLIC BLOOD PRESSURE: 93 MMHG

## 2018-05-05 DIAGNOSIS — M62.830 BACK MUSCLE SPASM: Primary | ICD-10-CM

## 2018-05-05 DIAGNOSIS — S92.354G CLOSED NONDISPLACED FRACTURE OF FIFTH METATARSAL BONE OF RIGHT FOOT WITH DELAYED HEALING, SUBSEQUENT ENCOUNTER: ICD-10-CM

## 2018-05-05 DIAGNOSIS — R30.0 DYSURIA: ICD-10-CM

## 2018-05-05 DIAGNOSIS — Z98.890 S/P FOOT JOINT OPERATION: ICD-10-CM

## 2018-05-05 LAB
ALBUMIN UR-MCNC: NEGATIVE MG/DL
APPEARANCE UR: CLEAR
BILIRUB UR QL STRIP: NEGATIVE
COLOR UR AUTO: YELLOW
GLUCOSE UR STRIP-MCNC: NEGATIVE MG/DL
HGB UR QL STRIP: NEGATIVE
KETONES UR STRIP-MCNC: NEGATIVE MG/DL
LEUKOCYTE ESTERASE UR QL STRIP: NEGATIVE
NITRATE UR QL: NEGATIVE
NON-SQ EPI CELLS #/AREA URNS LPF: NORMAL /LPF
PH UR STRIP: 6.5 PH (ref 5–7)
RBC #/AREA URNS AUTO: NORMAL /HPF
SOURCE: NORMAL
SP GR UR STRIP: 1.02 (ref 1–1.03)
UROBILINOGEN UR STRIP-ACNC: 0.2 EU/DL (ref 0.2–1)
WBC #/AREA URNS AUTO: NORMAL /HPF

## 2018-05-05 PROCEDURE — 81001 URINALYSIS AUTO W/SCOPE: CPT | Performed by: FAMILY MEDICINE

## 2018-05-05 PROCEDURE — 99214 OFFICE O/P EST MOD 30 MIN: CPT | Performed by: FAMILY MEDICINE

## 2018-05-05 RX ORDER — CYCLOBENZAPRINE HCL 5 MG
5 TABLET ORAL 3 TIMES DAILY PRN
Qty: 18 TABLET | Refills: 0 | Status: SHIPPED | OUTPATIENT
Start: 2018-05-05 | End: 2018-06-26

## 2018-05-05 RX ORDER — OXYCODONE HYDROCHLORIDE 5 MG/1
5-10 TABLET ORAL EVERY 4 HOURS PRN
Qty: 12 TABLET | Refills: 0 | Status: SHIPPED | OUTPATIENT
Start: 2018-05-05 | End: 2018-07-10

## 2018-05-05 NOTE — PROGRESS NOTES
SUBJECTIVE:   Lakhwinder Jones is a 37 year old male who presents to clinic today for the following health issues:      Joint Pain    Onset: 2 days    Description:   Location: left back pain  Character: feels like spasm/sharp     Intensity: moderate    Progression of Symptoms: same    Accompanying Signs & Symptoms:  Other symptoms: right foot pain since surgery 3 weeks ago, had MT bone fracture, also c/o burning urination, no hematuria or frequency     History:   Previous similar pain: no       Precipitating factors:   Trauma or overuse: no     Alleviating factors:  Improved by: oxycodone     Therapies Tried and outcome: oxycodone, tylenol, flexeril         Problem list and histories reviewed & adjusted, as indicated.  Additional history: as documented    Patient Active Problem List   Diagnosis     Anxiety state     Chronic back pain     Depression     Panic disorder without agoraphobia     CARDIOVASCULAR SCREENING; LDL GOAL LESS THAN 160     Abnormal results of liver function studies     Psoriatic arthritis (H)     Morbid obesity (H)     Elevated blood-pressure reading without diagnosis of hypertension     Closed nondisplaced fracture of fifth metatarsal bone of right foot, initial encounter     Past Surgical History:   Procedure Laterality Date     NOSE SURGERY      3 times     right elbow surgey      12 yo     TESTICLE SURGERY      21 yo       Social History   Substance Use Topics     Smoking status: Former Smoker     Types: Cigarettes     Smokeless tobacco: Never Used      Comment: 1-2 cigarettes per day to cope with stress     Alcohol use 4.8 oz/week     8 Standard drinks or equivalent per week      Comment: not for a month     Family History   Problem Relation Age of Onset     Obesity Mother      DIABETES Father      Coronary Artery Disease Father      Hypertension Father      Hyperlipidemia Father      Depression Father      Anxiety Disorder Father      MENTAL ILLNESS Father      Substance Abuse Father       Breast Cancer Maternal Grandmother      Depression Maternal Grandmother      MENTAL ILLNESS Maternal Grandmother      Substance Abuse Maternal Grandmother      Prostate Cancer Maternal Grandfather      DIABETES Paternal Grandmother      Breast Cancer Paternal Grandmother      Depression Paternal Grandmother      MENTAL ILLNESS Paternal Grandmother      DIABETES Paternal Grandfather      Asthma Brother      DIABETES Paternal Uncle      CEREBROVASCULAR DISEASE No family hx of      Anesthesia Reaction No family hx of      OSTEOPOROSIS No family hx of      Genetic Disorder No family hx of      Thyroid Disease No family hx of      Liver Disease No family hx of          Current Outpatient Prescriptions   Medication Sig Dispense Refill     adalimumab (HUMIRA) 40 MG/0.8ML prefilled syringe kit Inject 2 40 mg pens first day, inject 1 40 mg pen day 8 and day 22, then 40mg every other week 6 Syringe 0     adalimumab (HUMIRA) 40 MG/0.8ML prefilled syringe kit Inject 0.8 mLs (40 mg) Subcutaneous every 14 days 2 Syringe 6     adalimumab (HUMIRA) 40 MG/0.8ML prefilled syringe kit Inject 0.8 mLs (40 mg) Subcutaneous every 14 days 2 Syringe 3     citalopram (CELEXA) 40 MG tablet Take 1 tablet (40 mg) by mouth daily 90 tablet 1     cyclobenzaprine (FLEXERIL) 10 MG tablet Take 1 tablet (10 mg) by mouth 2 times daily as needed for muscle spasms 30 tablet 1     HYDROcodone-acetaminophen (NORCO) 5-325 MG per tablet Take 1-2 tablets by mouth every 8 hours as needed for moderate to severe pain maximum 6 tablet(s) per day 30 tablet 0     order for DME Roll-A-Bout Walker. Patient can use for right foot. 1 Units 0     order for DME Equipment being ordered: [id number] $249  Walking Boot XL [employer] Pneumatic 1 Device 0     oxyCODONE IR (ROXICODONE) 5 MG tablet Take 1-2 tablets (5-10 mg) by mouth every 4 hours as needed for moderate to severe pain or severe pain 30 tablet 0     Allergies   Allergen Reactions     Tramadol Other (See  Comments)     Shellfish-Derived Products      Recent Labs   Lab Test  04/13/18   1043  01/05/18   0924  02/27/17   1037  02/01/17   1024   12/08/15   0834   A1C   --    --    --    --    --   5.2   LDL   --    --    --    --    --   108*   HDL   --    --    --    --    --   48   TRIG   --    --    --    --    --   255*   ALT   --   115*  72*  135*   < >  71*   CR  1.01  0.80  0.94  0.87   < >  0.82   GFRESTIMATED  83  >90  >90  Non African American GFR Calc    >90  Non  GFR Calc     < >  >90  Non  GFR Calc     GFRESTBLACK  >90  >90  >90  African American GFR Calc    >90   GFR Calc     < >  >90   GFR Calc     POTASSIUM  3.8  4.4  4.0  4.2   < >  4.4   TSH   --    --    --   2.03   --   5.40*    < > = values in this interval not displayed.      BP Readings from Last 3 Encounters:   05/05/18 (!) 145/93   04/13/18 130/84   03/29/18 129/80    Wt Readings from Last 3 Encounters:   05/05/18 (!) 336 lb (152.4 kg)   04/13/18 (!) 349 lb (158.3 kg)   04/10/18 (!) 344 lb (156 kg)                  Labs reviewed in EPIC    Reviewed and updated as needed this visit by clinical staff  Tobacco  Allergies  Meds       Reviewed and updated as needed this visit by Provider         ROS:  Constitutional, HEENT, cardiovascular, pulmonary, GI, , musculoskeletal, neuro, skin, endocrine and psych systems are negative, except as otherwise noted.    OBJECTIVE:     BP (!) 145/93 (BP Location: Left arm, Patient Position: Chair, Cuff Size: Adult Regular)  Pulse 106  Temp 98.3  F (36.8  C) (Oral)  Wt (!) 336 lb (152.4 kg)  SpO2 96%  BMI 39.84 kg/m2  Body mass index is 39.84 kg/(m^2).  GENERAL: alert, no distress and obese  EYES: Eyes grossly normal to inspection, PERRL and conjunctivae and sclerae normal  NECK: no adenopathy, no asymmetry, masses, or scars and thyroid normal to palpation  RESP: lungs clear to auscultation - no rales, rhonchi or wheezes  CV: regular rates and  rhythm, normal S1 S2, no S3 or S4 and no murmur, click or rub  ABDOMEN: soft, nontender, no hepatosplenomegaly, no masses and bowel sounds normal  MS: Left lower lumbar paraspinal tenderness, pain reproducible on flexion and extension, no skin discoloration or swelling noted, SLR equivocal, reflexes 2+, sensation to touch and pressure intact, camp walker boot in right foot   SKIN: Psoriatic patches throughout  NEURO: Normal strength and tone, mentation intact and speech normal  LYMPH: no cervical, supraclavicular, axillary, or inguinal adenopathy      Results for orders placed or performed in visit on 05/05/18   UA with Microscopic   Result Value Ref Range    Color Urine Yellow     Appearance Urine Clear     Glucose Urine Negative NEG^Negative mg/dL    Bilirubin Urine Negative NEG^Negative    Ketones Urine Negative NEG^Negative mg/dL    Specific Gravity Urine 1.020 1.003 - 1.035    pH Urine 6.5 5.0 - 7.0 pH    Protein Albumin Urine Negative NEG^Negative mg/dL    Urobilinogen Urine 0.2 0.2 - 1.0 EU/dL    Nitrite Urine Negative NEG^Negative    Blood Urine Negative NEG^Negative    Leukocyte Esterase Urine Negative NEG^Negative    Source Midstream Urine     WBC Urine 0 - 5 OTO5^0 - 5 /HPF    RBC Urine O - 2 OTO2^O - 2 /HPF    Squamous Epithelial /LPF Urine Few FEW^Few /LPF         ASSESSMENT/PLAN:       1. Back muscle spasm  -Suspect symptoms secondary to back and spasm  -Flexeril prescribed and suggested to continue heat pads  - cyclobenzaprine (FLEXERIL) 5 MG tablet; Take 1 tablet (5 mg) by mouth 3 times daily as needed for muscle spasms  Dispense: 18 tablet; Refill: 0      2. Dysuria  -UA came back unremarkable  -Suggested to maintain well hydration  -Follow-up with PCP if symptoms persist or worsen  - UA with Microscopic      3. Closed nondisplaced fracture of fifth metatarsal bone of right foot with delayed healing, subsequent encounter  -Oxycodone refilled for right foot pain control, only 12 tabs prescribed,  judicious use stressed      4. S/P foot joint operation  - oxyCODONE IR (ROXICODONE) 5 MG tablet; Take 1-2 tablets (5-10 mg) by mouth every 4 hours as needed for moderate to severe pain or severe pain  Dispense: 12 tablet; Refill: 0        Patient Instructions     Back Spasm (No Trauma)    Spasm of the back muscles can occur after a sudden forceful twisting or bending force (such as in a car accident), after a simple awkward movement, or after lifting something heavy with poor body positioning. In any case, muscle spasm adds to the pain. Sleeping in an awkward position or on a poor quality mattress can also cause this. Some people respond to emotional stress by tensing the muscles of their back.  Pain that continues may need further evaluation or other types of treatment such as physical therapy.  You don't always need X-rays for the initial evaluation of back pain, unless you had a physical injury such as from a car accident or fall. If your pain continues and doesn't respond to medical treatment, X-rays and other tests may then be done.   Home care    As soon as possible, start sitting or walking again to avoid problems from prolonged bed rest (muscle weakness, worsening back stiffness and pain, blood clots in the legs).    When in bed, try to find a position of comfort. A firm mattress is best. Try lying flat on your back with pillows under your knees. You can also try lying on your side with your knees bent up toward your chest and a pillow between your knees.    Avoid prolonged sitting, long car rides, or travel. This puts more stress on the lower back than standing or walking.     During the first 24 to 72 hours after an injury or flare-up, apply an ice pack to the painful area for 20 minutes, then remove it for 20 minutes. Do this over a period of 60 to 90 minutes or several times a day. This will reduce swelling and pain. Always wrap ice packs in a thin towel.    You can start with ice, then switch to heat.  Heat (hot shower, hot bath, or heating pad) reduces pain, and works well for muscle spasms. Apply heat to the painful area for 20 minutes, then remove it for 20 minutes. Do this over a period of 60 to 90 minutes or several times a day. Do not sleep on a heating pad as it can burn or damage skin.    Alternate ice and heat therapies.    Be aware of safe lifting methods and do not lift anything over 15 pounds until all the pain is gone.  Gentle stretching will help your back heal faster. Do this simple routine 2 to 3 times a day until your back is feeling better.    Lie on your back with your knees bent and both feet on the ground    Slowly raise your left knee to your chest as you flatten your lower back against the floor. Hold for 20 to 30 seconds.    Relax and repeat the exercise with your right knee.    Do 2 to 3 of these exercises for each leg.    Repeat, hugging both knees to your chest at the same time.    Do not bounce, but use a gentle pull.  Medicines  Talk to your doctor before using medicine, especially if you have other medical problems or are taking other medicines.  You may use acetaminophen or ibuprofen to control pain, unless your healthcare provider prescribed another pain medicine. If you have a chronic condition such as diabetes, liver or kidney disease, stomach ulcer, or gastrointestinal bleeding, or are taking blood thinners, talk with your healthcare provider before taking any medicines.  Be careful if you are given prescription pain medicine, narcotics, or medicine for muscle spasm. They can cause drowsiness, affect your coordination, reflexes, or judgment. Do not drive or operate heavy machinery when taking these medicines. Take pain medicine only as prescribed by your healthcare provider.  Follow-up care  Follow up with your doctor, or as advised. Physical therapy or further tests may be needed.  If X-rays were taken, they may be reviewed by a radiologist. You will be notified of any new  findings that may affect your care.  Call 911  Call 911 if any of these occur:    Trouble breathing    Confusion    Drowsiness or trouble awakening    Fainting or loss of consciousness    Rapid or very slow heart rate    Loss of bowel or bladder control  When to seek medical advice  Call your healthcare provider right away if any of these occur:    Pain becomes worse or spreads to your legs    Weakness or numbness in one or both legs    Numbness in the groin or genital area    Fever of 100.4 F (38 C) or higher, or as directed by your healthcare provider    Burning or pain when passing urine  Date Last Reviewed: 6/1/2016 2000-2017 [employer]. [address] [place] [address], [place] [address]. All rights reserved. This information is not intended as a substitute for professional medical care. Always follow your healthcare professional's instructions.            Aric Fish MD  [facility]

## 2018-05-05 NOTE — PATIENT INSTRUCTIONS
Back Spasm (No Trauma)    Spasm of the back muscles can occur after a sudden forceful twisting or bending force (such as in a car accident), after a simple awkward movement, or after lifting something heavy with poor body positioning. In any case, muscle spasm adds to the pain. Sleeping in an awkward position or on a poor quality mattress can also cause this. Some people respond to emotional stress by tensing the muscles of their back.  Pain that continues may need further evaluation or other types of treatment such as physical therapy.  You don't always need X-rays for the initial evaluation of back pain, unless you had a physical injury such as from a car accident or fall. If your pain continues and doesn't respond to medical treatment, X-rays and other tests may then be done.   Home care    As soon as possible, start sitting or walking again to avoid problems from prolonged bed rest (muscle weakness, worsening back stiffness and pain, blood clots in the legs).    When in bed, try to find a position of comfort. A firm mattress is best. Try lying flat on your back with pillows under your knees. You can also try lying on your side with your knees bent up toward your chest and a pillow between your knees.    Avoid prolonged sitting, long car rides, or travel. This puts more stress on the lower back than standing or walking.     During the first 24 to 72 hours after an injury or flare-up, apply an ice pack to the painful area for 20 minutes, then remove it for 20 minutes. Do this over a period of 60 to 90 minutes or several times a day. This will reduce swelling and pain. Always wrap ice packs in a thin towel.    You can start with ice, then switch to heat. Heat (hot shower, hot bath, or heating pad) reduces pain, and works well for muscle spasms. Apply heat to the painful area for 20 minutes, then remove it for 20 minutes. Do this over a period of 60 to 90 minutes or several times a day. Do not sleep on a heating  pad as it can burn or damage skin.    Alternate ice and heat therapies.    Be aware of safe lifting methods and do not lift anything over 15 pounds until all the pain is gone.  Gentle stretching will help your back heal faster. Do this simple routine 2 to 3 times a day until your back is feeling better.    Lie on your back with your knees bent and both feet on the ground    Slowly raise your left knee to your chest as you flatten your lower back against the floor. Hold for 20 to 30 seconds.    Relax and repeat the exercise with your right knee.    Do 2 to 3 of these exercises for each leg.    Repeat, hugging both knees to your chest at the same time.    Do not bounce, but use a gentle pull.  Medicines  Talk to your doctor before using medicine, especially if you have other medical problems or are taking other medicines.  You may use acetaminophen or ibuprofen to control pain, unless your healthcare provider prescribed another pain medicine. If you have a chronic condition such as diabetes, liver or kidney disease, stomach ulcer, or gastrointestinal bleeding, or are taking blood thinners, talk with your healthcare provider before taking any medicines.  Be careful if you are given prescription pain medicine, narcotics, or medicine for muscle spasm. They can cause drowsiness, affect your coordination, reflexes, or judgment. Do not drive or operate heavy machinery when taking these medicines. Take pain medicine only as prescribed by your healthcare provider.  Follow-up care  Follow up with your doctor, or as advised. Physical therapy or further tests may be needed.  If X-rays were taken, they may be reviewed by a radiologist. You will be notified of any new findings that may affect your care.  Call 911  Call 911 if any of these occur:    Trouble breathing    Confusion    Drowsiness or trouble awakening    Fainting or loss of consciousness    Rapid or very slow heart rate    Loss of bowel or bladder control  When to seek  medical advice  Call your healthcare provider right away if any of these occur:    Pain becomes worse or spreads to your legs    Weakness or numbness in one or both legs    Numbness in the groin or genital area    Fever of 100.4 F (38 C) or higher, or as directed by your healthcare provider    Burning or pain when passing urine  Date Last Reviewed: 6/1/2016 2000-2017 [employer]. [address] [place] [address]. All rights reserved. This information is not intended as a substitute for professional medical care. Always follow your healthcare professional's instructions.

## 2018-05-05 NOTE — MR AVS SNAPSHOT
After Visit Summary   5/5/2018    Lakhwinder Jones    MRN: [id number]           Patient Information     Date Of Birth          1980        Visit Information        Provider Department      5/5/2018 9:10 AM Aric Fish MD [facility]        Today's Diagnoses     Dysuria    -  1    Back muscle spasm        Closed nondisplaced fracture of fifth metatarsal bone of right foot with delayed healing, subsequent encounter        S/P foot joint operation          Care Instructions      Back Spasm (No Trauma)    Spasm of the back muscles can occur after a sudden forceful twisting or bending force (such as in a car accident), after a simple awkward movement, or after lifting something heavy with poor body positioning. In any case, muscle spasm adds to the pain. Sleeping in an awkward position or on a poor quality mattress can also cause this. Some people respond to emotional stress by tensing the muscles of their back.  Pain that continues may need further evaluation or other types of treatment such as physical therapy.  You don't always need X-rays for the initial evaluation of back pain, unless you had a physical injury such as from a car accident or fall. If your pain continues and doesn't respond to medical treatment, X-rays and other tests may then be done.   Home care    As soon as possible, start sitting or walking again to avoid problems from prolonged bed rest (muscle weakness, worsening back stiffness and pain, blood clots in the legs).    When in bed, try to find a position of comfort. A firm mattress is best. Try lying flat on your back with pillows under your knees. You can also try lying on your side with your knees bent up toward your chest and a pillow between your knees.    Avoid prolonged sitting, long car rides, or travel. This puts more stress on the lower back than standing or walking.     During the first 24 to 72 hours after an injury or flare-up, apply an ice  pack to the painful area for 20 minutes, then remove it for 20 minutes. Do this over a period of 60 to 90 minutes or several times a day. This will reduce swelling and pain. Always wrap ice packs in a thin towel.    You can start with ice, then switch to heat. Heat (hot shower, hot bath, or heating pad) reduces pain, and works well for muscle spasms. Apply heat to the painful area for 20 minutes, then remove it for 20 minutes. Do this over a period of 60 to 90 minutes or several times a day. Do not sleep on a heating pad as it can burn or damage skin.    Alternate ice and heat therapies.    Be aware of safe lifting methods and do not lift anything over 15 pounds until all the pain is gone.  Gentle stretching will help your back heal faster. Do this simple routine 2 to 3 times a day until your back is feeling better.    Lie on your back with your knees bent and both feet on the ground    Slowly raise your left knee to your chest as you flatten your lower back against the floor. Hold for 20 to 30 seconds.    Relax and repeat the exercise with your right knee.    Do 2 to 3 of these exercises for each leg.    Repeat, hugging both knees to your chest at the same time.    Do not bounce, but use a gentle pull.  Medicines  Talk to your doctor before using medicine, especially if you have other medical problems or are taking other medicines.  You may use acetaminophen or ibuprofen to control pain, unless your healthcare provider prescribed another pain medicine. If you have a chronic condition such as diabetes, liver or kidney disease, stomach ulcer, or gastrointestinal bleeding, or are taking blood thinners, talk with your healthcare provider before taking any medicines.  Be careful if you are given prescription pain medicine, narcotics, or medicine for muscle spasm. They can cause drowsiness, affect your coordination, reflexes, or judgment. Do not drive or operate heavy machinery when taking these medicines. Take pain  medicine only as prescribed by your healthcare provider.  Follow-up care  Follow up with your doctor, or as advised. Physical therapy or further tests may be needed.  If X-rays were taken, they may be reviewed by a radiologist. You will be notified of any new findings that may affect your care.  Call 911  Call 911 if any of these occur:    Trouble breathing    Confusion    Drowsiness or trouble awakening    Fainting or loss of consciousness    Rapid or very slow heart rate    Loss of bowel or bladder control  When to seek medical advice  Call your healthcare provider right away if any of these occur:    Pain becomes worse or spreads to your legs    Weakness or numbness in one or both legs    Numbness in the groin or genital area    Fever of 100.4 F (38 C) or higher, or as directed by your healthcare provider    Burning or pain when passing urine  Date Last Reviewed: 6/1/2016 2000-2017 [employer]. [address] [place] [address]. All rights reserved. This information is not intended as a substitute for professional medical care. Always follow your healthcare professional's instructions.                Follow-ups after your visit        Your next 10 appointments already scheduled     May 09, 2018  8:30 AM CDT   (Arrive by 8:15 AM)   Post-Op with [facility] ([facility])    [address] [address] [phone]            May 24, 2018 12:20 PM CDT   Office Visit with Suellen Page MD   [facility] ([address] [address] [phone]           Bring a current list of meds and any records pertaining to this visit. For Physicals, please bring immunization records and any forms needing to be filled out. Please arrive 10 minutes early to complete paperwork.            May 29, 2018  5:40 PM CDT   (Arrive by  5:25 PM)   POST-OP FOOT/ANKLE with Ben Rivera MD   [facility] ([facility] [place])    [address] [facility]  [address] [facility] [address] [phone]              Who to contact     If you have questions or need follow up information about today's clinic visit or your schedule please contact [facility]3] GILES RUIZ directly at [phone].  Normal or non-critical lab and imaging results will be communicated to you by [employer], letter or phone within 4 business days after the clinic has received the results. If you do not hear from us within 7 days, please contact the clinic through viVoodt or phone. If you have a critical or abnormal lab result, we will notify you by phone as soon as possible.  Submit refill requests through [employer] or call your pharmacy and they will forward the refill request to us. Please allow 3 business days for your refill to be completed.          Additional Information About Your Visit        [employer] Information     [employer] gives you secure access to your electronic health record. If you see a primary care provider, you can also send messages to your care team and make appointments. If you have questions, please call your primary care clinic.  If you do not have a primary care provider, please call [phone] and they will assist you.        Care EveryWhere ID     This is your Care EveryWhere ID. This could be used by other organizations to access your [facility] medical records  [id number]        Your Vitals Were     Pulse Temperature Pulse Oximetry BMI (Body Mass Index)          106 98.3  F (36.8  C) (Oral) 96% 39.84 kg/m2         Blood Pressure from Last 3 Encounters:   05/05/18 (!) 145/93   04/13/18 130/84   03/29/18 129/80    Weight from Last 3 Encounters:   05/05/18 (!) 336 lb (152.4 kg)   04/13/18 (!) 349 lb (158.3 kg)   04/10/18 (!) 344 lb (156 kg)              We Performed the Following     UA with Microscopic           Today's Medication Changes          These changes are accurate as of 5/5/18  9:52 AM.  If you have any questions, ask your nurse or doctor.               These medicines have changed or have updated prescriptions.        Dose/Directions    * cyclobenzaprine 10 MG tablet   Commonly known as:  FLEXERIL   This may have changed:  Another medication with the same name was added. Make sure you understand how and when to take each.   Used for:  Nondisplaced fracture of fifth metatarsal bone, right foot, sequela   Changed by:  Aric Fish MD        Dose:  10 mg   Take 1 tablet (10 mg) by mouth 2 times daily as needed for muscle spasms   Quantity:  30 tablet   Refills:  1       * cyclobenzaprine 5 MG tablet   Commonly known as:  FLEXERIL   This may have changed:  You were already taking a medication with the same name, and this prescription was added. Make sure you understand how and when to take each.   Used for:  Back muscle spasm   Changed by:  Aric Fish MD        Dose:  5 mg   Take 1 tablet (5 mg) by mouth 3 times daily as needed for muscle spasms   Quantity:  18 tablet   Refills:  0       * Notice:  This list has 2 medication(s) that are the same as other medications prescribed for you. Read the directions carefully, and ask your doctor or other care provider to review them with you.         Where to get your medicines      These medications [place] [facility] [place] - [address]  [address], [facility] [address]     Phone:  [phone]     cyclobenzaprine 5 MG tablet         Some of these will need a paper prescription and others can be bought over the counter.  Ask your nurse if you have questions.     Bring a paper prescription for each of these medications     oxyCODONE IR 5 MG tablet               Information about OPIOIDS     PRESCRIPTION OPIOIDS: WHAT YOU NEED TO KNOW   You have a prescription for an opioid (narcotic) pain medicine. Opioids can  cause addiction. If you have a history of chemical dependency of any type, you are at a higher risk of becoming addicted to opioids. Only take this medicine after all other options have been tried. Take it for as short a time and as few doses as possible.     Do not:    Drive. If you drive while taking these medicines, you could be arrested for driving under the influence (DUI).    Operate heavy machinery    Do any other dangerous activities while taking these medicines.     Drink any alcohol while taking these medicines.      Take with any other medicines that contain acetaminophen. Read all labels carefully. Look for the word  acetaminophen  or  Tylenol.  Ask your pharmacist if you have questions or are unsure.    Store your pills in a secure place, locked if possible. We will not replace any lost or stolen medicine. If you don t finish your medicine, please throw away (dispose) as directed by your pharmacist. The [place] Pollution Control Agency has more information about safe disposal: https://www.pca.[facility].[place].us/living-green/managing-unwanted-medications    All opioids tend to cause constipation. Drink plenty of water and eat foods that have a lot of fiber, such as fruits, vegetables, prune juice, apple juice and high-fiber cereal. Take a laxative (Miralax, milk of magnesia, Colace, Senna) if you don t move your bowels at least every other day.          Primary Care Provider Office Phone # Fax #    Simona Prasad PA-C [phone]       [address] [place]  [facility] [address]        Equal Access to Services     KAL POLLOCK : Hadii rashid ku hadasho Soomaali, waaxda luqadaha, qaybta kaalmada adeegyada, adina keller . So [facility] [phone].    ATENCIÓN: Si habla español, tiene a rose disposición servicios gratuitos de asistencia lingüística. Llame al [phone].    We comply with applicable federal civil rights laws and [place] laws. We do not discriminate  on the basis of race, color, national origin, age, disability, sex, sexual orientation, or gender identity.            Thank you!     Thank you for choosing [facility]  for your care. Our goal is always to provide you with excellent care. Hearing back from our patients is one way we can continue to improve our services. Please take a few minutes to complete the written survey that you may receive in the mail after your visit with us. Thank you!             Your Updated Medication List - Protect others around you: Learn how to safely use, store and throw away your medicines at www.disposemymeds.org.          This list is accurate as of 5/5/18  9:52 AM.  Always use your most recent med list.                   Brand Name Dispense Instructions for use Diagnosis    * adalimumab 40 MG/0.8ML prefilled syringe kit    HUMIRA    6 Syringe    Inject 2 40 mg pens first day, inject 1 40 mg pen day 8 and day 22, then 40mg every other week    Plaque psoriasis       * adalimumab 40 MG/0.8ML prefilled syringe kit    HUMIRA    2 Syringe    Inject 0.8 mLs (40 mg) Subcutaneous every 14 days    Plaque psoriasis       * adalimumab 40 MG/0.8ML prefilled syringe kit    HUMIRA    2 Syringe    Inject 0.8 mLs (40 mg) Subcutaneous every 14 days    Plaque psoriasis       citalopram 40 MG tablet    celeXA    90 tablet    Take 1 tablet (40 mg) by mouth daily    Anxiety state, Panic disorder without agoraphobia       * cyclobenzaprine 10 MG tablet    FLEXERIL    30 tablet    Take 1 tablet (10 mg) by mouth 2 times daily as needed for muscle spasms    Nondisplaced fracture of fifth metatarsal bone, right foot, sequela       * cyclobenzaprine 5 MG tablet    FLEXERIL    18 tablet    Take 1 tablet (5 mg) by mouth 3 times daily as needed for muscle spasms    Back muscle spasm       HYDROcodone-acetaminophen 5-325 MG per tablet    NORCO    30 tablet    Take 1-2 tablets by mouth every 8 hours as needed for moderate to severe pain  maximum 6 tablet(s) per day    Closed nondisplaced fracture of fifth metatarsal bone of right foot with delayed healing, subsequent encounter       * order for DME     1 Units    Roll-A-Bout Walker. Patient can use for right foot.    Closed nondisplaced fracture of fifth metatarsal bone of right foot, initial encounter       * order for DME     1 Device    Equipment being ordered: [id number] $249  Walking Boot XL Funmilayo Pneumatic    Closed nondisplaced fracture of fifth metatarsal bone of right foot, initial encounter       oxyCODONE IR 5 MG tablet    ROXICODONE    12 tablet    Take 1-2 tablets (5-10 mg) by mouth every 4 hours as needed for moderate to severe pain or severe pain    S/P foot joint operation       * Notice:  This list has 7 medication(s) that are the same as other medications prescribed for you. Read the directions carefully, and ask your doctor or other care provider to review them with you.

## 2018-05-09 ENCOUNTER — OFFICE VISIT (OUTPATIENT)
Dept: ORTHOPEDICS | Facility: CLINIC | Age: 38
End: 2018-05-09
Payer: COMMERCIAL

## 2018-05-09 DIAGNOSIS — Z09 SURGICAL FOLLOW-UP CARE: Primary | ICD-10-CM

## 2018-05-09 ASSESSMENT — ENCOUNTER SYMPTOMS
POLYPHAGIA: 0
ALTERED TEMPERATURE REGULATION: 0
JOINT SWELLING: 0
HALLUCINATIONS: 0
STIFFNESS: 0
MUSCLE CRAMPS: 1
FATIGUE: 0
WEIGHT LOSS: 0
POLYDIPSIA: 0
CHILLS: 0
NIGHT SWEATS: 0
NECK PAIN: 0
MYALGIAS: 1
ARTHRALGIAS: 0
INCREASED ENERGY: 0
MUSCLE WEAKNESS: 0
WEIGHT GAIN: 0
BACK PAIN: 1
FEVER: 0
DECREASED APPETITE: 0

## 2018-05-09 NOTE — PROGRESS NOTES
Reason for visit:    Lakhwinder Jones came in to the clinic for a two week post op check.    His surgery was done 4/18/18 by Dr Rivera.  He had RIGHT 5th MT fx ORIF.     Assessment:    Lakhwinder came into the clinic in a tall CAM boot Non-WB. He states he has some pain and tingling over his incision site.    The Surgical wounds were exposed and found to be well-healed; so the sutures were removed.    Plan:     He was placed in his tall CAM boot.  He was told to increase to WBAT. He was informed to not push through the pain with ambulation. Orders for PT were placed. Signs/symptoms of infection were reviewed.      He has an appointment to see Dr. Rivera at that time Dr. Rivera will determine further restrictions.    He has our phone number and will call with questions or problems.

## 2018-05-09 NOTE — MR AVS SNAPSHOT
After Visit Summary   5/9/2018    Lakhwinder Jones    MRN: [id number]           Patient Information     Date Of Birth          1980        Visit Information        Provider Department      5/9/2018 8:30 AM Nurse, [facility]        Today's Diagnoses     Surgical follow-up care    -  1       Follow-ups after your visit        Your next 10 appointments already scheduled     May 24, 2018 12:20 PM CDT   Office Visit with Suellen Page MD   [facility]    [address] [place] [address] [phone]           Bring a current list of meds and any records pertaining to this visit. For Physicals, please bring immunization records and any forms needing to be filled out. Please arrive 10 minutes early to complete paperwork.            May 29, 2018  5:40 PM CDT   (Arrive by 5:25 PM)   POST-OP FOOT/ANKLE with Ben Rivera MD   [facility] ([facility])    [address] [place] [address] [phone]              Who to contact     Please call your clinic at [phone] to:    Ask questions about your health    Make or cancel appointments    Discuss your medicines    Learn about your test results    Speak to your doctor            Additional Information About Your Visit        MyChart Information     [employer] gives you secure access to your electronic health record. If you see a primary care provider, you can also send messages to your care team and make appointments. If you have questions, please call your primary care clinic.  If you do not have a primary care provider, please call [phone] and they will assist you.      [employer]z is an electronic gateway that provides easy, online access to your medical records. With [employer], you can request a clinic appointment, read your test results, renew a prescription or  communicate with your care team.     To access your existing account, please contact your [facility] or call [phone] for assistance.        Care EveryWhere ID     This is your Care EveryWhere ID. This could be used by other organizations to access your [place] medical records  [id number]         Blood Pressure from Last 3 Encounters:   05/05/18 (!) 145/93   04/13/18 130/84   03/29/18 129/80    Weight from Last 3 Encounters:   05/05/18 (!) 152.4 kg (336 lb)   04/13/18 (!) 158.3 kg (349 lb)   04/10/18 (!) 156 kg (344 lb)              Today, you had the following     No orders found for display       Primary Care Provider Office Phone # Fax #    Simona Prasad PA-C [phone]       [address]  [facility] [address]        Equal Access to Services     KENTON POLLOCK : Hadii aad ku hadasho Soomaali, waaxda luqadaha, qaybta kaalmada adeegyada, waxay idiin hayaan valentin khmassimo keller . So [facility] [phone].    ATENCIÓN: Si habla español, tiene a rose disposición servicios gratuitos de asistencia lingüística. Llame al [phone].    We comply with applicable federal civil rights laws and [place] laws. We do not discriminate on the basis of race, color, national origin, age, disability, sex, sexual orientation, or gender identity.            Thank you!     Thank you for choosing [facility]  for your care. Our goal is always to provide you with excellent care. Hearing back from our patients is one way we can continue to improve our services. Please take a few minutes to complete the written survey that you may receive in the mail after your visit with us. Thank you!             Your Updated Medication List - Protect others around you: Learn how to safely use, store and throw away your medicines at www.disposemymeds.org.          This list is accurate as of 5/9/18  9:03 AM.  Always use your most recent med list.                    Brand Name Dispense Instructions for use Diagnosis    * adalimumab 40 MG/0.8ML prefilled syringe kit    HUMIRA    6 Syringe    Inject 2 40 mg pens first day, inject 1 40 mg pen day 8 and day 22, then 40mg every other week    Plaque psoriasis       * adalimumab 40 MG/0.8ML prefilled syringe kit    HUMIRA    2 Syringe    Inject 0.8 mLs (40 mg) Subcutaneous every 14 days    Plaque psoriasis       * adalimumab 40 MG/0.8ML prefilled syringe kit    HUMIRA    2 Syringe    Inject 0.8 mLs (40 mg) Subcutaneous every 14 days    Plaque psoriasis       citalopram 40 MG tablet    celeXA    90 tablet    Take 1 tablet (40 mg) by mouth daily    Anxiety state, Panic disorder without agoraphobia       * cyclobenzaprine 10 MG tablet    FLEXERIL    30 tablet    Take 1 tablet (10 mg) by mouth 2 times daily as needed for muscle spasms    Nondisplaced fracture of fifth metatarsal bone, right foot, sequela       * cyclobenzaprine 5 MG tablet    FLEXERIL    18 tablet    Take 1 tablet (5 mg) by mouth 3 times daily as needed for muscle spasms    Back muscle spasm       HYDROcodone-acetaminophen 5-325 MG per tablet    NORCO    30 tablet    Take 1-2 tablets by mouth every 8 hours as needed for moderate to severe pain maximum 6 tablet(s) per day    Closed nondisplaced fracture of fifth metatarsal bone of right foot with delayed healing, subsequent encounter       * order for DME     1 Units    Roll-A-Bout Walker. Patient can use for right foot.    Closed nondisplaced fracture of fifth metatarsal bone of right foot, initial encounter       * order for DME     1 Device    Equipment being ordered: [id number] $249  Walking Boot XL Funmilayo Pneumatic    Closed nondisplaced fracture of fifth metatarsal bone of right foot, initial encounter       oxyCODONE IR 5 MG tablet    ROXICODONE    12 tablet    Take 1-2 tablets (5-10 mg) by mouth every 4 hours as needed for moderate to severe pain or severe pain    S/P foot joint operation       * Notice:  This  list has 7 medication(s) that are the same as other medications prescribed for you. Read the directions carefully, and ask your doctor or other care provider to review them with you.

## 2018-05-10 ENCOUNTER — THERAPY VISIT (OUTPATIENT)
Dept: PHYSICAL THERAPY | Facility: CLINIC | Age: 38
End: 2018-05-10
Payer: COMMERCIAL

## 2018-05-10 DIAGNOSIS — Z47.89 AFTERCARE FOLLOWING SURGERY OF THE MUSCULOSKELETAL SYSTEM: ICD-10-CM

## 2018-05-10 DIAGNOSIS — M25.571 PAIN IN JOINT INVOLVING ANKLE AND FOOT, RIGHT: Primary | ICD-10-CM

## 2018-05-10 PROCEDURE — 97110 THERAPEUTIC EXERCISES: CPT | Mod: GP | Performed by: PHYSICAL THERAPIST

## 2018-05-10 PROCEDURE — 97161 PT EVAL LOW COMPLEX 20 MIN: CPT | Mod: GP | Performed by: PHYSICAL THERAPIST

## 2018-05-10 NOTE — MR AVS SNAPSHOT
After Visit Summary   5/10/2018    Lakhwinder Jones    MRN: [id number]           Patient Information     Date Of Birth          1980        Visit Information        Provider Department      5/10/2018 10:20 AM Alexa Terry PT [facility]        Today's Diagnoses     Pain in joint involving ankle and foot, right    -  1    Aftercare following surgery of the musculoskeletal system           Follow-ups after your visit        Your next 10 appointments already scheduled     May 18, 2018  9:00 AM CDT   DAVID Extremity with Peggy Ivan PTA   [facility] ([facility]  )    [address]  [facility] [id number] [phone]            May 24, 2018 12:20 PM CDT   Office Visit with Suellen Page MD   [facility] ([address] [address] [phone]           Bring a current list of meds and any records pertaining to this visit. For Physicals, please bring immunization records and any forms needing to be filled out. Please arrive 10 minutes early to complete paperwork.            May 24, 2018  4:50 PM CDT   DAVID Extremity with Alexa Terry PT   [facility] ([facility]  )    [address]  [facility] [id number] [phone]            May 29, 2018  5:40 PM CDT   (Arrive by 5:25 PM)   POST-OP FOOT/ANKLE with Ben Rivera MD   [facility] ([facility])    [address] [place] [id number] [phone]            May 31, 2018  9:40 AM CDT   DAVID Extremity with Alexa Terry PT   [facility] ([facility]  )    [address]  [facility] [id number] [phone]              Who to contact     If you have questions or need follow up information about  today's clinic visit or your schedule please contact [facility] directly at [phone].  Normal or non-critical lab and imaging results will be communicated to you by [employer], letter or phone within 4 business days after the clinic has received the results. If you do not hear from us within 7 days, please contact the clinic through [employer] or phone. If you have a critical or abnormal lab result, we will notify you by phone as soon as possible.  Submit refill requests through "[employer]" or call your pharmacy and they will forward the refill request to us. Please allow 3 business days for your refill to be completed.          Additional Information About Your Visit        "[employer]" Information     "[employer]" gives you secure access to your electronic health record. If you see a primary care provider, you can also send messages to your care team and make appointments. If you have questions, please call your primary care clinic.  If you do not have a primary care provider, please call [phone] and they will assist you.        Care EveryWhere ID     This is your Care EveryWhere ID. This could be used by other organizations to access your [facility] medical records  [id number]         Blood Pressure from Last 3 Encounters:   05/05/18 (!) 145/93   04/13/18 130/84   03/29/18 129/80    Weight from Last 3 Encounters:   05/05/18 (!) 152.4 kg (336 lb)   04/13/18 (!) 158.3 kg (349 lb)   04/10/18 (!) 156 kg (344 lb)              We Performed the Following     HC PT EVAL, LOW COMPLEXITY     DAVID INITIAL EVAL REPORT     THERAPEUTIC EXERCISES        Primary Care Provider Office Phone # Fax #    Simona Prasad PA-C [phone]       [address]  [place] [facility] [address]        Equal Access to Services     KENTON POLLOCK : Hadii aad ku hadasho Soomaali, waaxda luqadaha, qaybta kaalmada adeegyada, waxay lida guerrero. So [facility]  [phone].    ATENCIÓN: Si baljit manning, tiene a rose disposición servicios gratuitos de asistencia lingüística. Mary Beth kennedy [phone].    We comply with applicable federal civil rights laws and [place] laws. We do not discriminate on the basis of race, color, national origin, age, disability, sex, sexual orientation, or gender identity.            Thank you!     Thank you for choosing [facility]  for your care. Our goal is always to provide you with excellent care. Hearing back from our patients is one way we can continue to improve our services. Please take a few minutes to complete the written survey that you may receive in the mail after your visit with us. Thank you!             Your Updated Medication List - Protect others around you: Learn how to safely use, store and throw away your medicines at www.disposemymeds.org.          This list is accurate as of 5/10/18 12:58 PM.  Always use your most recent med list.                   Brand Name Dispense Instructions for use Diagnosis    * adalimumab 40 MG/0.8ML prefilled syringe kit    HUMIRA    6 Syringe    Inject 2 40 mg pens first day, inject 1 40 mg pen day 8 and day 22, then 40mg every other week    Plaque psoriasis       * adalimumab 40 MG/0.8ML prefilled syringe kit    HUMIRA    2 Syringe    Inject 0.8 mLs (40 mg) Subcutaneous every 14 days    Plaque psoriasis       * adalimumab 40 MG/0.8ML prefilled syringe kit    HUMIRA    2 Syringe    Inject 0.8 mLs (40 mg) Subcutaneous every 14 days    Plaque psoriasis       citalopram 40 MG tablet    celeXA    90 tablet    Take 1 tablet (40 mg) by mouth daily    Anxiety state, Panic disorder without agoraphobia       * cyclobenzaprine 10 MG tablet    FLEXERIL    30 tablet    Take 1 tablet (10 mg) by mouth 2 times daily as needed for muscle spasms    Nondisplaced fracture of fifth metatarsal bone, right foot, sequela       * cyclobenzaprine 5 MG tablet    FLEXERIL    18 tablet     Take 1 tablet (5 mg) by mouth 3 times daily as needed for muscle spasms    Back muscle spasm       HYDROcodone-acetaminophen 5-325 MG per tablet    NORCO    30 tablet    Take 1-2 tablets by mouth every 8 hours as needed for moderate to severe pain maximum 6 tablet(s) per day    Closed nondisplaced fracture of fifth metatarsal bone of right foot with delayed healing, subsequent encounter       * order for DME     1 Units    Roll-A-Bout Walker. Patient can use for right foot.    Closed nondisplaced fracture of fifth metatarsal bone of right foot, initial encounter       * order for DME     1 Device    Equipment being ordered: [id number] $249  Walking Boot XL Funmilayo Pneumatic    Closed nondisplaced fracture of fifth metatarsal bone of right foot, initial encounter       oxyCODONE IR 5 MG tablet    ROXICODONE    12 tablet    Take 1-2 tablets (5-10 mg) by mouth every 4 hours as needed for moderate to severe pain or severe pain    S/P foot joint operation       * Notice:  This list has 7 medication(s) that are the same as other medications prescribed for you. Read the directions carefully, and ask your doctor or other care provider to review them with you.

## 2018-05-10 NOTE — PROGRESS NOTES
[facility] Initial Evaluation  Subjective:  Patient is a 37 year old male presenting with rehab right ankle/foot hpi. The history is provided by the patient. No  was used.   Lakhwinder Jones is a 37 year old male with a right foot condition.  Condition occurred with:  A fall/slip.  Condition occurred: at home.    Patient is s/p right 5th MTP fx ORIF 4/18/2018.  Initial injury occurred in October 2017 after a fall.  Patient currently WBAT in a CAM boot..    Patient reports pain:  Longitudinal arch and other (tenderness around the incision).    Pain is described as burning and other (tight) and is intermittent and reported as 2/10.   Pain is worse in the P.M..  Symptoms are exacerbated by weight bearing, walking and other (intermittent disturbed sleep) and relieved by rest.  Since onset symptoms are gradually improving.        General health as reported by patient is good.  Pertinent medical history includes:  Overweight and depression (Psoriasis, h/o LBP).    Other surgeries include:  Orthopedic surgery (R foot).  Current medications:  Muscle relaxants and anti-depressants.  Current occupation is [employer].  Patient is currently not working due to present treatment problem.  Primary job tasks include:  Prolonged standing.    Barriers include:  None as reported by patient.    Red flags:  None as reported by patient.                        Objective:    Gait:    Weight Bearing Status:  WBAT   Assistive Devices:  CAM            Ankle/Foot Evaluation  ROM:    AROM:    Dorsiflexion:  Left:   8  Right:   0  Plantarflexion:  Left:  65    Right:  35  Inversion:  Left:  25     Right:  15  Eversion:  14     Right:  15        Strength wnl ankle: Patient demonstrated good strength with isometrics.      PALPATION: normal    EDEMA: normal                                                              General     ROS    Assessment/Plan:    Patient is a 37 year old male with right side  ankle complaints.    Patient has the following significant findings with corresponding treatment plan.                Diagnosis 1:  S/p R ankle/foot  Pain -  manual therapy and home program  Decreased ROM/flexibility - manual therapy, therapeutic exercise and home program  Decreased strength - therapeutic exercise, therapeutic activities and home program  Edema - self management/home program  Decreased function - therapeutic activities and home program    Therapy Evaluation Codes:   1) History comprised of:   Personal factors that impact the plan of care:      None.    Comorbidity factors that impact the plan of care are:      None.     Medications impacting care: None.  2) Examination of Body Systems comprised of:   Body structures and functions that impact the plan of care:      Ankle.   Activity limitations that impact the plan of care are:      Standing and Walking.  3) Clinical presentation characteristics are:   Stable/Uncomplicated.  4) Decision-Making    Low complexity using standardized patient assessment instrument and/or measureable assessment of functional outcome.  Cumulative Therapy Evaluation is: Low complexity.    Previous and current functional limitations:  (See Goal Flow Sheet for this information)    Short term and Long term goals: (See Goal Flow Sheet for this information)     Communication ability:  Patient appears to be able to clearly communicate and understand verbal and written communication and follow directions correctly.  Treatment Explanation - The following has been discussed with the patient:   RX ordered/plan of care  Anticipated outcomes  Possible risks and side effects  This patient would benefit from PT intervention to resume normal activities.   Rehab potential is good.    Frequency:  1 X week, once daily  Duration:  for 8 weeks  Discharge Plan:  Achieve all LTG.  Independent in home treatment program.  Reach maximal therapeutic benefit.    Please refer to the daily flowsheet for  treatment today, total treatment time and time spent performing 1:1 timed codes.

## 2018-05-14 ENCOUNTER — TELEPHONE (OUTPATIENT)
Dept: ORTHOPEDICS | Facility: CLINIC | Age: 38
End: 2018-05-14

## 2018-05-14 NOTE — TELEPHONE ENCOUNTER
Returned call to patient. He was told that he is allowed to be weightbearing as tolerated in the cam boot as long as he is not having any pain. He was told if he is having any pain at all ,then he needs to go back to his crutches. He was instructed that he may remove his cam boot for resting,sitting, sleeping and hygiene purposes. He was also written a return to work letter as requested with restrictions. This was faxed to his place of employment at [phone] as requested by patient.

## 2018-05-14 NOTE — TELEPHONE ENCOUNTER
[facility] Call Center    Phone Message    May a detailed message be left on voicemail: yes    Reason for Call: Other: Pt has questions: wants to know if he can be walking on his foot? or completely off of it? can he walk in boot? what are work restrictions? or return to work instructions? Etc - He needs return to work letter so he can return back to work next week - Please return his call to giovanna - Thanks!     Action Taken: Message routed to:  [facility] ([facility] Orthopaedics

## 2018-05-14 NOTE — LETTER
Return to Work  May 14, 2018         Patient:  Lakhwinder Jones  :   1980  MRN:     [id number]  Physician: JAMEL BRADEN may return to work on Date: 18.      The next clinic appointment is scheduled for (date/time) 18.    Patient limitations:  May return to work for 5 hours per day, 5 days per week.  Please allow for breaks as needed. Allow for use of roll-a-bout knee walker while working as walking must be limited. Must wear cam boot at all times while working.         Fax to: [phone]      Electronically signed by Jamel Braden MD

## 2018-05-18 ENCOUNTER — THERAPY VISIT (OUTPATIENT)
Dept: PHYSICAL THERAPY | Facility: CLINIC | Age: 38
End: 2018-05-18
Payer: COMMERCIAL

## 2018-05-18 DIAGNOSIS — Z47.89 AFTERCARE FOLLOWING SURGERY OF THE MUSCULOSKELETAL SYSTEM: ICD-10-CM

## 2018-05-18 DIAGNOSIS — M25.571 PAIN IN JOINT INVOLVING ANKLE AND FOOT, RIGHT: ICD-10-CM

## 2018-05-18 PROCEDURE — 97110 THERAPEUTIC EXERCISES: CPT | Mod: GP | Performed by: PHYSICAL THERAPY ASSISTANT

## 2018-05-18 PROCEDURE — 97140 MANUAL THERAPY 1/> REGIONS: CPT | Mod: GP | Performed by: PHYSICAL THERAPY ASSISTANT

## 2018-05-23 DIAGNOSIS — Z09 SURGICAL FOLLOW-UP CARE: Primary | ICD-10-CM

## 2018-05-24 ENCOUNTER — THERAPY VISIT (OUTPATIENT)
Dept: PHYSICAL THERAPY | Facility: CLINIC | Age: 38
End: 2018-05-24
Payer: COMMERCIAL

## 2018-05-24 ENCOUNTER — OFFICE VISIT (OUTPATIENT)
Dept: FAMILY MEDICINE | Facility: CLINIC | Age: 38
End: 2018-05-24
Payer: COMMERCIAL

## 2018-05-24 VITALS
SYSTOLIC BLOOD PRESSURE: 133 MMHG | BODY MASS INDEX: 37.19 KG/M2 | HEART RATE: 98 BPM | WEIGHT: 315 LBS | HEIGHT: 77 IN | DIASTOLIC BLOOD PRESSURE: 77 MMHG

## 2018-05-24 DIAGNOSIS — Z47.89 AFTERCARE FOLLOWING SURGERY OF THE MUSCULOSKELETAL SYSTEM: ICD-10-CM

## 2018-05-24 DIAGNOSIS — M25.571 PAIN IN JOINT INVOLVING ANKLE AND FOOT, RIGHT: ICD-10-CM

## 2018-05-24 DIAGNOSIS — L40.50 PSORIASIS WITH ARTHROPATHY (H): Primary | ICD-10-CM

## 2018-05-24 DIAGNOSIS — Z79.620 LONG-TERM USE OF ADALIMUMAB: ICD-10-CM

## 2018-05-24 DIAGNOSIS — Z51.81 ENCOUNTER FOR MEDICATION MONITORING: ICD-10-CM

## 2018-05-24 PROCEDURE — 97140 MANUAL THERAPY 1/> REGIONS: CPT | Mod: GP | Performed by: PHYSICAL THERAPIST

## 2018-05-24 PROCEDURE — 97110 THERAPEUTIC EXERCISES: CPT | Mod: GP | Performed by: PHYSICAL THERAPIST

## 2018-05-24 PROCEDURE — 99214 OFFICE O/P EST MOD 30 MIN: CPT | Performed by: FAMILY MEDICINE

## 2018-05-24 NOTE — PROGRESS NOTES
[facility] - PRIMARY CARE SKIN    CC : Psoriasis with arthropathy, surveillance of Humira  SUBJECTIVE:                                                    Lakhwinder oJnes is a 37 year old male who presents to clinic today for follow-up of Humira therapy for chronic psoriasis with arthropathy that started in adolescence at age 19. There has been slight improvement with erythema otherwise minimal response , 3 1/2 months of Humira use. Still a lot of joint aching.    Humira therapy was initiated on 2/10/18. He has been rotating injection sites every 2 weeks. No irritation at injection sites.    Response to therapy: Psoriasis plaques have lessened but have not improved greatly. Joint pain has also persisted.  Side effects: No issues with injections.    Previous therapies tried : methotrexate, Enbrel when younger, phototherapy, various topical creams. Enbrel had previously been the most effective therapy for control, but it was too expensive.    Locations : Face, Neck, Trunk, Arms, Legs, Buttocks and Groin.  Onset : at age 19  Pruritic : YES.  Associated symptoms : itching, redness and scaling.  Symptoms appear to be : worsening and uncontrolled.    Personal Medical History  Skin Cancer : NO  Eczema Psoriasis Rosacea Autoimmune   NO YES - history of psoriasis with arthropathy with previous diagnosis by rheumatology NO NO     Family Medical History  Skin Cancer : YES - in grandfather  Eczema Psoriasis Rosacea Autoimmune   NO YES - in uncle NO YES - in grandmother but not psoriatic arthritis     Occupation : sales (indoor).    Patient Active Problem List   Diagnosis     Anxiety state     Chronic back pain     Depression     Panic disorder without agoraphobia     CARDIOVASCULAR SCREENING; LDL GOAL LESS THAN 160     Abnormal results of liver function studies     Psoriatic arthritis (H)     Morbid obesity (H)     Elevated blood-pressure reading without diagnosis of hypertension     Closed nondisplaced fracture of fifth  metatarsal bone of right foot, initial encounter     Pain in joint involving ankle and foot, right     Aftercare following surgery of the musculoskeletal system       Past Medical History:   Diagnosis Date     Anxiety      Back pain     chronic     Obesity      Psoriatic arthritis (H)     Past Surgical History:   Procedure Laterality Date     NOSE SURGERY      3 times     right elbow surgey      12 yo     TESTICLE SURGERY      21 yo      Social History   Substance Use Topics     Smoking status: Former Smoker     Types: Cigarettes     Smokeless tobacco: Never Used      Comment: 1-2 cigarettes per day to cope with stress     Alcohol use 4.8 oz/week     8 Standard drinks or equivalent per week      Comment: not for a month    Family History     Problem (# of Occurrences) Relation (Name,Age of Onset)    Anxiety Disorder (1) Father    Asthma (1) Brother    Breast Cancer (2) Maternal Grandmother, Paternal Grandmother    Coronary Artery Disease (1) Father    DIABETES (4) Father, Paternal Grandmother, Paternal Grandfather, Paternal Uncle    Depression (3) Father, Maternal Grandmother, Paternal Grandmother    Hyperlipidemia (1) Father    Hypertension (1) Father    MENTAL ILLNESS (3) Father, Maternal Grandmother, Paternal Grandmother    Obesity (1) Mother    Prostate Cancer (1) Maternal Grandfather    Substance Abuse (2) Father, Maternal Grandmother       Negative family history of: CEREBROVASCULAR DISEASE, Anesthesia Reaction, OSTEOPOROSIS, Genetic Disorder, Thyroid Disease, Liver Disease           Prescription Medications as of 5/24/2018             adalimumab (HUMIRA) 40 MG/0.8ML prefilled syringe kit Inject 0.8 mLs (40 mg) Subcutaneous every 14 days    adalimumab (HUMIRA) 40 MG/0.8ML prefilled syringe kit Inject 0.8 mLs (40 mg) Subcutaneous every 14 days    adalimumab (HUMIRA) 40 MG/0.8ML prefilled syringe kit Inject 2 40 mg pens first day, inject 1 40 mg pen day 8 and day 22, then 40mg every other week    citalopram  (CELEXA) 40 MG tablet Take 1 tablet (40 mg) by mouth daily    cyclobenzaprine (FLEXERIL) 10 MG tablet Take 1 tablet (10 mg) by mouth 2 times daily as needed for muscle spasms    cyclobenzaprine (FLEXERIL) 5 MG tablet Take 1 tablet (5 mg) by mouth 3 times daily as needed for muscle spasms    HYDROcodone-acetaminophen (NORCO) 5-325 MG per tablet Take 1-2 tablets by mouth every 8 hours as needed for moderate to severe pain maximum 6 tablet(s) per day    order for DME Equipment being ordered: [id number] $249  Walking Boot XL Funmilayo Pneumatic    order for DME Roll-A-Bout Walker. Patient can use for right foot.    oxyCODONE IR (ROXICODONE) 5 MG tablet Take 1-2 tablets (5-10 mg) by mouth every 4 hours as needed for moderate to severe pain or severe pain            Allergies   Allergen Reactions     Tramadol Other (See Comments)     Shellfish-Derived Products         ROS : 14 point review of systems was negative except the symptoms listed above in the HPI.    This document serves as a record of the services and decisions personally performed and made by Pearl Page MD. It was created on her behalf by Eron Abarca, a trained medical scribe.  The creation of this document is based on the scribe's personal observations and the provider's statements to the medical scribe.  Eron Abarca, May 24, 2018 12:00 PM      OBJECTIVE:                                                      Wt Readings from Last 2 Encounters:   05/24/18 (!) 335 lb (152 kg)   05/05/18 (!) 336 lb (152.4 kg)     GENERAL: healthy, alert, in moderate distress and obese  SKIN: Victoria Skin Type - I.  Face, Neck, Trunk, Arms, Legs, Buttocks and Groin were examined. The dermatoscope was used to help evaluate pigmented lesions.  Skin Pertinent Findings:  Generalized distribution persists. Still thick scaling. No decrease in the areas of involvement. Slightly less erythema, involves arms, legs, hands, legs and trunk.    Diagnostic Test Results:  No results found  for this or any previous visit (from the past 24 hour(s)).    MDM :  Will consider change to Otezla due to ineffectiveness of Humira. Cosentyx, Enbrel, Humira and Otezla all required to fail before insurance will cover Stelara. Will do labs 4 weeks after beginning Otezla.      ASSESSMENT:                                                      Encounter Diagnoses   Name Primary?     Psoriasis with arthropathy (H) Yes     Encounter for medication monitoring      Long-term use of adalimumab          PLAN:                                                    Patient Instructions   FUTURE APPOINTMENTS  Follow up 4 weeks after beginning Otezla.   and administer one more refill of Humira injection for one more dosage.    Take Otezla as instructed on package (this should ramp up to a maximum of 30 mg twice daily).        PROCEDURES:                                                    None.    TT : 20 minutes.  CT : 15 minutes. Discussion regarding etiology, spectrum of psoriasis, treatment options, aggravating factors.      The information in this document, created by the medical scribe for me, accurately reflects the services I personally performed and the decisions made by me. I have reviewed and approved this document for accuracy prior to leaving the patient care area.  Pearl Page MD May 24, 2018 12:00 PM  [facility] - PRIMARY CARE SKIN

## 2018-05-24 NOTE — PATIENT INSTRUCTIONS
FUTURE APPOINTMENTS  Follow up 4 weeks after beginning Otezla.   and administer one more refill of Humira injection for one more dosage.    Take Otezla as instructed on package (this should ramp up to a maximum of 30 mg twice daily).

## 2018-05-24 NOTE — LETTER
5/24/2018         RE: Lakhwinder Jones  [address]  [place] [address]        Dear Colleague,    Thank you for referring your patient, Lakhwinder Jones, to the [facility] [place]. Please see a copy of my visit note below.    [facility] - PRIMARY CARE SKIN    CC : Psoriasis with arthropathy, surveillance of Humira  SUBJECTIVE:                                                    Lakhwinder Jones is a 37 year old male who presents to clinic today for follow-up of Humira therapy for chronic psoriasis with arthropathy that started in adolescence at age 19. There has been slight improvement with erythema otherwise minimal response , 3 1/2 months of Humira use. Still a lot of joint aching.    Humira therapy was initiated on 2/10/18. He has been rotating injection sites every 2 weeks. No irritation at injection sites.    Response to therapy: Psoriasis plaques have lessened but have not improved greatly. Joint pain has also persisted.  Side effects: No issues with injections.    Previous therapies tried : methotrexate, Enbrel when younger, phototherapy, various topical creams. Enbrel had previously been the most effective therapy for control, but it was too expensive.    Locations : Face, Neck, Trunk, Arms, Legs, Buttocks and Groin.  Onset : at age 19  Pruritic : YES.  Associated symptoms : itching, redness and scaling.  Symptoms appear to be : worsening and uncontrolled.    Personal Medical History  Skin Cancer : NO  Eczema Psoriasis Rosacea Autoimmune   NO YES - history of psoriasis with arthropathy with previous diagnosis by rheumatology NO NO     Family Medical History  Skin Cancer : YES - in grandfather  Eczema Psoriasis Rosacea Autoimmune   NO YES - in uncle NO YES - in grandmother but not psoriatic arthritis     Occupation : sales (indoor).    Patient Active Problem List   Diagnosis     Anxiety state     Chronic back pain     Depression     Panic disorder without agoraphobia      CARDIOVASCULAR SCREENING; LDL GOAL LESS THAN 160     Abnormal results of liver function studies     Psoriatic arthritis (H)     Morbid obesity (H)     Elevated blood-pressure reading without diagnosis of hypertension     Closed nondisplaced fracture of fifth metatarsal bone of right foot, initial encounter     Pain in joint involving ankle and foot, right     Aftercare following surgery of the musculoskeletal system       Past Medical History:   Diagnosis Date     Anxiety      Back pain     chronic     Obesity      Psoriatic arthritis (H)     Past Surgical History:   Procedure Laterality Date     NOSE SURGERY      3 times     right elbow surgey      14 yo     TESTICLE SURGERY      21 yo      Social History   Substance Use Topics     Smoking status: Former Smoker     Types: Cigarettes     Smokeless tobacco: Never Used      Comment: 1-2 cigarettes per day to cope with stress     Alcohol use 4.8 oz/week     8 Standard drinks or equivalent per week      Comment: not for a month    Family History     Problem (# of Occurrences) Relation (Name,Age of Onset)    Anxiety Disorder (1) Father    Asthma (1) Brother    Breast Cancer (2) Maternal Grandmother, Paternal Grandmother    Coronary Artery Disease (1) Father    DIABETES (4) Father, Paternal Grandmother, Paternal Grandfather, Paternal Uncle    Depression (3) Father, Maternal Grandmother, Paternal Grandmother    Hyperlipidemia (1) Father    Hypertension (1) Father    MENTAL ILLNESS (3) Father, Maternal Grandmother, Paternal Grandmother    Obesity (1) Mother    Prostate Cancer (1) Maternal Grandfather    Substance Abuse (2) Father, Maternal Grandmother       Negative family history of: CEREBROVASCULAR DISEASE, Anesthesia Reaction, OSTEOPOROSIS, Genetic Disorder, Thyroid Disease, Liver Disease           Prescription Medications as of 5/24/2018             adalimumab (HUMIRA) 40 MG/0.8ML prefilled syringe kit Inject 0.8 mLs (40 mg) Subcutaneous every 14 days    adalimumab  (HUMIRA) 40 MG/0.8ML prefilled syringe kit Inject 0.8 mLs (40 mg) Subcutaneous every 14 days    adalimumab (HUMIRA) 40 MG/0.8ML prefilled syringe kit Inject 2 40 mg pens first day, inject 1 40 mg pen day 8 and day 22, then 40mg every other week    citalopram (CELEXA) 40 MG tablet Take 1 tablet (40 mg) by mouth daily    cyclobenzaprine (FLEXERIL) 10 MG tablet Take 1 tablet (10 mg) by mouth 2 times daily as needed for muscle spasms    cyclobenzaprine (FLEXERIL) 5 MG tablet Take 1 tablet (5 mg) by mouth 3 times daily as needed for muscle spasms    HYDROcodone-acetaminophen (NORCO) 5-325 MG per tablet Take 1-2 tablets by mouth every 8 hours as needed for moderate to severe pain maximum 6 tablet(s) per day    order for DME Equipment being ordered: [id number] $249  Walking Boot XL [employer]mil[employer]o Pneumatic    order for DME Roll-A-Bout Walker. Patient can use for right foot.    oxyCODONE IR (ROXICODONE) 5 MG tablet Take 1-2 tablets (5-10 mg) by mouth every 4 hours as needed for moderate to severe pain or severe pain            Allergies   Allergen Reactions     Tramadol Other (See Comments)     Shellfish-Derived Products         ROS : 14 point review of systems was negative except the symptoms listed above in the HPI.    This document serves as a record of the services and decisions personally performed and made by Pearl Page MD. It was created on her behalf by Eron Abarca, a trained medical scribe.  The creation of this document is based on the scribe's personal observations and the provider's statements to the medical scribe.  Eron Abarca, May 24, 2018 12:00 PM      OBJECTIVE:                                                      Wt Readings from Last 2 Encounters:   05/24/18 (!) 335 lb (152 kg)   05/05/18 (!) 336 lb (152.4 kg)     GENERAL: healthy, alert, in moderate distress and obese  SKIN: Victoria Skin Type - I.  Face, Neck, Trunk, Arms, Legs, Buttocks and Groin were examined. The dermatoscope was used to help evaluate  pigmented lesions.  Skin Pertinent Findings:  Generalized distribution persists. Still thick scaling. No decrease in the areas of involvement. Slightly less erythema, involves arms, legs, hands, legs and trunk.    Diagnostic Test Results:  No results found for this or any previous visit (from the past 24 hour(s)).    MDM :  Will consider change to Otezla due to ineffectiveness of Humira. Cosentyx, Enbrel, Humira and Otezla all required to fail [facility]. Will do labs 4 weeks after beginning Otezla.      ASSESSMENT:                                                      Encounter Diagnoses   Name Primary?     Psoriasis with arthropathy (H) Yes     Encounter for medication monitoring      Long-term use of adalimumab          PLAN:                                                    Patient Instructions   FUTURE APPOINTMENTS  Follow up 4 weeks after beginning Otezla.   and administer one more refill of Humira injection for one more dosage.    Take Otezla as instructed on package (this should ramp up to a maximum of 30 mg twice daily).        PROCEDURES:                                                    None.    TT : 20 minutes.  CT : 15 minutes. Discussion regarding etiology, spectrum of psoriasis, treatment options, aggravating factors.      The information in this document, created by the medical scribe for me, accurately reflects the services I personally performed and the decisions made by me. I have reviewed and approved this document for accuracy prior to leaving the patient care area.  Pearl Page MD May 24, 2018 12:00 PM  [facility] - PRIMARY CARE SKIN    Again, thank you for allowing me to participate in the care of your patient.        Sincerely,        Suellen Page MD

## 2018-05-24 NOTE — MR AVS SNAPSHOT
After Visit Summary   5/24/2018    Lakhwinder Jones    MRN: [id number]           Patient Information     Date Of Birth          1980        Visit Information        Provider Department      5/24/2018 12:20 PM Suellen Page MD [facility]        Today's Diagnoses     Psoriasis with arthropathy (H)    -  1    Encounter for medication monitoring        Long-term use of adalimumab          Care Instructions    FUTURE APPOINTMENTS  Follow up 4 weeks after beginning Otezla.   and administer one more refill of Humira injection for one more dosage.    Take Otezla as instructed on package (this should ramp up to a maximum of 30 mg twice daily).          Follow-ups after your visit        Your next 10 appointments already scheduled     May 24, 2018  4:50 PM CDT   DAVID Extremity with Alexa Terry PT   [facility] ([facility]  )    [address]  [facility] [address] [phone]            May 29, 2018  5:20 PM CDT   XR FOOT RIGHT G/E 3 VIEWS with [id number]   [facility] ([facility])    [address] [place] [address] [phone]           Please bring a list of your current medicines to your exam. (Include vitamins, minerals and over-thecounter medicines.) Leave your valuables at home.  Tell your doctor if there is a chance you may be pregnant.  You do not need to do anything special for this exam.            May 29, 2018  5:40 PM CDT   (Arrive by 5:25 PM)   POST-OP FOOT/ANKLE with Ben Rivera MD   [facility] ([facility])    [address] [address] [phone]            May 31, 2018  9:40 AM CDT   DAVID Extremity with Alexa Terry PT   [facility] ([facility]  )    [address]  [facility] [address]  "  [phone]              Future tests that were ordered for you today     Open Future Orders        Priority Expected Expires Ordered    XR Foot RT G/E 3 vw Routine 5/23/2018 6/22/2018 5/23/2018            Who to contact     If you have questions or need follow up information about today's clinic visit or your schedule please contact [facility] [place] directly at [phone].  Normal or non-critical lab and imaging results will be communicated to you by [employer], letter or phone within 4 business days after the clinic has received the results. If you do not hear from us within 7 days, please contact the clinic through [employer] or phone. If you have a critical or abnormal lab result, we will notify you by phone as soon as possible.  Submit refill requests through [employer] or call your pharmacy and they will forward the refill request to us. Please allow 3 business days for your refill to be completed.          Additional Information About Your Visit        [employer] Information     [employer] gives you secure access to your electronic health record. If you see a primary care provider, you can also send messages to your care team and make appointments. If you have questions, please call your primary care clinic.  If you do not have a primary care provider, please call [phone] and they will assist you.        Care EveryWhere ID     This is your Care EveryWhere ID. This could be used by other organizations to access your [place] medical records  [id number]        Your Vitals Were     Pulse Height BMI (Body Mass Index)             98 6' 5\" (1.956 m) 39.73 kg/m2          Blood Pressure from Last 3 Encounters:   05/24/18 133/77   05/05/18 (!) 145/93   04/13/18 130/84    Weight from Last 3 Encounters:   05/24/18 (!) 335 lb (152 kg)   05/05/18 (!) 336 lb (152.4 kg)   04/13/18 (!) 349 lb (158.3 kg)              Today, you had the following     No orders found for display         Today's Medication Changes    "       These changes are accurate as of 5/24/18 12:22 PM.  If you have any questions, ask your nurse or doctor.               Start taking these medicines.        Dose/Directions    Apremilast 10 & 20 & 30 MG Tbpk   Commonly known as:  OTEZLA   Used for:  Psoriasis with arthropathy (H)   Started by:  Suellen Page MD        Take one tablet in the morning on day 1, then take one tablet every 12 hours on days 2 thru 14, according to the instructions on the packet.   Quantity:  27 tablet   Refills:  0            Where to get your medicines      Call your pharmacy to confirm that your medication is ready for pickup. It may take up to 24 hours for them to receive the prescription. If the prescription is not ready within 3 business days, please contact your clinic or your provider.     We will let you know when these medications are ready. If you don't hear back within 3 business days, please contact us.     Apremilast 10 & 20 & 30 MG Tbpk                Primary Care Provider Office Phone # Fax #    Simona Prasad PA-C [phone]       [address] [facility] [address]        Equal Access to Services     [facility]: Hadii aad ku hadasho Soomaali, waaxda luqadaha, qaybta kaalmada adeegyada, adina keller . So [facility] [phone].    ATENCIÓN: Si habla español, tiene a rose disposición servicios gratuitos de asistencia lingüística. Llame al [phone].    We comply with applicable federal civil rights laws and [place] laws. We do not discriminate on the basis of race, color, national origin, age, disability, sex, sexual orientation, or gender identity.            Thank you!     Thank you for choosing [facility] [place]  for your care. Our goal is always to provide you with excellent care. Hearing back from our patients is one way we can continue to improve our services. Please take a few minutes to complete the written survey that you  may receive in the mail after your visit with us. Thank you!             Your Updated Medication List - Protect others around you: Learn how to safely use, store and throw away your medicines at www.disposemymeds.org.          This list is accurate as of 5/24/18 12:22 PM.  Always use your most recent med list.                   Brand Name Dispense Instructions for use Diagnosis    * adalimumab 40 MG/0.8ML prefilled syringe kit    HUMIRA    6 Syringe    Inject 2 40 mg pens first day, inject 1 40 mg pen day 8 and day 22, then 40mg every other week    Plaque psoriasis       * adalimumab 40 MG/0.8ML prefilled syringe kit    HUMIRA    2 Syringe    Inject 0.8 mLs (40 mg) Subcutaneous every 14 days    Plaque psoriasis       * adalimumab 40 MG/0.8ML prefilled syringe kit    HUMIRA    2 Syringe    Inject 0.8 mLs (40 mg) Subcutaneous every 14 days    Plaque psoriasis       Apremilast 10 & 20 & 30 MG Tbpk    OTEZLA    27 tablet    Take one tablet in the morning on day 1, then take one tablet every 12 hours on days 2 thru 14, according to the instructions on the packet.    Psoriasis with arthropathy (H)       citalopram 40 MG tablet    celeXA    90 tablet    Take 1 tablet (40 mg) by mouth daily    Anxiety state, Panic disorder without agoraphobia       * cyclobenzaprine 10 MG tablet    FLEXERIL    30 tablet    Take 1 tablet (10 mg) by mouth 2 times daily as needed for muscle spasms    Nondisplaced fracture of fifth metatarsal bone, right foot, sequela       * cyclobenzaprine 5 MG tablet    FLEXERIL    18 tablet    Take 1 tablet (5 mg) by mouth 3 times daily as needed for muscle spasms    Back muscle spasm       HYDROcodone-acetaminophen 5-325 MG per tablet    NORCO    30 tablet    Take 1-2 tablets by mouth every 8 hours as needed for moderate to severe pain maximum 6 tablet(s) per day    Closed nondisplaced fracture of fifth metatarsal bone of right foot with delayed healing, subsequent encounter       * order for DME     1  Units    Roll-A-Bout Walker. Patient can use for right foot.    Closed nondisplaced fracture of fifth metatarsal bone of right foot, initial encounter       * order for DME     1 Device    Equipment being ordered: [id number] $249  Walking Boot XL [employer] Pneumatic    Closed nondisplaced fracture of fifth metatarsal bone of right foot, initial encounter       oxyCODONE IR 5 MG tablet    ROXICODONE    12 tablet    Take 1-2 tablets (5-10 mg) by mouth every 4 hours as needed for moderate to severe pain or severe pain    S/P foot joint operation       * Notice:  This list has 7 medication(s) that are the same as other medications prescribed for you. Read the directions carefully, and ask your doctor or other care provider to review them with you.

## 2018-05-24 NOTE — PROGRESS NOTES
Subjective:  HPI                    Objective:  System    Physical Exam    General     ROS    Assessment/Plan:    PROGRESS  REPORT    Progress reporting period is from 5/10/2018 to 5/24/2018.   Patient has been seen for 3 visits.    SUBJECTIVE  Subjective: Patient reports the foot is doing fine.  Patient has returned to work 5 hours shifts.  Foot is sore at end of shift.  Soreness present primarily along the outside bottom of the foot.  Exercises are going fine and ROM is improving    Current Pain level: 1/10.     Initial Pain level: 2/10.   Changes in function:  Yes (See Goal flowsheet attached for changes in current functional level)  Adverse reaction to treatment or activity: None    OBJECTIVE    Objective: AROM: R ankle ROM has improved to 9-10 degrees DF, 45-50 degrees PF and 22 degrees inversion.     ASSESSMENT/PLAN  Updated problem list and treatment plan: Diagnosis 1:  S/p R ankle/foot  Pain -  manual therapy and home program  Decreased ROM/flexibility - manual therapy, therapeutic exercise and home program  Decreased strength - therapeutic exercise, therapeutic activities and home program  Edema - self management/home program and manual therapy  Decreased function - therapeutic activities and home program  STG/LTGs have been met or progress has been made towards goals:  Yes (See Goal flow sheet completed today.)  Assessment of Progress: The patient's condition is improving.  Self Management Plans:  Patient has been instructed in a home treatment program.  I have re-evaluated this patient and find that the nature, scope, duration and intensity of the therapy is appropriate for the medical condition of the patient.  Lakhwinder continues to require the following intervention to meet STG and LTG's:  PT    Recommendations:  This patient would benefit from continued therapy.     Frequency:  1 X week, once daily  Duration:  for 6 weeks        Please refer to the daily flowsheet for treatment today, total treatment time  and time spent performing 1:1 timed codes.

## 2018-05-24 NOTE — MR AVS SNAPSHOT
After Visit Summary   5/24/2018    Lakhwinder Jones    MRN: [id number]           Patient Information     Date Of Birth          1980        Visit Information        Provider Department      5/24/2018 4:50 PM Alexa Terry PT [facility]        Today's Diagnoses     Pain in joint involving ankle and foot, right        Aftercare following surgery of the musculoskeletal system           Follow-ups after your visit        Your next 10 appointments already scheduled     May 29, 2018  5:20 PM CDT   XR FOOT RIGHT G/E 3 VIEWS with [id number]   [facility] ([facility])    [address] [place] [id number] [phone]           Please bring a list of your current medicines to your exam. (Include vitamins, minerals and over-thecounter medicines.) Leave your valuables at home.  Tell your doctor if there is a chance you may be pregnant.  You do not need to do anything special for this exam.            May 29, 2018  5:40 PM CDT   (Arrive by 5:25 PM)   POST-OP FOOT/ANKLE with Ben Rivera MD   [facility] ([facility])    [address] [place] [address] [id number] [phone]            May 31, 2018  9:40 AM CDT   DAVID Extremity with Alexa Terry PT   [facility] ([facility]  )    [address]  [facility] [address] [phone]              Future tests that were ordered for you today     Open Future Orders        Priority Expected Expires Ordered    XR Foot RT G/E 3 vw Routine 5/23/2018 6/22/2018 5/23/2018            Who to contact     If you have questions or need follow up information about today's clinic visit or your schedule please contact [facility] directly at [phone].  Normal or non-critical lab and imaging results will be  communicated to you by [employer], letter or phone within 4 business days after the clinic has received the results. If you do not hear from us within 7 days, please contact the clinic through EDAN or phone. If you have a critical or abnormal lab result, we will notify you by phone as soon as possible.  Submit refill requests through EDAN or call your pharmacy and they will forward the refill request to us. Please allow 3 business days for your refill to be completed.          Additional Information About Your Visit        EDAN Information     [employer]AN gives you secure access to your electronic health record. If you see a primary care provider, you can also send messages to your care team and make appointments. If you have questions, please call your primary care clinic.  If you do not have a primary care provider, please call [phone] and they will assist you.        Care EveryWhere ID     This is your Care EveryWhere ID. This could be used by other organizations to access your [facility] medical records  [id number]         Blood Pressure from Last 3 Encounters:   05/24/18 133/77   05/05/18 (!) 145/93   04/13/18 130/84    Weight from Last 3 Encounters:   05/24/18 (!) 152 kg (335 lb)   05/05/18 (!) 152.4 kg (336 lb)   04/13/18 (!) 158.3 kg (349 lb)              We Performed the Following     [facility] PROGRESS NOTES REPORT     MANUAL THER TECH,1+REGIONS,EA 15 MIN     THERAPEUTIC EXERCISES          Today's Medication Changes          These changes are accurate as of 5/24/18  5:29 PM.  If you have any questions, ask your nurse or doctor.               Start taking these medicines.        Dose/Directions    Apremilast 10 & 20 & 30 MG Tbpk   Commonly known as:  OTEZLA   Used for:  Psoriasis with arthropathy (H)   Started by:  Suellen Page MD        Take one tablet in the morning on day 1, then take one tablet every 12 hours on days 2 thru 14, according to the instructions on the packet.   Quantity:  27  tablet   Refills:  0            Where to get your medicines      These medications were sent to [address] [address]     Phone:  [phone]     Apremilast 10 & 20 & 30 MG Tbpk                Primary Care Provider Office Phone # Fax #    Simona Tigre Prasad PA-C [phone] [address]  [facility] [address]        Equal Access to Services     [place] : Hadii aad ku hadasho Soomaali, waaxda luqadaha, qaybta kaalmada adeegyada, waxay idiin hayaan adeeg kharash la'leah . So [facility] [phone].    ATENCIÓN: Si habla español, tiene a rose disposición servicios gratuitos de asistencia lingüística. [facility] [phone].    We comply with applicable federal civil rights laws and [place] laws. We do not discriminate on the basis of race, color, national origin, age, disability, sex, sexual orientation, or gender identity.            Thank you!     Thank you for choosing [facility]  for your care. Our goal is always to provide you with excellent care. Hearing back from our patients is one way we can continue to improve our services. Please take a few minutes to complete the written survey that you may receive in the mail after your visit with us. Thank you!             Your Updated Medication List - Protect others around you: Learn how to safely use, store and throw away your medicines at www.disposemymeds.org.          This list is accurate as of 5/24/18  5:29 PM.  Always use your most recent med list.                   Brand Name Dispense Instructions for use Diagnosis    * adalimumab 40 MG/0.8ML prefilled syringe kit    HUMIRA    6 Syringe    Inject 2 40 mg pens first day, inject 1 40 mg pen day 8 and day 22, then 40mg every other week    Plaque psoriasis       * adalimumab 40 MG/0.8ML prefilled syringe kit    HUMIRA    2 Syringe    Inject 0.8 mLs (40 mg) Subcutaneous  every 14 days    Plaque psoriasis       * adalimumab 40 MG/0.8ML prefilled syringe kit    HUMIRA    2 Syringe    Inject 0.8 mLs (40 mg) Subcutaneous every 14 days    Plaque psoriasis       Apremilast 10 & 20 & 30 MG Tbpk    OTEZLA    27 tablet    Take one tablet in the morning on day 1, then take one tablet every 12 hours on days 2 thru 14, according to the instructions on the packet.    Psoriasis with arthropathy (H)       citalopram 40 MG tablet    celeXA    90 tablet    Take 1 tablet (40 mg) by mouth daily    Anxiety state, Panic disorder without agoraphobia       * cyclobenzaprine 10 MG tablet    FLEXERIL    30 tablet    Take 1 tablet (10 mg) by mouth 2 times daily as needed for muscle spasms    Nondisplaced fracture of fifth metatarsal bone, right foot, sequela       * cyclobenzaprine 5 MG tablet    FLEXERIL    18 tablet    Take 1 tablet (5 mg) by mouth 3 times daily as needed for muscle spasms    Back muscle spasm       HYDROcodone-acetaminophen 5-325 MG per tablet    NORCO    30 tablet    Take 1-2 tablets by mouth every 8 hours as needed for moderate to severe pain maximum 6 tablet(s) per day    Closed nondisplaced fracture of fifth metatarsal bone of right foot with delayed healing, subsequent encounter       * order for DME     1 Units    Roll-A-Bout Walker. Patient can use for right foot.    Closed nondisplaced fracture of fifth metatarsal bone of right foot, initial encounter       * order for DME     1 Device    Equipment being ordered: [id number] $249  Walking Boot XL Funmilayo Pneumatic    Closed nondisplaced fracture of fifth metatarsal bone of right foot, initial encounter       oxyCODONE IR 5 MG tablet    ROXICODONE    12 tablet    Take 1-2 tablets (5-10 mg) by mouth every 4 hours as needed for moderate to severe pain or severe pain    S/P foot joint operation       * Notice:  This list has 7 medication(s) that are the same as other medications prescribed for you. Read the directions carefully, and  ask your doctor or other care provider to review them with you.

## 2018-05-29 ENCOUNTER — OFFICE VISIT (OUTPATIENT)
Dept: ORTHOPEDICS | Facility: CLINIC | Age: 38
End: 2018-05-29
Payer: COMMERCIAL

## 2018-05-29 ENCOUNTER — RADIANT APPOINTMENT (OUTPATIENT)
Dept: GENERAL RADIOLOGY | Facility: CLINIC | Age: 38
End: 2018-05-29
Attending: ORTHOPAEDIC SURGERY
Payer: COMMERCIAL

## 2018-05-29 DIAGNOSIS — Z09 SURGICAL FOLLOW-UP CARE: ICD-10-CM

## 2018-05-29 DIAGNOSIS — M25.571 PAIN IN JOINT, ANKLE AND FOOT, RIGHT: Primary | ICD-10-CM

## 2018-05-29 NOTE — PROGRESS NOTES
CHIEF COMPLAINT:  Status post right fifth metatarsal open reduction internal fixation performed on 04/18/2018.      HISTORY OF PRESENT ILLNESS:  Mr. Jones presents today in the accompaniment of his wife.  Overall, he reports to be doing well.  Reports to have some minor pain and discomfort, especially after a day of work.      PHYSICAL EXAMINATION:  On today's visit, he presents with full range of motion of the right ankle.  There is a well-healed surgical incision.  CMS intact.  Skin intact.  Presents with minimum pain with palpation of the base of the fifth metatarsal.      RADIOGRAPHIC EVALUATION:  Three views of the right foot were obtained today which were significant for showing what seems to be a well-healed fracture of the fifth metatarsal.  Hardware is intact and in place.  Alignment is excellent.      ASSESSMENT:  Status post right fifth metatarsal open reduction internal fixation.      PLAN:  I discussed with the patient that he is making progress according to the plan.  He will proceed with the use of the CAM Walker for comfort purposes.  I warned him about the importance of avoiding any pain through the fracture site.      The patient will follow up in 6 weeks from today.  At that time, 3 views of the right foot will be obtained.      All questions were answered.

## 2018-05-29 NOTE — MR AVS SNAPSHOT
After Visit Summary   5/29/2018    Lakhwinder Jones    MRN: [id number]           Patient Information     Date Of Birth          1980        Visit Information        Provider Department      5/29/2018 5:40 PM Ben Rivera MD [facility]        Today's Diagnoses     Pain in joint, ankle and foot, right    -  1       Follow-ups after your visit        Your next 10 appointments already scheduled     May 31, 2018  9:40 AM CDT   DAVID Extremity with Alexa Terry, PT   [facility] ([facility]  )    [address]  [facility] [address]   [phone]            Jul 10, 2018  6:00 PM CDT   (Arrive by 5:45 PM)   RETURN FOOT/ANKLE with Ben Rivera MD   [facility] ([facility])    [address] [address] [phone]              Who to contact     Please call your clinic at [phone] to:    Ask questions about your health    Make or cancel appointments    Discuss your medicines    Learn about your test results    Speak to your doctor            Additional Information About Your Visit        [employer] Information     [employer] gives you secure access to your electronic health record. If you see a primary care provider, you can also send messages to your care team and make appointments. If you have questions, please call your primary care clinic.  If you do not have a primary care provider, please call [phone] and they will assist you.      [employer] is an electronic gateway that provides easy, online access to your medical records. With [employer], you can request a clinic appointment, read your test results, renew a prescription or communicate with your care team.     To access your existing account, please contact your [facility] or call [phone] for assistance.        Care EveryWhere ID     This is your  Care EveryWhere ID. This could be used by other organizations to access your [facility] medical records  [id number]         Blood Pressure from Last 3 Encounters:   05/24/18 133/77   05/05/18 (!) 145/93   04/13/18 130/84    Weight from Last 3 Encounters:   05/24/18 (!) 152 kg (335 lb)   05/05/18 (!) 152.4 kg (336 lb)   04/13/18 (!) 158.3 kg (349 lb)              Today, you had the following     No orders found for display         Today's Medication Changes          These changes are accurate as of 5/29/18  6:42 PM.  If you have any questions, ask your nurse or doctor.               Stop taking these medicines if you haven't already. Please contact your care team if you have questions.     HYDROcodone-acetaminophen 5-325 MG per tablet   Commonly known as:  NORCO   Stopped by:  Ben Rivera MD                    Primary Care Provider Office Phone # Fax #    Simona Prasad PA-C [phone]       [address]  [place] [address]        Equal Access to Services     [facility]: Hadii rashid chang hadasho Somihir, waaxda luqadaha, qaybta kaalmada adebobyada, adina keller . So [facility] [phone].    ATENCIÓN: Si habla español, tiene a rose disposición servicios gratuitos de asistencia lingüística. Llame al [phone].    We comply with applicable federal civil rights laws and [place] laws. We do not discriminate on the basis of race, color, national origin, age, disability, sex, sexual orientation, or gender identity.            Thank you!     Thank you for choosing [facility]  for your care. Our goal is always to provide you with excellent care. Hearing back from our patients is one way we can continue to improve our services. Please take a few minutes to complete the written survey that you may receive in the mail after your visit with us. Thank you!             Your Updated Medication List - Protect others around you: Learn how to  safely use, store and throw away your medicines at www.disposemymeds.org.          This list is accurate as of 5/29/18  6:42 PM.  Always use your most recent med list.                   Brand Name Dispense Instructions for use Diagnosis    * adalimumab 40 MG/0.8ML prefilled syringe kit    HUMIRA    6 Syringe    Inject 2 40 mg pens first day, inject 1 40 mg pen day 8 and day 22, then 40mg every other week    Plaque psoriasis       * adalimumab 40 MG/0.8ML prefilled syringe kit    HUMIRA    2 Syringe    Inject 0.8 mLs (40 mg) Subcutaneous every 14 days    Plaque psoriasis       * adalimumab 40 MG/0.8ML prefilled syringe kit    HUMIRA    2 Syringe    Inject 0.8 mLs (40 mg) Subcutaneous every 14 days    Plaque psoriasis       Apremilast 10 & 20 & 30 MG Tbpk    OTEZLA    27 tablet    Take one tablet in the morning on day 1, then take one tablet every 12 hours on days 2 thru 14, according to the instructions on the packet.    Psoriasis with arthropathy (H)       citalopram 40 MG tablet    celeXA    90 tablet    Take 1 tablet (40 mg) by mouth daily    Anxiety state, Panic disorder without agoraphobia       * cyclobenzaprine 10 MG tablet    FLEXERIL    30 tablet    Take 1 tablet (10 mg) by mouth 2 times daily as needed for muscle spasms    Nondisplaced fracture of fifth metatarsal bone, right foot, sequela       * cyclobenzaprine 5 MG tablet    FLEXERIL    18 tablet    Take 1 tablet (5 mg) by mouth 3 times daily as needed for muscle spasms    Back muscle spasm       * order for DME     1 Units    Roll-A-Bout Walker. Patient can use for right foot.    Closed nondisplaced fracture of fifth metatarsal bone of right foot, initial encounter       * order for DME     1 Device    Equipment being ordered: [id number] $249  Walking Boot XL Funmilayo Pneumatic    Closed nondisplaced fracture of fifth metatarsal bone of right foot, initial encounter       oxyCODONE IR 5 MG tablet    ROXICODONE    12 tablet    Take 1-2 tablets (5-10 mg)  by mouth every 4 hours as needed for moderate to severe pain or severe pain    S/P foot joint operation       * Notice:  This list has 7 medication(s) that are the same as other medications prescribed for you. Read the directions carefully, and ask your doctor or other care provider to review them with you.

## 2018-05-29 NOTE — LETTER
5/29/2018     RE: Lakhwinder Jones  [address]  [facility] [address]     Dear Colleague,    Thank you for referring your patient, Lakhwinder Jones, to the [facility] at [facility]. Please see a copy of my visit note below.    CHIEF COMPLAINT:  Status post right fifth metatarsal open reduction internal fixation performed on 04/18/2018.      HISTORY OF PRESENT ILLNESS:  Mr. Jones presents today in the accompaniment of his wife.  Overall, he reports to be doing well.  Reports to have some minor pain and discomfort, especially after a day of work.      PHYSICAL EXAMINATION:  On today's visit, he presents with full range of motion of the right ankle.  There is a well-healed surgical incision.  CMS intact.  Skin intact.  Presents with minimum pain with palpation of the base of the fifth metatarsal.      RADIOGRAPHIC EVALUATION:  Three views of the right foot were obtained today which were significant for showing what seems to be a well-healed fracture of the fifth metatarsal.  Hardware is intact and in place.  Alignment is excellent.      ASSESSMENT:  Status post right fifth metatarsal open reduction internal fixation.      PLAN:  I discussed with the patient that he is making progress according to the plan.  He will proceed with the use of the CAM Walker for comfort purposes.  I warned him about the importance of avoiding any pain through the fracture site.      The patient will follow up in 6 weeks from today.  At that time, 3 views of the right foot will be obtained.      All questions were answered.     Again, thank you for allowing me to participate in the care of your patient.      Sincerely,    Ben Rivera MD

## 2018-05-29 NOTE — NURSING NOTE
Reason For Visit:   Chief Complaint   Patient presents with     RECHECK     Right 5th MT fx ORIF. DOS 4/18/18           Pain Assessment  Patient Currently in Pain: Denies (some soreness)

## 2018-05-30 ENCOUNTER — TELEPHONE (OUTPATIENT)
Dept: FAMILY MEDICINE | Facility: CLINIC | Age: 38
End: 2018-05-30

## 2018-05-30 NOTE — TELEPHONE ENCOUNTER
Prior Authorization Specialty Medication Request  Otezla  Medication/Dose:   ICD code (if different than what is on RX):    Previously Tried and Failed:    Will consider change to Otezla due to ineffectiveness of Humira. Cosentyx, Enbrel, Humira and Otezla all required to fail before insurance will cover Stelara. Will do labs 4 weeks after beginning Otezla.  Important Lab Values:   Rationale:     Insurance Name:   Coverage information:     Subscriber: [id number] AMY KING     Rel to sub: 02 - Spouse     Member ID: [id number]     Payor: 13-MEDICA Ph: [phone]     Benefit plan: 1575-MEDICA ESSENTIAL Ph: [phone]     Group number: [id number]     Member effective dates: from 01/01/18        Pharmacy Information (if different than what is on RX)  Name:    Phone:

## 2018-05-31 NOTE — TELEPHONE ENCOUNTER
PA Initiation    Medication: Lida - PA Initiated  Insurance Company: MEDICA - Phone [phone] Fax [phone]  Pharmacy Filling the Rx: [facility] - [address] [place] [address]  Filling Pharmacy Phone:    Filling Pharmacy Fax:    Start Date: 5/31/2018

## 2018-06-04 NOTE — TELEPHONE ENCOUNTER
Approved for 2 years: 6/4/2018-6/4/2020. Approval reference number [id number]. Bettie Velázquez RN

## 2018-06-05 NOTE — TELEPHONE ENCOUNTER
Prior Authorization Approval    Authorization Effective Date:    Authorization Expiration Date:    Medication: Otezla - PA Approved  Approved Dose/Quantity:   Reference #: [id number]   Insurance Company: MEDICA - Phone [phone] Fax [phone]  Expected CoPay:       CoPay Card Available: Yes   Foundation Assistance Needed:    Which Pharmacy is filling the prescription (Not needed for infusion/clinic administered): [address]  Pharmacy Notified: Yes  Patient Notified: Yes

## 2018-06-18 ENCOUNTER — TELEPHONE (OUTPATIENT)
Dept: ORTHOPEDICS | Facility: CLINIC | Age: 38
End: 2018-06-18

## 2018-06-18 NOTE — TELEPHONE ENCOUNTER
[facility] Call Center    Phone Message    May a detailed message be left on voicemail: yes    Reason for Call: pt calling to get restrictions to 30-35 hours per week for next 2 weeks- pt leg is getting better and wants to increase depending how pt feels. It is currently at 25 hours per week.  Please fax to his job at [phone] attn: Lakhwinder    Action Taken: Message routed to:  [facility] Orthopedics

## 2018-06-18 NOTE — LETTER
Return to Work  2018         Patient:  Lakhwinder Jones  :   1980  MRN:     [id number]  Physician: JAMEL BRADEN may return to work on Date: 18.      The next clinic appointment is scheduled for (date/time) 7/10/18.    Patient limitations:  May return to work for 30-35 hours per week as pain allows. Restrictions in place until re-evaluated in clinic on 7/10/18.         Fax to: [phone]      Electronically signed by Jamel Braden MD

## 2018-06-19 NOTE — TELEPHONE ENCOUNTER
Patient notified of completed letter and that it was faxed to the number he provided. He will follow up as scheduled.

## 2018-06-26 ENCOUNTER — OFFICE VISIT (OUTPATIENT)
Dept: FAMILY MEDICINE | Facility: CLINIC | Age: 38
End: 2018-06-26
Payer: COMMERCIAL

## 2018-06-26 VITALS
BODY MASS INDEX: 37.19 KG/M2 | TEMPERATURE: 97.7 F | WEIGHT: 315 LBS | SYSTOLIC BLOOD PRESSURE: 144 MMHG | HEART RATE: 90 BPM | DIASTOLIC BLOOD PRESSURE: 86 MMHG | OXYGEN SATURATION: 96 % | HEIGHT: 77 IN

## 2018-06-26 DIAGNOSIS — M54.42 ACUTE MIDLINE LOW BACK PAIN WITH LEFT-SIDED SCIATICA: Primary | ICD-10-CM

## 2018-06-26 DIAGNOSIS — R03.0 ELEVATED BLOOD-PRESSURE READING WITHOUT DIAGNOSIS OF HYPERTENSION: ICD-10-CM

## 2018-06-26 PROCEDURE — 99213 OFFICE O/P EST LOW 20 MIN: CPT | Performed by: NURSE PRACTITIONER

## 2018-06-26 RX ORDER — METHYLPREDNISOLONE 4 MG
TABLET, DOSE PACK ORAL
Qty: 21 TABLET | Refills: 0 | Status: SHIPPED | OUTPATIENT
Start: 2018-06-26 | End: 2018-08-30

## 2018-06-26 RX ORDER — OXYCODONE HYDROCHLORIDE 5 MG/1
5-10 TABLET ORAL EVERY 4 HOURS PRN
Qty: 12 TABLET | Refills: 0 | Status: CANCELLED | OUTPATIENT
Start: 2018-06-26

## 2018-06-26 RX ORDER — CYCLOBENZAPRINE HCL 10 MG
10 TABLET ORAL 3 TIMES DAILY PRN
Qty: 30 TABLET | Refills: 0 | Status: SHIPPED | OUTPATIENT
Start: 2018-06-26 | End: 2018-10-18

## 2018-06-26 NOTE — PROGRESS NOTES
SUBJECTIVE:   Lakhwinder Jones is a 37 year old male who presents to clinic today for the following health issues:      Back Pain       Duration: on going for 12 years. Flared last week and last night        Specific cause: bending down    Description:   Location of pain: middle of back right   Character of pain: dull ache, stabbing, cramping and tightness   Pain radiation:radiates into the left leg  New numbness or weakness in legs, not attributed to pain:  no     Intensity: Currently 5/10, At its worst 9/10    History:   Pain interferes with job: YES  History of back problems: YES  Any previous MRI or X-rays: YES  Sees a specialist for back pain:  YES, 3 OR 4 YEARS AGO  Therapies tried without relief: Ibuprofen and acetaminophen (Tylenol)    Alleviating factors:   Improved by: none      Precipitating factors:  Worsened by: Lifting and Bending    Functional and Psychosocial Screen (Jose [facility] STarT Back):      Not performed today      Accompanying Signs & Symptoms:  Risk of Fracture:  None  Risk of Cauda Equina:  None  Risk of Infection:  None  Risk of Cancer:  None  Risk of Ankylosing Spondylitis:  Onset at age <35, male, AND morning back stiffness. YES    Lakhwinder is a 37 year old male who is being seen for the above complaints. He has a long history of back pain.  It started 3 days ago when he dropped something on the floor and when bending over to pick it up he felt his back spasm. It radiates down his left leg. It has improved slightly. When he bent over today it got worse. He takes Flexeril 10mg 2-3x/day for pain. He takes an Opiate when its really bad. Has had a cortisone shot in the past for his back pain after his lumbar MRI in 2015 and it helped.  Hoping for a referral for another cortisone shot and potentially PT.  Of note he had recent right foot surgery in April for Watts fracture. He has been out of the boot for 4 weeks, has some heel numbness, and is following up with the surgeon in 2 weeks. No loss of  bowel or bladder. No saddle anesthesia.    Problem list and histories reviewed & adjusted, as indicated.  Additional history: as documented    Patient Active Problem List   Diagnosis     Anxiety state     Chronic back pain     Depression     Panic disorder without agoraphobia     CARDIOVASCULAR SCREENING; LDL GOAL LESS THAN 160     Abnormal results of liver function studies     Psoriatic arthritis (H)     Morbid obesity (H)     Elevated blood-pressure reading without diagnosis of hypertension     Closed nondisplaced fracture of fifth metatarsal bone of right foot, initial encounter     Pain in joint involving ankle and foot, right     Aftercare following surgery of the musculoskeletal system     Past Surgical History:   Procedure Laterality Date     NOSE SURGERY      3 times     right elbow surgey      14 yo     TESTICLE SURGERY      21 yo       Social History   Substance Use Topics     Smoking status: Former Smoker     Types: Cigarettes     Smokeless tobacco: Never Used      Comment: 1-2 cigarettes per day to cope with stress     Alcohol use 4.8 oz/week     8 Standard drinks or equivalent per week      Comment: not for a month     Family History   Problem Relation Age of Onset     Obesity Mother      Diabetes Father      Coronary Artery Disease Father      Hypertension Father      Hyperlipidemia Father      Depression Father      Anxiety Disorder Father      Mental Illness Father      Substance Abuse Father      Breast Cancer Maternal Grandmother      Depression Maternal Grandmother      Mental Illness Maternal Grandmother      Substance Abuse Maternal Grandmother      Prostate Cancer Maternal Grandfather      Diabetes Paternal Grandmother      Breast Cancer Paternal Grandmother      Depression Paternal Grandmother      Mental Illness Paternal Grandmother      Diabetes Paternal Grandfather      Asthma Brother      Diabetes Paternal Uncle      Cerebrovascular Disease No family hx of      Anesthesia Reaction No  family hx of      Osteoperosis No family hx of      Genetic Disorder No family hx of      Thyroid Disease No family hx of      Liver Disease No family hx of          Current Outpatient Prescriptions   Medication Sig Dispense Refill     cyclobenzaprine (FLEXERIL) 10 MG tablet Take 1 tablet (10 mg) by mouth 3 times daily as needed for muscle spasms 30 tablet 0     methylPREDNISolone (MEDROL DOSEPAK) 4 MG tablet Follow package instructions 21 tablet 0     adalimumab (HUMIRA) 40 MG/0.8ML prefilled syringe kit Inject 0.8 mLs (40 mg) Subcutaneous every 14 days 2 Syringe 3     Apremilast (OTEZLA) 10 & 20 & 30 MG TBPK Take one tablet in the morning on day 1, then take one tablet every 12 hours on days 2 thru 14, according to the instructions on the packet. 27 tablet 0     citalopram (CELEXA) 40 MG tablet Take 1 tablet (40 mg) by mouth daily 90 tablet 1     order for DME Equipment being ordered: [id number] $249  Walking Boot XL Funmilayo Pneumatic 1 Device 0     order for DME Roll-A-Bout Walker. Patient can use for right foot. 1 Units 0     oxyCODONE IR (ROXICODONE) 5 MG tablet Take 1-2 tablets (5-10 mg) by mouth every 4 hours as needed for moderate to severe pain or severe pain 12 tablet 0     [DISCONTINUED] adalimumab (HUMIRA) 40 MG/0.8ML prefilled syringe kit Inject 0.8 mLs (40 mg) Subcutaneous every 14 days 2 Syringe 6     [DISCONTINUED] adalimumab (HUMIRA) 40 MG/0.8ML prefilled syringe kit Inject 2 40 mg pens first day, inject 1 40 mg pen day 8 and day 22, then 40mg every other week 6 Syringe 0     Allergies   Allergen Reactions     Tramadol Other (See Comments)     Shellfish-Derived Products      BP Readings from Last 3 Encounters:   06/26/18 144/86   05/24/18 133/77   05/05/18 (!) 145/93    Wt Readings from Last 3 Encounters:   06/26/18 (!) 342 lb 3.2 oz (155.2 kg)   05/24/18 (!) 335 lb (152 kg)   05/05/18 (!) 336 lb (152.4 kg)                  Labs reviewed in EPIC    Reviewed and updated as needed this visit by  "clinical staff  Tobacco  Allergies  Meds  Problems       Reviewed and updated as needed this visit by Provider  Allergies  Meds  Problems         ROS:  Constitutional, HEENT, cardiovascular, pulmonary, gi and gu systems are negative, except as otherwise noted.    OBJECTIVE:     /86 (BP Location: Left arm, Patient Position: Chair, Cuff Size: Adult Large)  Pulse 90  Temp 97.7  F (36.5  C) (Oral)  Ht 6' 5\" (1.956 m)  Wt (!) 342 lb 3.2 oz (155.2 kg)  SpO2 96%  BMI 40.58 kg/m2  Body mass index is 40.58 kg/(m^2).  GENERAL: healthy, alert and no distress  MS: no gross musculoskeletal defects noted, no edema  SKIN: Generalized psoriasis  NEURO: Normal strength and tone, mentation intact and speech normal  Comprehensive back pain exam:  Tenderness of mid lumbar spine, Pain limits the following motions: Flexion, extension, and rotation, Lower extremity strength functional and equal on both sides, Lower extremity reflexes within normal limits bilaterally, Lower extremity sensation normal and equal on both sides and Straight leg positive on  left, indicating possible ipsilateral radiculopathy  PSYCH: mentation appears normal, affect normal/bright    Diagnostic Test Results:  none     ASSESSMENT/PLAN:   1. Acute midline low back pain with left-sided sciatica  No red flags. Neuro intact. Exam consisted with historical back pain exacerbations.   Ice or heat 15 minutes 4-6 times daily  Gentle stretching  Movement is key for early relief of back pain  Start flexeril 10 mg up to 3 times daily as needed for back pain/spasms. No driving, operating machinery or drinking alcohol while taking.  Start Medrol dose henry  If worsening or not improving in 1 week, follow up with primary care provider, sooner if needed.  If you develop loss of bowel or bladder, saddle anesthesia, or foot drop, go to emergency department.  - cyclobenzaprine (FLEXERIL) 10 MG tablet; Take 1 tablet (10 mg) by mouth 3 times daily as needed for " muscle spasms  Dispense: 30 tablet; Refill: 0  - PHYSICAL THERAPY REFERRAL  - ORTHO  REFERRAL  - methylPREDNISolone (MEDROL DOSEPAK) 4 MG tablet; Follow package instructions  Dispense: 21 tablet; Refill: 0    2. Elevated blood-pressure reading without diagnosis of hypertension  Continued to be elevated on BP recheck. Lakhwinder is currently experiencing pain but also reports his bp fluctuates and has been borderline high in the past.  Referral placed for BP study.     See Patient Instructions    Patient verbalizes understanding and agrees with plan of care. Patient stable for discharge.  The benefits, risks and potential side effects were discussed in detail.   Black box warnings discussed as relevant. All patient questions were answered. The patient was instructed to follow up immediately if any adverse reactions develop.     Nicole Choudhury, RN DNP Student.     I, Tara Velazquez, was present with the nurse practitioner student who participated in the service and in the documentation of the note.  I have verified the history and personally performed the physical exam and medical decision making.  I agree with the assessment and plan of care as documented in the note.      Items personally reviewed: vitals.      LINNEA [facility]

## 2018-06-26 NOTE — PATIENT INSTRUCTIONS
Ice or heat 15 minutes 4-6 times daily  Gentle stretching  Movement is key for early relief of back pain  Start flexeril 10 mg up to 3 times daily as needed for back pain/spasms. No driving, operating machinery or drinking alcohol while taking.  Start Medrol dose henry  If worsening or not improving in 1 week, follow up with primary care provider, sooner if needed.  If you develop loss of bowel or bladder, saddle anesthesia, or foot drop, go to emergency department.    Referral placed for BP study      At [facility], we strive to deliver an exceptional experience to you, every time we see you.  If you receive a survey in the mail, please send us back your thoughts. We really do value your feedback.    Based on your medical history, these are the current health maintenance/preventive care services that you are due for (some may have been done at this visit.)  Health Maintenance Due   Topic Date Due     URINE DRUG SCREEN Q1 YR  10/13/1996     HIV SCREEN (SYSTEM ASSIGNED)  10/13/1999       Suggested websites for health information:  Www.[facility]org : Up to date and easily searchable information on multiple topics.  Www.medlineplus.gov : medication info, interactive tutorials, watch real surgeries online  Www.familydoctor.org : good info from the Academy of Family Physicians  Www.cdc.gov : public health info, travel advisories, epidemics (H1N1)  Www.aap.org : children's health info, normal development, vaccinations  Www.health.state.[place].us : [place] dept of health, public health issues in [place], N1N1    Your care team:                            Family Medicine Internal Medicine   MD Constantino Walker MD Shantel Branch-Fleming, MD Katya Georgiev PA-C Megan Hill, LINNEA Tyler MD Pediatrics   NIRU Isaac, MD Stephie Ramirez APRN CNP   MD Ann Winkler MD Deborah Mielke, MD Kim Thein, APRN [facility]      Clinic hours: Monday -  Thursday 7 am-7 pm; Fridays 7 am-5 pm.   Urgent care: Monday - Friday 11 am-9 pm; Saturday and Sunday 9 am-5 pm.  Pharmacy : Monday -Thursday 8 am-8 pm; Friday 8 am-6 pm; Saturday and Sunday 9 am-5 pm.     Clinic: [phone]   Pharmacy: [phone]      Back Pain (Acute or Chronic)    Back pain is one of the most common problems. The good news is that most people feel better in 1 to 2 weeks, and most of the rest in 1 to 2 months. Most people can remain active.  People experience and describe pain differently; not everyone is the same.    The pain can be sharp, stabbing, shooting, aching, cramping or burning.    Movement, standing, bending, lifting, sitting, or walking may worsen pain.    It can be localized to one spot or area, or it can be more generalized.    It can spread or radiate upwards, to the front, or go down your arms or legs (sciatica).    It can cause muscle spasm.  Most of the time, mechanical problems with the muscles or spine cause the pain. Mechanical problems are usually caused by an injury to the muscles or ligaments. While illness can cause back pain, it is usually not caused by a serious illness. Mechanical problems include:     Physical activity such as sports, exercise, work, or normal activity    Overexertion, lifting, pushing, pulling incorrectly or too aggressively    Sudden twisting, bending, or stretching from an accident, or accidental movement    Poor posture    Stretching or moving wrong, without noticing pain at the time    Poor coordination, lack of regular exercise (check with your doctor about this)    Spinal disc disease or arthritis    Stress  Pain can also be related to pregnancy, or illness like appendicitis, bladder or kidney infections, pelvic infections, and many other things.  Acute back pain usually gets better in 1 to 2 weeks. Back pain related to disk disease, arthritis in the spinal joints or spinal stenosis (narrowing of the spinal canal) can become chronic and  last for months or years.  Unless you had a physical injury (for example, a car accident or fall) X-rays are usually not needed for the initial evaluation of back pain. If pain continues and does not respond to medical treatment, X-rays and other tests may be needed.  Home care  Try these home care recommendations:    When in bed, try to find a position of comfort. A firm mattress is best. Try lying flat on your back with pillows under your knees. You can also try lying on your side with your knees bent up towards your chest and a pillow between your knees.    At first, do not try to stretch out the sore spots. If there is a strain, it is not like the good soreness you get after exercising without an injury. In this case, stretching may make it worse.    Avoid prolong sitting, long car rides, or travel. This puts more stress on the lower back than standing or walking.    During the first 24 to 72 hours after an acute injury or flare up of chronic back pain, apply an ice pack to the painful area for 20 minutes and then remove it for 20 minutes. Do this over a period of 60 to 90 minutes or several times a day. This will reduce swelling and pain. Wrap the ice pack in a thin towel or plastic to protect your skin.    You can start with ice, then switch to heat. Heat (hot shower, hot bath, or heating pad) reduces pain and works well for muscle spasms. Heat can be applied to the painful area for 20 minutes then remove it for 20 minutes. Do this over a period of 60 to 90 minutes or several times a day. Do not sleep on a heating pad. It can lead to skin burns or tissue damage.    You can alternate ice and heat therapy. Talk with your doctor about the best treatment for your back pain.    Therapeutic massage can help relax the back muscles without stretching them.    Be aware of safe lifting methods and do not lift anything without stretching first.  Medicines  Talk to your doctor before using medicine, especially if you have  other medical problems or are taking other medicines.    You may use over-the-counter medicine as directed on the bottle to control pain, unless another pain medicine was prescribed. If you have chronic conditions like diabetes, liver or kidney disease, stomach ulcers, or gastrointestinal bleeding, or are taking blood thinners, talk to your doctor before taking any medicine.    Be careful if you are given a prescription medicines, narcotics, or medicine for muscle spasms. They can cause drowsiness, affect your coordination, reflexes, and judgement. Do not drive or operate heavy machinery.  Follow-up care  Follow up with your healthcare provider, or as advised.   A radiologist will review any X-rays that were taken. Your provide will notify you of any new findings that may affect your care.  Call 911  Call emergency services if any of the following occur:    Trouble breathing    Confusion    Very drowsy or trouble awakening    Fainting or loss of consciousness    Rapid or very slow heart rate    Loss of bowel or bladder control  When to seek medical advice  Call your healthcare provider right away if any of these occur:     Pain becomes worse or spreads to your legs    Weakness or numbness in one or both legs    Numbness in the groin or genital area  Date Last Reviewed: 7/1/2016 2000-2017 [employer]. [address] [place] [address], [place] [address]. All rights reserved. This information is not intended as a substitute for professional medical care. Always follow your healthcare professional's instructions.

## 2018-06-26 NOTE — MR AVS SNAPSHOT
After Visit Summary   6/26/2018    Lakhwinder Jones    MRN: [id number]           Patient Information     Date Of Birth          1980        Visit Information        Provider Department      6/26/2018 10:40 AM Tara Velazquez APRN CNP [facility]        Today's Diagnoses     Acute midline low back pain with left-sided sciatica    -  1    Elevated blood-pressure reading without diagnosis of hypertension          Care Instructions    Ice or heat 15 minutes 4-6 times daily  Gentle stretching  Movement is key for early relief of back pain  Start flexeril 10 mg up to 3 times daily as needed for back pain/spasms. No driving, operating machinery or drinking alcohol while taking.  Start Medrol dose henry  If worsening or not improving in 1 week, follow up with primary care provider, sooner if needed.  If you develop loss of bowel or bladder, saddle anesthesia, or foot drop, go to emergency department.    Referral placed for BP study      At [facility], we strive to deliver an exceptional experience to you, every time we see you.  If you receive a survey in the mail, please send us back your thoughts. We really do value your feedback.    Based on your medical history, these are the current health maintenance/preventive care services that you are due for (some may have been done at this visit.)  Health Maintenance Due   Topic Date Due     URINE DRUG SCREEN Q1 YR  10/13/1996     HIV SCREEN (SYSTEM ASSIGNED)  10/13/1999       Suggested websites for health information:  Www.Atlassian.org : Up to date and easily searchable information on multiple topics.  Www.medlineplus.gov : medication info, interactive tutorials, watch real surgeries online  Www.familydoctor.org : good info from the Academy of Family Physicians  Www.cdc.gov : public health info, travel advisories, epidemics (H1N1)  Www.aap.org : children's health info, normal development,  vaccinations  Www.health.[facility].[place].us : [place] dept of health, public health issues in [place], N1N1    Your care team:                            Family Medicine Internal Medicine   MD Constantino Walker MD Shantel Branch-Fleming, MD Katya Georgiev PA-C Megan Hill, APRN [facility]    Luke Tyler MD Pediatrics   NIRU Isaac, MD Stephie Ramirez APRN CNP   MD Ann Winkler MD Deborah Mielke, MD Kim Thein, APRN [facility]      Clinic hours: Monday - Thursday 7 am-7 pm; Fridays 7 am-5 pm.   Urgent care: Monday - Friday 11 am-9 pm; Saturday and Sunday 9 am-5 pm.  Pharmacy : Monday -Thursday 8 am-8 pm; Friday 8 am-6 pm; Saturday and Sunday 9 am-5 pm.     Clinic: [phone]   Pharmacy: [phone]      Back Pain (Acute or Chronic)    Back pain is one of the most common problems. The good news is that most people feel better in 1 to 2 weeks, and most of the rest in 1 to 2 months. Most people can remain active.  People experience and describe pain differently; not everyone is the same.    The pain can be sharp, stabbing, shooting, aching, cramping or burning.    Movement, standing, bending, lifting, sitting, or walking may worsen pain.    It can be localized to one spot or area, or it can be more generalized.    It can spread or radiate upwards, to the front, or go down your arms or legs (sciatica).    It can cause muscle spasm.  Most of the time, mechanical problems with the muscles or spine cause the pain. Mechanical problems are usually caused by an injury to the muscles or ligaments. While illness can cause back pain, it is usually not caused by a serious illness. Mechanical problems include:     Physical activity such as sports, exercise, work, or normal activity    Overexertion, lifting, pushing, pulling incorrectly or too aggressively    Sudden twisting, bending, or stretching from an accident, or accidental movement    Poor posture    Stretching or moving  wrong, without noticing pain at the time    Poor coordination, lack of regular exercise (check with your doctor about this)    Spinal disc disease or arthritis    Stress  Pain can also be related to pregnancy, or illness like appendicitis, bladder or kidney infections, pelvic infections, and many other things.  Acute back pain usually gets better in 1 to 2 weeks. Back pain related to disk disease, arthritis in the spinal joints or spinal stenosis (narrowing of the spinal canal) can become chronic and last for months or years.  Unless you had a physical injury (for example, a car accident or fall) X-rays are usually not needed for the initial evaluation of back pain. If pain continues and does not respond to medical treatment, X-rays and other tests may be needed.  Home care  Try these home care recommendations:    When in bed, try to find a position of comfort. A firm mattress is best. Try lying flat on your back with pillows under your knees. You can also try lying on your side with your knees bent up towards your chest and a pillow between your knees.    At first, do not try to stretch out the sore spots. If there is a strain, it is not like the good soreness you get after exercising without an injury. In this case, stretching may make it worse.    Avoid prolong sitting, long car rides, or travel. This puts more stress on the lower back than standing or walking.    During the first 24 to 72 hours after an acute injury or flare up of chronic back pain, apply an ice pack to the painful area for 20 minutes and then remove it for 20 minutes. Do this over a period of 60 to 90 minutes or several times a day. This will reduce swelling and pain. Wrap the ice pack in a thin towel or plastic to protect your skin.    You can start with ice, then switch to heat. Heat (hot shower, hot bath, or heating pad) reduces pain and works well for muscle spasms. Heat can be applied to the painful area for 20 minutes then remove it for  20 minutes. Do this over a period of 60 to 90 minutes or several times a day. Do not sleep on a heating pad. It can lead to skin burns or tissue damage.    You can alternate ice and heat therapy. Talk with your doctor about the best treatment for your back pain.    Therapeutic massage can help relax the back muscles without stretching them.    Be aware of safe lifting methods and do not lift anything without stretching first.  Medicines  Talk to your doctor before using medicine, especially if you have other medical problems or are taking other medicines.    You may use over-the-counter medicine as directed on the bottle to control pain, unless another pain medicine was prescribed. If you have chronic conditions like diabetes, liver or kidney disease, stomach ulcers, or gastrointestinal bleeding, or are taking blood thinners, talk to your doctor before taking any medicine.    Be careful if you are given a prescription medicines, narcotics, or medicine for muscle spasms. They can cause drowsiness, affect your coordination, reflexes, and judgement. Do not drive or operate heavy machinery.  Follow-up care  Follow up with your healthcare provider, or as advised.   A radiologist will review any X-rays that were taken. Your provide will notify you of any new findings that may affect your care.  Call 911  Call emergency services if any of the following occur:    Trouble breathing    Confusion    Very drowsy or trouble awakening    Fainting or loss of consciousness    Rapid or very slow heart rate    Loss of bowel or bladder control  When to seek medical advice  Call your healthcare provider right away if any of these occur:     Pain becomes worse or spreads to your legs    Weakness or numbness in one or both legs    Numbness in the groin or genital area  Date Last Reviewed: 7/1/2016 2000-2017 [employer] [address] [place] [address], [place] [address]. All rights reserved. This information is not intended as a  substitute for professional medical care. Always follow your healthcare professional's instructions.                Follow-ups after your visit        Additional Services     ORTHO  REFERRAL       [facility] is referring you to the Orthopedic  Services at [facility].       The  Representative will assist you in the coordination of your Orthopedic and Musculoskeletal Care as prescribed by your physician.    The  Representative will call you within 1 business day to help schedule your appointment, or you may contact the  Representative at:    All areas ~ [phone]     Type of Referral : Spine: Lumbar  **Choose Medical Spine Specialist (unless patient was seen by a Medical Spine Specialist within the past 6 months).**  Surgical Evaluation is advised if the patient presents with one or more of the following red flags: Evidence of Spinal Tumor, Infection or Fracture, Cauda Equina Syndrome, Sudden or Progressive Weakness, Loss of Bowel or Bladder Control, or any other documented emergent neurological condition resulting from a Lumbar Spinal Condition. Spine Surgeon         *Dr. Mijares please - patient has seen him in 2015 and 2016 for similar  Timeframe requested: Routine    Coverage of these services is subject to the terms and limitations of your health insurance plan.  Please call member services at your health plan with any benefit or coverage questions.      If X-rays, CT or MRI's have been performed, please contact the facility where they were done to arrange for , prior to your scheduled appointment.  Please bring this referral request to your appointment and present it to your specialist.            PHYSICAL THERAPY REFERRAL       *This therapy referral will be filtered to a centralized scheduling office at [facility] and the patient will receive a call to schedule an appointment at a [facility]  "location most convenient for them. *     [facility] provides Physical Therapy evaluation and treatment and many specialty services across the [place] system.  If requesting a specialty program, please choose from the list below.    If you have not heard from the scheduling office within 2 business days, please call [phone] for all locations, with the exception of [place], please call [phone] and [facility] [place], please call [phone]  Treatment: Evaluation & Treatment  Special Instructions/Modalities: eval and treat  Special Programs: None    Please be aware that coverage of these services is subject to the terms and limitations of your health insurance plan.  Call member services at your health plan with any benefit or coverage questions.      **Note to Provider:  If you are referring outside of [facility] for the therapy appointment, please list the name of the location in the \"special instructions\" above, print the referral and give to the patient to schedule the appointment.                  Your next 10 appointments already scheduled     Jul 10, 2018  6:00 PM CDT   (Arrive by 5:45 PM)   RETURN FOOT/ANKLE with Ben Rivera MD   [facility] [place])    [address] [place] [id number] [phone]              Who to contact     If you have questions or need follow up information about today's clinic visit or your schedule please contact [facility] [place] directly at [phone].  Normal or non-critical lab and imaging results will be communicated to you by MyChart, letter or phone within 4 business days after the clinic has received the results. If you do not hear from us within 7 days, please contact the clinic through MyChart or phone. If you have a critical or abnormal lab result, we will notify you by phone as soon as possible.  Submit refill requests through [employer] or call your " "pharmacy and they will forward the refill request to us. Please allow 3 business days for your refill to be completed.          Additional Information About Your Visit        [employer] Information     [employer] gives you secure access to your electronic health record. If you see a primary care provider, you can also send messages to your care team and make appointments. If you have questions, please call your primary care clinic.  If you do not have a primary care provider, please call [phone] and they will assist you.        Care EveryWhere ID     This is your Care EveryWhere ID. This could be used by other organizations to access your [facility] medical records  [id number]        Your Vitals Were     Pulse Temperature Height Pulse Oximetry BMI (Body Mass Index)       90 97.7  F (36.5  C) (Oral) 6' 5\" (1.956 m) 96% 40.58 kg/m2        Blood Pressure from Last 3 Encounters:   06/26/18 144/86   05/24/18 133/77   05/05/18 (!) 145/93    Weight from Last 3 Encounters:   06/26/18 (!) 342 lb 3.2 oz (155.2 kg)   05/24/18 (!) 335 lb (152 kg)   05/05/18 (!) 336 lb (152.4 kg)              We Performed the Following     ORTHO  REFERRAL     PHYSICAL THERAPY REFERRAL          Today's Medication Changes          These changes are accurate as of 6/26/18 11:24 AM.  If you have any questions, ask your nurse or doctor.               Start taking these medicines.        Dose/Directions    methylPREDNISolone 4 MG tablet   Commonly known as:  MEDROL DOSEPAK   Used for:  Acute midline low back pain with left-sided sciatica   Started by:  Tara Velazquez APRN CNP        Follow package instructions   Quantity:  21 tablet   Refills:  0         These medicines have changed or have updated prescriptions.        Dose/Directions    cyclobenzaprine 10 MG tablet   Commonly known as:  FLEXERIL   This may have changed:  when to take this   Used for:  Acute midline low back pain with left-sided sciatica   Changed by:  Tara Velazquez, " APRN CNP        Dose:  10 mg   Take 1 tablet (10 mg) by mouth 3 times daily as needed for muscle spasms   Quantity:  30 tablet   Refills:  0            Where to get your medicines      These medications were [place] [facility] [place] - [address]  [address], [place] [address]     Phone:  [phone]     cyclobenzaprine 10 MG tablet    methylPREDNISolone 4 MG tablet                Primary Care Provider Office Phone # Fax #    Simona Prasad PA-C [phone]       [address]  [facility] [address]        Equal Access to Services     KAL POLLOCK : Hadii rashid chang hadasho Soomaali, waaxda luqadaha, qaybta kaalmada adeegyada, adina guerrero. So [facility] [phone].    ATENCIÓN: Si habla español, tiene a rose disposición servicios gratuitos de asistencia lingüística. Llame al [phone].    We comply with applicable federal civil rights laws and [place] laws. We do not discriminate on the basis of race, color, national origin, age, disability, sex, sexual orientation, or gender identity.            Thank you!     Thank you for choosing [facility]  for your care. Our goal is always to provide you with excellent care. Hearing back from our patients is one way we can continue to improve our services. Please take a few minutes to complete the written survey that you may receive in the mail after your visit with us. Thank you!             Your Updated Medication List - Protect others around you: Learn how to safely use, store and throw away your medicines at www.disposemymeds.org.          This list is accurate as of 6/26/18 11:24 AM.  Always use your most recent med list.                   Brand Name Dispense Instructions for use Diagnosis    adalimumab 40 MG/0.8ML prefilled syringe kit    HUMIRA    2 Syringe    Inject 0.8 mLs (40 mg) Subcutaneous every 14 days    Plaque psoriasis        Apremilast 10 & 20 & 30 MG Tbpk    OTEZLA    27 tablet    Take one tablet in the morning on day 1, then take one tablet every 12 hours on days 2 thru 14, according to the instructions on the packet.    Psoriasis with arthropathy (H)       citalopram 40 MG tablet    celeXA    90 tablet    Take 1 tablet (40 mg) by mouth daily    Anxiety state, Panic disorder without agoraphobia       cyclobenzaprine 10 MG tablet    FLEXERIL    30 tablet    Take 1 tablet (10 mg) by mouth 3 times daily as needed for muscle spasms    Acute midline low back pain with left-sided sciatica       methylPREDNISolone 4 MG tablet    MEDROL DOSEPAK    21 tablet    Follow package instructions    Acute midline low back pain with left-sided sciatica       * order for DME     1 Units    Roll-A-Bout Walker. Patient can use for right foot.    Closed nondisplaced fracture of fifth metatarsal bone of right foot, initial encounter       * order for DME     1 Device    Equipment being ordered: [id number] $249  Walking Boot XL Funmilayo Pneumatic    Closed nondisplaced fracture of fifth metatarsal bone of right foot, initial encounter       oxyCODONE IR 5 MG tablet    ROXICODONE    12 tablet    Take 1-2 tablets (5-10 mg) by mouth every 4 hours as needed for moderate to severe pain or severe pain    S/P foot joint operation       * Notice:  This list has 2 medication(s) that are the same as other medications prescribed for you. Read the directions carefully, and ask your doctor or other care provider to review them with you.

## 2018-06-27 ENCOUNTER — TELEPHONE (OUTPATIENT)
Dept: NURSING | Facility: CLINIC | Age: 38
End: 2018-06-27

## 2018-06-29 NOTE — TELEPHONE ENCOUNTER
Spoke with Lakhwinder, reviewed the main points about the [facility] blood pressure study and he agreed to participate.  [facility] with Rhonda Rodriguez CNP, on Thursday, July 19th at 11:20AM.

## 2018-07-10 ENCOUNTER — TELEPHONE (OUTPATIENT)
Dept: ORTHOPEDICS | Facility: CLINIC | Age: 38
End: 2018-07-10

## 2018-07-10 DIAGNOSIS — M25.571 PAIN IN JOINT INVOLVING ANKLE AND FOOT, RIGHT: Primary | ICD-10-CM

## 2018-07-16 ENCOUNTER — MYC MEDICAL ADVICE (OUTPATIENT)
Dept: FAMILY MEDICINE | Facility: CLINIC | Age: 38
End: 2018-07-16

## 2018-07-16 NOTE — TELEPHONE ENCOUNTER
Please see My Chart Message and advise.  Patient is requesting Otezla be ordered. PA was approved 6/5/18 - see 5/30/18 telephone encounter. Original ordered dated 5/24/18.  order sent to [facility] on 5/24/18. Checked with [facility].   Spoke with Silvia and gave her PA approval number.   [facility] is reviewing and will call the patient before shipping.  Bettie Velázquez RN

## 2018-07-20 NOTE — TELEPHONE ENCOUNTER
Patient was no show for [facility] Visit #1 and after speaking with him, he is no longer interested in the study.

## 2018-07-23 NOTE — TELEPHONE ENCOUNTER
Spoke with Gisell at [facility]. She will submit to escalation department. Unable to tell me what the hold up for this prescription is. Bettie Velázquez RN

## 2018-07-24 NOTE — TELEPHONE ENCOUNTER
[facility] calling to state that they only have a 55 tab starter tab and 27 tabs were ordered. Ok to change per Dr. Page.    Jennifer Ospina,RN BSN  [facility]  [phone]

## 2018-07-26 ENCOUNTER — OFFICE VISIT (OUTPATIENT)
Dept: ORTHOPEDICS | Facility: CLINIC | Age: 38
End: 2018-07-26
Payer: COMMERCIAL

## 2018-07-26 VITALS
WEIGHT: 315 LBS | BODY MASS INDEX: 37.19 KG/M2 | HEIGHT: 77 IN | SYSTOLIC BLOOD PRESSURE: 157 MMHG | HEART RATE: 90 BPM | DIASTOLIC BLOOD PRESSURE: 92 MMHG

## 2018-07-26 DIAGNOSIS — F40.240 CLAUSTROPHOBIA: Primary | ICD-10-CM

## 2018-07-26 DIAGNOSIS — M51.26 LUMBAR DISC HERNIATION: Primary | ICD-10-CM

## 2018-07-26 PROCEDURE — 99214 OFFICE O/P EST MOD 30 MIN: CPT | Performed by: PREVENTIVE MEDICINE

## 2018-07-26 RX ORDER — CYCLOBENZAPRINE HCL 10 MG
10 TABLET ORAL
Qty: 42 TABLET | Refills: 1 | Status: SHIPPED | OUTPATIENT
Start: 2018-07-26 | End: 2018-10-25

## 2018-07-26 RX ORDER — METHYLPREDNISOLONE 4 MG
TABLET, DOSE PACK ORAL
Qty: 21 TABLET | Refills: 0 | Status: SHIPPED | OUTPATIENT
Start: 2018-07-26 | End: 2018-08-30

## 2018-07-26 ASSESSMENT — PAIN SCALES - GENERAL: PAINLEVEL: MODERATE PAIN (5)

## 2018-07-26 NOTE — TELEPHONE ENCOUNTER
Pt is scheduled for his MRI on 8/02/2018 and he has issues with being claustrophobic. Would Dr Mijares be able to fax in a script for some oral sedation for this MRI. Pt uses the [facility] in our building ([place])      Pt is aware that he needs to arrive 1 hour early and that he is not to take the medication until after he talks with MRI staff. Pt also is aware that he needs a  home from the MRI.       Oseas Carvajal  Procedure , [place]  Peds Specialty and Adult Endocrinology

## 2018-07-26 NOTE — PATIENT INSTRUCTIONS
Thanks for coming today.  Ortho[facility] Medicine Clinic  [address] [place] [address]    To schedule future appointments in Ortho Clinic, you may call [phone].    To schedule ordered imaging by your provider:   Call [facility] [phone]    To schedule an injection ordered by your provider:  Call [facility] Injection scheduling line: [phone]  [employer] available online at:  [email or web address]    Please call if any further questions or concerns ([phone]).  Clinic hours 8 am to 5 pm.    Return to clinic (call) if symptoms worsen or fail to improve.

## 2018-07-26 NOTE — NURSING NOTE
"Lakhwinder Jones's chief complaint for this visit includes:  Chief Complaint   Patient presents with     Back Pain     Follow up for low back pain     PCP: Simona Prasad    Referring Provider:  LINNEA Esqueda CNP  [address] [place] [address]    BP (!) 157/92  Pulse 90  Ht 1.956 m (6' 5\")  Wt (!) 155.1 kg (342 lb)  BMI 40.56 kg/m2  Moderate Pain (5)     Do you need any medication refills at today's visit? No  "

## 2018-07-26 NOTE — LETTER
7/26/2018         RE: Lakhwinder Jones  [address]  [facility] [address]        Dear Colleague,    Thank you for referring your patient, Lakhwinder Jones, to the [facility]. Please see a copy of my visit note below.    HISTORY OF PRESENT ILLNESS  Mr. 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      MEDICAL HISTORY  Patient Active Problem List   Diagnosis     Anxiety state     Chronic back pain     Depression     Panic disorder without agoraphobia     CARDIOVASCULAR SCREENING; LDL GOAL LESS THAN 160     Abnormal results of liver function studies     Psoriatic arthritis (H)     Morbid obesity (H)     Elevated blood-pressure reading without diagnosis of hypertension     Closed nondisplaced fracture of fifth metatarsal bone of right foot, initial encounter     Pain in joint involving ankle and foot, right     Aftercare following surgery of the musculoskeletal system       Current Outpatient Prescriptions   Medication Sig Dispense Refill     adalimumab (HUMIRA) 40 MG/0.8ML prefilled syringe kit Inject 0.8 mLs (40 mg) Subcutaneous every 14 days 2 Syringe 3     Apremilast (OTEZLA) 10 & 20 & 30 MG TBPK Take one tablet in the morning on day 1, then take one tablet every 12 hours on days 2 thru 14, according to the instructions on the packet. 27 tablet 0     citalopram (CELEXA) 40 MG tablet Take 1 tablet (40 mg) by mouth daily 90 tablet 1     cyclobenzaprine (FLEXERIL) 10 MG tablet Take 1 tablet (10 mg) by mouth 3 times daily as needed for muscle spasms 30 tablet 0     methylPREDNISolone (MEDROL DOSEPAK) 4 MG tablet Follow package instructions 21 tablet 0     order for  "DME Equipment being ordered: [id number] $249  Walking Boot XL Funmilayo Pneumatic 1 Device 0       Allergies   Allergen Reactions     Tramadol Other (See Comments)     Shellfish-Derived Products        Family History   Problem Relation Age of Onset     Obesity Mother      Diabetes Father      Coronary Artery Disease Father      Hypertension Father      Hyperlipidemia Father      Depression Father      Anxiety Disorder Father      Mental Illness Father      Substance Abuse Father      Breast Cancer Maternal Grandmother      Depression Maternal Grandmother      Mental Illness Maternal Grandmother      Substance Abuse Maternal Grandmother      Prostate Cancer Maternal Grandfather      Diabetes Paternal Grandmother      Breast Cancer Paternal Grandmother      Depression Paternal Grandmother      Mental Illness Paternal Grandmother      Diabetes Paternal Grandfather      Asthma Brother      Diabetes Paternal Uncle      Cerebrovascular Disease No family hx of      Anesthesia Reaction No family hx of      Osteoperosis No family hx of      Genetic Disorder No family hx of      Thyroid Disease No family hx of      Liver Disease No family hx of        Additional medical/Social/Surgical histories reviewed in [facility] and updated as appropriate.     REVIEW OF SYSTEMS (7/26/2018)  10 point ROS of systems including Constitutional, Eyes, Respiratory, Cardiovascular, Gastroenterology, Genitourinary, Integumentary, Musculoskeletal, Psychiatric were all negative except for pertinent positives noted in my HPI.     PHYSICAL EXAM  Vitals:    07/26/18 1147   BP: (!) 157/92   Pulse: 90   Weight: (!) 155.1 kg (342 lb)   Height: 1.956 m (6' 5\")     Vital Signs: BP (!) 157/92  Pulse 90  Ht 1.956 m (6' 5\")  Wt (!) 155.1 kg (342 lb)  BMI 40.56 kg/m2 Patient declined being weighed. Body mass index is 40.56 kg/(m^2).    General  - normal appearance, in no obvious distress  CV  - normal peripheral perfusion  Pulm  - normal respiratory pattern, " non-labored  Musculoskeletal - lumbar spine  - stance: normal gait without limp, no obvious leg length discrepancy, normal heel and toe walk  - inspection: normal bone and joint alignment, no obvious scoliosis  - palpation: no paravertebral or bony tenderness  - ROM: flexion exacerbates pain, normal extension, sidebending, rotation- with pain  - strength: lower extremities 5/5 in all planes  - special tests:  (+) straight leg raise- bilaterally  (+) slump test- low back pain  Neuro  - patellar and Achilles DTRs 2+ bilaterally, bilateral lower extremity sensory deficit throughout L5 distribution, grossly normal coordination, normal muscle tone  Skin  - no ecchymosis, erythema, warmth, or induration, no obvious rash  Psych  - interactive, appropriate, normal mood and affect    ASSESSMENT & PLAN  38 yo male with lumbar disc herniation and radicular pain  Reviewed his prevoius MRI, ordered a new Lumbar MRI with plans to order LALA after completed  Can call and let us know  Refilled flexeril and start medrol pack  Given HEP    Jak Mijares MD, [facility]      Again, thank you for allowing me to participate in the care of your patient.        Sincerely,        Jak Mijares MD

## 2018-07-26 NOTE — MR AVS SNAPSHOT
After Visit Summary   2018    Lakhwinder Jones    MRN: [id number]           Patient Information     Date Of Birth          1980        Visit Information        Provider Department      2018 12:00 PM Jak Mijares MD [facility]        Today's Diagnoses     Lumbar disc herniation    -  1      Care Instructions    Thanks for coming today.  Ortho/Sports Medicine Clinic  [address] [place] [address]    To schedule future appointments in Ortho Clinic, you may call [phone].    To schedule ordered imaging by your provider:   Call Central Imaging [phone]    To schedule an injection ordered by your provider:  Call Central Imaging Injection scheduling line: [phone]  [employer] available online at:  [email or web address]    Please call if any further questions or concerns ([phone]).  Clinic hours 8 am to 5 pm.    Return to clinic (call) if symptoms worsen or fail to improve.          Follow-ups after your visit        Future tests that were ordered for you today     Open Future Orders        Priority Expected Expires Ordered    MR Lumbar Spine w/o Contrast Routine  2019            Who to contact     If you have questions or need follow up information about today's clinic visit or your schedule please contact [facility] directly at [phone].  Normal or non-critical lab and imaging results will be communicated to you by MyChart, letter or phone within 4 business days after the clinic has received the results. If you do not hear from us within 7 days, please contact the clinic through MyChart or phone. If you have a critical or abnormal lab result, we will notify you by phone as soon as possible.  Submit refill requests through [employer] or call your pharmacy and they will forward the refill request to us. Please allow 3 business days for your refill to be completed.          Additional  "Information About Your Visit        [employer] Information     [employer]) gives you secure access to your electronic health record. If you see a primary care provider, you can also send messages to your care team and make appointments. If you have questions, please call your primary care clinic.  If you do not have a primary care provider, please call [phone] and they will assist you.      [employer] ([employer]) is an electronic gateway that provides easy, online access to your medical records. With [employer] ([employer]), you can request a clinic appointment, read your test results, renew a prescription or communicate with your care team.     To access your existing account, please contact your [facility] or call [phone] for assistance.        Care EveryWhere ID     This is your Care EveryWhere ID. This could be used by other organizations to access your [place] medical records  [id number]        Your Vitals Were     Pulse Height BMI (Body Mass Index)             90 1.956 m (6' 5\") 40.56 kg/m2          Blood Pressure from Last 3 Encounters:   07/26/18 (!) 157/92   06/26/18 144/86   05/24/18 133/77    Weight from Last 3 Encounters:   07/26/18 (!) 155.1 kg (342 lb)   06/26/18 (!) 155.2 kg (342 lb 3.2 oz)   05/24/18 (!) 152 kg (335 lb)                 Today's Medication Changes          These changes are accurate as of 7/26/18 12:13 PM.  If you have any questions, ask your nurse or doctor.               These medicines have changed or have updated prescriptions.        Dose/Directions    * cyclobenzaprine 10 MG tablet   Commonly known as:  FLEXERIL   This may have changed:  Another medication with the same name was added. Make sure you understand how and when to take each.   Used for:  Acute midline low back pain with left-sided sciatica        Dose:  10 mg   Take 1 tablet (10 mg) by mouth 3 times daily as needed for muscle spasms   Quantity:  30 tablet   Refills:  0       * cyclobenzaprine 10 MG tablet "   Commonly known as:  FLEXERIL   This may have changed:  You were already taking a medication with the same name, and this prescription was added. Make sure you understand how and when to take each.   Used for:  Lumbar disc herniation        Dose:  10 mg   Take 1 tablet (10 mg) by mouth nightly as needed for muscle spasms   Quantity:  42 tablet   Refills:  1       * methylPREDNISolone 4 MG tablet   Commonly known as:  MEDROL DOSEPAK   This may have changed:  Another medication with the same name was added. Make sure you understand how and when to take each.   Used for:  Acute midline low back pain with left-sided sciatica        Follow package instructions   Quantity:  21 tablet   Refills:  0       * methylPREDNISolone 4 MG tablet   Commonly known as:  MEDROL DOSEPAK   This may have changed:  You were already taking a medication with the same name, and this prescription was added. Make sure you understand how and when to take each.   Used for:  Lumbar disc herniation        Follow package instructions   Quantity:  21 tablet   Refills:  0       * Notice:  This list has 4 medication(s) that are the same as other medications prescribed for you. Read the directions carefully, and ask your doctor or other care provider to review them with you.         Where to get your medicines      These medications were sent to [facility] [place], [place] - [address], [address]  [address], [address], [facility] [address]     Phone:  [phone]     cyclobenzaprine 10 MG tablet    methylPREDNISolone 4 MG tablet                Primary Care Provider Office Phone # Fax #    Simona Prasad PA-C [phone]       [address]  [facility] [address]        Equal Access to Services     [facility]: Hadii rashid chang hadasho Somihir, waaxda luqadaha, qaybta kaalchristen davey, adina guerrero. So [facility] [phone].    ATENCIÓN: Rosario manning,  tiene a rose disposición servicios gratuitos de asistencia lingüística. Mary Beth kennedy [phone].    We comply with applicable federal civil rights laws and [place] laws. We do not discriminate on the basis of race, color, national origin, age, disability, sex, sexual orientation, or gender identity.            Thank you!     Thank you for choosing [facility]  for your care. Our goal is always to provide you with excellent care. Hearing back from our patients is one way we can continue to improve our services. Please take a few minutes to complete the written survey that you may receive in the mail after your visit with us. Thank you!             Your Updated Medication List - Protect others around you: Learn how to safely use, store and throw away your medicines at www.disposemymeds.org.          This list is accurate as of 7/26/18 12:13 PM.  Always use your most recent med list.                   Brand Name Dispense Instructions for use Diagnosis    adalimumab 40 MG/0.8ML prefilled syringe kit    HUMIRA    2 Syringe    Inject 0.8 mLs (40 mg) Subcutaneous every 14 days    Plaque psoriasis       Apremilast 10 & 20 & 30 MG Tbpk    OTEZLA    27 tablet    Take one tablet in the morning on day 1, then take one tablet every 12 hours on days 2 thru 14, according to the instructions on the packet.    Psoriasis with arthropathy (H)       citalopram 40 MG tablet    celeXA    90 tablet    Take 1 tablet (40 mg) by mouth daily    Anxiety state, Panic disorder without agoraphobia       * cyclobenzaprine 10 MG tablet    FLEXERIL    30 tablet    Take 1 tablet (10 mg) by mouth 3 times daily as needed for muscle spasms    Acute midline low back pain with left-sided sciatica       * cyclobenzaprine 10 MG tablet    FLEXERIL    42 tablet    Take 1 tablet (10 mg) by mouth nightly as needed for muscle spasms    Lumbar disc herniation       * methylPREDNISolone 4 MG tablet    MEDROL DOSEPAK    21 tablet    Follow package  instructions    Acute midline low back pain with left-sided sciatica       * methylPREDNISolone 4 MG tablet    MEDROL DOSEPAK    21 tablet    Follow package instructions    Lumbar disc herniation       order for DME     1 Device    Equipment being ordered: [id number] $249  Walking Boot XL Funmilayo Pneumatic    Closed nondisplaced fracture of fifth metatarsal bone of right foot, initial encounter       * Notice:  This list has 4 medication(s) that are the same as other medications prescribed for you. Read the directions carefully, and ask your doctor or other care provider to review them with you.

## 2018-07-26 NOTE — PROGRESS NOTES
HISTORY OF PRESENT ILLNESS  Mr. 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      MEDICAL HISTORY  Patient Active Problem List   Diagnosis     Anxiety state     Chronic back pain     Depression     Panic disorder without agoraphobia     CARDIOVASCULAR SCREENING; LDL GOAL LESS THAN 160     Abnormal results of liver function studies     Psoriatic arthritis (H)     Morbid obesity (H)     Elevated blood-pressure reading without diagnosis of hypertension     Closed nondisplaced fracture of fifth metatarsal bone of right foot, initial encounter     Pain in joint involving ankle and foot, right     Aftercare following surgery of the musculoskeletal system       Current Outpatient Prescriptions   Medication Sig Dispense Refill     adalimumab (HUMIRA) 40 MG/0.8ML prefilled syringe kit Inject 0.8 mLs (40 mg) Subcutaneous every 14 days 2 Syringe 3     Apremilast (OTEZLA) 10 & 20 & 30 MG TBPK Take one tablet in the morning on day 1, then take one tablet every 12 hours on days 2 thru 14, according to the instructions on the packet. 27 tablet 0     citalopram (CELEXA) 40 MG tablet Take 1 tablet (40 mg) by mouth daily 90 tablet 1     cyclobenzaprine (FLEXERIL) 10 MG tablet Take 1 tablet (10 mg) by mouth 3 times daily as needed for muscle spasms 30 tablet 0     methylPREDNISolone (MEDROL DOSEPAK) 4 MG tablet Follow package instructions 21 tablet 0     order for DME Equipment being ordered: [id number] $249  Walking Boot XL Funmilayo Pneumatic 1 Device 0       Allergies   Allergen Reactions     Tramadol Other (See Comments)     Shellfish-Derived Products        Family History   Problem Relation Age of Onset      "Obesity Mother      Diabetes Father      Coronary Artery Disease Father      Hypertension Father      Hyperlipidemia Father      Depression Father      Anxiety Disorder Father      Mental Illness Father      Substance Abuse Father      Breast Cancer Maternal Grandmother      Depression Maternal Grandmother      Mental Illness Maternal Grandmother      Substance Abuse Maternal Grandmother      Prostate Cancer Maternal Grandfather      Diabetes Paternal Grandmother      Breast Cancer Paternal Grandmother      Depression Paternal Grandmother      Mental Illness Paternal Grandmother      Diabetes Paternal Grandfather      Asthma Brother      Diabetes Paternal Uncle      Cerebrovascular Disease No family hx of      Anesthesia Reaction No family hx of      Osteoperosis No family hx of      Genetic Disorder No family hx of      Thyroid Disease No family hx of      Liver Disease No family hx of        Additional medical/Social/Surgical histories reviewed in [facility] and updated as appropriate.     REVIEW OF SYSTEMS (7/26/2018)  10 point ROS of systems including Constitutional, Eyes, Respiratory, Cardiovascular, Gastroenterology, Genitourinary, Integumentary, Musculoskeletal, Psychiatric were all negative except for pertinent positives noted in my HPI.     PHYSICAL EXAM  Vitals:    07/26/18 1147   BP: (!) 157/92   Pulse: 90   Weight: (!) 155.1 kg (342 lb)   Height: 1.956 m (6' 5\")     Vital Signs: BP (!) 157/92  Pulse 90  Ht 1.956 m (6' 5\")  Wt (!) 155.1 kg (342 lb)  BMI 40.56 kg/m2 Patient declined being weighed. Body mass index is 40.56 kg/(m^2).    General  - normal appearance, in no obvious distress  CV  - normal peripheral perfusion  Pulm  - normal respiratory pattern, non-labored  Musculoskeletal - lumbar spine  - stance: normal gait without limp, no obvious leg length discrepancy, normal heel and toe walk  - inspection: normal bone and joint alignment, no obvious scoliosis  - palpation: no paravertebral or bony " tenderness  - ROM: flexion exacerbates pain, normal extension, sidebending, rotation- with pain  - strength: lower extremities 5/5 in all planes  - special tests:  (+) straight leg raise- bilaterally  (+) slump test- low back pain  Neuro  - patellar and Achilles DTRs 2+ bilaterally, bilateral lower extremity sensory deficit throughout L5 distribution, grossly normal coordination, normal muscle tone  Skin  - no ecchymosis, erythema, warmth, or induration, no obvious rash  Psych  - interactive, appropriate, normal mood and affect    ASSESSMENT & PLAN  36 yo male with lumbar disc herniation and radicular pain  Reviewed his prevoius MRI, ordered a new Lumbar MRI with plans to order LALA after completed  Can call and let us know  Refilled flexeril and start medrol pack  Given HEP    Jak Mijares MD, CAQSM

## 2018-08-01 ENCOUNTER — TELEPHONE (OUTPATIENT)
Dept: GENERAL RADIOLOGY | Facility: CLINIC | Age: 38
End: 2018-08-01

## 2018-08-01 DIAGNOSIS — F41.0 PANIC DISORDER WITHOUT AGORAPHOBIA: ICD-10-CM

## 2018-08-01 DIAGNOSIS — F41.1 ANXIETY STATE: ICD-10-CM

## 2018-08-01 RX ORDER — DIAZEPAM 2 MG
2 TABLET ORAL ONCE
Qty: 1 TABLET | Refills: 0 | Status: SHIPPED | OUTPATIENT
Start: 2018-08-01 | End: 2019-02-06

## 2018-08-01 NOTE — TELEPHONE ENCOUNTER
"Requested Prescriptions   Pending Prescriptions Disp Refills     citalopram (CELEXA) 40 MG tablet    Last Written Prescription Date:  8/20/17  Last Fill Quantity: 90,  # refills: 1   Last Office Visit with [facility] or [facility] prescribing provider:  6/26/18   Future Office Visit:      90 tablet 1     Sig: Take 1 tablet (40 mg) by mouth daily    SSRIs Protocol Passed    8/1/2018  3:36 PM       Passed - Recent (12 mo) or future (30 days) visit within the authorizing provider's specialty    Patient had office visit in the last 12 months or has a visit in the next 30 days with authorizing provider or within the authorizing provider's specialty.  See \"Patient Info\" tab in inbasket, or \"Choose Columns\" in Meds & Orders section of the refill encounter.           Passed - Patient is age 18 or older              Reinaldo Faarax  [facility]  "

## 2018-08-01 NOTE — TELEPHONE ENCOUNTER
Hello,  last fill date:04-  Last quantity:90    Thank You,  Allyson Nielson  Pharmacy Technician  [facility]  [phone]

## 2018-08-01 NOTE — TELEPHONE ENCOUNTER
[facility] Call Center    Phone Message    May a detailed message be left on voicemail: yes    Reason for Call: Other: Patient is following up on Rx request below for his upcomming MRI appointment tomorrow. Please advise.     Action Taken: Message routed to:  Adult Clinics: Sports Medicine p [phone]

## 2018-08-01 NOTE — TELEPHONE ENCOUNTER
I called to do a reminder call on this patient today for his MRI tomorrow he is coming in later today to  some prescriptions and wanted to make sure that his oral sedation for MRI was put in. I think he requested it on 7/26, if you wouldn't mind taking a look for him. Thanks for your help and time. Imaging

## 2018-08-02 ENCOUNTER — RADIANT APPOINTMENT (OUTPATIENT)
Dept: MRI IMAGING | Facility: CLINIC | Age: 38
End: 2018-08-02
Attending: PREVENTIVE MEDICINE
Payer: COMMERCIAL

## 2018-08-02 DIAGNOSIS — M51.26 LUMBAR DISC HERNIATION: ICD-10-CM

## 2018-08-02 PROCEDURE — 72148 MRI LUMBAR SPINE W/O DYE: CPT | Performed by: RADIOLOGY

## 2018-08-06 RX ORDER — CITALOPRAM HYDROBROMIDE 40 MG/1
40 TABLET ORAL DAILY
Qty: 30 TABLET | Refills: 0 | Status: SHIPPED | OUTPATIENT
Start: 2018-08-06 | End: 2018-11-04

## 2018-08-06 NOTE — TELEPHONE ENCOUNTER
Routing refill request to provider for review/approval because:  A break in medication - last filled for a 6 month supply in 08/2017. Routing to listed PCP.    Medication Detail      Disp Refills Start End KATE   citalopram (CELEXA) 40 MG tablet 90 tablet 1 8/21/2017  No   Sig - Route: Take 1 tablet (40 mg) by mouth daily - Oral   Class: E-Prescribe   Order: [id number]   E-Prescribing Status: Receipt confirmed by pharmacy (8/21/2017 10:16 AM CDT)     Monica Brown RN

## 2018-08-30 ENCOUNTER — OFFICE VISIT (OUTPATIENT)
Dept: ORTHOPEDICS | Facility: CLINIC | Age: 38
End: 2018-08-30
Payer: COMMERCIAL

## 2018-08-30 VITALS
HEIGHT: 77 IN | DIASTOLIC BLOOD PRESSURE: 79 MMHG | HEART RATE: 83 BPM | WEIGHT: 315 LBS | BODY MASS INDEX: 37.19 KG/M2 | SYSTOLIC BLOOD PRESSURE: 133 MMHG

## 2018-08-30 DIAGNOSIS — L40.50 PSORIASIS WITH ARTHROPATHY (H): ICD-10-CM

## 2018-08-30 DIAGNOSIS — M51.26 LUMBAR HERNIATED DISC: Primary | ICD-10-CM

## 2018-08-30 PROCEDURE — 99213 OFFICE O/P EST LOW 20 MIN: CPT | Performed by: PREVENTIVE MEDICINE

## 2018-08-30 RX ORDER — MELOXICAM 15 MG/1
15 TABLET ORAL DAILY
Qty: 30 TABLET | Refills: 0 | Status: SHIPPED | OUTPATIENT
Start: 2018-08-30 | End: 2018-09-25

## 2018-08-30 NOTE — PATIENT INSTRUCTIONS
Thanks for coming today.  Ortho/Sports Medicine Clinic  [address] [place] [address]    To schedule future appointments in Ortho Clinic, you may call [phone].    To schedule ordered imaging by your provider:   Call Central Imaging [phone]    To schedule an injection ordered by your provider:  Call Central Imaging Injection scheduling line: [phone]  [employer] available online at:  [email or web address]    Please call if any further questions or concerns ([phone]).  Clinic hours 8 am to 5 pm.    Return to clinic (call) if symptoms worsen or fail to improve.

## 2018-08-30 NOTE — MR AVS SNAPSHOT
After Visit Summary   2018    Lakhwinder Jones    MRN: [id number]           Patient Information     Date Of Birth          1980        Visit Information        Provider Department      2018 10:40 AM Jak Mijares MD [facility]        Today's Diagnoses     Lumbar herniated disc    -  1      Care Instructions    Thanks for coming today.  [facility]  [address] [place] [address]    To schedule future appointments in Ortho Clinic, you may call [phone].    To schedule ordered imaging by your provider:   Call Central Imaging [phone]    To schedule an injection ordered by your provider:  Call [facility] Injection scheduling line: [phone]  [employer] available online at:  [employer]    Please call if any further questions or concerns ([phone]).  Clinic hours 8 am to 5 pm.    Return to clinic (call) if symptoms worsen or fail to improve.          Follow-ups after your visit        Your next 10 appointments already scheduled     Aug 30, 2018  3:15 PM CDT   (Arrive by 3:00 PM)   XR LUMBAR EPIDURAL INJECTION with MGXR3, MG NEURO RAD   [facility]    [address] [place] [address] [phone]           Please bring any scans or X-rays taken at other hospitals, if similar tests were done. Also bring a list of your medicines, including vitamins, minerals and over-the-counter drugs. It is safest to leave personal items at home.  Injections take about 30 to 45 minutes. Most people spend up to 2 hours in the clinic or hospital.  Plan to have someone drive you home afterward.  Tell your doctor in advance:   If you are allergic to X-ray dye (contrast fluid).   If you may be pregnant.   If you take Coumadin (or any other blood thinners) you may need to stop taking them up to 5 days prior to the exam. Talk to your doctor before  stopping.   If you take Plavix, Ticlid, Pletal or Persantine, please ask your doctor if you should stop these medicines. You may need extra tests on the morning of your scan.   If you take Xarelto (Rivaroxaban), you may need to stop taking it 24 hours before treatment. Talk to your doctor before stopping this medicine.   If you have any questions, please call the Imaging Department where you will have your exam. Directions, parking instructions, and other information are available on our website, [email or web address].            Sep 25, 2018 10:00 AM CDT   [facility] Visit with Jak Sherwood DO   [facility]    [address] [place] [address] [phone]              Future tests that were ordered for you today     Open Future Orders        Priority Expected Expires Ordered    X-ray Lumbar epidural injection Routine 8/30/2018 8/30/2019 8/30/2018            Who to contact     If you have questions or need follow up information about today's clinic visit or your schedule please contact [facility] directly at [phone].  Normal or non-critical lab and imaging results will be communicated to you by [employer]art, letter or phone within 4 business days after the clinic has received the results. If you do not hear from us within 7 days, please contact the clinic through Grad[employer] or phone. If you have a critical or abnormal lab result, we will notify you by phone as soon as possible.  Submit refill requests through Open CS or call your pharmacy and they will forward the refill request to us. Please allow 3 business days for your refill to be completed.          Additional Information About Your Visit        [employer] Information     Open CS gives you secure access to your electronic health record. If you see a primary care provider, you can also send messages to your care team and make appointments. If you have questions, please call your primary  "care clinic.  If you do not have a primary care provider, please call [phone] and they will assist you.      [employer] is an electronic gateway that provides easy, online access to your medical records. With [employer], you can request a clinic appointment, read your test results, renew a prescription or communicate with your care team.     To access your existing account, please contact your [facility] or call [phone] for assistance.        Care EveryWhere ID     This is your Care EveryWhere ID. This could be used by other organizations to access your [facility] medical records  [id number]        Your Vitals Were     Pulse Height BMI (Body Mass Index)             83 1.956 m (6' 5\") 40.56 kg/m2          Blood Pressure from Last 3 Encounters:   08/30/18 133/79   07/26/18 (!) 157/92   06/26/18 144/86    Weight from Last 3 Encounters:   08/30/18 (!) 155.1 kg (342 lb)   07/26/18 (!) 155.1 kg (342 lb)   06/26/18 (!) 155.2 kg (342 lb 3.2 oz)                 Today's Medication Changes          These changes are accurate as of 8/30/18 11:56 AM.  If you have any questions, ask your nurse or doctor.               Start taking these medicines.        Dose/Directions    meloxicam 15 MG tablet   Commonly known as:  MOBIC   Used for:  Lumbar herniated disc   Started by:  Jak Mijares MD        Dose:  15 mg   Take 1 tablet (15 mg) by mouth daily   Quantity:  30 tablet   Refills:  0         Stop taking these medicines if you haven't already. Please contact your care team if you have questions.     adalimumab 40 MG/0.8ML prefilled syringe kit   Commonly known as:  HUMIRA   Stopped by:  Jak Mijares MD                Where to get your medicines      These medications were sent to [facility] - [place], [place] - [address], [address]  [address], [facility] [address]     Phone:  [phone]     meloxicam 15 MG tablet                " Primary Care Provider Office Phone # Fax #    Simonavirginia Prasad PA-C [phone]       [address]  [facility] [address]        Equal Access to Services     KENTON POLLOCK : Hadii aad ku hadcrowo Soomaali, waaxda luqadaha, qaybta kaalmada adeegyada, adina rodriguezn adebob gregory laSarmadleah . So [facility] [phone].    ATENCIÓN: Si habla español, tiene a rose disposición servicios gratuitos de asistencia lingüística. Llame al [phone].    We comply with applicable federal civil rights laws and [place] laws. We do not discriminate on the basis of race, color, national origin, age, disability, sex, sexual orientation, or gender identity.            Thank you!     Thank you for choosing [facility]  for your care. Our goal is always to provide you with excellent care. Hearing back from our patients is one way we can continue to improve our services. Please take a few minutes to complete the written survey that you may receive in the mail after your visit with us. Thank you!             Your Updated Medication List - Protect others around you: Learn how to safely use, store and throw away your medicines at www.disposemymeds.org.          This list is accurate as of 8/30/18 11:56 AM.  Always use your most recent med list.                   Brand Name Dispense Instructions for use Diagnosis    Apremilast 10 & 20 & 30 MG Tbpk    OTEZLA    27 tablet    Take one tablet in the morning on day 1, then take one tablet every 12 hours on days 2 thru 14, according to the instructions on the packet.    Psoriasis with arthropathy (H)       citalopram 40 MG tablet    celeXA    30 tablet    Take 1 tablet (40 mg) by mouth daily    Anxiety state, Panic disorder without agoraphobia       * cyclobenzaprine 10 MG tablet    FLEXERIL    30 tablet    Take 1 tablet (10 mg) by mouth 3 times daily as needed for muscle spasms    Acute midline low back pain with left-sided sciatica       *  cyclobenzaprine 10 MG tablet    FLEXERIL    42 tablet    Take 1 tablet (10 mg) by mouth nightly as needed for muscle spasms    Lumbar disc herniation       meloxicam 15 MG tablet    MOBIC    30 tablet    Take 1 tablet (15 mg) by mouth daily    Lumbar herniated disc       order for DME     1 Device    Equipment being ordered: [id number] $249  Walking Boot XL Funmilayo Pneumatic    Closed nondisplaced fracture of fifth metatarsal bone of right foot, initial encounter       * Notice:  This list has 2 medication(s) that are the same as other medications prescribed for you. Read the directions carefully, and ask your doctor or other care provider to review them with you.

## 2018-08-30 NOTE — LETTER
8/30/2018         RE: Lakhwinder Jones  [address]  [place] [address]        Dear Colleague,    Thank you for referring your patient, Lakhwinder Jones, to the [facility]. Please see a copy of my visit note below.    HISTORY OF PRESENT ILLNESS  Mr. Jones is a pleasant 37 year old year old male who presents to clinic today with lumbar radicular pain after having MRI  He took medrol pack that didn't help much  Taking flexeril at nighttime  Feels about the same      MEDICAL HISTORY  Patient Active Problem List   Diagnosis     Anxiety state     Chronic back pain     Depression     Panic disorder without agoraphobia     CARDIOVASCULAR SCREENING; LDL GOAL LESS THAN 160     Abnormal results of liver function studies     Psoriatic arthritis (H)     Morbid obesity (H)     Elevated blood-pressure reading without diagnosis of hypertension     Closed nondisplaced fracture of fifth metatarsal bone of right foot, initial encounter     Pain in joint involving ankle and foot, right     Aftercare following surgery of the musculoskeletal system       Current Outpatient Prescriptions   Medication Sig Dispense Refill     Apremilast (OTEZLA) 10 & 20 & 30 MG TBPK Take one tablet in the morning on day 1, then take one tablet every 12 hours on days 2 thru 14, according to the instructions on the packet. 27 tablet 0     citalopram (CELEXA) 40 MG tablet Take 1 tablet (40 mg) by mouth daily 30 tablet 0     cyclobenzaprine (FLEXERIL) 10 MG tablet Take 1 tablet (10 mg) by mouth nightly as needed for muscle spasms 42 tablet 1     cyclobenzaprine (FLEXERIL) 10 MG tablet Take 1 tablet (10 mg) by mouth 3 times daily as needed for muscle spasms 30 tablet 0     meloxicam (MOBIC) 15 MG tablet Take 1 tablet (15 mg) by mouth daily 30 tablet 0     order for DME Equipment being ordered: [id number] $249  Walking Boot XL Funmilayo Pneumatic 1 Device 0       Allergies   Allergen Reactions     Tramadol Other (See  "Comments)     Shellfish-Derived Products        Family History   Problem Relation Age of Onset     Obesity Mother      Diabetes Father      Coronary Artery Disease Father      Hypertension Father      Hyperlipidemia Father      Depression Father      Anxiety Disorder Father      Mental Illness Father      Substance Abuse Father      Breast Cancer Maternal Grandmother      Depression Maternal Grandmother      Mental Illness Maternal Grandmother      Substance Abuse Maternal Grandmother      Prostate Cancer Maternal Grandfather      Diabetes Paternal Grandmother      Breast Cancer Paternal Grandmother      Depression Paternal Grandmother      Mental Illness Paternal Grandmother      Diabetes Paternal Grandfather      Asthma Brother      Diabetes Paternal Uncle      Cerebrovascular Disease No family hx of      Anesthesia Reaction No family hx of      Osteoperosis No family hx of      Genetic Disorder No family hx of      Thyroid Disease No family hx of      Liver Disease No family hx of        Additional medical/Social/Surgical histories reviewed in [facility] and updated as appropriate.     REVIEW OF SYSTEMS (8/30/2018)  10 point ROS of systems including Constitutional, Eyes, Respiratory, Cardiovascular, Gastroenterology, Genitourinary, Integumentary, Musculoskeletal, Psychiatric were all negative except for pertinent positives noted in my HPI.     PHYSICAL EXAM  Vitals:    08/30/18 1054   BP: 133/79   Pulse: 83   Weight: (!) 155.1 kg (342 lb)   Height: 1.956 m (6' 5\")     Vital Signs: /79  Pulse 83  Ht 1.956 m (6' 5\")  Wt (!) 155.1 kg (342 lb)  BMI 40.56 kg/m2 Patient declined being weighed. Body mass index is 40.56 kg/(m^2).    General  - normal appearance, in no obvious distress  CV  - normal peripheral perfusion  Pulm  - normal respiratory pattern, non-labored  Musculoskeletal - lumbar spine  - stance: normal gait without limp, no obvious leg length discrepancy, normal heel and toe walk  - inspection: normal " bone and joint alignment, no obvious scoliosis  - palpation: no paravertebral or bony tenderness  - ROM: flexion exacerbates pain in low back, normal extension, sidebending, rotation  - strength: lower extremities 5/5 in all planes  - special tests:  (+) straight leg raise- left  (+) slump test- low back  Neuro  - patellar and Achilles DTRs 2+ bilaterally, left lower extremity sensory deficit throughout L5 distribution, grossly normal coordination, normal muscle tone  Skin  - no ecchymosis, erythema, warmth, or induration, no obvious rash  Psych  - interactive, appropriate, normal mood and affect  ASSESSMENT & PLAN  38 yo male with lumbar disc herniations  Reviewed lumbar MRI  Shows herniated lumbar discs  Ordered lumbar LALA  F/u after    Jak Mijares MD, CAQSM    Again, thank you for allowing me to participate in the care of your patient.        Sincerely,        Jak Mijares MD

## 2018-08-30 NOTE — PROGRESS NOTES
HISTORY OF PRESENT ILLNESS  Mr. Jones is a pleasant 37 year old year old male who presents to clinic today with lumbar radicular pain after having MRI  He took medrol pack that didn't help much  Taking flexeril at nighttime  Feels about the same      MEDICAL HISTORY  Patient Active Problem List   Diagnosis     Anxiety state     Chronic back pain     Depression     Panic disorder without agoraphobia     CARDIOVASCULAR SCREENING; LDL GOAL LESS THAN 160     Abnormal results of liver function studies     Psoriatic arthritis (H)     Morbid obesity (H)     Elevated blood-pressure reading without diagnosis of hypertension     Closed nondisplaced fracture of fifth metatarsal bone of right foot, initial encounter     Pain in joint involving ankle and foot, right     Aftercare following surgery of the musculoskeletal system       Current Outpatient Prescriptions   Medication Sig Dispense Refill     Apremilast (OTEZLA) 10 & 20 & 30 MG TBPK Take one tablet in the morning on day 1, then take one tablet every 12 hours on days 2 thru 14, according to the instructions on the packet. 27 tablet 0     citalopram (CELEXA) 40 MG tablet Take 1 tablet (40 mg) by mouth daily 30 tablet 0     cyclobenzaprine (FLEXERIL) 10 MG tablet Take 1 tablet (10 mg) by mouth nightly as needed for muscle spasms 42 tablet 1     cyclobenzaprine (FLEXERIL) 10 MG tablet Take 1 tablet (10 mg) by mouth 3 times daily as needed for muscle spasms 30 tablet 0     meloxicam (MOBIC) 15 MG tablet Take 1 tablet (15 mg) by mouth daily 30 tablet 0     order for DME Equipment being ordered: [id number] $249  Walking Boot XL Funmilayo Pneumatic 1 Device 0       Allergies   Allergen Reactions     Tramadol Other (See Comments)     Shellfish-Derived Products        Family History   Problem Relation Age of Onset     Obesity Mother      Diabetes Father      Coronary Artery Disease Father      Hypertension Father      Hyperlipidemia Father      Depression Father      Anxiety  "Disorder Father      Mental Illness Father      Substance Abuse Father      Breast Cancer Maternal Grandmother      Depression Maternal Grandmother      Mental Illness Maternal Grandmother      Substance Abuse Maternal Grandmother      Prostate Cancer Maternal Grandfather      Diabetes Paternal Grandmother      Breast Cancer Paternal Grandmother      Depression Paternal Grandmother      Mental Illness Paternal Grandmother      Diabetes Paternal Grandfather      Asthma Brother      Diabetes Paternal Uncle      Cerebrovascular Disease No family hx of      Anesthesia Reaction No family hx of      Osteoperosis No family hx of      Genetic Disorder No family hx of      Thyroid Disease No family hx of      Liver Disease No family hx of        Additional medical/Social/Surgical histories reviewed in [facility] and updated as appropriate.     REVIEW OF SYSTEMS (8/30/2018)  10 point ROS of systems including Constitutional, Eyes, Respiratory, Cardiovascular, Gastroenterology, Genitourinary, Integumentary, Musculoskeletal, Psychiatric were all negative except for pertinent positives noted in my HPI.     PHYSICAL EXAM  Vitals:    08/30/18 1054   BP: 133/79   Pulse: 83   Weight: (!) 155.1 kg (342 lb)   Height: 1.956 m (6' 5\")     Vital Signs: /79  Pulse 83  Ht 1.956 m (6' 5\")  Wt (!) 155.1 kg (342 lb)  BMI 40.56 kg/m2 Patient declined being weighed. Body mass index is 40.56 kg/(m^2).    General  - normal appearance, in no obvious distress  CV  - normal peripheral perfusion  Pulm  - normal respiratory pattern, non-labored  Musculoskeletal - lumbar spine  - stance: normal gait without limp, no obvious leg length discrepancy, normal heel and toe walk  - inspection: normal bone and joint alignment, no obvious scoliosis  - palpation: no paravertebral or bony tenderness  - ROM: flexion exacerbates pain in low back, normal extension, sidebending, rotation  - strength: lower extremities 5/5 in all planes  - special tests:  (+) " straight leg raise- left  (+) slump test- low back  Neuro  - patellar and Achilles DTRs 2+ bilaterally, left lower extremity sensory deficit throughout L5 distribution, grossly normal coordination, normal muscle tone  Skin  - no ecchymosis, erythema, warmth, or induration, no obvious rash  Psych  - interactive, appropriate, normal mood and affect  ASSESSMENT & PLAN  38 yo male with lumbar disc herniations  Reviewed lumbar MRI  Shows herniated lumbar discs  Ordered lumbar LALA  F/u after    Jak Mijares MD, CAQSM

## 2018-09-05 DIAGNOSIS — L40.9 PSORIASIS: Primary | ICD-10-CM

## 2018-09-05 DIAGNOSIS — L40.50 PSORIASIS WITH ARTHROPATHY (H): ICD-10-CM

## 2018-09-05 NOTE — TELEPHONE ENCOUNTER
called patient and notified of refills    Jennifer OspinaRN BSN  [facility]  [phone]

## 2018-09-05 NOTE — TELEPHONE ENCOUNTER
patient wants a refill on otezla - patient called on 08/30/18 but  did  Not route to team for refills.    Jennifer Ospina,RN BSN  [facility]  [phone]

## 2018-09-13 ENCOUNTER — RADIANT APPOINTMENT (OUTPATIENT)
Dept: GENERAL RADIOLOGY | Facility: CLINIC | Age: 38
End: 2018-09-13
Attending: PREVENTIVE MEDICINE
Payer: COMMERCIAL

## 2018-09-13 VITALS — HEART RATE: 58 BPM | DIASTOLIC BLOOD PRESSURE: 78 MMHG | OXYGEN SATURATION: 97 % | SYSTOLIC BLOOD PRESSURE: 142 MMHG

## 2018-09-13 DIAGNOSIS — M51.26 LUMBAR HERNIATED DISC: ICD-10-CM

## 2018-09-13 PROCEDURE — 62323 NJX INTERLAMINAR LMBR/SAC: CPT | Performed by: RADIOLOGY

## 2018-09-13 RX ORDER — IOPAMIDOL 408 MG/ML
10 INJECTION, SOLUTION INTRATHECAL ONCE
Status: COMPLETED | OUTPATIENT
Start: 2018-09-13 | End: 2018-09-13

## 2018-09-13 RX ORDER — BUPIVACAINE HYDROCHLORIDE 5 MG/ML
15 INJECTION, SOLUTION PERINEURAL ONCE
Status: COMPLETED | OUTPATIENT
Start: 2018-09-13 | End: 2018-09-13

## 2018-09-13 RX ORDER — METHYLPREDNISOLONE ACETATE 80 MG/ML
80 INJECTION, SUSPENSION INTRA-ARTICULAR; INTRALESIONAL; INTRAMUSCULAR; SOFT TISSUE ONCE
Status: COMPLETED | OUTPATIENT
Start: 2018-09-13 | End: 2018-09-13

## 2018-09-13 RX ADMIN — METHYLPREDNISOLONE ACETATE 80 MG: 80 INJECTION, SUSPENSION INTRA-ARTICULAR; INTRALESIONAL; INTRAMUSCULAR; SOFT TISSUE at 17:44

## 2018-09-13 RX ADMIN — IOPAMIDOL 2 ML: 408 INJECTION, SOLUTION INTRATHECAL at 17:43

## 2018-09-13 RX ADMIN — BUPIVACAINE HYDROCHLORIDE 10 MG: 5 INJECTION, SOLUTION PERINEURAL at 17:42

## 2018-09-13 NOTE — PROGRESS NOTES
: Lakhwinder was seen in X-ray today for a lumbar epidural injection. Patient rated pain before procedure 6/10. After procedure patient rated pain 5/10. This pain level is acceptable to patient. Patient discharged home with .

## 2018-09-25 ENCOUNTER — OFFICE VISIT (OUTPATIENT)
Dept: ORTHOPEDICS | Facility: CLINIC | Age: 38
End: 2018-09-25
Payer: COMMERCIAL

## 2018-09-25 VITALS — HEART RATE: 97 BPM | OXYGEN SATURATION: 98 % | DIASTOLIC BLOOD PRESSURE: 80 MMHG | SYSTOLIC BLOOD PRESSURE: 130 MMHG

## 2018-09-25 DIAGNOSIS — M54.16 LUMBAR RADICULOPATHY: Primary | ICD-10-CM

## 2018-09-25 PROCEDURE — 99213 OFFICE O/P EST LOW 20 MIN: CPT | Performed by: FAMILY MEDICINE

## 2018-09-25 ASSESSMENT — PAIN SCALES - GENERAL: PAINLEVEL: SEVERE PAIN (7)

## 2018-09-25 NOTE — MR AVS SNAPSHOT
After Visit Summary   2018    Lakhwinder Jones    MRN: [id number]           Patient Information     Date Of Birth          1980        Visit Information        Provider Department      2018 10:00 AM Jak Sherwood,  [facility]        Today's Diagnoses     Lumbar radiculopathy    -  1      Care Instructions    Thanks for coming today.  Ortho/Sports Medicine Clinic  [address] [place] [address]    To schedule future appointments in Ortho Clinic, you may call [phone].    To schedule ordered imaging by your provider:   Call Central Imaging [phone]    To schedule an injection ordered by your provider:  Call Central Imaging Injection scheduling line: [phone]  Arradiance available online at:  [email or web address]    Please call if any further questions or concerns ([phone]).  Clinic hours 8 am to 5 pm.    Return to clinic (call) if symptoms worsen or fail to improve.            Follow-ups after your visit        Future tests that were ordered for you today     Open Future Orders        Priority Expected Expires Ordered    XR Lumbar Epidural Injection Incl Imaging Routine 2018            Who to contact     If you have questions or need follow up information about today's clinic visit or your schedule please contact [facility] directly at [phone].  Normal or non-critical lab and imaging results will be communicated to you by MyChart, letter or phone within 4 business days after the clinic has received the results. If you do not hear from us within 7 days, please contact the clinic through Birdland Softwarehart or phone. If you have a critical or abnormal lab result, we will notify you by phone as soon as possible.  Submit refill requests through Arradiance or call your pharmacy and they will forward the refill request to us. Please allow 3 business days for your refill to be completed.           Additional Information About Your Visit        Epicsellhart Information     Seguricel gives you secure access to your electronic health record. If you see a primary care provider, you can also send messages to your care team and make appointments. If you have questions, please call your primary care clinic.  If you do not have a primary care provider, please call [phone] and they will assist you.      Seguricel is an electronic gateway that provides easy, online access to your medical records. With Seguricel, you can request a clinic appointment, read your test results, renew a prescription or communicate with your care team.     To access your existing account, please contact your [facility] or call [phone] for assistance.        Care EveryWhere ID     This is your Care EveryWhere ID. This could be used by other organizations to access your [place] medical records  [id number]        Your Vitals Were     Pulse Pulse Oximetry                97 98%           Blood Pressure from Last 3 Encounters:   09/25/18 130/80   09/13/18 142/78   08/30/18 133/79    Weight from Last 3 Encounters:   08/30/18 (!) 155.1 kg (342 lb)   07/26/18 (!) 155.1 kg (342 lb)   06/26/18 (!) 155.2 kg (342 lb 3.2 oz)                 Today's Medication Changes          These changes are accurate as of 9/25/18 10:17 AM.  If you have any questions, ask your nurse or doctor.               Stop taking these medicines if you haven't already. Please contact your care team if you have questions.     meloxicam 15 MG tablet   Commonly known as:  MOBIC   Stopped by:  Jak Sherwood, DO                    Primary Care Provider Office Phone # Fax #    Simona Prasad PA-C [phone]       [address]  [facility] [address]        Equal Access to Services     [facility]: Hadii aad ku hadasho Soomaali, waaxda luqadaha, qaybta kaalmada adeegyada, adina guerrero. So  [facility] [phone].    ATENCIÓN: Si baljit manning, tiene a rose disposición servicios gratuitos de asistencia lingüística. Mary Beth kennedy [phone].    We comply with applicable federal civil rights laws and [place] laws. We do not discriminate on the basis of race, color, national origin, age, disability, sex, sexual orientation, or gender identity.            Thank you!     Thank you for choosing [facility]  for your care. Our goal is always to provide you with excellent care. Hearing back from our patients is one way we can continue to improve our services. Please take a few minutes to complete the written survey that you may receive in the mail after your visit with us. Thank you!             Your Updated Medication List - Protect others around you: Learn how to safely use, store and throw away your medicines at www.disposemymeds.org.          This list is accurate as of 9/25/18 10:17 AM.  Always use your most recent med list.                   Brand Name Dispense Instructions for use Diagnosis    * Apremilast 10 & 20 & 30 MG Tbpk    OTEZLA    27 tablet    Take one tablet in the morning on day 1, then take one tablet every 12 hours on days 2 thru 14, according to the instructions on the packet.    Psoriasis with arthropathy (H)       * apremilast 30 MG tablet    OTEZLA    60 tablet    Take 1 tablet (30 mg) by mouth 2 times daily    Psoriasis, Psoriasis with arthropathy (H)       citalopram 40 MG tablet    celeXA    30 tablet    Take 1 tablet (40 mg) by mouth daily    Anxiety state, Panic disorder without agoraphobia       * cyclobenzaprine 10 MG tablet    FLEXERIL    30 tablet    Take 1 tablet (10 mg) by mouth 3 times daily as needed for muscle spasms    Acute midline low back pain with left-sided sciatica       * cyclobenzaprine 10 MG tablet    FLEXERIL    42 tablet    Take 1 tablet (10 mg) by mouth nightly as needed for muscle spasms    Lumbar disc herniation       order for DME     1 Device     Equipment being ordered: [id number] $249  Walking Boot XL Funmilayo Pneumatic    Closed nondisplaced fracture of fifth metatarsal bone of right foot, initial encounter       * Notice:  This list has 4 medication(s) that are the same as other medications prescribed for you. Read the directions carefully, and ask your doctor or other care provider to review them with you.

## 2018-09-25 NOTE — NURSING NOTE
Lakhwinder Jones's chief complaint for this visit includes:  Chief Complaint   Patient presents with     RECHECK     follow up lumbar LALA     PCP: Simona Prasad    Referring Provider:  No referring provider defined for this encounter.    /80  Pulse 97  SpO2 98%  Severe Pain (7)     Do you need any medication refills at today's visit? No

## 2018-09-25 NOTE — PROGRESS NOTES
HISTORY OF PRESENT ILLNESS  Mr. Jones is a pleasant 37 year old year old male following up with back pain with radicular features.  Lakhwinder is a patient of Dr. Mijares's.   Unfortunately Lakhwinder feels no better after his lumbar injection that he had 12 days ago.  This was done with a transforaminal left-sided approach at L4-5.  Unfortunately he felt no relief at all with this injection.  He did not get relief from the lidocaine immediately postprocedure either.  He continues to have low back pain with symptoms that radiate down both legs at times.  Additional history: as documented      REVIEW OF SYSTEMS (9/25/2018)  10 point ROS of systems including Constitutional, Eyes, Respiratory, Cardiovascular, Gastroenterology, Genitourinary, Integumentary, Musculoskeletal, Psychiatric were all negative except for pertinent positives noted in my HPI.     PHYSICAL EXAM  Vitals:    09/25/18 0957   BP: 130/80   Pulse: 97   SpO2: 98%       ASSESSMENT & PLAN  Mr. Jones is a 37 year old year old male following up with back pain with radicular features.    I reviewed his MRI of the room with him which reads:  Impression: Degenerative changes are seen in the lumbar spine with  moderate narrowing of the left lateral recess at L4-5 and  mild-to-moderate bilateral neural foraminal stenosis at L5-S1.    Lakhwinder and I did the diagnostic and therapeutic nature of these injections.  Given his lack of any relief at the L4-5 level I do think it is reasonable to repeat his injection, this 1 at the L5-S1 level.  He can get this done at his earliest convenience.    He will follow-up with Dr. Mijares 2 weeks after his injection.    It was a pleasure seeing Lakhwinder.    15 minutes was spent in the room, 10 of which was spent on counseling and coordination of care.        Jak Sherwood, , CAM

## 2018-09-25 NOTE — PATIENT INSTRUCTIONS
Thanks for coming today.  Ortho/Sports Medicine Clinic  [address] [place] [address]    To schedule future appointments in Ortho Clinic, you may call [phone].    To schedule ordered imaging by your provider:   Call Central Imaging [phone]    To schedule an injection ordered by your provider:  Call Central Imaging Injection scheduling line: [phone]  Cinexiohart available online at:  [email or web address]    Please call if any further questions or concerns ([phone]).  Clinic hours 8 am to 5 pm.    Return to clinic (call) if symptoms worsen or fail to improve.

## 2018-09-25 NOTE — LETTER
9/25/2018         RE: Lakhwinder Jones  [address]  [place] [address]        Dear Colleague,    Thank you for referring your patient, Lakhwinder Jones, to the [facility]. Please see a copy of my visit note below.    HISTORY OF PRESENT ILLNESS  Mr. Jones is a pleasant 37 year old year old male following up with back pain with radicular features.  Lakhwinder is a patient of Dr. Mijares's.   Unfortunately Lakhwinder feels no better after his lumbar injection that he had 12 days ago.  This was done with a transforaminal left-sided approach at L4-5.  Unfortunately he felt no relief at all with this injection.  He did not get relief from the lidocaine immediately postprocedure either.  He continues to have low back pain with symptoms that radiate down both legs at times.  Additional history: as documented      REVIEW OF SYSTEMS (9/25/2018)  10 point ROS of systems including Constitutional, Eyes, Respiratory, Cardiovascular, Gastroenterology, Genitourinary, Integumentary, Musculoskeletal, Psychiatric were all negative except for pertinent positives noted in my HPI.     PHYSICAL EXAM  Vitals:    09/25/18 0957   BP: 130/80   Pulse: 97   SpO2: 98%       ASSESSMENT & PLAN  Mr. Jones is a 37 year old year old male following up with back pain with radicular features.    I reviewed his MRI of the room with him which reads:  Impression: Degenerative changes are seen in the lumbar spine with  moderate narrowing of the left lateral recess at L4-5 and  mild-to-moderate bilateral neural foraminal stenosis at L5-S1.    Lakhwinder and I did the diagnostic and therapeutic nature of these injections.  Given his lack of any relief at the L4-5 level I do think it is reasonable to repeat his injection, this 1 at the L5-S1 level.  He can get this done at his earliest convenience.    He will follow-up with Dr. Mijares 2 weeks after his injection.    It was a pleasure seeing Lakhwinder.    15 minutes was spent in the  room, 10 of which was spent on counseling and coordination of care.        Jak Sherwood DO, [facility]          Again, thank you for allowing me to participate in the care of your patient.        Sincerely,        Jak Sherwood DO

## 2018-10-04 ENCOUNTER — RADIANT APPOINTMENT (OUTPATIENT)
Dept: GENERAL RADIOLOGY | Facility: CLINIC | Age: 38
End: 2018-10-04
Attending: FAMILY MEDICINE
Payer: COMMERCIAL

## 2018-10-04 VITALS — DIASTOLIC BLOOD PRESSURE: 81 MMHG | OXYGEN SATURATION: 98 % | SYSTOLIC BLOOD PRESSURE: 139 MMHG | HEART RATE: 71 BPM

## 2018-10-04 DIAGNOSIS — M54.16 LUMBAR RADICULOPATHY: ICD-10-CM

## 2018-10-04 PROCEDURE — 62323 NJX INTERLAMINAR LMBR/SAC: CPT | Performed by: RADIOLOGY

## 2018-10-04 RX ORDER — IOPAMIDOL 408 MG/ML
10 INJECTION, SOLUTION INTRATHECAL ONCE
Status: COMPLETED | OUTPATIENT
Start: 2018-10-04 | End: 2018-10-04

## 2018-10-04 RX ORDER — BUPIVACAINE HYDROCHLORIDE 5 MG/ML
10 INJECTION, SOLUTION PERINEURAL ONCE
Status: COMPLETED | OUTPATIENT
Start: 2018-10-04 | End: 2018-10-04

## 2018-10-04 RX ORDER — METHYLPREDNISOLONE ACETATE 80 MG/ML
80 INJECTION, SUSPENSION INTRA-ARTICULAR; INTRALESIONAL; INTRAMUSCULAR; SOFT TISSUE ONCE
Status: COMPLETED | OUTPATIENT
Start: 2018-10-04 | End: 2018-10-04

## 2018-10-04 RX ADMIN — METHYLPREDNISOLONE ACETATE 80 MG: 80 INJECTION, SUSPENSION INTRA-ARTICULAR; INTRALESIONAL; INTRAMUSCULAR; SOFT TISSUE at 13:27

## 2018-10-04 RX ADMIN — BUPIVACAINE HYDROCHLORIDE 10 MG: 5 INJECTION, SOLUTION PERINEURAL at 13:26

## 2018-10-04 RX ADMIN — IOPAMIDOL 2 ML: 408 INJECTION, SOLUTION INTRATHECAL at 13:23

## 2018-10-04 NOTE — PROGRESS NOTES
: Lakhwinder was seen in X-ray today for a lumbar epidural injection. Patient rated pain before procedure 5/10. After procedure patient rated pain 5/10. This pain level is acceptable to patient. Patient discharged home with his .

## 2018-10-04 NOTE — DISCHARGE SUMMARY
AFTER YOU GO HOME    ? DO relax; minimize your activity for 24 hours  ? You may resume normal activity tomorrow  ? You may remove the bandage in the evening or next morning  ? You may resume bathing the next day  ? Drink at least 4 extra glasses of fluid today if not on fluid restrictions  ? DO NOT drive or operate machinery at home or at work for at least 24 hours      VISIT THE EMERGENCY ROOM OR URGENT CARE IF:    ? There is redness or swelling at the injection site  ? There is discharge from the injection site  ? You develop a temperature of 101  F or greater      ADDITIONAL INSTRUCTIONS:     ? You may resume your Coumadin or other blood thinner at your regular dose today.  Follow up with your physician to have your INR rechecked if indicated.  ? If you gain no relief from the injection after two (2) weeks, follow-up with your provider for your options.        Contacts:    During business hours from 8 to 5 pm, you may call [phone] to reach a nurse advisor at [facility].  After hours, call [facility]  [phone].  Ask for the Radiologist on-call.  Someone is on-call 24 hrs/day.  [facility] Toll Free Number   [phone]

## 2018-10-18 ENCOUNTER — OFFICE VISIT (OUTPATIENT)
Dept: ORTHOPEDICS | Facility: CLINIC | Age: 38
End: 2018-10-18
Payer: COMMERCIAL

## 2018-10-18 VITALS — HEART RATE: 80 BPM | SYSTOLIC BLOOD PRESSURE: 145 MMHG | DIASTOLIC BLOOD PRESSURE: 94 MMHG | OXYGEN SATURATION: 96 %

## 2018-10-18 DIAGNOSIS — M54.16 LUMBAR RADICULOPATHY: Primary | ICD-10-CM

## 2018-10-18 DIAGNOSIS — M62.830 LUMBAR PARASPINAL MUSCLE SPASM: Primary | ICD-10-CM

## 2018-10-18 DIAGNOSIS — M54.42 ACUTE MIDLINE LOW BACK PAIN WITH LEFT-SIDED SCIATICA: ICD-10-CM

## 2018-10-18 PROCEDURE — 99213 OFFICE O/P EST LOW 20 MIN: CPT | Performed by: PREVENTIVE MEDICINE

## 2018-10-18 RX ORDER — GABAPENTIN 300 MG/1
300 CAPSULE ORAL AT BEDTIME
Qty: 60 CAPSULE | Refills: 1 | Status: SHIPPED | OUTPATIENT
Start: 2018-10-18 | End: 2019-02-06

## 2018-10-18 RX ORDER — CYCLOBENZAPRINE HCL 10 MG
10 TABLET ORAL 2 TIMES DAILY PRN
Qty: 60 TABLET | Refills: 1 | Status: SHIPPED | OUTPATIENT
Start: 2018-10-18 | End: 2019-03-19

## 2018-10-18 ASSESSMENT — PAIN SCALES - GENERAL: PAINLEVEL: SEVERE PAIN (7)

## 2018-10-18 NOTE — MR AVS SNAPSHOT
After Visit Summary   10/18/2018    Lakhwinder Jones    MRN: [id number]           Patient Information     Date Of Birth          1980        Visit Information        Provider Department      10/18/2018 10:20 AM Jak Mijares MD [facility]        Today's Diagnoses     Lumbar paraspinal muscle spasm    -  1    Acute midline low back pain with left-sided sciatica           Follow-ups after your visit        Additional Services     PHYSICAL THERAPY REFERRAL (Internal)       Physical Therapy Referral                  Your next 10 appointments already scheduled     Oct 19, 2018  9:40 AM CDT   (Arrive by 9:25 AM)   [facility] with Edy Cespedes, KIRAN   [facility] ([facility]  )    [address]  [facility] [address] [phone]              Future tests that were ordered for you today     Open Future Orders        Priority Expected Expires Ordered    PHYSICAL THERAPY REFERRAL (Internal) Routine  10/18/2019 10/18/2018            Who to contact     If you have questions or need follow up information about today's clinic visit or your schedule please contact [facility] directly at [phone].  Normal or non-critical lab and imaging results will be communicated to you by MyChart, letter or phone within 4 business days after the clinic has received the results. If you do not hear from us within 7 days, please contact the clinic through Appaturehart or phone. If you have a critical or abnormal lab result, we will notify you by phone as soon as possible.  Submit refill requests through [employer] or call your pharmacy and they will forward the refill request to us. Please allow 3 business days for your refill to be completed.          Additional Information About Your Visit        MyChart Information     [employer] gives you secure access to your electronic health record. If you see a primary care provider, you can also  send messages to your care team and make appointments. If you have questions, please call your primary care clinic.  If you do not have a primary care provider, please call [phone] and they will assist you.      Infobionics is an electronic gateway that provides easy, online access to your medical records. With Infobionics, you can request a clinic appointment, read your test results, renew a prescription or communicate with your care team.     To access your existing account, please contact your [facility] or call [phone] for assistance.        Care EveryWhere ID     This is your Care EveryWhere ID. This could be used by other organizations to access your [place] medical records  [id number]        Your Vitals Were     Pulse Pulse Oximetry                80 96%           Blood Pressure from Last 3 Encounters:   10/18/18 (!) 145/94   10/04/18 139/81   09/25/18 130/80    Weight from Last 3 Encounters:   08/30/18 (!) 155.1 kg (342 lb)   07/26/18 (!) 155.1 kg (342 lb)   06/26/18 (!) 155.2 kg (342 lb 3.2 oz)                 Today's Medication Changes          These changes are accurate as of 10/18/18 10:54 AM.  If you have any questions, ask your nurse or doctor.               Start taking these medicines.        Dose/Directions    gabapentin 300 MG capsule   Commonly known as:  NEURONTIN   Used for:  Acute midline low back pain with left-sided sciatica, Lumbar paraspinal muscle spasm        Dose:  300 mg   Take 1 capsule (300 mg) by mouth At Bedtime   Quantity:  60 capsule   Refills:  1         These medicines have changed or have updated prescriptions.        Dose/Directions    * cyclobenzaprine 10 MG tablet   Commonly known as:  FLEXERIL   This may have changed:  Another medication with the same name was changed. Make sure you understand how and when to take each.   Used for:  Lumbar disc herniation        Dose:  10 mg   Take 1 tablet (10 mg) by mouth nightly as needed for muscle  spasms   Quantity:  42 tablet   Refills:  1       * cyclobenzaprine 10 MG tablet   Commonly known as:  FLEXERIL   This may have changed:  when to take this   Used for:  Acute midline low back pain with left-sided sciatica        Dose:  10 mg   Take 1 tablet (10 mg) by mouth 2 times daily as needed for muscle spasms   Quantity:  60 tablet   Refills:  1       * Notice:  This list has 2 medication(s) that are the same as other medications prescribed for you. Read the directions carefully, and ask your doctor or other care provider to review them with you.         Where to get your medicines      These medications were sent to [facility] - [place], [place] - [address], [address]  [address], [address], [facility] [address]     Phone:  [phone]     cyclobenzaprine 10 MG tablet    gabapentin 300 MG capsule                Primary Care Provider Office Phone # Fax #    Simona Prasad PA-C [phone]       [address]  [place] [facility] [address]        Equal Access to Services     [facility]: Hadii aad ku hadasho Soomaali, waaxda luqadaha, qaybta kaalmada adeegyada, waxay lida keller . So [facility] [phone].    ATENCIÓN: Si habla español, tiene a rose disposición servicios gratuitos de asistencia lingüística. [facility] [phone].    We comply with applicable federal civil rights laws and [place] laws. We do not discriminate on the basis of race, color, national origin, age, disability, sex, sexual orientation, or gender identity.            Thank you!     Thank you for choosing [facility]  for your care. Our goal is always to provide you with excellent care. Hearing back from our patients is one way we can continue to improve our services. Please take a few minutes to complete the written survey that you may receive in the mail after your visit with us. Thank you!             Your Updated Medication List -  Protect others around you: Learn how to safely use, store and throw away your medicines at www.disposemymeds.org.          This list is accurate as of 10/18/18 10:54 AM.  Always use your most recent med list.                   Brand Name Dispense Instructions for use Diagnosis    * Apremilast 10 & 20 & 30 MG Tbpk    OTEZLA    27 tablet    Take one tablet in the morning on day 1, then take one tablet every 12 hours on days 2 thru 14, according to the instructions on the packet.    Psoriasis with arthropathy (H)       * apremilast 30 MG tablet    OTEZLA    60 tablet    Take 1 tablet (30 mg) by mouth 2 times daily    Psoriasis, Psoriasis with arthropathy (H)       citalopram 40 MG tablet    celeXA    30 tablet    Take 1 tablet (40 mg) by mouth daily    Anxiety state, Panic disorder without agoraphobia       * cyclobenzaprine 10 MG tablet    FLEXERIL    42 tablet    Take 1 tablet (10 mg) by mouth nightly as needed for muscle spasms    Lumbar disc herniation       * cyclobenzaprine 10 MG tablet    FLEXERIL    60 tablet    Take 1 tablet (10 mg) by mouth 2 times daily as needed for muscle spasms    Acute midline low back pain with left-sided sciatica       gabapentin 300 MG capsule    NEURONTIN    60 capsule    Take 1 capsule (300 mg) by mouth At Bedtime    Acute midline low back pain with left-sided sciatica, Lumbar paraspinal muscle spasm       order for DME     1 Device    Equipment being ordered: [id number] $249  Walking Boot XL Funmilayo Pneumatic    Closed nondisplaced fracture of fifth metatarsal bone of right foot, initial encounter       * Notice:  This list has 4 medication(s) that are the same as other medications prescribed for you. Read the directions carefully, and ask your doctor or other care provider to review them with you.

## 2018-10-18 NOTE — NURSING NOTE
Lakhwinder Jones's chief complaint for this visit includes:  Chief Complaint   Patient presents with     RECHECK     Follow up after lumbar epidural on 10/04/2018. Patient states that he is still having a lot of pain and his back is stiff.      PCP: Simona Prasad    Referring Provider:  No referring provider defined for this encounter.    BP (!) 145/94 (BP Location: Right arm, Patient Position: Sitting, Cuff Size: Adult Large)  Pulse 80  SpO2 96%  Severe Pain (7)     Do you need any medication refills at today's visit? Yes

## 2018-10-18 NOTE — PROGRESS NOTES
HISTORY OF PRESENT ILLNESS  Mr. 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  Using flexeril daily    MEDICAL HISTORY  Patient Active Problem List   Diagnosis     Anxiety state     Chronic back pain     Depression     Panic disorder without agoraphobia     CARDIOVASCULAR SCREENING; LDL GOAL LESS THAN 160     Abnormal results of liver function studies     Psoriatic arthritis (H)     Morbid obesity (H)     Elevated blood-pressure reading without diagnosis of hypertension     Closed nondisplaced fracture of fifth metatarsal bone of right foot, initial encounter     Pain in joint involving ankle and foot, right     Aftercare following surgery of the musculoskeletal system       Current Outpatient Prescriptions   Medication Sig Dispense Refill     Apremilast (OTEZLA) 10 & 20 & 30 MG TBPK Take one tablet in the morning on day 1, then take one tablet every 12 hours on days 2 thru 14, according to the instructions on the packet. 27 tablet 0     apremilast (OTEZLA) 30 MG tablet Take 1 tablet (30 mg) by mouth 2 times daily 60 tablet 3     citalopram (CELEXA) 40 MG tablet Take 1 tablet (40 mg) by mouth daily 30 tablet 0     cyclobenzaprine (FLEXERIL) 10 MG tablet Take 1 tablet (10 mg) by mouth 2 times daily as needed for muscle spasms 60 tablet 1     cyclobenzaprine (FLEXERIL) 10 MG tablet Take 1 tablet (10 mg) by mouth nightly as needed for muscle spasms 42 tablet 1     gabapentin (NEURONTIN) 300 MG capsule Take 1 capsule (300 mg) by mouth At Bedtime 60 capsule 1     order for DME Equipment being ordered: [id number] $249  Walking Boot XL Funmilayo Pneumatic (Patient not taking: Reported on 10/18/2018) 1 Device 0       Allergies   Allergen  Reactions     Tramadol Other (See Comments)     Shellfish-Derived Products        Family History   Problem Relation Age of Onset     Obesity Mother      Diabetes Father      Coronary Artery Disease Father      Hypertension Father      Hyperlipidemia Father      Depression Father      Anxiety Disorder Father      Mental Illness Father      Substance Abuse Father      Breast Cancer Maternal Grandmother      Depression Maternal Grandmother      Mental Illness Maternal Grandmother      Substance Abuse Maternal Grandmother      Prostate Cancer Maternal Grandfather      Diabetes Paternal Grandmother      Breast Cancer Paternal Grandmother      Depression Paternal Grandmother      Mental Illness Paternal Grandmother      Diabetes Paternal Grandfather      Asthma Brother      Diabetes Paternal Uncle      Cerebrovascular Disease No family hx of      Anesthesia Reaction No family hx of      Osteoporosis No family hx of      Genetic Disorder No family hx of      Thyroid Disease No family hx of      Liver Disease No family hx of        Additional medical/Social/Surgical histories reviewed in [facility] and updated as appropriate.     REVIEW OF SYSTEMS (10/18/2018)  10 point ROS of systems including Constitutional, Eyes, Respiratory, Cardiovascular, Gastroenterology, Genitourinary, Integumentary, Musculoskeletal, Psychiatric were all negative except for pertinent positives noted in my HPI.     PHYSICAL EXAM  Vitals:    10/18/18 1022   BP: (!) 145/94   BP Location: Right arm   Patient Position: Sitting   Cuff Size: Adult Large   Pulse: 80   SpO2: 96%     Vital Signs: BP (!) 145/94 (BP Location: Right arm, Patient Position: Sitting, Cuff Size: Adult Large)  Pulse 80  SpO2 96% Patient declined being weighed. There is no height or weight on file to calculate BMI.    General  - normal appearance, in no obvious distress, overweight  CV  - normal peripheral perfusion  Pulm  - normal respiratory pattern, non-labored  Musculoskeletal -  lumbar spine  - stance: normal gait without limp, no obvious leg length discrepancy, normal heel and toe walk  - inspection: normal bone and joint alignment, no obvious scoliosis  - palpation: no paravertebral or bony tenderness  - ROM: flexion exacerbates pain in low back, normal extension, sidebending, rotation  - strength: lower extremities 5/5 in all planes  - special tests:  (+) straight leg raise- low back  (+) slump test  Neuro  - patellar and Achilles DTRs 2+ bilaterally, bilateral lower extremity sensory deficit throughout L5 distribution, grossly normal coordination, normal muscle tone  Skin  - no ecchymosis, erythema, warmth, or induration, no obvious rash  Psych  - interactive, appropriate, normal mood and affect  ASSESSMENT & PLAN  37 yo male with lumbar disc herniation, radicular pain, not improved  Referral to spine surgery  Gabapentin nightly  Cont. Flexeril PRN  Start PT    Jak Mijares MD, CAQSM

## 2018-10-18 NOTE — LETTER
10/18/2018         RE: Lakhwinder Jones  [address]  [place] [address]        Dear Colleague,    Thank you for referring your patient, Lakhwinder Jones, to the [facility]. Please see a copy of my visit note below.    HISTORY OF PRESENT ILLNESS  Mr. 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  Using flexeril daily    MEDICAL HISTORY  Patient Active Problem List   Diagnosis     Anxiety state     Chronic back pain     Depression     Panic disorder without agoraphobia     CARDIOVASCULAR SCREENING; LDL GOAL LESS THAN 160     Abnormal results of liver function studies     Psoriatic arthritis (H)     Morbid obesity (H)     Elevated blood-pressure reading without diagnosis of hypertension     Closed nondisplaced fracture of fifth metatarsal bone of right foot, initial encounter     Pain in joint involving ankle and foot, right     Aftercare following surgery of the musculoskeletal system       Current Outpatient Prescriptions   Medication Sig Dispense Refill     Apremilast (OTEZLA) 10 & 20 & 30 MG TBPK Take one tablet in the morning on day 1, then take one tablet every 12 hours on days 2 thru 14, according to the instructions on the packet. 27 tablet 0     apremilast (OTEZLA) 30 MG tablet Take 1 tablet (30 mg) by mouth 2 times daily 60 tablet 3     citalopram (CELEXA) 40 MG tablet Take 1 tablet (40 mg) by mouth daily 30 tablet 0     cyclobenzaprine (FLEXERIL) 10 MG tablet Take 1 tablet (10 mg) by mouth 2 times daily as needed for muscle spasms 60 tablet 1     cyclobenzaprine (FLEXERIL) 10 MG tablet Take 1 tablet (10 mg) by mouth nightly as needed for muscle spasms 42 tablet 1     gabapentin  (NEURONTIN) 300 MG capsule Take 1 capsule (300 mg) by mouth At Bedtime 60 capsule 1     order for DME Equipment being ordered: [id number] $249  Walking Boot XL Funmilayo Pneumatic (Patient not taking: Reported on 10/18/2018) 1 Device 0       Allergies   Allergen Reactions     Tramadol Other (See Comments)     Shellfish-Derived Products        Family History   Problem Relation Age of Onset     Obesity Mother      Diabetes Father      Coronary Artery Disease Father      Hypertension Father      Hyperlipidemia Father      Depression Father      Anxiety Disorder Father      Mental Illness Father      Substance Abuse Father      Breast Cancer Maternal Grandmother      Depression Maternal Grandmother      Mental Illness Maternal Grandmother      Substance Abuse Maternal Grandmother      Prostate Cancer Maternal Grandfather      Diabetes Paternal Grandmother      Breast Cancer Paternal Grandmother      Depression Paternal Grandmother      Mental Illness Paternal Grandmother      Diabetes Paternal Grandfather      Asthma Brother      Diabetes Paternal Uncle      Cerebrovascular Disease No family hx of      Anesthesia Reaction No family hx of      Osteoporosis No family hx of      Genetic Disorder No family hx of      Thyroid Disease No family hx of      Liver Disease No family hx of        Additional medical/Social/Surgical histories reviewed in [facility] and updated as appropriate.     REVIEW OF SYSTEMS (10/18/2018)  10 point ROS of systems including Constitutional, Eyes, Respiratory, Cardiovascular, Gastroenterology, Genitourinary, Integumentary, Musculoskeletal, Psychiatric were all negative except for pertinent positives noted in my HPI.     PHYSICAL EXAM  Vitals:    10/18/18 1022   BP: (!) 145/94   BP Location: Right arm   Patient Position: Sitting   Cuff Size: Adult Large   Pulse: 80   SpO2: 96%     Vital Signs: BP (!) 145/94 (BP Location: Right arm, Patient Position: Sitting, Cuff Size: Adult Large)  Pulse 80  SpO2 96%  Patient declined being weighed. There is no height or weight on file to calculate BMI.    General  - normal appearance, in no obvious distress, overweight  CV  - normal peripheral perfusion  Pulm  - normal respiratory pattern, non-labored  Musculoskeletal - lumbar spine  - stance: normal gait without limp, no obvious leg length discrepancy, normal heel and toe walk  - inspection: normal bone and joint alignment, no obvious scoliosis  - palpation: no paravertebral or bony tenderness  - ROM: flexion exacerbates pain in low back, normal extension, sidebending, rotation  - strength: lower extremities 5/5 in all planes  - special tests:  (+) straight leg raise- low back  (+) slump test  Neuro  - patellar and Achilles DTRs 2+ bilaterally, bilateral lower extremity sensory deficit throughout L5 distribution, grossly normal coordination, normal muscle tone  Skin  - no ecchymosis, erythema, warmth, or induration, no obvious rash  Psych  - interactive, appropriate, normal mood and affect  ASSESSMENT & PLAN  37 yo male with lumbar disc herniation, radicular pain, not improved  Referral to spine surgery  Gabapentin nightly  Cont. Flexeril PRN  Start PT    Jak Mijares MD, CAQSM    Again, thank you for allowing me to participate in the care of your patient.        Sincerely,        Jak Mijares MD

## 2018-10-19 ENCOUNTER — OFFICE VISIT (OUTPATIENT)
Dept: ORTHOPEDICS | Facility: CLINIC | Age: 38
End: 2018-10-19
Payer: COMMERCIAL

## 2018-10-19 ENCOUNTER — DOCUMENTATION ONLY (OUTPATIENT)
Dept: ORTHOPEDICS | Facility: CLINIC | Age: 38
End: 2018-10-19

## 2018-10-19 ENCOUNTER — PRE VISIT (OUTPATIENT)
Dept: ORTHOPEDICS | Facility: CLINIC | Age: 38
End: 2018-10-19

## 2018-10-19 ENCOUNTER — RADIANT APPOINTMENT (OUTPATIENT)
Dept: GENERAL RADIOLOGY | Facility: CLINIC | Age: 38
End: 2018-10-19
Attending: ORTHOPAEDIC SURGERY
Payer: COMMERCIAL

## 2018-10-19 ENCOUNTER — TELEPHONE (OUTPATIENT)
Dept: ORTHOPEDICS | Facility: CLINIC | Age: 38
End: 2018-10-19

## 2018-10-19 VITALS — WEIGHT: 315 LBS | BODY MASS INDEX: 37.19 KG/M2 | HEIGHT: 77 IN

## 2018-10-19 DIAGNOSIS — M54.16 LUMBAR RADICULOPATHY: ICD-10-CM

## 2018-10-19 DIAGNOSIS — M54.16 LUMBAR RADICULOPATHY: Primary | ICD-10-CM

## 2018-10-19 LAB
MRSA DNA SPEC QL NAA+PROBE: NEGATIVE
SPECIMEN SOURCE: NORMAL

## 2018-10-19 NOTE — TELEPHONE ENCOUNTER
Lakhwinder was phoned back by RN.  Pt states his low back skin is clear, and his wife will take photo for Dr Miranda's review.  Pt states his hip area has flaky psoriasis at times.  He will get close up of low back, and another photo of a larger area low back.  Demetria Saldana RN

## 2018-10-19 NOTE — MR AVS SNAPSHOT
After Visit Summary   10/19/2018    Lakhwinder Jones    MRN: [id number]           Patient Information     Date Of Birth          1980        Visit Information        Provider Department      10/19/2018 8:40 AM [facility]        Today's Diagnoses     Lumbar radiculopathy    -  1       Follow-ups after your visit        Additional Services     PAC Visit Referral (For [facility] Only)       Does this visit require an Anesthesia consult?  L4-5 Decompression    H&P done by:  N/A and Other (Specify): PAc      Please be aware that coverage of these services is subject to the terms and limitations of your health insurance plan.  Call member services at your health plan with any benefit or coverage questions.      Please bring the following to your appointment:  >>   Any x-rays, CTs or MRIs which have been performed.  Contact the facility where they were done to arrange for  prior to your scheduled appointment.  Any new CT, MRI or other procedures ordered by your specialist must be performed at a [place] facility or coordinated by your clinic's referral office.    >>   List of current medications  >>   This referral request   >>   Any documents/labs given to you for this referral                  Your next 10 appointments already scheduled     Oct 19, 2018  9:40 AM CDT   (Arrive by 9:25 AM)   [facility] Spine with Edy Cespedes, PT   [facility] ([facility]  )    [address]  [place] [id number] [phone]              Future tests that were ordered for you today     Open Future Orders        Priority Expected Expires Ordered    PHYSICAL THERAPY REFERRAL (Internal) Routine  10/18/2019 10/18/2018            Who to contact     Please call your clinic at [phone] to:    Ask questions about your health    Make or cancel appointments    Discuss your medicines    Learn about your test results    Speak to your  doctor            Additional Information About Your Visit        [employer] Information     [employer] gives you secure access to your electronic health record. If you see a primary care provider, you can also send messages to your care team and make appointments. If you have questions, please call your primary care clinic.  If you do not have a primary care provider, please call [phone] and they will assist you.      [employer] is an electronic gateway that provides easy, online access to your medical records. With [employer], you can request a clinic appointment, read your test results, renew a prescription or communicate with your care team.     To access your existing account, please contact your [facility] or call [phone] for assistance.        Care EveryWhere ID     This is your Care EveryWhere ID. This could be used by other organizations to access your [facility] medical records  [id number]         Blood Pressure from Last 3 Encounters:   10/18/18 (!) 145/94   10/04/18 139/81   09/25/18 130/80    Weight from Last 3 Encounters:   08/30/18 (!) 155.1 kg (342 lb)   07/26/18 (!) 155.1 kg (342 lb)   06/26/18 (!) 155.2 kg (342 lb 3.2 oz)              We Performed the Following     PAC Visit Referral (For [facility] Only)     Samnatha-Operative Worksheet        Primary Care Provider Office Phone # Fax #    Simona Prasad PA-C [phone]       [address] [facility] [address]        Equal Access to Services     K[place]ON POLLOCK : Hadii aad ku hadasho Soomaali, waaxda luqadaha, qaybta kaalmada adeegyada, waxay idiin hayadalgisan valentin cabreraarafer la'leah . So [facility] [phone].    ATENCIÓN: Si habla español, tiene a rose disposición servicios gratuitos de asistencia lingüística. Llame al [phone].    We comply with applicable federal civil rights laws and [place] laws. We do not discriminate on the basis of race, color, national origin, age, disability, sex, sexual  orientation, or gender identity.            Thank you!     Thank you for choosing [facility]  for your care. Our goal is always to provide you with excellent care. Hearing back from our patients is one way we can continue to improve our services. Please take a few minutes to complete the written survey that you may receive in the mail after your visit with us. Thank you!             Your Updated Medication List - Protect others around you: Learn how to safely use, store and throw away your medicines at www.disposemymeds.org.          This list is accurate as of 10/19/18  8:59 AM.  Always use your most recent med list.                   Brand Name Dispense Instructions for use Diagnosis    * Apremilast 10 & 20 & 30 MG Tbpk    OTEZLA    27 tablet    Take one tablet in the morning on day 1, then take one tablet every 12 hours on days 2 thru 14, according to the instructions on the packet.    Psoriasis with arthropathy (H)       * apremilast 30 MG tablet    OTEZLA    60 tablet    Take 1 tablet (30 mg) by mouth 2 times daily    Psoriasis, Psoriasis with arthropathy (H)       citalopram 40 MG tablet    celeXA    30 tablet    Take 1 tablet (40 mg) by mouth daily    Anxiety state, Panic disorder without agoraphobia       * cyclobenzaprine 10 MG tablet    FLEXERIL    42 tablet    Take 1 tablet (10 mg) by mouth nightly as needed for muscle spasms    Lumbar disc herniation       * cyclobenzaprine 10 MG tablet    FLEXERIL    60 tablet    Take 1 tablet (10 mg) by mouth 2 times daily as needed for muscle spasms    Acute midline low back pain with left-sided sciatica       gabapentin 300 MG capsule    NEURONTIN    60 capsule    Take 1 capsule (300 mg) by mouth At Bedtime    Acute midline low back pain with left-sided sciatica, Lumbar paraspinal muscle spasm       order for DME     1 Device    Equipment being ordered: [id number] $249  Walking Boot XL Funmilayo Pneumatic    Closed nondisplaced fracture of fifth  metatarsal bone of right foot, initial encounter       * Notice:  This list has 4 medication(s) that are the same as other medications prescribed for you. Read the directions carefully, and ask your doctor or other care provider to review them with you.

## 2018-10-19 NOTE — TELEPHONE ENCOUNTER
[facility] Call Center    Phone Message    May a detailed message be left on voicemail: yes    Reason for Call: PT has a question for Dr. Miranda's nurse Demetria before sending a picture of their back.     Action Taken: Message routed to:  [facility] ORTH[facility]

## 2018-10-19 NOTE — NURSING NOTE
Reason For Visit:   Chief Complaint   Patient presents with     Lower Back - Pain       Primary MD: Simona Prasad  Ref. MD: Jak Mijares    ?  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  Date of injury: 14 years ago  Type of injury: Playing basketball, continually getting worse.  Date of surgery: NA  Type of surgery: NA.  Smoker: No  Request smoking cessation information: No    There were no vitals taken for this visit.    Pain Assessment  Patient Currently in Pain: Yes  0-10 Pain Scale: 7  Pain Orientation: Lower  Pain Descriptors: Dull, Shooting, Sharp, Tightness  Aggravating Factors: Walking, Standing, Sitting    Oswestry (ALEXANDRA) Questionnaire    OSWESTRY DISABILITY INDEX 10/19/2018   Count 10   Sum 14   Oswestry Score (%) 28   Some recent data might be hidden        Visual Analog Pain Scale  Back Pain Scale 0-10: 7  Right leg pain: 3  Left leg pain: 0    Promis 10 Assessment    No flowsheet data found.             Champ Kaur, ATC

## 2018-10-19 NOTE — PROGRESS NOTES
Patient is scheduled for surgery with Dr. Miranda    Spoke or left message with: Patient    Date of Surgery: 11/1/18    Location: [place]    Post ops: 6 weeks & 3 months    Pre-op with surgeon (if applicable): Complete    H&P: Scheduled with PAC 10/25/18    Additional imaging/appointments: N/A    Surgery packet: Received in clinic     Additional comments: N/A

## 2018-10-19 NOTE — NURSING NOTE
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  BMI 40.0 MRSA screen swab was sent.  Psoriasis noted on pt's hands.    Lakhwinder left clinic before his low back skin could be assessed for intactness.  Pt states his psoriasis is mainly in his hands and elbows.  Pt agreed to send a photo of his back skin via [employer] this evening.       Person(s) involved in teaching:   Patient     Motivation Level:  Asks Questions: Yes  Eager to Learn: Yes  Cooperative: Yes  Receptive (willing/able to accept information): Yes  Any cultural factors/Religion beliefs that may influence understanding or compliance? No  Comments:      Patient demonstrates understanding of the following:  Reason for the appointment, diagnosis and treatment plan: Yes  Knowledge of proper use of medications and conditions for which they are ordered (with special attention to potential side effects or drug interactions): Yes  Which situations necessitate calling provider and whom to contact: Yes       Teaching Concerns Addressed:   Comments:      Proper use and care of meds (medical equip, care aids, etc.): Yes  Nutritional needs and diet plan: Yes  Pain management techniques: Yes  Wound Care: Yes  How and/when to access community resources: NA     Instructional Materials Used/Given: preop pkt, antiseptic soap     Time spent with patient: 30 minutes.

## 2018-10-19 NOTE — PROGRESS NOTES
Spine Surgery Consultation    REFERRING PHYSICIAN: Jak Mijares   PRIMARY CARE PHYSICIAN: Simona Prasad           Chief Complaint:   No chief complaint on file.      History of Present Illness:  Symptom Profile Including: location of symptoms, onset, severity, exacerbating/alleviating factors, previous treatments:        Lakhwinder Jones is a 38 year old male who presents for evaluation predominantly of left L5 distribution radicular complaints which shoot down the lateral aspect of the leg onto the dorsum of the foot.  It is a burning and tingling type sensation that is worse with activity and improves somewhat with rest.  He has been taking Tylenol and anti-inflammatories.  He was started on gabapentin last week.  He is also previously done physical therapy, chiropractic care range of motion and stretching exercises.  He had 2 lumbar epidurals one in September and one in October and each of these also provided about 2-3 days of relief but no lasting benefit.    In addition, he has chronic back pain since he is in his 20s.  He also seems to have some right anterior knee and thigh symptoms which are nonradicular in nature and have been going on for quite a while.  These right-sided symptoms did not respond to the injection and seem to be unrelated to his back.         Past Medical History:     Past Medical History:   Diagnosis Date     Anxiety      Back pain     chronic     Obesity      Psoriatic arthritis (H)             Past Surgical History:     Past Surgical History:   Procedure Laterality Date     NOSE SURGERY      3 times     right elbow surgey      12 yo     TESTICLE SURGERY      21 yo            Social History:     Social History   Substance Use Topics     Smoking status: Former Smoker     Types: Cigarettes     Smokeless tobacco: Never Used      Comment: 1-2 cigarettes per day to cope with stress     Alcohol use 4.8 oz/week     8 Standard drinks or equivalent per week      Comment:  not for a month            Family History:     Family History   Problem Relation Age of Onset     Obesity Mother      Diabetes Father      Coronary Artery Disease Father      Hypertension Father      Hyperlipidemia Father      Depression Father      Anxiety Disorder Father      Mental Illness Father      Substance Abuse Father      Breast Cancer Maternal Grandmother      Depression Maternal Grandmother      Mental Illness Maternal Grandmother      Substance Abuse Maternal Grandmother      Prostate Cancer Maternal Grandfather      Diabetes Paternal Grandmother      Breast Cancer Paternal Grandmother      Depression Paternal Grandmother      Mental Illness Paternal Grandmother      Diabetes Paternal Grandfather      Asthma Brother      Diabetes Paternal Uncle      Cerebrovascular Disease No family hx of      Anesthesia Reaction No family hx of      Osteoporosis No family hx of      Genetic Disorder No family hx of      Thyroid Disease No family hx of      Liver Disease No family hx of             Allergies:     Allergies   Allergen Reactions     Tramadol Other (See Comments)     Shellfish-Derived Products             Medications:     Current Outpatient Prescriptions   Medication     Apremilast (OTEZLA) 10 & 20 & 30 MG TBPK     apremilast (OTEZLA) 30 MG tablet     citalopram (CELEXA) 40 MG tablet     cyclobenzaprine (FLEXERIL) 10 MG tablet     cyclobenzaprine (FLEXERIL) 10 MG tablet     gabapentin (NEURONTIN) 300 MG capsule     order for DME     No current facility-administered medications for this visit.              Review of Systems:     A 10 point ROS was performed and reviewed. Specific responses to these questions are noted at the end of the document.         Physical Exam:   Vitals: There were no vitals taken for this visit.  Constitutional: awake, alert, cooperative, no apparent distress, appears stated age.    Eyes: The sclera are white.  Ears, Nose, Throat: The trachea is midline.  Psychiatric: The patient  has a normal affect.  Respiratory: breathing non-labored  Cardiovascular: The extremities are warm and perfused.  Skin: no obvious rashes or lesions.  Musculoskeletal, Neurologic, and Spine:        Lumbar Spine:    Appearance - No gross stepoffs or deformities    Motor -     L2-3: Hip flexion 5/5 L and 5/5 R strength          L3/4:  Knee extension L 5/5 and R 5/5 strength         L4/5:  Foot dorsiflexion R 5/5 L 5/5 and       EHL dorsiflexion R 4/5 L 3/5 strength         S1:  Plantarflexion/Peroneal Muscles  L 4/5 and R 5/5 strength    Sensation: intact to light touch L3-S1 distribution BLE, diminished left L5    There is some breakaway strength in all distributions limited by pain and effort.      Neurologic:      REFLEXES Left Right                  Patella 1+ 1+   Ankle jerk 1+ 1+   Babinski No upgoing great toe No upgoing great toe   Clonus 0 beats 0 beats     Hip Exam:  No pain with hip log roll and no tenderness over the greater trochanters.    Alignment:  Patient stands with a neutral standing sagittal balance.    Straight leg raise on the left produces shooting leg pain.  On the right side it produces back pain.           Imaging:   We ordered and independently reviewed new radiographs at this clinic visit. The results were discussed with the patient.  Findings include:    August 2, 2018 lumbar MRI: Left paracentral disc herniation at L4-5 abuts the traversing L5 nerve root resulting in severe L5 nerve root impingement.  Mild spondylosis.    October 10, 2018 lumbar AP lateral flexion-extension radiographs: Mild diffuse spondylosis but preserved overall alignment and no dynamic instability or fractures.             Assessment and Plan:   Assessment:  38 year old male with left L5 distribution radicular complaints in the setting of a L4-5 paracentral disc herniation.  He also has chronic back pain from spondylosis and obesity and some right thigh and knee symptoms which I think are not spine related.      Plan:  1. We talked about options moving forward.  At this point I think he is failed extensive conservative management.  If he did wish to proceed with surgery for this problem I recommend a left L4-5 microdiscectomy.  2. Risks of this surgery include risk of infection, risk of dural tear resulting in CSF leak which might result in headaches, or possible need for lumbar drain, or possible revision surgery in the setting of a persistent leak. Risk of seroma or hematoma requiring revision surgery. Possible nerve root injury resulting in numbness weakness or paralysis into the leg. Possible radiculitis which could result in similar symptoms or could result in significant neurogenic type pain into the leg. Risk of incomplete decompression which might require revision surgery in the future.  Risk of pars fracture or postoperative instability requiring conversion to a fusion in the future. Risk of adjacent segment problems requiring surgery in the future. Risk of incomplete relief of symptoms possibly requiring revision surgery in the future. There is a risk of blood clots in the legs or the lungs.  Furthermore, although rare, there are risks of major vessel or major organ injury from the surgery, and risks of the anesthetic including stroke heart attack and death.  3. We talked about the postoperative recovery period, the need for activity avoidance and the risk of pars fracture if he does too much too soon.  4. I also spent extensive time counseling him about the goals of surgery and spent about 15 minutes on this point alone.  I told him the main goal of surgery is to help with the radicular left leg symptoms.  I told him this is not a back pain operation.  It would not address the spondylosis.  It also would likely have no effect on the right knee or thigh symptoms which I think are not spine related.  He does understand the limited goals of surgery and he wants to proceed.  5. I will try to get things arranged for  him.      Respectfully,  Favio Miranda MD  Spine Surgery  [facility]

## 2018-10-19 NOTE — LETTER
10/19/2018       RE: Lakhwinder Jones  [address]  [facility] [address]     Dear Colleague,    Thank you for referring your patient, Lakhwinder Jones, to the [facility]. Please see a copy of my visit note below.    Spine Surgery Consultation    REFERRING PHYSICIAN: Jak Mijares   PRIMARY CARE PHYSICIAN: Simona Prasad           Chief Complaint:   No chief complaint on file.      History of Present Illness:  Symptom Profile Including: location of symptoms, onset, severity, exacerbating/alleviating factors, previous treatments:        Lakhwinder Jones is a 38 year old male who presents for evaluation predominantly of left L5 distribution radicular complaints which shoot down the lateral aspect of the leg onto the dorsum of the foot.  It is a burning and tingling type sensation that is worse with activity and improves somewhat with rest.  He has been taking Tylenol and anti-inflammatories.  He was started on gabapentin last week.  He is also previously done physical therapy, chiropractic care range of motion and stretching exercises.  He had 2 lumbar epidurals one in September and one in October and each of these also provided about 2-3 days of relief but no lasting benefit.    In addition, he has chronic back pain since he is in his 20s.  He also seems to have some right anterior knee and thigh symptoms which are nonradicular in nature and have been going on for quite a while.  These right-sided symptoms did not respond to the injection and seem to be unrelated to his back.         Past Medical History:     Past Medical History:   Diagnosis Date     Anxiety      Back pain     chronic     Obesity      Psoriatic arthritis (H)             Past Surgical History:     Past Surgical History:   Procedure Laterality Date     NOSE SURGERY      3 times     right elbow surgey      14 yo     TESTICLE SURGERY      23 yo             Social History:     Social History   Substance Use Topics     Smoking status: Former Smoker     Types: Cigarettes     Smokeless tobacco: Never Used      Comment: 1-2 cigarettes per day to cope with stress     Alcohol use 4.8 oz/week     8 Standard drinks or equivalent per week      Comment: not for a month            Family History:     Family History   Problem Relation Age of Onset     Obesity Mother      Diabetes Father      Coronary Artery Disease Father      Hypertension Father      Hyperlipidemia Father      Depression Father      Anxiety Disorder Father      Mental Illness Father      Substance Abuse Father      Breast Cancer Maternal Grandmother      Depression Maternal Grandmother      Mental Illness Maternal Grandmother      Substance Abuse Maternal Grandmother      Prostate Cancer Maternal Grandfather      Diabetes Paternal Grandmother      Breast Cancer Paternal Grandmother      Depression Paternal Grandmother      Mental Illness Paternal Grandmother      Diabetes Paternal Grandfather      Asthma Brother      Diabetes Paternal Uncle      Cerebrovascular Disease No family hx of      Anesthesia Reaction No family hx of      Osteoporosis No family hx of      Genetic Disorder No family hx of      Thyroid Disease No family hx of      Liver Disease No family hx of             Allergies:     Allergies   Allergen Reactions     Tramadol Other (See Comments)     Shellfish-Derived Products             Medications:     Current Outpatient Prescriptions   Medication     Apremilast (OTEZLA) 10 & 20 & 30 MG TBPK     apremilast (OTEZLA) 30 MG tablet     citalopram (CELEXA) 40 MG tablet     cyclobenzaprine (FLEXERIL) 10 MG tablet     cyclobenzaprine (FLEXERIL) 10 MG tablet     gabapentin (NEURONTIN) 300 MG capsule     order for DME     No current facility-administered medications for this visit.              Review of Systems:     A 10 point ROS was performed and reviewed. Specific responses to these questions are noted  at the end of the document.         Physical Exam:   Vitals: There were no vitals taken for this visit.  Constitutional: awake, alert, cooperative, no apparent distress, appears stated age.    Eyes: The sclera are white.  Ears, Nose, Throat: The trachea is midline.  Psychiatric: The patient has a normal affect.  Respiratory: breathing non-labored  Cardiovascular: The extremities are warm and perfused.  Skin: no obvious rashes or lesions.  Musculoskeletal, Neurologic, and Spine:        Lumbar Spine:    Appearance - No gross stepoffs or deformities    Motor -     L2-3: Hip flexion 5/5 L and 5/5 R strength          L3/4:  Knee extension L 5/5 and R 5/5 strength         L4/5:  Foot dorsiflexion R 5/5 L 5/5 and       EHL dorsiflexion R 4/5 L 3/5 strength         S1:  Plantarflexion/Peroneal Muscles  L 4/5 and R 5/5 strength    Sensation: intact to light touch L3-S1 distribution BLE, diminished left L5    There is some breakaway strength in all distributions limited by pain and effort.      Neurologic:      REFLEXES Left Right                  Patella 1+ 1+   Ankle jerk 1+ 1+   Babinski No upgoing great toe No upgoing great toe   Clonus 0 beats 0 beats     Hip Exam:  No pain with hip log roll and no tenderness over the greater trochanters.    Alignment:  Patient stands with a neutral standing sagittal balance.    Straight leg raise on the left produces shooting leg pain.  On the right side it produces back pain.           Imaging:   We ordered and independently reviewed new radiographs at this clinic visit. The results were discussed with the patient.  Findings include:    August 2, 2018 lumbar MRI: Left paracentral disc herniation at L4-5 abuts the traversing L5 nerve root resulting in severe L5 nerve root impingement.  Mild spondylosis.    October 10, 2018 lumbar AP lateral flexion-extension radiographs: Mild diffuse spondylosis but preserved overall alignment and no dynamic instability or fractures.              Assessment and Plan:   Assessment:  38 year old male with left L5 distribution radicular complaints in the setting of a L4-5 paracentral disc herniation.  He also has chronic back pain from spondylosis and obesity and some right thigh and knee symptoms which I think are not spine related.     Plan:  1. We talked about options moving forward.  At this point I think he is failed extensive conservative management.  If he did wish to proceed with surgery for this problem I recommend a left L4-5 microdiscectomy.  2. Risks of this surgery include risk of infection, risk of dural tear resulting in CSF leak which might result in headaches, or possible need for lumbar drain, or possible revision surgery in the setting of a persistent leak. Risk of seroma or hematoma requiring revision surgery. Possible nerve root injury resulting in numbness weakness or paralysis into the leg. Possible radiculitis which could result in similar symptoms or could result in significant neurogenic type pain into the leg. Risk of incomplete decompression which might require revision surgery in the future.  Risk of pars fracture or postoperative instability requiring conversion to a fusion in the future. Risk of adjacent segment problems requiring surgery in the future. Risk of incomplete relief of symptoms possibly requiring revision surgery in the future. There is a risk of blood clots in the legs or the lungs.  Furthermore, although rare, there are risks of major vessel or major organ injury from the surgery, and risks of the anesthetic including stroke heart attack and death.  3. We talked about the postoperative recovery period, the need for activity avoidance and the risk of pars fracture if he does too much too soon.  4. I also spent extensive time counseling him about the goals of surgery and spent about 15 minutes on this point alone.  I told him the main goal of surgery is to help with the radicular left leg symptoms.  I told him this is  not a back pain operation.  It would not address the spondylosis.  It also would likely have no effect on the right knee or thigh symptoms which I think are not spine related.  He does understand the limited goals of surgery and he wants to proceed.  5. I will try to get things arranged for him.      Again, thank you for allowing me to participate in the care of your patient.      Sincerely,    Favio Miranda MD

## 2018-10-19 NOTE — TELEPHONE ENCOUNTER
FUTURE VISIT INFORMATION      FUTURE VISIT INFORMATION:    Date: 10/19/18    Time: 0840    Location: [facility]  REFERRAL INFORMATION:    Referring provider:  DR CHRISTIE    Referring providers clinic: [facility]    Reason for visit/diagnosis  LBP     RECORDS REQUESTED FROM:       Clinic name Comments Records Status Imaging Status                                         RECORDS STATUS      RECORDS IN CHART

## 2018-10-25 ENCOUNTER — OFFICE VISIT (OUTPATIENT)
Dept: SURGERY | Facility: CLINIC | Age: 38
End: 2018-10-25
Payer: COMMERCIAL

## 2018-10-25 ENCOUNTER — ANESTHESIA EVENT (OUTPATIENT)
Dept: SURGERY | Facility: CLINIC | Age: 38
End: 2018-10-25
Payer: COMMERCIAL

## 2018-10-25 VITALS
SYSTOLIC BLOOD PRESSURE: 142 MMHG | DIASTOLIC BLOOD PRESSURE: 88 MMHG | TEMPERATURE: 97.8 F | BODY MASS INDEX: 37.19 KG/M2 | HEIGHT: 77 IN | RESPIRATION RATE: 18 BRPM | HEART RATE: 75 BPM | OXYGEN SATURATION: 97 % | WEIGHT: 315 LBS

## 2018-10-25 DIAGNOSIS — Z01.818 PREOP GENERAL PHYSICAL EXAM: Primary | ICD-10-CM

## 2018-10-25 DIAGNOSIS — Z01.818 PREOP GENERAL PHYSICAL EXAM: ICD-10-CM

## 2018-10-25 LAB
ALBUMIN SERPL-MCNC: 3.9 G/DL (ref 3.4–5)
ALBUMIN UR-MCNC: NEGATIVE MG/DL
ALP SERPL-CCNC: 76 U/L (ref 40–150)
ALT SERPL W P-5'-P-CCNC: 95 U/L (ref 0–70)
ANION GAP SERPL CALCULATED.3IONS-SCNC: 6 MMOL/L (ref 3–14)
APPEARANCE UR: CLEAR
AST SERPL W P-5'-P-CCNC: 29 U/L (ref 0–45)
BILIRUB SERPL-MCNC: 0.4 MG/DL (ref 0.2–1.3)
BILIRUB UR QL STRIP: NEGATIVE
BUN SERPL-MCNC: 11 MG/DL (ref 7–30)
CALCIUM SERPL-MCNC: 9.5 MG/DL (ref 8.5–10.1)
CHLORIDE SERPL-SCNC: 104 MMOL/L (ref 94–109)
CO2 SERPL-SCNC: 30 MMOL/L (ref 20–32)
COLOR UR AUTO: YELLOW
CREAT SERPL-MCNC: 0.92 MG/DL (ref 0.66–1.25)
ERYTHROCYTE [DISTWIDTH] IN BLOOD BY AUTOMATED COUNT: 12.1 % (ref 10–15)
GFR SERPL CREATININE-BSD FRML MDRD: >90 ML/MIN/1.7M2
GLUCOSE SERPL-MCNC: 102 MG/DL (ref 70–99)
GLUCOSE UR STRIP-MCNC: NEGATIVE MG/DL
HCT VFR BLD AUTO: 40.9 % (ref 40–53)
HGB BLD-MCNC: 14.3 G/DL (ref 13.3–17.7)
HGB UR QL STRIP: NEGATIVE
INR PPP: 0.96 (ref 0.86–1.14)
KETONES UR STRIP-MCNC: NEGATIVE MG/DL
LEUKOCYTE ESTERASE UR QL STRIP: NEGATIVE
MCH RBC QN AUTO: 30.4 PG (ref 26.5–33)
MCHC RBC AUTO-ENTMCNC: 35 G/DL (ref 31.5–36.5)
MCV RBC AUTO: 87 FL (ref 78–100)
NITRATE UR QL: NEGATIVE
PH UR STRIP: 6 PH (ref 5–7)
PLATELET # BLD AUTO: 199 10E9/L (ref 150–450)
POTASSIUM SERPL-SCNC: 4.4 MMOL/L (ref 3.4–5.3)
PROT SERPL-MCNC: 7.6 G/DL (ref 6.8–8.8)
RBC # BLD AUTO: 4.7 10E12/L (ref 4.4–5.9)
SODIUM SERPL-SCNC: 139 MMOL/L (ref 133–144)
SOURCE: NORMAL
SP GR UR STRIP: 1.02 (ref 1–1.03)
UROBILINOGEN UR STRIP-MCNC: 0 MG/DL (ref 0–2)
WBC # BLD AUTO: 7.2 10E9/L (ref 4–11)

## 2018-10-25 RX ORDER — MULTIPLE VITAMINS W/ MINERALS TAB 9MG-400MCG
1 TAB ORAL EVERY MORNING
COMMUNITY

## 2018-10-25 ASSESSMENT — LIFESTYLE VARIABLES: TOBACCO_USE: 1

## 2018-10-25 NOTE — H&P
Pre-Operative H & P     CC:  Preoperative exam to assess for increased cardiopulmonary risk while undergoing surgery and anesthesia.    Date of Encounter: 10/25/2018  Primary Care Physician:  Simona Prasad    Reason for visit:  Preop general physical exam  Back pain      HPI  Lakhwinder P Karen is a 38 year old male who presents for pre-operative H & P in preparation for  Left Lumbar 4-5 Microdisectomy  on 11/1/2018 by Dr. Miranda in treatment of lumbar radiculopathy at [facility].     History is obtained from the patient.   Back pain, radiating to both legs, worse on left. He has significant pain and it is affecting his activity level. Meds have not been helpful. He currently uses Tylenol and gabapentin.      Past Medical History  Past Medical History:   Diagnosis Date     Anxiety      Back pain     chronic     Obesity      Psoriatic arthritis (H)        Past Surgical History  Past Surgical History:   Procedure Laterality Date     FOOT SURGERY Right      NOSE SURGERY      3 times     right elbow surgey      14 yo     TESTICLE SURGERY      23 yo       Hx of Blood transfusions/reactions: none    Hx of abnormal bleeding or anti-platelet use: none    Menstrual history: No LMP for male patient.:     Steroid use in the last year: non    Personal or FH with difficulty with Anesthesia:  None        Prior to Admission Medications  Current Outpatient Prescriptions   Medication Sig Dispense Refill     Acetaminophen (TYLENOL PO) Take 1,000 mg by mouth as needed for mild pain or fever       apremilast (OTEZLA) 30 MG tablet Take 1 tablet (30 mg) by mouth 2 times daily 60 tablet 3     citalopram (CELEXA) 40 MG tablet Take 1 tablet (40 mg) by mouth daily (Patient taking differently: Take 40 mg by mouth every morning ) 30 tablet 0     cyclobenzaprine (FLEXERIL) 10 MG tablet Take 1 tablet (10 mg) by mouth 2 times daily as needed for muscle spasms 60 tablet 1     gabapentin  (NEURONTIN) 300 MG capsule Take 1 capsule (300 mg) by mouth At Bedtime (Patient taking differently: Take 300 mg by mouth 3 times daily ) 60 capsule 1     multivitamin, therapeutic with minerals (MULTI-VITAMIN) TABS tablet Take 1 tablet by mouth every morning       NONFORMULARY as needed CBD OIL       Apremilast (OTEZLA) 10 & 20 & 30 MG TBPK Take one tablet in the morning on day 1, then take one tablet every 12 hours on days 2 thru 14, according to the instructions on the packet. 27 tablet 0     order for DME Equipment being ordered: [id number] $249  Walking Boot XL Funmilayo Pneumatic (Patient not taking: Reported on 10/18/2018) 1 Device 0         Allergies  Allergies   Allergen Reactions     Tramadol Other (See Comments)     Shellfish-Derived Products        Social History  Social History     Social History     Marital status:      Spouse name: Shereen      Number of children: 0     Years of education: N/A     Occupational History     Not on file.     Social History Main Topics     Smoking status: Former Smoker     Packs/day: 0.20     Years: 10.00     Types: Cigarettes     Quit date: 1/1/2015     Smokeless tobacco: Never Used     Alcohol use 4.8 oz/week     8 Standard drinks or equivalent per week      Comment: not for a month     Drug use: Yes     Special: Marijuana      Comment: PRN pain     Sexual activity: Yes     Partners: Female     Birth control/ protection: None     Other Topics Concern     Not on file     Social History Narrative       Family History  Family History   Problem Relation Age of Onset     Obesity Mother      Diabetes Father      Coronary Artery Disease Father      Hypertension Father      Hyperlipidemia Father      Depression Father      Anxiety Disorder Father      Mental Illness Father      Substance Abuse Father      Breast Cancer Maternal Grandmother      Depression Maternal Grandmother      Mental Illness Maternal Grandmother      Substance Abuse Maternal Grandmother      Prostate  Cancer Maternal Grandfather      Diabetes Paternal Grandmother      Breast Cancer Paternal Grandmother      Depression Paternal Grandmother      Mental Illness Paternal Grandmother      Diabetes Paternal Grandfather      Asthma Brother      Diabetes Paternal Uncle      Cerebrovascular Disease No family hx of      Anesthesia Reaction No family hx of      Osteoporosis No family hx of      Genetic Disorder No family hx of      Thyroid Disease No family hx of      Liver Disease No family hx of            Anesthesia Evaluation     . Pt has had prior anesthetic. Type: General and MAC           ROS/MED HX    ENT/Pulmonary:     (+)tobacco use, Past use 0.2 PPD for 15 years, quit 2015 packs/day  , . .    Neurologic:  - neg neurologic ROS     Cardiovascular:  - neg cardiovascular ROS   (+) ----. : . . . :. . No previous cardiac testing       METS/Exercise Tolerance: Comment: Activity limited by back pain 3 - Able to walk 1-2 blocks without stopping   Hematologic:  - neg hematologic  ROS       Musculoskeletal:   (+) , , other musculoskeletal- back pain      GI/Hepatic:  - neg GI/hepatic ROS       Renal/Genitourinary:  - ROS Renal section negative       Endo:     (+) Obesity, .      Psychiatric:     (+) psychiatric history anxiety      Infectious Disease:  - neg infectious disease ROS       Malignancy:      - no malignancy   Other:    (+) No chance of pregnancy C-spine cleared: N/A, no H/O Chronic Pain,no other significant disability   - neg other ROS         Physical Exam  Normal systems: cardiovascular, pulmonary and dental    Airway   Mallampati: III  TM distance: >3 FB  Neck ROM: full    Dental     Cardiovascular   Rhythm and rate: regular and normal      Pulmonary    breath sounds clear to auscultation               PAC Discussion and Assessment    ASA Classification: 3  Case is suitable for: [facility]  Anesthetic techniques and relevant risks discussed: GA  Invasive monitoring and risk discussed: Yes  Types:   Possibility  "and Risk of blood transfusion discussed:   NPO instructions given:   Additional anesthetic preparation and risks discussed:   Needs early admission to pre-op area:   Other:       The complete review of systems is negative other than noted in the HPI or here.   Temp: 97.8  F (36.6  C) Temp src: Oral BP: 142/88 Pulse: 75   Resp: 18 SpO2: 97 %         334 lbs 3.2 oz  6' 5\"   Body mass index is 39.63 kg/(m^2).       Physical Exam  Constitutional: Awake, alert, cooperative, no apparent distress, and appears stated age.  Eyes: Pupils equal, round and reactive to light, extra ocular muscles intact, sclera clear, conjunctiva normal.  HENT: Normocephalic, oral pharynx with moist mucus membranes, good dentition. No goiter appreciated.   Respiratory: Clear to auscultation bilaterally, no crackles or wheezing.  Cardiovascular: Regular rate and rhythm, normal S1 and S2, and no murmur noted.  Carotids +2, no bruits. No edema. Palpable pulses to radial  DP and PT arteries.   GI: Normal bowel sounds, soft, non-distended, non-tender, no masses palpated, no hepatosplenomegaly.  Lymph/Hematologic: No cervical lymphadenopathy and no supraclavicular lymphadenopathy.  Genitourinary:  Deferred   Skin: Warm and dry.  No rashes at anticipated surgical site. Extensive psoriasis   Musculoskeletal: Full ROM of neck. There is no redness, warmth, or swelling of the joints. Lower back pain, radiates down both legs  Neurologic: Awake, alert, oriented to name, place and time. Cranial nerves II-XII are grossly intact. Gait is normal.   Neuropsychiatric: Calm, cooperative. Normal affect.     Labs: (personally reviewed)  Results for MICHELLE AMOS (MRN [id number]) as of 10/25/2018 12:48   Ref. Range 10/25/2018 10:28 10/25/2018 10:29   Sodium Latest Ref Range: 133 - 144 mmol/L  139   Potassium Latest Ref Range: 3.4 - 5.3 mmol/L  4.4   Chloride Latest Ref Range: 94 - 109 mmol/L  104   Carbon Dioxide Latest Ref Range: 20 - 32 mmol/L  30   Urea " Nitrogen Latest Ref Range: 7 - 30 mg/dL  11   Creatinine Latest Ref Range: 0.66 - 1.25 mg/dL  0.92   GFR Estimate Latest Ref Range: >60 mL/min/1.7m2  >90   GFR Estimate If Black Latest Ref Range: >60 mL/min/1.7m2  >90   Calcium Latest Ref Range: 8.5 - 10.1 mg/dL  9.5   Anion Gap Latest Ref Range: 3 - 14 mmol/L  6   Albumin Latest Ref Range: 3.4 - 5.0 g/dL  3.9   Protein Total Latest Ref Range: 6.8 - 8.8 g/dL  7.6   Bilirubin Total Latest Ref Range: 0.2 - 1.3 mg/dL  0.4   Alkaline Phosphatase Latest Ref Range: 40 - 150 U/L  76   ALT Latest Ref Range: 0 - 70 U/L  95 (H)   AST Latest Ref Range: 0 - 45 U/L  29   Glucose Latest Ref Range: 70 - 99 mg/dL  102 (H)   WBC Latest Ref Range: 4.0 - 11.0 10e9/L  7.2   Hemoglobin Latest Ref Range: 13.3 - 17.7 g/dL  14.3   Hematocrit Latest Ref Range: 40.0 - 53.0 %  40.9   Platelet Count Latest Ref Range: 150 - 450 10e9/L  199   RBC Count Latest Ref Range: 4.4 - 5.9 10e12/L  4.70   MCV Latest Ref Range: 78 - 100 fl  87   MCH Latest Ref Range: 26.5 - 33.0 pg  30.4   MCHC Latest Ref Range: 31.5 - 36.5 g/dL  35.0   RDW Latest Ref Range: 10.0 - 15.0 %  12.1   INR Latest Ref Range: 0.86 - 1.14   0.96   ABO Unknown  O   RH(D) Unknown  Neg   Antibody Screen Unknown  Neg   Test Valid Only At Latest Units:      [facility]...   Specimen Expires Unknown  10/28/2018   Blood Bank Comment Unknown  PREADMIT [facility] FOR...   Color Urine Unknown Yellow    Appearance Urine Unknown Clear    Glucose Urine Latest Ref Range: NEG^Negative mg/dL Negative    Bilirubin Urine Latest Ref Range: NEG^Negative  Negative    Ketones Urine Latest Ref Range: NEG^Negative mg/dL Negative    Specific Gravity Urine Latest Ref Range: 1.003 - 1.035  1.019    pH Urine Latest Ref Range: 5.0 - 7.0 pH 6.0    Protein Albumin Urine Latest Ref Range: NEG^Negative mg/dL Negative    Urobilinogen mg/dL Latest Ref Range: 0.0 - 2.0 mg/dL 0.0    Nitrite Urine Latest Ref Range: NEG^Negative  Negative    Blood Urine Latest Ref  Range: NEG^Negative  Negative    Leukocyte Esterase Urine Latest Ref Range: NEG^Negative  Negative    Source Unknown Midstream Urine        EKG: Personally reviewed but formal cardiology read pending: not indicated           Outside records reviewed from: everywhere      ASSESSMENT and PLAN  Lakhwinder Jones is a 38 year old male scheduled to undergo Left Lumbar 4-5 Microdisectomy  on 11/1/2018 by Dr. Miranda in treatment of lumbar radiculopathy. He has the following specific operative considerations:   - RCRI : No serious cardiac risks.  0.4% risk of major adverse cardiac event.   - Anesthesia considerations:  Refer to PAC assessment in anesthesia records  - VTE risk: 0.5%  - JAMES # of risks = low risk  - Post-op delirium risk: low risk  - Risk of PONV score = 2.  If > 2, anti-emetic intervention recommended.       Previous anesthesia without complications  1) Cardiac: No known cardiac diagnosis or symptoms. Activity is reduced due to back pain. No chest pain, PND or orthopnea. No edema  2) Pulmonary: Smoked 0.2 PPD for 10 years, quit 2015. Denies pulmonary symptoms  3) GI: Occasional GERD, diet control  4) MSK: Back pain, radiating to both legs, worse on left. He has significant pain and it is affecting his activity level. Meds have not been helpful. He currently uses Tylenol and gabapentin.   5) Endo: BMI: 40  He is on Otezla for psoriasis and has seen little improvement.      Pt optimized for surgery. AVS with information on surgery time/arrival time, meds and NPO status given by nursing staff        I spent 30 minutes with patient, greater than 50% educating on preop meds, counseling on anesthesia and coordinating care for back surgery      Patient was discussed with Dr Mancia.    LINNEA Chandra CNS  Preoperative Assessment Center  [facility]  Clinic and Surgery Center  Phone: [phone]  Fax: [phone]

## 2018-10-25 NOTE — ANESTHESIA PREPROCEDURE EVALUATION
Anesthesia Evaluation     . Pt has had prior anesthetic. Type: General and MAC           ROS/MED HX    ENT/Pulmonary:     (+)tobacco use, Past use 0.2 PPD for 15 years, quit 2015 packs/day  , . .    Neurologic:  - neg neurologic ROS   (+)migraines,     Cardiovascular:  - neg cardiovascular ROS   (+) ----. : . . . :. . No previous cardiac testing       METS/Exercise Tolerance: Comment: Activity limited by back pain 3 - Able to walk 1-2 blocks without stopping   Hematologic:  - neg hematologic  ROS       Musculoskeletal:   (+) , , other musculoskeletal- back pain      GI/Hepatic:  - neg GI/hepatic ROS       Renal/Genitourinary:  - ROS Renal section negative       Endo:     (+) Obesity, .      Psychiatric:     (+) psychiatric history anxiety      Infectious Disease:  - neg infectious disease ROS       Malignancy:      - no malignancy   Other:    (+) No chance of pregnancy C-spine cleared: N/A, no H/O Chronic Pain,no other significant disability   - neg other ROS                 Physical Exam  Normal systems: cardiovascular, pulmonary and dental    Airway   Mallampati: III  TM distance: >3 FB  Neck ROM: full    Dental     Cardiovascular   Rhythm and rate: regular and normal      Pulmonary    breath sounds clear to auscultation    Other findings:   Results for MICHELLE AMOS (MRN [id number]) as of 10/25/2018 12:48    10/25/2018 10:28  Color Urine: Yellow  Appearance Urine: Clear  Glucose Urine: Negative  Bilirubin Urine: Negative  Ketones Urine: Negative  Specific Gravity Urine: 1.019  pH Urine: 6.0  Protein Albumin Urine: Negative  Urobilinogen mg/dL: 0.0  Nitrite Urine: Negative  Blood Urine: Negative  Leukocyte Esterase Urine: Negative  Source: Midstream Urine    10/25/2018 10:29  Sodium: 139  Potassium: 4.4  Chloride: 104  Carbon Dioxide: 30  Urea Nitrogen: 11  Creatinine: 0.92  GFR Estimate: >90  GFR Estimate If Black: >90  Calcium: 9.5  Anion Gap: 6  Albumin: 3.9  Protein Total: 7.6  Bilirubin Total:  0.4  Alkaline Phosphatase: 76  ALT: 95 (H)  AST: 29  Glucose: 102 (H)  WBC: 7.2  Hemoglobin: 14.3  Hematocrit: 40.9  Platelet Count: 199  RBC Count: 4.70  MCV: 87  MCH: 30.4  MCHC: 35.0  RDW: 12.1  INR: 0.96  ABO: O  RH(D): Neg  Antibody Screen: Neg  Test Valid Only At: [facility]  Specimen Expires: 10/28/2018  Blood Bank Comment: PREADMIT [facility] FOR...           PAC Discussion and Assessment    ASA Classification: 3  Case is suitable for: [place]  Anesthetic techniques and relevant risks discussed: GA  Invasive monitoring and risk discussed: Yes  Types:   Possibility and Risk of blood transfusion discussed:   NPO instructions given:   Additional anesthetic preparation and risks discussed:   Needs early admission to pre-op area:   Other:     PAC Resident/NP Anesthesia Assessment:  Lakhwinder Jones is a 37 yo male scheduled for Left Lumbar 4-5 Microdisectomy  on 11/1/2018 by Dr. Miranda in treatment of lumbar radiculopathy     Previous anesthesia without complications    1) Cardiac: No known cardiac diagnosis or symptoms. Activity is reduced due to back pain. No chest pain, PND or orthopnea. No edema  2) Pulmonary: Smoked 0.2 PPD for 10 years, quit 2015. Denies pulmonary symptoms  3) GI: Occasional GERD, diet control  4) MSK: Back pain, radiating to both legs, worse on left. He has significant pain and it is affecting his activity level. Meds have not been helpful. He currently uses Tylenol and gabapentin.   5) Endo: BMI: 40    He is on Otezla for psoriasis and has seen little improvement.                    Reviewed and Signed by PAC Mid-Level Provider/Resident  Mid-Level Provider/Resident: Angelika Cortes, LINNEA  Date: 10/25/2018  Time: 1000    Attending Anesthesiologist Anesthesia Assessment:  38 year old for left lumbar microdiscectomy in treatment of lumbar radiculopathy. Patient has no significant cardiac or pulmonary disease.   Patient/case discussed with ALFREDO/resident; agree with above assessment. No need  to see patient. Patient is appropriate for the planned procedure without further work-up or medical management.      Reviewed and Signed by PAC Anesthesiologist  Anesthesiologist: [email or web address]  Date: 10/25/2018  Time:   Pass/Fail: Pass  Disposition:     PAC Pharmacist Assessment:        Pharmacist:   Date:   Time:      Anesthesia Plan      History & Physical Review  History and physical reviewed and following examination; no interval change.    ASA Status:  3 .        Plan for General and ETT with Propofol induction. Maintenance will be Balanced.    PONV prophylaxis:  Ondansetron (or other 5HT-3) and Dexamethasone or Solumedrol  Additional equipment: Videolaryngoscope      Postoperative Care  Postoperative pain management:  IV analgesics and Oral pain medications.      Consents  Anesthetic plan, risks, benefits and alternatives discussed with:  Patient.  Use of blood products discussed: No .   .                          .

## 2018-10-25 NOTE — PATIENT INSTRUCTIONS
Preparing for Your Surgery      Name:  Lakhwinder Jones   MRN:  [id number]   :  1980   Today's Date:  10/25/2018     Arriving for surgery:  Surgery date:  18  Arrival time:  10:20 am    Please come to:  [facility] [address]  [address] [place]  [address]    - parking is available in front of [facility] from 5:15AM to 8:00PM. If you prefer, park your car in the Green Lot.    -Proceed to the 3rd floor, check in at the Adult Surgery Waiting Lounge. [phone]    If an escort is needed stop at the Information Desk in the lobby. Inform the information person that you are here for surgery. An escort to the Adult Surgery Waiting Lounge will be provided.    -  Bring your ID and insurance card.    What can I eat or drink?  -  You may have solid food or milk products until 8 hours prior to your surgery. (Until 4:50 am )  -  You may have water, apple juice or 7up/Sprite until 2 hours prior to your surgery. (Until 10:20 am- Stop on arrival to hospital. )    Which medicines can I take?       -  Do not bring your own medications to the hospital.        -  Follow Orthopedic Clinic instructions regarding Ibuprofen. If no instructions given, NO Ibuprofen the day prior to surgery.         -  Stop Multivitamin 7 days prior to surgery.        -  Please DO NOT take the Otezla the morning of surgery.    -  Please take these medications the morning of surgery:  Citalopram (Celexa), Gabapentin,      Acetaminophen (Tylenol) if needed    How do I prepare myself?  -  Take two showers: one the night before surgery; and one the morning of surgery.         Use Scrubcare or Hibiclens to wash from neck down.  You may use your own shampoo and conditioner. No other hair products.   -  Do NOT use lotion, powder, colognes, deodorant, or antiperspirant the day of your surgery.  -  Do NOT wear any jewelry.    Questions or Concerns:  If you have questions or concerns prior to  your surgery, call [phone]. (Mon - Fri   8 am- 5:30 pm)  Questions after surgery, contact your Surgeons office.      AFTER YOUR SURGERY  Breathing exercises   Breathing exercises help you recover faster. Take deep breaths and let the air out slowly. This will:     Help you wake up after surgery.    Help prevent complications like pneumonia.  Preventing complications will help you go home sooner.   We may give you a breathing device (incentive spirometer) to encourage you to breathe deeply.   Nausea and vomiting   You may feel sick to your stomach after surgery; if so, let your nurse know.    Pain control:  After surgery, you may have pain. Our goal is to help you manage your pain. Pain medicine will help you feel comfortable enough to do activities that will help you heal.  These activities may include breathing exercises, walking and physical therapy.   To help your health care team treat your pain we will ask: 1) If you have pain  2) where it is located 3) describe your pain in your words  Methods of pain control include medications given by mouth, vein or by nerve block for some surgeries.  Sequential Compression Device (SCD) or Pneumo Boots:  You may need to wear SCD S on your legs or feet. These are wraps connected to a machine that pumps in air and releases it. The repeated pumping helps prevent blood clots from forming.

## 2018-10-25 NOTE — MR AVS SNAPSHOT
After Visit Summary   10/25/2018    Lakhwinder Jones    MRN: [id number]           Patient Information     Date Of Birth          1980        Visit Information        Provider Department      10/25/2018 9:00 AM Tamica Cortes APRN [facility]        Care Instructions    Preparing for Your Surgery      Name:  Lakhwinder Jones   MRN:  [id number]   :  1980   Today's Date:  10/25/2018     Arriving for surgery:  Surgery date:  18  Arrival time:  10:20 am    Please come to:  [facility] [address]  [address] [place], [place] [address]. [place]  [address]    - parking is available in front of [facility] from 5:15AM to 8:00PM. If you prefer, park your car in the Green Lot.    -Proceed to the 3rd floor, check in at the Adult Surgery Waiting Lounge. [phone]    If an escort is needed stop at the Information Desk in the lobby. Inform the information person that you are here for surgery. An escort to the Adult Surgery Waiting Lounge will be provided.    -  Bring your ID and insurance card.    What can I eat or drink?  -  You may have solid food or milk products until 8 hours prior to your surgery. (Until 4:50 am )  -  You may have water, apple juice or 7up/Sprite until 2 hours prior to your surgery. (Until 10:20 am- Stop on arrival to hospital. )    Which medicines can I take?       -  Do not bring your own medications to the hospital.        -  Follow Orthopedic Clinic instructions regarding Ibuprofen. If no instructions given, NO Ibuprofen the day prior to surgery.         -  Stop Multivitamin 7 days prior to surgery.        -  Please DO NOT take the Otezla the morning of surgery.    -  Please take these medications the morning of surgery:  Citalopram (Celexa), Gabapentin,      Acetaminophen (Tylenol) if needed    How do I prepare myself?  -  Take two showers: one the night before surgery; and one the morning of  surgery.         Use Scrubcare or Hibiclens to wash from neck down.  You may use your own shampoo and conditioner. No other hair products.   -  Do NOT use lotion, powder, colognes, deodorant, or antiperspirant the day of your surgery.  -  Do NOT wear any jewelry.    Questions or Concerns:  If you have questions or concerns prior to your surgery, call [phone]. (Mon - Fri   8 am- 5:30 pm)  Questions after surgery, contact your Surgeons office.      AFTER YOUR SURGERY  Breathing exercises   Breathing exercises help you recover faster. Take deep breaths and let the air out slowly. This will:     Help you wake up after surgery.    Help prevent complications like pneumonia.  Preventing complications will help you go home sooner.   We may give you a breathing device (incentive spirometer) to encourage you to breathe deeply.   Nausea and vomiting   You may feel sick to your stomach after surgery; if so, let your nurse know.    Pain control:  After surgery, you may have pain. Our goal is to help you manage your pain. Pain medicine will help you feel comfortable enough to do activities that will help you heal.  These activities may include breathing exercises, walking and physical therapy.   To help your health care team treat your pain we will ask: 1) If you have pain  2) where it is located 3) describe your pain in your words  Methods of pain control include medications given by mouth, vein or by nerve block for some surgeries.  Sequential Compression Device (SCD) or Pneumo Boots:  You may need to wear SCD S on your legs or feet. These are wraps connected to a machine that pumps in air and releases it. The repeated pumping helps prevent blood clots from forming.                     Follow-ups after your visit        Your next 10 appointments already scheduled     Nov 01, 2018   Procedure with Favio Miranda MD   [facility], [place], Same Day Surgery (--)    [address] [facility] [address] [phone]  "           Dec 14, 2018 12:40 PM CST   (Arrive by 12:25 PM)   Post-Op with Favio Miranda MD   [facility] ([facility])    [address] [address] [phone]            Feb 01, 2019 12:40 PM CST   (Arrive by 12:25 PM)   Post-Op with Favio Miranda MD   [facility] ([facility])    [address] [place] [address] [phone]              Who to contact     Please call your clinic at [phone] to:    Ask questions about your health    Make or cancel appointments    Discuss your medicines    Learn about your test results    Speak to your doctor            Additional Information About Your Visit        [facility] [facility] Information     [employer] gives you secure access to your electronic health record. If you see a primary care provider, you can also send messages to your care team and make appointments. If you have questions, please call your primary care clinic.  If you do not have a primary care provider, please call [phone] and they will assist you.      [employer] is an electronic gateway that provides easy, online access to your medical records. With [employer], you can request a clinic appointment, read your test results, renew a prescription or communicate with your care team.     To access your existing account, please contact your [facility] or call [phone] for assistance.        Care EveryWhere ID     This is your Care EveryWhere ID. This could be used by other organizations to access your [place] medical records  [id number]        Your Vitals Were     Pulse Temperature Respirations Height Pulse Oximetry BMI (Body Mass Index)    75 97.8  F (36.6  C) (Oral) 18 1.956 m (6' 5\") 97% 39.63 kg/m2       Blood Pressure from Last 3 Encounters:   10/25/18 142/88   10/18/18 (!) 145/94   10/04/18 139/81    Weight from Last 3 " Encounters:   10/25/18 (!) 151.6 kg (334 lb 3.2 oz)   10/19/18 (!) 152.8 kg (336 lb 14.4 oz)   08/30/18 (!) 155.1 kg (342 lb)              Today, you had the following     No orders found for display         Today's Medication Changes          These changes are accurate as of 10/25/18  9:36 AM.  If you have any questions, ask your nurse or doctor.               These medicines have changed or have updated prescriptions.        Dose/Directions    citalopram 40 MG tablet   Commonly known as:  celeXA   This may have changed:  when to take this   Used for:  Anxiety state, Panic disorder without agoraphobia        Dose:  40 mg   Take 1 tablet (40 mg) by mouth daily   Quantity:  30 tablet   Refills:  0       gabapentin 300 MG capsule   Commonly known as:  NEURONTIN   This may have changed:  when to take this   Used for:  Acute midline low back pain with left-sided sciatica, Lumbar paraspinal muscle spasm        Dose:  300 mg   Take 1 capsule (300 mg) by mouth At Bedtime   Quantity:  60 capsule   Refills:  1                Primary Care Provider Office Phone # Fax #    Simona Prasad PA-C [phone]       [address] [facility] [address]        Equal Access to Services     KENTON POLLOCK AH: Hadii rashid ku hadasho Soomaali, waaxda luqadaha, qaybta kaalmada adeegyada, waxay lida guerrero. So [facility] [phone].    ATENCIÓN: Si habla español, tiene a rose disposición servicios gratuitos de asistencia lingüística. Llame al [phone].    We comply with applicable federal civil rights laws and [place] laws. We do not discriminate on the basis of race, color, national origin, age, disability, sex, sexual orientation, or gender identity.            Thank you!     Thank you for choosing [facility]  for your care. Our goal is always to provide you with excellent care. Hearing back from our patients is one way we can continue to improve our services.  Please take a few minutes to complete the written survey that you may receive in the mail after your visit with us. Thank you!             Your Updated Medication List - Protect others around you: Learn how to safely use, store and throw away your medicines at www.disposemymeds.org.          This list is accurate as of 10/25/18  9:36 AM.  Always use your most recent med list.                   Brand Name Dispense Instructions for use Diagnosis    * Apremilast 10 & 20 & 30 MG Tbpk    OTEZLA    27 tablet    Take one tablet in the morning on day 1, then take one tablet every 12 hours on days 2 thru 14, according to the instructions on the packet.    Psoriasis with arthropathy (H)       * apremilast 30 MG tablet    OTEZLA    60 tablet    Take 1 tablet (30 mg) by mouth 2 times daily    Psoriasis, Psoriasis with arthropathy (H)       citalopram 40 MG tablet    celeXA    30 tablet    Take 1 tablet (40 mg) by mouth daily    Anxiety state, Panic disorder without agoraphobia       cyclobenzaprine 10 MG tablet    FLEXERIL    60 tablet    Take 1 tablet (10 mg) by mouth 2 times daily as needed for muscle spasms    Acute midline low back pain with left-sided sciatica       gabapentin 300 MG capsule    NEURONTIN    60 capsule    Take 1 capsule (300 mg) by mouth At Bedtime    Acute midline low back pain with left-sided sciatica, Lumbar paraspinal muscle spasm       Multi-vitamin Tabs tablet      Take 1 tablet by mouth every morning        NONFORMULARY      as needed CBD OIL        order for DME     1 Device    Equipment being ordered: [id number] $249  Walking Boot XL Funmilayo Pneumatic    Closed nondisplaced fracture of fifth metatarsal bone of right foot, initial encounter       TYLENOL PO      Take 1,000 mg by mouth as needed for mild pain or fever        * Notice:  This list has 2 medication(s) that are the same as other medications prescribed for you. Read the directions carefully, and ask your doctor or other care provider to  review them with you.

## 2018-11-01 ENCOUNTER — APPOINTMENT (OUTPATIENT)
Dept: GENERAL RADIOLOGY | Facility: CLINIC | Age: 38
End: 2018-11-01
Attending: ORTHOPAEDIC SURGERY
Payer: COMMERCIAL

## 2018-11-01 ENCOUNTER — ANESTHESIA (OUTPATIENT)
Dept: SURGERY | Facility: CLINIC | Age: 38
End: 2018-11-01
Payer: COMMERCIAL

## 2018-11-01 ENCOUNTER — SURGERY (OUTPATIENT)
Age: 38
End: 2018-11-01

## 2018-11-01 ENCOUNTER — HOSPITAL ENCOUNTER (OUTPATIENT)
Facility: CLINIC | Age: 38
Discharge: HOME | End: 2018-11-01
Attending: ORTHOPAEDIC SURGERY | Admitting: ORTHOPAEDIC SURGERY
Payer: COMMERCIAL

## 2018-11-01 VITALS
TEMPERATURE: 97.8 F | OXYGEN SATURATION: 97 % | SYSTOLIC BLOOD PRESSURE: 126 MMHG | HEART RATE: 74 BPM | HEIGHT: 77 IN | BODY MASS INDEX: 37.19 KG/M2 | RESPIRATION RATE: 14 BRPM | WEIGHT: 315 LBS | DIASTOLIC BLOOD PRESSURE: 83 MMHG

## 2018-11-01 DIAGNOSIS — Z98.890 S/P DISCECTOMY: Primary | ICD-10-CM

## 2018-11-01 LAB
ABO + RH BLD: NORMAL
ABO + RH BLD: NORMAL
BLD GP AB SCN SERPL QL: NORMAL
BLOOD BANK CMNT PATIENT-IMP: NORMAL
BLOOD BANK CMNT PATIENT-IMP: NORMAL
GLUCOSE BLDC GLUCOMTR-MCNC: 94 MG/DL (ref 70–99)
SPECIMEN EXP DATE BLD: NORMAL

## 2018-11-01 PROCEDURE — 25000125 ZZHC RX 250: Performed by: ORTHOPAEDIC SURGERY

## 2018-11-01 PROCEDURE — 25000301 ZZH OR RX SURGIFLO W/THROMBIN KIT 2ML 1991 OPNP: Performed by: ORTHOPAEDIC SURGERY

## 2018-11-01 PROCEDURE — 71000014 ZZH RECOVERY PHASE 1 LEVEL 2 FIRST HR: Performed by: ORTHOPAEDIC SURGERY

## 2018-11-01 PROCEDURE — 25000128 H RX IP 250 OP 636: Performed by: NURSE ANESTHETIST, CERTIFIED REGISTERED

## 2018-11-01 PROCEDURE — 25000128 H RX IP 250 OP 636: Performed by: PHYSICIAN ASSISTANT

## 2018-11-01 PROCEDURE — 36000066 ZZH SURGERY LEVEL 4 W FLUORO 1ST 30 MIN - UMMC: Performed by: ORTHOPAEDIC SURGERY

## 2018-11-01 PROCEDURE — 27210794 ZZH OR GENERAL SUPPLY STERILE: Performed by: ORTHOPAEDIC SURGERY

## 2018-11-01 PROCEDURE — 71000027 ZZH RECOVERY PHASE 2 EACH 15 MINS: Performed by: ORTHOPAEDIC SURGERY

## 2018-11-01 PROCEDURE — 40000170 ZZH STATISTIC PRE-PROCEDURE ASSESSMENT II: Performed by: ORTHOPAEDIC SURGERY

## 2018-11-01 PROCEDURE — 25000125 ZZHC RX 250: Performed by: NURSE ANESTHETIST, CERTIFIED REGISTERED

## 2018-11-01 PROCEDURE — 40000985 XR LUMBAR SPINE PORT 1 VW: Mod: TC

## 2018-11-01 PROCEDURE — 71000015 ZZH RECOVERY PHASE 1 LEVEL 2 EA ADDTL HR: Performed by: ORTHOPAEDIC SURGERY

## 2018-11-01 PROCEDURE — 82962 GLUCOSE BLOOD TEST: CPT

## 2018-11-01 PROCEDURE — 25000128 H RX IP 250 OP 636

## 2018-11-01 PROCEDURE — 25000128 H RX IP 250 OP 636: Performed by: ANESTHESIOLOGY

## 2018-11-01 PROCEDURE — 25000132 ZZH RX MED GY IP 250 OP 250 PS 637: Performed by: ANESTHESIOLOGY

## 2018-11-01 PROCEDURE — 27210995 ZZH RX 272: Performed by: ORTHOPAEDIC SURGERY

## 2018-11-01 PROCEDURE — 37000009 ZZH ANESTHESIA TECHNICAL FEE, EACH ADDTL 15 MIN: Performed by: ORTHOPAEDIC SURGERY

## 2018-11-01 PROCEDURE — 36000064 ZZH SURGERY LEVEL 4 EA 15 ADDTL MIN - UMMC: Performed by: ORTHOPAEDIC SURGERY

## 2018-11-01 PROCEDURE — 37000008 ZZH ANESTHESIA TECHNICAL FEE, 1ST 30 MIN: Performed by: ORTHOPAEDIC SURGERY

## 2018-11-01 PROCEDURE — 25000132 ZZH RX MED GY IP 250 OP 250 PS 637: Performed by: PHYSICIAN ASSISTANT

## 2018-11-01 PROCEDURE — 25000566 ZZH SEVOFLURANE, EA 15 MIN: Performed by: ORTHOPAEDIC SURGERY

## 2018-11-01 RX ORDER — ONDANSETRON 2 MG/ML
INJECTION INTRAMUSCULAR; INTRAVENOUS PRN
Status: DISCONTINUED | OUTPATIENT
Start: 2018-11-01 | End: 2018-11-01

## 2018-11-01 RX ORDER — PROPOFOL 10 MG/ML
INJECTION, EMULSION INTRAVENOUS PRN
Status: DISCONTINUED | OUTPATIENT
Start: 2018-11-01 | End: 2018-11-01

## 2018-11-01 RX ORDER — HYDROMORPHONE HYDROCHLORIDE 1 MG/ML
.3-.5 INJECTION, SOLUTION INTRAMUSCULAR; INTRAVENOUS; SUBCUTANEOUS EVERY 5 MIN PRN
Status: DISCONTINUED | OUTPATIENT
Start: 2018-11-01 | End: 2018-11-01 | Stop reason: HOSPADM

## 2018-11-01 RX ORDER — MEPERIDINE HYDROCHLORIDE 25 MG/ML
12.5 INJECTION INTRAMUSCULAR; INTRAVENOUS; SUBCUTANEOUS EVERY 5 MIN PRN
Status: DISCONTINUED | OUTPATIENT
Start: 2018-11-01 | End: 2018-11-01 | Stop reason: HOSPADM

## 2018-11-01 RX ORDER — SODIUM CHLORIDE, SODIUM LACTATE, POTASSIUM CHLORIDE, CALCIUM CHLORIDE 600; 310; 30; 20 MG/100ML; MG/100ML; MG/100ML; MG/100ML
INJECTION, SOLUTION INTRAVENOUS CONTINUOUS
Status: DISCONTINUED | OUTPATIENT
Start: 2018-11-01 | End: 2018-11-01 | Stop reason: HOSPADM

## 2018-11-01 RX ORDER — FENTANYL CITRATE 50 UG/ML
25-50 INJECTION, SOLUTION INTRAMUSCULAR; INTRAVENOUS
Status: DISCONTINUED | OUTPATIENT
Start: 2018-11-01 | End: 2018-11-01 | Stop reason: HOSPADM

## 2018-11-01 RX ORDER — NALOXONE HYDROCHLORIDE 0.4 MG/ML
.1-.4 INJECTION, SOLUTION INTRAMUSCULAR; INTRAVENOUS; SUBCUTANEOUS
Status: DISCONTINUED | OUTPATIENT
Start: 2018-11-01 | End: 2018-11-01 | Stop reason: HOSPADM

## 2018-11-01 RX ORDER — SODIUM CHLORIDE, SODIUM LACTATE, POTASSIUM CHLORIDE, CALCIUM CHLORIDE 600; 310; 30; 20 MG/100ML; MG/100ML; MG/100ML; MG/100ML
INJECTION, SOLUTION INTRAVENOUS CONTINUOUS PRN
Status: DISCONTINUED | OUTPATIENT
Start: 2018-11-01 | End: 2018-11-01

## 2018-11-01 RX ORDER — ACETAMINOPHEN 325 MG/1
650 TABLET ORAL
Status: DISCONTINUED | OUTPATIENT
Start: 2018-11-01 | End: 2018-11-01 | Stop reason: HOSPADM

## 2018-11-01 RX ORDER — CEFAZOLIN SODIUM 1 G/3ML
1 INJECTION, POWDER, FOR SOLUTION INTRAMUSCULAR; INTRAVENOUS SEE ADMIN INSTRUCTIONS
Status: DISCONTINUED | OUTPATIENT
Start: 2018-11-01 | End: 2018-11-01 | Stop reason: HOSPADM

## 2018-11-01 RX ORDER — ONDANSETRON 4 MG/1
4 TABLET, ORALLY DISINTEGRATING ORAL EVERY 30 MIN PRN
Status: DISCONTINUED | OUTPATIENT
Start: 2018-11-01 | End: 2018-11-01 | Stop reason: HOSPADM

## 2018-11-01 RX ORDER — OXYCODONE HYDROCHLORIDE 5 MG/1
5 TABLET ORAL EVERY 4 HOURS PRN
Status: DISCONTINUED | OUTPATIENT
Start: 2018-11-01 | End: 2018-11-01 | Stop reason: HOSPADM

## 2018-11-01 RX ORDER — MAGNESIUM HYDROXIDE 1200 MG/15ML
LIQUID ORAL PRN
Status: DISCONTINUED | OUTPATIENT
Start: 2018-11-01 | End: 2018-11-01 | Stop reason: HOSPADM

## 2018-11-01 RX ORDER — BUPIVACAINE HYDROCHLORIDE AND EPINEPHRINE 2.5; 5 MG/ML; UG/ML
INJECTION, SOLUTION INFILTRATION; PERINEURAL PRN
Status: DISCONTINUED | OUTPATIENT
Start: 2018-11-01 | End: 2018-11-01 | Stop reason: HOSPADM

## 2018-11-01 RX ORDER — CEFAZOLIN SODIUM IN 0.9 % NACL 3 G/100 ML
3 INTRAVENOUS SOLUTION, PIGGYBACK (ML) INTRAVENOUS
Status: COMPLETED | OUTPATIENT
Start: 2018-11-01 | End: 2018-11-01

## 2018-11-01 RX ORDER — ONDANSETRON 2 MG/ML
4 INJECTION INTRAMUSCULAR; INTRAVENOUS EVERY 30 MIN PRN
Status: DISCONTINUED | OUTPATIENT
Start: 2018-11-01 | End: 2018-11-01 | Stop reason: HOSPADM

## 2018-11-01 RX ORDER — LABETALOL HYDROCHLORIDE 5 MG/ML
5 INJECTION, SOLUTION INTRAVENOUS ONCE
Status: COMPLETED | OUTPATIENT
Start: 2018-11-01 | End: 2018-11-01

## 2018-11-01 RX ORDER — AMOXICILLIN 250 MG
1-2 CAPSULE ORAL 2 TIMES DAILY PRN
Qty: 30 TABLET | Refills: 0 | Status: SHIPPED | OUTPATIENT
Start: 2018-11-01 | End: 2019-02-06

## 2018-11-01 RX ORDER — HYDROXYZINE HYDROCHLORIDE 25 MG/1
25 TABLET, FILM COATED ORAL
Status: COMPLETED | OUTPATIENT
Start: 2018-11-01 | End: 2018-11-01

## 2018-11-01 RX ORDER — OXYCODONE HYDROCHLORIDE 5 MG/1
5 TABLET ORAL EVERY 6 HOURS PRN
Qty: 50 TABLET | Refills: 0 | Status: SHIPPED | OUTPATIENT
Start: 2018-11-01 | End: 2018-11-05

## 2018-11-01 RX ORDER — FENTANYL CITRATE 50 UG/ML
INJECTION, SOLUTION INTRAMUSCULAR; INTRAVENOUS PRN
Status: DISCONTINUED | OUTPATIENT
Start: 2018-11-01 | End: 2018-11-01

## 2018-11-01 RX ORDER — OXYCODONE HYDROCHLORIDE 5 MG/1
5 TABLET ORAL
Status: COMPLETED | OUTPATIENT
Start: 2018-11-01 | End: 2018-11-01

## 2018-11-01 RX ORDER — ONDANSETRON 4 MG/1
4 TABLET, ORALLY DISINTEGRATING ORAL
Status: DISCONTINUED | OUTPATIENT
Start: 2018-11-01 | End: 2018-11-01 | Stop reason: HOSPADM

## 2018-11-01 RX ADMIN — Medication 5 MG: at 09:40

## 2018-11-01 RX ADMIN — Medication 0.5 MG: at 10:00

## 2018-11-01 RX ADMIN — Medication 0.5 MG: at 09:43

## 2018-11-01 RX ADMIN — Medication 0.5 MG: at 09:25

## 2018-11-01 RX ADMIN — OXYCODONE HYDROCHLORIDE 5 MG: 5 TABLET ORAL at 11:00

## 2018-11-01 RX ADMIN — HYDROXYZINE HYDROCHLORIDE 25 MG: 25 TABLET ORAL at 09:35

## 2018-11-01 RX ADMIN — FENTANYL CITRATE 100 MCG: 50 INJECTION, SOLUTION INTRAMUSCULAR; INTRAVENOUS at 07:01

## 2018-11-01 RX ADMIN — Medication 200 MG: at 07:00

## 2018-11-01 RX ADMIN — ROCURONIUM BROMIDE 10 MG: 10 INJECTION INTRAVENOUS at 07:33

## 2018-11-01 RX ADMIN — OXYCODONE HYDROCHLORIDE 5 MG: 5 TABLET ORAL at 09:36

## 2018-11-01 RX ADMIN — Medication 3 G: at 07:12

## 2018-11-01 RX ADMIN — FENTANYL CITRATE 50 MCG: 50 INJECTION INTRAMUSCULAR; INTRAVENOUS at 08:58

## 2018-11-01 RX ADMIN — HYDROMORPHONE HYDROCHLORIDE 0.5 MG: 1 INJECTION, SOLUTION INTRAMUSCULAR; INTRAVENOUS; SUBCUTANEOUS at 08:23

## 2018-11-01 RX ADMIN — ONDANSETRON 4 MG: 2 INJECTION INTRAMUSCULAR; INTRAVENOUS at 08:15

## 2018-11-01 RX ADMIN — MIDAZOLAM 2 MG: 1 INJECTION INTRAMUSCULAR; INTRAVENOUS at 06:52

## 2018-11-01 RX ADMIN — FENTANYL CITRATE 50 MCG: 50 INJECTION, SOLUTION INTRAMUSCULAR; INTRAVENOUS at 08:44

## 2018-11-01 RX ADMIN — FENTANYL CITRATE 50 MCG: 50 INJECTION, SOLUTION INTRAMUSCULAR; INTRAVENOUS at 07:33

## 2018-11-01 RX ADMIN — Medication 0.5 MG: at 09:12

## 2018-11-01 RX ADMIN — PROPOFOL 250 MG: 10 INJECTION, EMULSION INTRAVENOUS at 07:00

## 2018-11-01 RX ADMIN — Medication 0.5 MG: at 09:06

## 2018-11-01 RX ADMIN — THROMBIN HUMAN 1 KIT: 2000 POWDER, FOR SOLUTION TOPICAL at 07:51

## 2018-11-01 RX ADMIN — ROCURONIUM BROMIDE 40 MG: 10 INJECTION INTRAVENOUS at 07:08

## 2018-11-01 RX ADMIN — BUPIVACAINE HYDROCHLORIDE AND EPINEPHRINE BITARTRATE 60 ML: 2.5; .005 INJECTION, SOLUTION INFILTRATION; PERINEURAL at 08:13

## 2018-11-01 RX ADMIN — SUGAMMADEX 300 MG: 100 INJECTION, SOLUTION INTRAVENOUS at 08:32

## 2018-11-01 RX ADMIN — SODIUM CHLORIDE 800 ML: 900 IRRIGANT IRRIGATION at 08:14

## 2018-11-01 RX ADMIN — FENTANYL CITRATE 50 MCG: 50 INJECTION INTRAMUSCULAR; INTRAVENOUS at 09:02

## 2018-11-01 RX ADMIN — ACETAMINOPHEN 650 MG: 325 TABLET, FILM COATED ORAL at 09:35

## 2018-11-01 RX ADMIN — SODIUM CHLORIDE, POTASSIUM CHLORIDE, SODIUM LACTATE AND CALCIUM CHLORIDE: 600; 310; 30; 20 INJECTION, SOLUTION INTRAVENOUS at 06:51

## 2018-11-01 RX ADMIN — Medication 0.5 MG: at 09:33

## 2018-11-01 NOTE — IP AVS SNAPSHOT
[facility]    [address] [facility] [address]    Phone:  [phone]                                       After Visit Summary   11/1/2018    Lakhwinder Jones    MRN: [id number]           After Visit Summary Signature Page     I have received my discharge instructions, and my questions have been answered. I have discussed any challenges I see with this plan with the nurse or doctor.    ..........................................................................................................................................  Patient/Patient Representative Signature      ..........................................................................................................................................  Patient Representative Print Name and Relationship to Patient    ..................................................               ................................................  Date                                   Time    ..........................................................................................................................................  Reviewed by Signature/Title    ...................................................              ..............................................  Date                                               Time          22EPIC Rev 08/18

## 2018-11-01 NOTE — DISCHARGE INSTRUCTIONS
Same-Day Surgery   Adult Discharge Orders & Instructions     For 24 hours after surgery:  1. Get plenty of rest.  A responsible adult must stay with you for at least 24 hours after you leave the hospital.   2. Pain medication can slow your reflexes. Do not drive or use heavy equipment.  If you have weakness or tingling, don't drive or use heavy equipment until this feeling goes away.  3. Mixing alcohol and pain medication can cause dizziness and slow your breathing. It can even be fatal. Do not drink alcohol while taking pain medication.  4. Avoid strenuous or risky activities.  Ask for help when climbing stairs.   5. You may feel lightheaded.  If so, sit for a few minutes before standing.  Have someone help you get up.   6. If you have nausea (feel sick to your stomach), drink only clear liquids such as apple juice, ginger ale, broth or 7-Up.  Rest may also help.  Be sure to drink enough fluids.  Move to a regular diet as you feel able. Take pain medications with a small amount of solid food, such as toast or crackers, to avoid nausea.   7. A slight fever is normal. Call the doctor if your fever is over 100 F (37.7 C) (taken under the tongue) or lasts longer than 24 hours.  8. You may have a dry mouth, muscle aches, trouble sleeping or a sore throat.  These symptoms should go away after 24 hours.  9. Do not make important or legal decisions.   Pain Management:      1. Take pain medication (if prescribed) for pain as directed by your physician.        2. WARNING: If the pain medication you have been prescribed contains Tylenol (acetaminophen), DO NOT take additional doses of Tylenol (acetaminophen). Gave 650 mg of tylenol at 9:35 am on 2018.     3. Gave 25 mg Hydroxyzine (atarax) at 9:35 on 2018.      Call your doctor for any of the followin.  Signs of infection (fever, growing tenderness at the surgery site, severe pain, a large amount of drainage or bleeding, foul-smelling drainage, redness,  swelling).    2.  It has been over 8 to 10 hours since surgery and you are still not able to urinate (pee).    3.  Headache for over 24 hours.      To contact a doctor, call Dr. KATIA Miranda's clinic #  [phone]  or:      [phone] and ask for the Resident On Call for: Orthopedic surgery  (answered 24 hours a day)      Emergency Department:  [place] [facility]: [phone]  [place] [facility]: [phone]               Rev. 10/2014              Tips for taking pain medications  To get the best pain relief possible , remember these points:      Take pain medications as directed, before pain becomes severe      Pain medication can upset your stomach: taking it with food may help      Constipation is a common side effect of pain medication. Drink plenty of  Fluids      Eat foods high in fiber. Take a stool softener  if recommended by your doctor or  Pharmacist.        Do not drink alcohol, drive or operate machinery while taking pain medications.      Ask about other ways to control pain, such as with heat, ice, aromatherapy or relaxation.

## 2018-11-01 NOTE — IP AVS SNAPSHOT
MRN:[id number]                      After Visit Summary   11/1/2018    Lakhwinder Jones    MRN: [id number]           Thank you!     Thank you for choosing [facility] for your care. Our goal is always to provide you with excellent care. Hearing back from our patients is one way we can continue to improve our services. Please take a few minutes to complete the written survey that you may receive in the mail after you visit with us. Thank you!        Patient Information     Date Of Birth          1980        About your hospital stay     You were admitted on:  November 1, 2018 You last received care in the:   PACU    You were discharged on:  November 1, 2018       Who to Call     For medical emergencies, please call 911.  For non-urgent questions about your medical care, please call your primary care provider or clinic, [phone]  For questions related to your surgery, please call your surgery clinic        Attending Provider     Provider Favio Jimenez MD Orthopedics       Primary Care Provider Office Phone # Fax #    Simona Prasad PA-C [phone]      After Care Instructions      Diet as Tolerated       Return to diet before surgery, unless instructed otherwise.            Discharge Instructions       Review outpatient procedure discharge instructions with patient as directed by Provider            Discharge Instructions - Lifting Limit (specify)       Lifting limit 10 pounds until seen at post-op follow up appointment.  Limit bending and twisting.            Dressing Change       You may remove the dressing in 5-7 days.  In the meantime, it can get wet.  Once the incision is uncovered, it can get wet but do not submerge in water for at least 5 weeks.            Ice to affected area       Ice pack to surgical site every 15 minutes per hour for 24 hours            Notify Provider       For signs and symptoms of infection: Fever greater  than 101, redness, swelling, heat at site, drainage, pus.            Return to clinic       Return to clinic in six weeks for your first post operative appointment            Shower        Cover dressing if dressing is not going to be changed today            Weight bearing - As tolerated           Wound care       Do not immerse wound in water for the first five weeks after surgery.                  Your next 10 appointments already scheduled     Dec 14, 2018 12:40 PM CST   (Arrive by 12:25 PM)   Post-Op with Favio Miranda MD   [facility] ([facility])    [address] [place] [id number]   [phone]            Feb 01, 2019 12:40 PM CST   (Arrive by 12:25 PM)   Post-Op with Favio Miranda MD   [facility] ([facility])    [address] [place] [id number]   [phone]              Further instructions from your care team       Same-Day Surgery   Adult Discharge Orders & Instructions     For 24 hours after surgery:  1. Get plenty of rest.  A responsible adult must stay with you for at least 24 hours after you leave the hospital.   2. Pain medication can slow your reflexes. Do not drive or use heavy equipment.  If you have weakness or tingling, don't drive or use heavy equipment until this feeling goes away.  3. Mixing alcohol and pain medication can cause dizziness and slow your breathing. It can even be fatal. Do not drink alcohol while taking pain medication.  4. Avoid strenuous or risky activities.  Ask for help when climbing stairs.   5. You may feel lightheaded.  If so, sit for a few minutes before standing.  Have someone help you get up.   6. If you have nausea (feel sick to your stomach), drink only clear liquids such as apple juice, ginger ale, broth or 7-Up.  Rest may also help.  Be sure to drink enough fluids.  Move to a regular diet as you feel able.  Take pain medications with a small amount of solid food, such as toast or crackers, to avoid nausea.   7. A slight fever is normal. Call the doctor if your fever is over 100 F (37.7 C) (taken under the tongue) or lasts longer than 24 hours.  8. You may have a dry mouth, muscle aches, trouble sleeping or a sore throat.  These symptoms should go away after 24 hours.  9. Do not make important or legal decisions.   Pain Management:      1. Take pain medication (if prescribed) for pain as directed by your physician.        2. WARNING: If the pain medication you have been prescribed contains Tylenol (acetaminophen), DO NOT take additional doses of Tylenol (acetaminophen). Gave 650 mg of tylenol at 9:35 am on 2018.     3. Gave 25 mg Hydroxyzine (atarax) at 9:35 on 2018.      Call your doctor for any of the followin.  Signs of infection (fever, growing tenderness at the surgery site, severe pain, a large amount of drainage or bleeding, foul-smelling drainage, redness, swelling).    2.  It has been over 8 to 10 hours since surgery and you are still not able to urinate (pee).    3.  Headache for over 24 hours.      To contact a doctor, call Dr. KATIA Miranda's clinic #  [phone]  or:      [phone] and ask for the Resident On Call for: Orthopedic surgery  (answered 24 hours a day)      Emergency Department:  [facility]: [phone]  [facility]: [phone]               Rev. 10/2014              Tips for taking pain medications  To get the best pain relief possible , remember these points:      Take pain medications as directed, before pain becomes severe      Pain medication can upset your stomach: taking it with food may help      Constipation is a common side effect of pain medication. Drink plenty of  Fluids      Eat foods high in fiber. Take a stool softener  if recommended by your doctor or  Pharmacist.        Do not drink alcohol, drive or operate machinery  "while taking pain medications.      Ask about other ways to control pain, such as with heat, ice, aromatherapy or relaxation.      Pending Results     No orders found from 10/30/2018 to 11/2/2018.            Admission Information     Date & Time Provider Department Dept. Phone    11/1/2018 Favio Miranda MD  PACU [phone]      Your Vitals Were     Blood Pressure Pulse Temperature Respirations Height Weight    148/92 74 98.1  F (36.7  C) (Oral) 14 1.956 m (6' 5.01\") 152 kg (335 lb 1.6 oz)    Pulse Oximetry BMI (Body Mass Index)                96% 39.73 kg/m2          MyChart Information     [facility] gives you secure access to your electronic health record. If you see a primary care provider, you can also send messages to your care team and make appointments. If you have questions, please call your primary care clinic.  If you do not have a primary care provider, please call [phone] and they will assist you.        Care EveryWhere ID     This is your Care EveryWhere ID. This could be used by other organizations to access your [facility] medical records  [id number]        Equal Access to Services     KAL POLLOCK : Hadii rashid Elizabeth, rahul roberts, qajose angel davey, adina guerrero. So [facility] [phone].    ATENCIÓN: Si habla español, tiene a rose disposición servicios gratuitos de asistencia lingüística. Llame al [phone].    We comply with applicable federal civil rights laws and [place] laws. We do not discriminate on the basis of race, color, national origin, age, disability, sex, sexual orientation, or gender identity.               Review of your medicines      UNREVIEWED medicines. Ask your doctor about these medicines        Dose / Directions    * Apremilast 10 & 20 & 30 MG Tbpk   Commonly known as:  OTEZLA   Used for:  Psoriasis with arthropathy (H)        Take one tablet in the morning on day 1, then take one tablet every 12 hours on " days 2 thru 14, according to the instructions on the packet.   Quantity:  27 tablet   Refills:  0       * apremilast 30 MG tablet   Commonly known as:  OTEZLA   Used for:  Psoriasis, Psoriasis with arthropathy (H)        Dose:  30 mg   Take 1 tablet (30 mg) by mouth 2 times daily   Quantity:  60 tablet   Refills:  3       citalopram 40 MG tablet   Commonly known as:  celeXA   Used for:  Anxiety state, Panic disorder without agoraphobia        Dose:  40 mg   Take 1 tablet (40 mg) by mouth daily   Quantity:  30 tablet   Refills:  0       cyclobenzaprine 10 MG tablet   Commonly known as:  FLEXERIL   Used for:  Acute midline low back pain with left-sided sciatica        Dose:  10 mg   Take 1 tablet (10 mg) by mouth 2 times daily as needed for muscle spasms   Quantity:  60 tablet   Refills:  1       gabapentin 300 MG capsule   Commonly known as:  NEURONTIN   Used for:  Acute midline low back pain with left-sided sciatica, Lumbar paraspinal muscle spasm        Dose:  300 mg   Take 1 capsule (300 mg) by mouth At Bedtime   Quantity:  60 capsule   Refills:  1       Multi-vitamin Tabs tablet        Dose:  1 tablet   Take 1 tablet by mouth every morning   Refills:  0       NONFORMULARY        as needed CBD OIL   Refills:  0       TYLENOL PO        Dose:  1000 mg   Take 1,000 mg by mouth as needed for mild pain or fever   Refills:  0       * Notice:  This list has 2 medication(s) that are the same as other medications prescribed for you. Read the directions carefully, and ask your doctor or other care provider to review them with you.      START taking        Dose / Directions    oxyCODONE IR 5 MG tablet   Commonly known as:  ROXICODONE   Used for:  S/P discectomy        Dose:  5 mg   Take 1 tablet (5 mg) by mouth every 6 hours as needed for moderate to severe pain   Quantity:  50 tablet   Refills:  0       senna-docusate 8.6-50 MG per tablet   Commonly known as:  SENOKOT-S;PERICOLACE   Used for:  S/P discectomy        Dose:   1-2 tablet   Take 1-2 tablets by mouth 2 times daily as needed for constipation (While on oral opioids.)   Quantity:  30 tablet   Refills:  0         CONTINUE these medicines which have NOT CHANGED        Dose / Directions    order for DME   Used for:  Closed nondisplaced fracture of fifth metatarsal bone of right foot, initial encounter        Equipment being ordered: [id number] $249  Walking Boot XL Funmilayo Pneumatic   Quantity:  1 Device   Refills:  0            Where to get your medicines      These medications were sent to [place], [place] - [address]  [address] [facility] [address], [facility] [address]     Phone:  [phone]     senna-docusate 8.6-50 MG per tablet         Some of these will need a paper prescription and others can be bought over the counter. Ask your nurse if you have questions.     Bring a paper prescription for each of these medications     oxyCODONE IR 5 MG tablet                Protect others around you: Learn how to safely use, store and throw away your medicines at www.disposemymeds.org.        Information about OPIOIDS     PRESCRIPTION OPIOIDS: WHAT YOU NEED TO KNOW   We gave you an opioid (narcotic) pain medicine. It is important to manage your pain, but opioids are not always the best choice. You should first try all the other options your care team gave you. Take this medicine for as short a time (and as few doses) as possible.    Some activities can increase your pain, such as bandage changes or therapy sessions. It may help to take your pain medicine 30 to 60 minutes before these activities. Reduce your stress by getting enough sleep, working on hobbies you enjoy and practicing relaxation or meditation. Talk to your care team about ways to manage your pain beyond prescription opioids.    These medicines have risks:    DO NOT drive when on new or higher doses of pain medicine. These medicines can affect your alertness and reaction times, and you could  The patient is a 89 year old female with a history of CAD s/p PCI, chronic AF, dementia who presents with weakness and near syncope, found to have UTI, GRACIELA, rapid AF.    Plan:  - Rates now improved. Agree with continuing metoprolol IV q6h. When able to take PO, will transition to oral metoprolol.  - Continue IVF for dehyration  - Echocardiogram ordered, will follow-up results  - Not a candidate for long-term AC given advanced dementia  - Continue aspirin  - Hold ramipril given GRACIELA be arrested for driving under the influence (DUI). If you need to use opioids long-term, talk to your care team about driving.    DO NOT operate heavy machinery    DO NOT do any other dangerous activities while taking these medicines.    DO NOT drink any alcohol while taking these medicines.     If the opioid prescribed includes acetaminophen, DO NOT take with any other medicines that contain acetaminophen. Read all labels carefully. Look for the word  acetaminophen  or  Tylenol.  Ask your pharmacist if you have questions or are unsure.    You can get addicted to pain medicines, especially if you have a history of addiction (chemical, alcohol or substance dependence). Talk to your care team about ways to reduce this risk.    All opioids tend to cause constipation. Drink plenty of water and eat foods that have a lot of fiber, such as fruits, vegetables, prune juice, apple juice and high-fiber cereal. Take a laxative (Miralax, milk of magnesia, Colace, Senna) if you don t move your bowels at least every other day. Other side effects include upset stomach, sleepiness, dizziness, throwing up, tolerance (needing more of the medicine to have the same effect), physical dependence and slowed breathing.    Store your pills in a secure place, locked if possible. We will not replace any lost or stolen medicine. If you don t finish your medicine, please throw away (dispose) as directed by your pharmacist. The [place] Pollution [employer] has more information about safe disposal: https://www.pca.[facility].[place].us/living-green/managing-unwanted-medications             Medication List: This is a list of all your medications and when to take them. Check marks below indicate your daily home schedule. Keep this list as a reference.      Medications           Morning Afternoon Evening Bedtime As Needed    * Apremilast 10 & 20 & 30 MG Tbpk   Commonly known as:  OTEZLA   Take one tablet in the morning on day 1, then take one tablet  every 12 hours on days 2 thru 14, according to the instructions on the packet.                                * apremilast 30 MG tablet   Commonly known as:  OTEZLA   Take 1 tablet (30 mg) by mouth 2 times daily                                citalopram 40 MG tablet   Commonly known as:  celeXA   Take 1 tablet (40 mg) by mouth daily                                cyclobenzaprine 10 MG tablet   Commonly known as:  FLEXERIL   Take 1 tablet (10 mg) by mouth 2 times daily as needed for muscle spasms                                gabapentin 300 MG capsule   Commonly known as:  NEURONTIN   Take 1 capsule (300 mg) by mouth At Bedtime                                Multi-vitamin Tabs tablet   Take 1 tablet by mouth every morning                                NONFORMULARY   as needed CBD OIL   Last time this was given:  1,000 mg on 11/1/2018  8:21 AM                                order for DME   Equipment being ordered: [id number] $249  Walking Boot XL Funmilayo Pneumatic                                oxyCODONE IR 5 MG tablet   Commonly known as:  ROXICODONE   Take 1 tablet (5 mg) by mouth every 6 hours as needed for moderate to severe pain   Last time this was given:  5 mg on 11/1/2018 11:00 AM   Last time this was given:  First 5 mg oxycodone given at 9:36 am 11/1/2018. Due to uncontrolled pain additional 5 mg pain pill given at 11:00am. See above entry.                                 senna-docusate 8.6-50 MG per tablet   Commonly known as:  SENOKOT-S;PERICOLACE   Take 1-2 tablets by mouth 2 times daily as needed for constipation (While on oral opioids.)                                TYLENOL PO   Take 1,000 mg by mouth as needed for mild pain or fever   Last time this was given:  650 mg on 11/1/2018  9:35 AM                                * Notice:  This list has 2 medication(s) that are the same as other medications prescribed for you. Read the directions carefully, and ask your doctor or other care provider to  review them with you.

## 2018-11-01 NOTE — BRIEF OP NOTE
Orthopaedic Brief Operative Note 11/1/2018 8:43 AM    Patient: Lakhwinder Jones  MRN: [id number]       Pre-operative diagnosis: Lumbar Radiculopathy   Post-operative diagnosis: Same   Procedure: Procedure(s):  Left Lumbar 4-5 Microdisectomy      Surgeon:  Co-surgeon:  Resident: Favio Miranda*  N/A  None available   Assistant(s): Ysabel Mendoza PA-C     Anesthesia: General   Estimated blood loss: 10 ml   Total IV fluids: (See anesthesia record)   Total urine output: (See anesthesia record)   Blood transfusion No transfusion was given during surgery   Drains: None   Specimens: None   Implants: None   Grafts: N/A   Findings: See dictated operative report for full details   Complications: None   Disposition: Stable and extubated to PACU     Plan:    Pain: Scheduled Tylenol, oxycodone PRN  Activity: Weight bearing as tolerated. No lifting >10 lbs. No excessive twisting or bending.   Wound care: Remove dressing in 5-7 days  Diet: Clear liquids and ADAT  DVT ppx: Mechanical only  Disposition: Home today         Ysabel Mendoza PA-C  Pager: [phone]    If no answer or after 4pm, please page the on call ortho resident.

## 2018-11-01 NOTE — OP NOTE
DATE OF SURGERY: 11/1/2018    PREOPERATIVE DIAGNOSIS: Lumbar Radiculopathy             POSTOPERATIVE DIAGNOSIS: same    PROCEDURES:  1. Left L4-5 Microdiscectomy    PRIMARY SURGEON: Favio Miranda MD    FIRST ASSISTANT: JAMISON Guevara, there was no qualified resident available, the PA was necessary for retraction, visualization, and wound closure.      ANESTHESIA: General Endotracheal    COMPLICATIONS:  None.    SPECIMENS: None.    ESTIMATED BLOOD LOSS: 10cc    INDICATIONS:                          Lakhwinder Jones is a 38 year old male who elected surgical treatment, and understood the indications for this surgery, as well as its risks, benefits, and alternatives as documented in the pre-operative H&P.  Specifically, we reviewed the risks and benefits of the surgery in detail. The risks include, but are not limited to, the general risks associated with anesthesia, including death, pulmonary embolism, DVT, stroke, myocardial infarction, pneumonia, and urinary tract infection. Additional risks specific to the surgery include the risk of infection, dural tear with resultant CSF leak which might necessitate placement of a drain or revision surgery or could result in headaches, nerve injury resulting in weakness or paralysis, risk of adjacent segment disease, the risks of vascular injury, need for revision surgery in the future due to one of the above issues, or risk of incomplete symptom relief. Lakhwinder Jones understands the risks of the surgery and wishes to proceed.  No Guarantees were given.       DESCRIPTION OF PROCEDURE:           Lakhwinder Jones was taken to the operating room, where the Anesthesiology Service induced satisfactory general anesthesia.  Ancef was given IV.  Venous thromboembolic prophylaxis was performed with sequential devices.  A Green catheter was placed under standard sterile techniques.  The patient was placed prone on an open OSI frame with the abdomen hanging free and  all bony prominences well padded.  The low back was then prepped and draped in its entirety in the usual sterile fashion.  We then held a multidisciplinary time out in which we verified the patient, procedure, antibiotics, and operative plan.  All team members were in agreement.    Digital radiology was brought into the sterile field to obtain a true lateral view and needles were placed to letha the intended point of incision.  We made a midline incision and a unilateral leftl subperiosteal exposure was performed of the L4 through L5 spinous processes and medial lamina.  A needle was placed into the medial facet joint and a final image was then obtained to verify our position.     I removed the inferior 5-7mm of the L4 lamina with a kimberly and then used a curette to detach the flavum from the L5 lamina with a curette.  The flavum was then removed piecemeal with a kerrison. I then worked lateral to the nerve with a kerrison and removed the overhanging medial facet joint.  The nerve was retracted medially with a derico and I then entered the disc space with a penfield and got about 2cc of loose disc material from under the PLL.  I controlled light bleeding.  The nerve seemed totally free at this point with no residual compression.        The wound was then thoroughly irrigated.  Hemostasis was achieved.  A hemovac drain was not placed.  The wound was closed in layers with vicryl suture, followed by monocryl and dermabond for the skin.  A sterile dressing was applied. The patient was turned supine, extubated, and returned to the recovery area in stable condition.      I was present and scrubbed for the critical portions of the procedure including the exposure and decompression.    Favio Miranda MD

## 2018-11-01 NOTE — OR NURSING
[facility]  Pre/Post Care Unit 3A        Lakhwinder FLORIAN Dimascolleen  [address]  [place] [address]      Date: 11/1/2018      TO WHOM IT MAY CONCERN:    Lakhwinder Jones was seen at our hospital for a procedure on 11/1/2018.  Patient may return to school or work 1 week unless post op status change (patient to call clinic if needed longer time for recovery) .  The patient has the following activity restrictions: no Gym or vigorous exercise and no more than 10 lbs lifting restrictions for 6 week.     Sincerely,      Sree Verdugo RN  11/1/2018  11:19 AM       Pre/Post Care Unit

## 2018-11-01 NOTE — ANESTHESIA POSTPROCEDURE EVALUATION
Anesthesia POST Procedure Evaluation    Patient: Lakhwinder Jones   MRN:     [id number] Gender:   male   Age:    38 year old :      1980        Preoperative Diagnosis: Lumbar Radiculopathy   Procedure(s):  Left Lumbar 4-5 Microdisectomy    Postop Comments: No value filed.       Anesthesia Type:  Not documented    Reportable Event: NO     PAIN: Uncomplicated   Sign Out status: Comfortable, Well controlled pain     PONV: No PONV   Sign Out status:  No Nausea or Vomiting     Neuro/Psych: Uneventful perioperative course   Sign Out Status: Preoperative baseline; Age appropriate mentation     Airway/Resp.: Uneventful perioperative course   Sign Out Status: Non labored breathing, age appropriate RR; Resp. Status within EXPECTED Parameters     CV: Uneventful perioperative course   Sign Out status: Appropriate BP and perfusion indices; Appropriate HR/Rhythm     Disposition:   Sign Out in:  PACU  Recovery Course: Uneventful  Follow-Up: Not required     Comments/Narrative:  No significant issues           Last Anesthesia Record Vitals:  CRNA VITALS  2018 0710 - 2018 0740      2018             Resp Rate (observed): 10          Last PACU/Preop Vitals:  Vitals:    18 0534   BP: 142/89   Pulse: 74   Resp: 16   Temp: 36.6  C (97.9  F)   SpO2: 95%         Electronically Signed By: Alfonso Felder MD, MD, 2018, 7:40 AM

## 2018-11-01 NOTE — ANESTHESIA CARE TRANSFER NOTE
Patient: Lakhwinder Jones    Procedure(s):  Left Lumbar 4-5 Microdisectomy     Diagnosis: Lumbar Radiculopathy  Diagnosis Additional Information: No value filed.    Anesthesia Type:   General, ETT     Note:  Airway :Face Mask  Patient transferred to:PACU  Handoff Report: Identifed the Patient, Identified the Reponsible Provider, Reviewed the pertinent medical history, Discussed the surgical course, Reviewed Intra-OP anesthesia mangement and issues during anesthesia, Set expectations for post-procedure period and Allowed opportunity for questions and acknowledgement of understanding      Vitals: (Last set prior to Anesthesia Care Transfer)    CRNA VITALS  11/1/2018 0812 - 11/1/2018 0852      11/1/2018             Resp Rate (observed): 14                Electronically Signed By: LINNEA Rahman CRNA  November 1, 2018  8:52 AM

## 2018-11-02 ENCOUNTER — MYC MEDICAL ADVICE (OUTPATIENT)
Dept: ORTHOPEDICS | Facility: CLINIC | Age: 38
End: 2018-11-02

## 2018-11-02 NOTE — TELEPHONE ENCOUNTER
"Patients wife calls in to report patients dressing \"rolled up\" last night s/p Left Lumbar 4-5 Microdisectomy DOS 11/1/18 with Ruben. Discussed with Dr. Miranda who recommends they place 4x4 gauze pads and tape to cover but not shear. Wife OK with plan.   "

## 2018-11-04 ENCOUNTER — MYC REFILL (OUTPATIENT)
Dept: ORTHOPEDICS | Facility: CLINIC | Age: 38
End: 2018-11-04

## 2018-11-04 DIAGNOSIS — Z98.890 S/P DISCECTOMY: ICD-10-CM

## 2018-11-04 DIAGNOSIS — F41.1 ANXIETY STATE: ICD-10-CM

## 2018-11-04 DIAGNOSIS — F41.0 PANIC DISORDER WITHOUT AGORAPHOBIA: ICD-10-CM

## 2018-11-05 ENCOUNTER — TELEPHONE (OUTPATIENT)
Dept: ORTHOPEDICS | Facility: CLINIC | Age: 38
End: 2018-11-05

## 2018-11-05 ENCOUNTER — MYC REFILL (OUTPATIENT)
Dept: ORTHOPEDICS | Facility: CLINIC | Age: 38
End: 2018-11-05

## 2018-11-05 DIAGNOSIS — Z98.890 S/P DISCECTOMY: ICD-10-CM

## 2018-11-05 RX ORDER — OXYCODONE HYDROCHLORIDE 5 MG/1
5 TABLET ORAL EVERY 6 HOURS PRN
Qty: 50 TABLET | Refills: 0 | Status: SHIPPED | OUTPATIENT
Start: 2018-11-05 | End: 2018-11-06

## 2018-11-05 RX ORDER — OXYCODONE HYDROCHLORIDE 5 MG/1
5 TABLET ORAL EVERY 6 HOURS PRN
Qty: 50 TABLET | Refills: 0
Start: 2018-11-05 | End: 2019-03-19

## 2018-11-05 RX ORDER — CITALOPRAM HYDROBROMIDE 40 MG/1
40 TABLET ORAL DAILY
Qty: 30 TABLET | Refills: 0 | Status: SHIPPED | OUTPATIENT
Start: 2018-11-05 | End: 2019-01-09

## 2018-11-05 NOTE — TELEPHONE ENCOUNTER
Health Call Center    Phone Message    May a detailed message be left on voicemail: yes    Reason for Call: Other: Pt called back asking what kind of progress has been made and would like call back. Called Demetria and went straight to . If she is gone, please have someone else follow up as pt would like refill by today.     Action Taken: Message routed to:  [facility] Ortho     Called pt & refilled postop Oxycodone & wife will .  S.O./Bridgette Kelly RN.

## 2018-11-05 NOTE — TELEPHONE ENCOUNTER
Health Call Center    Phone Message    May a detailed message be left on voicemail: no    Reason for Call: Other: Pt calling to get a narcotic refill, Pt would like to pick it up at the lock box. Pt also stated on his discharge papers, it said he got a qty of 58 tablets but he only actually received 28 from the pharmacy. Please call Pt when this is ready for  and to explain the quantity.       Action Taken: Message routed to:  [facility] Clinic     Called pt.  Refilled Oxycodone & wife will .  S.O./Bridgette Kelly RN.

## 2018-11-05 NOTE — TELEPHONE ENCOUNTER
Health Call Center    Phone Message    May a detailed message be left on voicemail: no    Reason for Call: Medication Question or concern regarding medication   Prescription Clarification  Name of Medication: oxyCODONE IR (ROXICODONE) 5 MG tablet  Prescribing Provider: Dr. Agustin Miranda   Pharmacy: [facility]   What on the order needs clarification? Please call Pt to discuss changing dose so he isn't getting it 'early' and he needs his wife to come get the script. Please call Pt back asap since he is out of his meds.            Action Taken: Message routed to:  [facility]

## 2018-11-06 ENCOUNTER — OFFICE VISIT (OUTPATIENT)
Dept: ORTHOPEDICS | Facility: CLINIC | Age: 38
End: 2018-11-06
Payer: COMMERCIAL

## 2018-11-06 VITALS — HEIGHT: 77 IN | WEIGHT: 315 LBS | BODY MASS INDEX: 37.19 KG/M2

## 2018-11-06 DIAGNOSIS — Z98.890 S/P DISCECTOMY: ICD-10-CM

## 2018-11-06 RX ORDER — SULFAMETHOXAZOLE/TRIMETHOPRIM 800-160 MG
1 TABLET ORAL 2 TIMES DAILY
Qty: 20 TABLET | Refills: 0 | Status: SHIPPED | OUTPATIENT
Start: 2018-11-06 | End: 2018-11-13

## 2018-11-06 RX ORDER — OXYCODONE HYDROCHLORIDE 5 MG/1
5-10 TABLET ORAL EVERY 6 HOURS PRN
Qty: 50 TABLET | Refills: 0 | Status: SHIPPED | OUTPATIENT
Start: 2018-11-06 | End: 2019-01-09

## 2018-11-06 ASSESSMENT — ENCOUNTER SYMPTOMS
NECK PAIN: 0
SINUS PAIN: 0
DECREASED APPETITE: 0
MYALGIAS: 1
ARTHRALGIAS: 1
FATIGUE: 1
WEIGHT GAIN: 0
BACK PAIN: 1
FEVER: 1
TROUBLE SWALLOWING: 1
POLYDIPSIA: 0
SORE THROAT: 1
POLYPHAGIA: 0
HOARSE VOICE: 0
HALLUCINATIONS: 0
NIGHT SWEATS: 1
CHILLS: 1
JOINT SWELLING: 1
MUSCLE CRAMPS: 1
INCREASED ENERGY: 1
MUSCLE WEAKNESS: 1
TASTE DISTURBANCE: 0
STIFFNESS: 1
WEIGHT LOSS: 0
SMELL DISTURBANCE: 0
NECK MASS: 0
ALTERED TEMPERATURE REGULATION: 1

## 2018-11-06 NOTE — TELEPHONE ENCOUNTER
Spoke with Dr. Miranda's team; patient is coming in today for an appointment with Dr. Miranda and they will address his medication concerns at this visit.

## 2018-11-06 NOTE — LETTER
11/6/2018       RE: Lakhwinder Jones  [address]  [facility] [address]     Dear Colleague,    Thank you for referring your patient, Lakhwinder Jones, to the [facility]. Please see a copy of my visit note below.    The patient returns today for a wound check.  He says his preoperative leg symptoms are better than they were before surgery and they no longer go below the knee.  He does still have some discomfort in the left buttock but overall thinks his leg is better than it was before surgery.  He has some appropriate incisional type pain.    They called in today because his wife has been doing daily dressing changes for about 3 days and they noticed about a half dollar sized area of spotting on the dressing.  He has not had any fevers or chills.    On exam the incision is well approximated.  There is a pinpoint area at the upper aspect which appears to have a small amount of serous fluid around it.  I looked at his dressing today and it has been on since yesterday and there is about a half dollar sized area of serous fluid.  No active purulence.  No surrounding cellulitis.    I provided him some reassurance.  He does have significant psoriasis and he may be at higher risk of wound healing issues but I do not see any active infection at this time.  I think we should give this some time to see if it will dry up.  I am going to give him some oral Bactrim to try to prophylax the wound.  I gave him some dressing materials and I would like him to do once daily dressing changes and try to keep the area dry.  I told him to contact me if it becomes red or angry looking or if he develops any persistent fevers or if the drainage worsens, otherwise I would plan to check it again in 1 week.  I told him the worst case scenario if this does not dry up is that we would need to do an irrigation and debridement in the operating room and I provided him  some information about this,, but I think with how benign it looks at the current time that it would be best to give this a little more time to see if it will dry up.  We did take a picture of the wound for the medical record and I will see him again in clinic next week to follow his progress.      Again, thank you for allowing me to participate in the care of your patient.      Sincerely,    Favio Miranda MD

## 2018-11-06 NOTE — PROGRESS NOTES
The patient returns today for a wound check.  He says his preoperative leg symptoms are better than they were before surgery and they no longer go below the knee.  He does still have some discomfort in the left buttock but overall thinks his leg is better than it was before surgery.  He has some appropriate incisional type pain.    They called in today because his wife has been doing daily dressing changes for about 3 days and they noticed about a half dollar sized area of spotting on the dressing.  He has not had any fevers or chills.    On exam the incision is well approximated.  There is a pinpoint area at the upper aspect which appears to have a small amount of serous fluid around it.  I looked at his dressing today and it has been on since yesterday and there is about a half dollar sized area of serous fluid.  No active purulence.  No surrounding cellulitis.    I provided him some reassurance.  He does have significant psoriasis and he may be at higher risk of wound healing issues but I do not see any active infection at this time.  I think we should give this some time to see if it will dry up.  I am going to give him some oral Bactrim to try to prophylax the wound.  I gave him some dressing materials and I would like him to do once daily dressing changes and try to keep the area dry.  I told him to contact me if it becomes red or angry looking or if he develops any persistent fevers or if the drainage worsens, otherwise I would plan to check it again in 1 week.  I told him the worst case scenario if this does not dry up is that we would need to do an irrigation and debridement in the operating room and I provided him some information about this,, but I think with how benign it looks at the current time that it would be best to give this a little more time to see if it will dry up.  We did take a picture of the wound for the medical record and I will see him again in clinic next week to follow his  progress.  Answers for HPI/ROS submitted by the patient on 11/6/2018   General Symptoms: Yes  Skin Symptoms: No  HENT Symptoms: Yes  EYE SYMPTOMS: No  HEART SYMPTOMS: No  LUNG SYMPTOMS: No  INTESTINAL SYMPTOMS: No  URINARY SYMPTOMS: No  REPRODUCTIVE SYMPTOMS: No  SKELETAL SYMPTOMS: Yes  BLOOD SYMPTOMS: No  NERVOUS SYSTEM SYMPTOMS: Yes  MENTAL HEALTH SYMPTOMS: Yes  Fever: Yes  Loss of appetite: No  Weight loss: No  Weight gain: No  Fatigue: Yes  Night sweats: Yes  Chills: Yes  Increased stress: Yes  Excessive hunger: No  Excessive thirst: No  Feeling hot or cold when others believe the temperature is normal: Yes  Loss of height: No  Post-operative complications: Yes  Surgical site pain: Yes  Hallucinations: No  Change in or Loss of Energy: Yes  Hyperactivity: No  Confusion: No  Ear pain: Yes  Ear discharge: No  Hearing loss: No  Tinnitus: No  Nosebleeds: No  Sinus pain: No  Trouble swallowing: Yes   Voice hoarseness: No  Mouth sores: Yes  Sore throat: Yes  Tooth pain: No  Gum tenderness: No  Bleeding gums: No  Change in taste: No  Change in sense of smell: No  Dry mouth: No  Hearing aid used: No  Neck lump: No  Back pain: Yes  Muscle aches: Yes  Neck pain: No  Swollen joints: Yes  Joint pain: Yes  Bone pain: Yes  Muscle cramps: Yes  Muscle weakness: Yes  Joint stiffness: Yes  Bone fracture: No

## 2018-11-06 NOTE — MR AVS SNAPSHOT
After Visit Summary   11/6/2018    Lakhwinder Jones    MRN: [id number]           Patient Information     Date Of Birth          1980        Visit Information        Provider Department      11/6/2018 1:30 PM Favio Miranda MD [facility]        Today's Diagnoses     S/P discectomy           Follow-ups after your visit        Your next 10 appointments already scheduled     Dec 14, 2018 12:40 PM CST   (Arrive by 12:25 PM)   Post-Op with Favio Miranda MD   [facility] ([facility])    [address] [place] [id number] [phone]            Feb 01, 2019 12:40 PM CST   (Arrive by 12:25 PM)   Post-Op with Favio Miranda MD   [facility] ([facility])    [address] [place] [id number] [phone]              Who to contact     Please call your clinic at [phone] to:    Ask questions about your health    Make or cancel appointments    Discuss your medicines    Learn about your test results    Speak to your doctor            Additional Information About Your Visit        MyChart Information     [employer] gives you secure access to your electronic health record. If you see a primary care provider, you can also send messages to your care team and make appointments. If you have questions, please call your primary care clinic.  If you do not have a primary care provider, please call [phone] and they will assist you.      [employer] is an electronic gateway that provides easy, online access to your medical records. With [employer], you can request a clinic appointment, read your test results, renew a prescription or communicate with your care team.     To access your existing account, please contact your [facility] or call [phone] for assistance.        Care EveryWhere ID     This is  "your Care EveryWhere ID. This could be used by other organizations to access your [facility] medical records  [id number]        Your Vitals Were     Height BMI (Body Mass Index)                1.956 m (6' 5\") 39.61 kg/m2           Blood Pressure from Last 3 Encounters:   11/01/18 126/83   10/25/18 142/88   10/18/18 (!) 145/94    Weight from Last 3 Encounters:   11/06/18 (!) 151.5 kg (334 lb)   11/01/18 (!) 152 kg (335 lb 1.6 oz)   10/25/18 (!) 151.6 kg (334 lb 3.2 oz)              Today, you had the following     No orders found for display         Today's Medication Changes          These changes are accurate as of 11/6/18  1:52 PM.  If you have any questions, ask your nurse or doctor.               Start taking these medicines.        Dose/Directions    sulfamethoxazole-trimethoprim 800-160 MG per tablet   Commonly known as:  BACTRIM DS/SEPTRA DS   Used for:  S/P discectomy   Started by:  Favio Miranda MD        Dose:  1 tablet   Take 1 tablet by mouth 2 times daily   Quantity:  20 tablet   Refills:  0         These medicines have changed or have updated prescriptions.        Dose/Directions    gabapentin 300 MG capsule   Commonly known as:  NEURONTIN   This may have changed:  when to take this   Used for:  Acute midline low back pain with left-sided sciatica, Lumbar paraspinal muscle spasm        Dose:  300 mg   Take 1 capsule (300 mg) by mouth At Bedtime   Quantity:  60 capsule   Refills:  1       * oxyCODONE IR 5 MG tablet   Commonly known as:  ROXICODONE   This may have changed:  Another medication with the same name was changed. Make sure you understand how and when to take each.   Used for:  S/P discectomy   Changed by:  Favio Miranda MD        Dose:  5 mg   Take 1 tablet (5 mg) by mouth every 6 hours as needed for moderate to severe pain   Quantity:  50 tablet   Refills:  0       * oxyCODONE IR 5 MG tablet   Commonly known as:  ROXICODONE   This may have changed:  how much to " take   Used for:  S/P discectomy   Changed by:  Favio Miranda MD        Dose:  5-10 mg   Take 1-2 tablets (5-10 mg) by mouth every 6 hours as needed for moderate to severe pain   Quantity:  50 tablet   Refills:  0       * Notice:  This list has 2 medication(s) that are the same as other medications prescribed for you. Read the directions carefully, and ask your doctor or other care provider to review them with you.         Where to get your medicines      These medications were sent to [facility] [phone]  [address] [address]    Hours:  TRANSPLANT PHONE NUMBER [phone] Phone:  [phone]     sulfamethoxazole-trimethoprim 800-160 MG per tablet         Some of these will need a paper prescription and others can be bought over the counter.  Ask your nurse if you have questions.     Bring a paper prescription for each of these medications     oxyCODONE IR 5 MG tablet               Information about OPIOIDS     PRESCRIPTION OPIOIDS: WHAT YOU NEED TO KNOW   We gave you an opioid (narcotic) pain medicine. It is important to manage your pain, but opioids are not always the best choice. You should first try all the other options your care team gave you. Take this medicine for as short a time (and as few doses) as possible.    Some activities can increase your pain, such as bandage changes or therapy sessions. It may help to take your pain medicine 30 to 60 minutes before these activities. Reduce your stress by getting enough sleep, working on hobbies you enjoy and practicing relaxation or meditation. Talk to your care team about ways to manage your pain beyond prescription opioids.    These medicines have risks:    DO NOT drive when on new or higher doses of pain medicine. These medicines can affect your alertness and reaction times, and you could be arrested for driving under the influence (DUI). If you need to  use opioids long-term, talk to your care team about driving.    DO NOT operate heavy machinery    DO NOT do any other dangerous activities while taking these medicines.    DO NOT drink any alcohol while taking these medicines.     If the opioid prescribed includes acetaminophen, DO NOT take with any other medicines that contain acetaminophen. Read all labels carefully. Look for the word  acetaminophen  or  Tylenol.  Ask your pharmacist if you have questions or are unsure.    You can get addicted to pain medicines, especially if you have a history of addiction (chemical, alcohol or substance dependence). Talk to your care team about ways to reduce this risk.    All opioids tend to cause constipation. Drink plenty of water and eat foods that have a lot of fiber, such as fruits, vegetables, prune juice, apple juice and high-fiber cereal. Take a laxative (Miralax, milk of magnesia, Colace, Senna) if you don t move your bowels at least every other day. Other side effects include upset stomach, sleepiness, dizziness, throwing up, tolerance (needing more of the medicine to have the same effect), physical dependence and slowed breathing.    Store your pills in a secure place, locked if possible. We will not replace any lost or stolen medicine. If you don t finish your medicine, please throw away (dispose) as directed by your pharmacist. The [place] Pollution Control Agency has more information about safe disposal: https://www.pca.[facility].mn.us/living-green/managing-unwanted-medications         Primary Care Provider Office Phone # Fax #    Simona Prasad PA-C [phone]       [address]  [facility] [place] [address]        Equal Access to Services     [facility]: Hadii aad ku hadasho Soomaali, waaxda luqadaha, qaybta kaalmada adeegyaairam, adina keller . So [facility] [phone].    ATENCIÓN: Si habla español, tiene a rose disposición servicios gratuitos de asistencia  lingüísticaDayday Clinton al [phone].    We comply with applicable federal civil rights laws and [place] laws. We do not discriminate on the basis of race, color, national origin, age, disability, sex, sexual orientation, or gender identity.            Thank you!     Thank you for choosing [facility]  for your care. Our goal is always to provide you with excellent care. Hearing back from our patients is one way we can continue to improve our services. Please take a few minutes to complete the written survey that you may receive in the mail after your visit with us. Thank you!             Your Updated Medication List - Protect others around you: Learn how to safely use, store and throw away your medicines at www.disposemymeds.org.          This list is accurate as of 11/6/18  1:52 PM.  Always use your most recent med list.                   Brand Name Dispense Instructions for use Diagnosis    * Apremilast 10 & 20 & 30 MG Tbpk    OTEZLA    27 tablet    Take one tablet in the morning on day 1, then take one tablet every 12 hours on days 2 thru 14, according to the instructions on the packet.    Psoriasis with arthropathy (H)       * apremilast 30 MG tablet    OTEZLA    60 tablet    Take 1 tablet (30 mg) by mouth 2 times daily    Psoriasis, Psoriasis with arthropathy (H)       citalopram 40 MG tablet    celeXA    30 tablet    Take 1 tablet (40 mg) by mouth daily    Anxiety state, Panic disorder without agoraphobia       cyclobenzaprine 10 MG tablet    FLEXERIL    60 tablet    Take 1 tablet (10 mg) by mouth 2 times daily as needed for muscle spasms    Acute midline low back pain with left-sided sciatica       gabapentin 300 MG capsule    NEURONTIN    60 capsule    Take 1 capsule (300 mg) by mouth At Bedtime    Acute midline low back pain with left-sided sciatica, Lumbar paraspinal muscle spasm       Multi-vitamin Tabs tablet      Take 1 tablet by mouth every morning        NONFORMULARY      as needed CBD  OIL        order for DME     1 Device    Equipment being ordered: [id number] $207  Walking Boot XL [employer] Pneumatic    Closed nondisplaced fracture of fifth metatarsal bone of right foot, initial encounter       * oxyCODONE IR 5 MG tablet    ROXICODONE    50 tablet    Take 1 tablet (5 mg) by mouth every 6 hours as needed for moderate to severe pain    S/P discectomy       * oxyCODONE IR 5 MG tablet    ROXICODONE    50 tablet    Take 1-2 tablets (5-10 mg) by mouth every 6 hours as needed for moderate to severe pain    S/P discectomy       senna-docusate 8.6-50 MG per tablet    SENOKOT-S;PERICOLACE    30 tablet    Take 1-2 tablets by mouth 2 times daily as needed for constipation (While on oral opioids.)    S/P discectomy       sulfamethoxazole-trimethoprim 800-160 MG per tablet    BACTRIM DS/SEPTRA DS    20 tablet    Take 1 tablet by mouth 2 times daily    S/P discectomy       TYLENOL PO      Take 1,000 mg by mouth as needed for mild pain or fever        * Notice:  This list has 4 medication(s) that are the same as other medications prescribed for you. Read the directions carefully, and ask your doctor or other care provider to review them with you.

## 2018-11-06 NOTE — NURSING NOTE
"Reason For Visit: No chief complaint on file.      ?  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  Date of injury: 14 years ago  Type of injury: Playing basketball, continually getting worse.  Date of surgery: 11/1/18   Type of surgery: L4-5 microdiscectomy   Smoker: No  Request smoking cessation information: No      Ht 1.956 m (6' 5\")  Wt (!) 151.5 kg (334 lb)  BMI 39.61 kg/m2    Pain Assessment  Patient Currently in Pain: Yes  0-10 Pain Scale: 6  Primary Pain Location: Back  Pain Orientation: Lower  Other Pain Locations: arms and legs weak, ear pain  Pain Descriptors: Sharp, Aching, Constant, Shooting  Alleviating Factors: Pain medication  Aggravating Factors: Movement, Bending, Other (comment), Walking (cant find a comfortabkle position)    Oswestry (ALEXANDRA) Questionnaire    OSWESTRY DISABILITY INDEX 10/19/2018   Count 10   Sum 14   Oswestry Score (%) 28   Some recent data might be hidden            Neck Disability Index (NDI) Questionnaire    No flowsheet data found.                Promis 10 Assessment    No flowsheet data found.             Nicolette Guzman, ATC    "

## 2018-11-13 ENCOUNTER — OFFICE VISIT (OUTPATIENT)
Dept: ORTHOPEDICS | Facility: CLINIC | Age: 38
End: 2018-11-13
Payer: COMMERCIAL

## 2018-11-13 VITALS — WEIGHT: 315 LBS | HEIGHT: 77 IN | BODY MASS INDEX: 37.19 KG/M2

## 2018-11-13 DIAGNOSIS — M62.830 BACK MUSCLE SPASM: ICD-10-CM

## 2018-11-13 DIAGNOSIS — Z98.890 S/P DISCECTOMY: ICD-10-CM

## 2018-11-13 DIAGNOSIS — M54.16 LUMBAR RADICULOPATHY: Primary | ICD-10-CM

## 2018-11-13 RX ORDER — HYDROXYZINE PAMOATE 25 MG/1
25-50 CAPSULE ORAL 3 TIMES DAILY PRN
Qty: 60 CAPSULE | Refills: 0 | Status: SHIPPED | OUTPATIENT
Start: 2018-11-13 | End: 2019-02-06

## 2018-11-13 RX ORDER — SULFAMETHOXAZOLE/TRIMETHOPRIM 800-160 MG
1 TABLET ORAL 2 TIMES DAILY
Qty: 20 TABLET | Refills: 0 | Status: SHIPPED | OUTPATIENT
Start: 2018-11-13 | End: 2019-02-06

## 2018-11-13 ASSESSMENT — ENCOUNTER SYMPTOMS
CHILLS: 1
NAIL CHANGES: 0
FATIGUE: 1
HALLUCINATIONS: 0
DECREASED APPETITE: 0
POLYPHAGIA: 0
ALTERED TEMPERATURE REGULATION: 1
INCREASED ENERGY: 1
WEIGHT GAIN: 0
NIGHT SWEATS: 1
SKIN CHANGES: 0
WEIGHT LOSS: 0
POOR WOUND HEALING: 1
FEVER: 1
POLYDIPSIA: 0

## 2018-11-13 NOTE — LETTER
Return to Work  2018     Seen today: yes    Patient:  Lakhwinder Jones  :   1980  MRN:     [id number]  Physician: AGUSTO WARD    Lakhwinder Jones may return to work with no repetitive bending, twisting, or lifting greater than 25 pounds. He is allowed to stand for 1 hour at a time with breaks between.     If there are any questions please give the clinic a call at [phone].          Electronically signed by Agusto Ward MD

## 2018-11-13 NOTE — NURSING NOTE
"Reason For Visit:   Chief Complaint   Patient presents with     Lower Back - WOUND CARE       Primary MD: Simona Prasad  Ref. MD: Jak Mijares    ?  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  Date of injury: 14 years ago  Type of injury: Playing basketball, continually getting worse.  Date of surgery: 11/1/18   Type of surgery: L4-5 microdiscectomy   Smoker: No  Request smoking cessation information: No    Ht 1.956 m (6' 5\")  Wt (!) 151.5 kg (334 lb)  BMI 39.61 kg/m2    Pain Assessment  Patient Currently in Pain: Yes  0-10 Pain Scale: 7  Primary Pain Location: Back  Pain Orientation: Lower  Pain Descriptors: Burning, Stabbing, Aching, Constant  Aggravating Factors: Movement    Oswestry (ALEXANDRA) Questionnaire    OSWESTRY DISABILITY INDEX 10/19/2018   Count 10   Sum 14   Oswestry Score (%) 28   Some recent data might be hidden                Visual Analog Pain Scale  Back Pain Scale 0-10: 7  Right leg pain: 0  Left leg pain: 0              Champ Kaur ATC  "

## 2018-11-13 NOTE — MR AVS SNAPSHOT
After Visit Summary   11/13/2018    Lakhwinder Jones    MRN: [id number]           Patient Information     Date Of Birth          1980        Visit Information        Provider Department      11/13/2018 8:00 AM Favio Miranda MD [facility]        Today's Diagnoses     Lumbar radiculopathy    -  1       Follow-ups after your visit        Your next 10 appointments already scheduled     Dec 14, 2018 12:40 PM CST   (Arrive by 12:25 PM)   Post-Op with Favio Miranda MD   [facility] ([facility])    [address] [place] [address] [id number] [phone]            Feb 01, 2019 12:40 PM CST   (Arrive by 12:25 PM)   Post-Op with Favio Miranda MD   [facility] ([facility])    [address] [place] [address] [phone] [phone]              Who to contact     Please call your clinic at [phone] to:    Ask questions about your health    Make or cancel appointments    Discuss your medicines    Learn about your test results    Speak to your doctor            Additional Information About Your Visit        MyChart Information     [employer] gives you secure access to your electronic health record. If you see a primary care provider, you can also send messages to your care team and make appointments. If you have questions, please call your primary care clinic.  If you do not have a primary care provider, please call [phone] and they will assist you.      [employer] is an electronic gateway that provides easy, online access to your medical records. With [employer], you can request a clinic appointment, read your test results, renew a prescription or communicate with your care team.     To access your existing account, please contact your [facility] or call [phone] for assistance.        Care EveryWhere ID      "This is your Care EveryWhere ID. This could be used by other organizations to access your [facility] medical records  [id number]        Your Vitals Were     Height BMI (Body Mass Index)                1.956 m (6' 5\") 39.61 kg/m2           Blood Pressure from Last 3 Encounters:   11/01/18 126/83   10/25/18 142/88   10/18/18 (!) 145/94    Weight from Last 3 Encounters:   11/13/18 (!) 151.5 kg (334 lb)   11/06/18 (!) 151.5 kg (334 lb)   11/01/18 (!) 152 kg (335 lb 1.6 oz)              Today, you had the following     No orders found for display         Today's Medication Changes          These changes are accurate as of 11/13/18  8:17 AM.  If you have any questions, ask your nurse or doctor.               These medicines have changed or have updated prescriptions.        Dose/Directions    gabapentin 300 MG capsule   Commonly known as:  NEURONTIN   This may have changed:  when to take this   Used for:  Acute midline low back pain with left-sided sciatica, Lumbar paraspinal muscle spasm        Dose:  300 mg   Take 1 capsule (300 mg) by mouth At Bedtime   Quantity:  60 capsule   Refills:  1                Primary Care Provider Office Phone # Fax #    Simona Prasad PA-C [phone]       [id number] [place]  [facility] [id number]        Equal Access to Services     KENTON POLLOCK AH: Hadii rashid chang hadasho Sorichieali, waaxda luqadaha, qaybta kaalmada adeegyada, daina keller . So [facility] [phone].    ATENCIÓN: Si habla español, tiene a rose disposición servicios gratuitos de asistencia lingüística. Llame al [phone].    We comply with applicable federal civil rights laws and [place] laws. We do not discriminate on the basis of race, color, national origin, age, disability, sex, sexual orientation, or gender identity.            Thank you!     Thank you for choosing [facility]  for your care. Our goal is always to provide you with excellent care. Hearing " back from our patients is one way we can continue to improve our services. Please take a few minutes to complete the written survey that you may receive in the mail after your visit with us. Thank you!             Your Updated Medication List - Protect others around you: Learn how to safely use, store and throw away your medicines at www.disposemymeds.org.          This list is accurate as of 11/13/18  8:17 AM.  Always use your most recent med list.                   Brand Name Dispense Instructions for use Diagnosis    * Apremilast 10 & 20 & 30 MG Tbpk    OTEZLA    27 tablet    Take one tablet in the morning on day 1, then take one tablet every 12 hours on days 2 thru 14, according to the instructions on the packet.    Psoriasis with arthropathy (H)       * apremilast 30 MG tablet    OTEZLA    60 tablet    Take 1 tablet (30 mg) by mouth 2 times daily    Psoriasis, Psoriasis with arthropathy (H)       citalopram 40 MG tablet    celeXA    30 tablet    Take 1 tablet (40 mg) by mouth daily    Anxiety state, Panic disorder without agoraphobia       cyclobenzaprine 10 MG tablet    FLEXERIL    60 tablet    Take 1 tablet (10 mg) by mouth 2 times daily as needed for muscle spasms    Acute midline low back pain with left-sided sciatica       gabapentin 300 MG capsule    NEURONTIN    60 capsule    Take 1 capsule (300 mg) by mouth At Bedtime    Acute midline low back pain with left-sided sciatica, Lumbar paraspinal muscle spasm       Multi-vitamin Tabs tablet      Take 1 tablet by mouth every morning        NONFORMULARY      as needed CBD OIL        order for DME     1 Device    Equipment being ordered: [id number] $249  Walking Boot XL [employer]mil[employer] Pneumatic    Closed nondisplaced fracture of fifth metatarsal bone of right foot, initial encounter       * oxyCODONE IR 5 MG tablet    ROXICODONE    50 tablet    Take 1 tablet (5 mg) by mouth every 6 hours as needed for moderate to severe pain    S/P discectomy       * oxyCODONE IR 5  MG tablet    ROXICODONE    50 tablet    Take 1-2 tablets (5-10 mg) by mouth every 6 hours as needed for moderate to severe pain    S/P discectomy       senna-docusate 8.6-50 MG per tablet    SENOKOT-S;PERICOLACE    30 tablet    Take 1-2 tablets by mouth 2 times daily as needed for constipation (While on oral opioids.)    S/P discectomy       sulfamethoxazole-trimethoprim 800-160 MG per tablet    BACTRIM DS/SEPTRA DS    20 tablet    Take 1 tablet by mouth 2 times daily    S/P discectomy       TYLENOL PO      Take 1,000 mg by mouth as needed for mild pain or fever        * Notice:  This list has 4 medication(s) that are the same as other medications prescribed for you. Read the directions carefully, and ask your doctor or other care provider to review them with you.

## 2018-11-13 NOTE — PROGRESS NOTES
Spine Surgery Return Clinic Visit      Chief Complaint:   WOUND CARE of the Lower Back      Interval HPI:  Symptom Profile Including: location of symptoms, onset, severity, exacerbating/alleviating factors, previous treatments:        Lakhwinder Jones is a 38 year old male who underwent an L4-5 decompression on November 1, 2018.  I saw him about a week ago and he still had a very slight amount of serous drainage from the wound.  I gave him some oral antibiotics and he returns today for a wound check.    He has not had any fevers or chills at home.  His left leg symptoms are totally resolved.  He reports that the amount of drainage on the dressing has been decreasing.          Past Medical History:     Past Medical History:   Diagnosis Date     Anxiety      Back pain     chronic     Obesity      Psoriatic arthritis (H)             Past Surgical History:     Past Surgical History:   Procedure Laterality Date     DISCECTOMY LUMBAR POSTERIOR MICROSCOPIC ONE LEVEL Left 11/1/2018    Procedure: Left Lumbar 4-5 Microdisectomy ;  Surgeon: Favio Miranda MD;  Location: [facility]     FOOT SURGERY Right      NOSE SURGERY      3 times     right elbow surgey      12 yo     TESTICLE SURGERY      23 yo            Social History:     Social History   Substance Use Topics     Smoking status: Former Smoker     Packs/day: 0.20     Years: 10.00     Types: Cigarettes     Quit date: 1/1/2015     Smokeless tobacco: Never Used     Alcohol use 4.8 oz/week     8 Standard drinks or equivalent per week      Comment: not for a month            Family History:     Family History   Problem Relation Age of Onset     Obesity Mother      Diabetes Father      Coronary Artery Disease Father      Hypertension Father      Hyperlipidemia Father      Depression Father      Anxiety Disorder Father      Mental Illness Father      Substance Abuse Father      Breast Cancer Maternal Grandmother      Depression Maternal Grandmother      Mental Illness  "Maternal Grandmother      Substance Abuse Maternal Grandmother      Prostate Cancer Maternal Grandfather      Diabetes Paternal Grandmother      Breast Cancer Paternal Grandmother      Depression Paternal Grandmother      Mental Illness Paternal Grandmother      Diabetes Paternal Grandfather      Asthma Brother      Diabetes Paternal Uncle      Cerebrovascular Disease No family hx of      Anesthesia Reaction No family hx of      Osteoporosis No family hx of      Genetic Disorder No family hx of      Thyroid Disease No family hx of      Liver Disease No family hx of             Allergies:     Allergies   Allergen Reactions     Tramadol Other (See Comments)     Shellfish-Derived Products             Medications:     Current Outpatient Prescriptions   Medication     Acetaminophen (TYLENOL PO)     Apremilast (OTEZLA) 10 & 20 & 30 MG TBPK     apremilast (OTEZLA) 30 MG tablet     citalopram (CELEXA) 40 MG tablet     cyclobenzaprine (FLEXERIL) 10 MG tablet     gabapentin (NEURONTIN) 300 MG capsule     multivitamin, therapeutic with minerals (MULTI-VITAMIN) TABS tablet     NONFORMULARY     order for DME     oxyCODONE IR (ROXICODONE) 5 MG tablet     oxyCODONE IR (ROXICODONE) 5 MG tablet     senna-docusate (SENOKOT-S;PERICOLACE) 8.6-50 MG per tablet     sulfamethoxazole-trimethoprim (BACTRIM DS/SEPTRA DS) 800-160 MG per tablet     No current facility-administered medications for this visit.              Review of Systems:   A focused musculoskeletal and neurologic ROS was performed with pertinent positives and negatives noted in the HPI.  Additional systems were also reviewed and are documented at the bottom of the note.         Physical Exam:   Vitals: Ht 1.956 m (6' 5\")  Wt (!) 151.5 kg (334 lb)  BMI 39.61 kg/m2  Musculoskeletal, Neurologic, and Spine:       There is about a 3 mm spot of what looks to be serous, scab-like  drainage on the dressing.  No surrounding erythema or cellulitis around the wound and no active " drainage.         Imaging:     None today       Assessment and Plan:     38 year old male doing relatively well.  I told him we should continue the antibiotics for another week since that does seem to be slowly drying up the wound.  I do not see any active cellulitis and there is really minimal drainage at this time with no separation of the skin edges.  I took a picture for the medical record.  I asked him to let us know if it gets worse.  I am going to have another appointment with him in a week to check the wound.  I gave him some red flag signs to watch out for.  He is going to return to work this week and I think that is reasonable.  He is going to transition to Tylenol and anti-inflammatories as well as Vistaril for back pain.    Respectfully,  Favio Miranda MD  Spine Surgery  [facility]    Answers for HPI/ROS submitted by the patient on 11/13/2018   General Symptoms: Yes  Skin Symptoms: Yes  HENT Symptoms: No  EYE SYMPTOMS: No  HEART SYMPTOMS: No  LUNG SYMPTOMS: No  INTESTINAL SYMPTOMS: No  URINARY SYMPTOMS: No  REPRODUCTIVE SYMPTOMS: No  SKELETAL SYMPTOMS: Yes  BLOOD SYMPTOMS: No  NERVOUS SYSTEM SYMPTOMS: No  MENTAL HEALTH SYMPTOMS: No  Fever: Yes  Loss of appetite: No  Weight loss: No  Weight gain: No  Fatigue: Yes  Night sweats: Yes  Chills: Yes  Increased stress: No  Excessive hunger: No  Excessive thirst: No  Feeling hot or cold when others believe the temperature is normal: Yes  Loss of height: No  Post-operative complications: Yes  Surgical site pain: Yes  Hallucinations: No  Change in or Loss of Energy: Yes  Hyperactivity: No  Confusion: No  Changes in hair: No  Changes in moles/birth marks: No  Itching: No  Rashes: No  Changes in nails: No  Acne: No  Change in facial hair: No  Warts: No  Non-healing sores: Yes  Scarring: Yes  Flaking of skin: Yes  Color changes of hands/feet in cold : No  Sun sensitivity: No  Skin thickening: No

## 2018-11-13 NOTE — LETTER
11/13/2018       RE: Lakhwinder Jones  [address]  [place] [id number]     Dear Colleague,    Thank you for referring your patient, Lakhwinder Jones, to the [facility]. Please see a copy of my visit note below.    Spine Surgery Return Clinic Visit      Chief Complaint:   WOUND CARE of the Lower Back      Interval HPI:  Symptom Profile Including: location of symptoms, onset, severity, exacerbating/alleviating factors, previous treatments:        Lakhwinder Jones is a 38 year old male who underwent an L4-5 decompression on November 1, 2018.  I saw him about a week ago and he still had a very slight amount of serous drainage from the wound.  I gave him some oral antibiotics and he returns today for a wound check.    He has not had any fevers or chills at home.  His left leg symptoms are totally resolved.  He reports that the amount of drainage on the dressing has been decreasing.          Past Medical History:     Past Medical History:   Diagnosis Date     Anxiety      Back pain     chronic     Obesity      Psoriatic arthritis (H)             Past Surgical History:     Past Surgical History:   Procedure Laterality Date     DISCECTOMY LUMBAR POSTERIOR MICROSCOPIC ONE LEVEL Left 11/1/2018    Procedure: Left Lumbar 4-5 Microdisectomy ;  Surgeon: Favio Miranda MD;  [facility]     FOOT SURGERY Right      NOSE SURGERY      3 times     right elbow surgey      12 yo     TESTICLE SURGERY      23 yo            Social History:     Social History   Substance Use Topics     Smoking status: Former Smoker     Packs/day: 0.20     Years: 10.00     Types: Cigarettes     Quit date: 1/1/2015     Smokeless tobacco: Never Used     Alcohol use 4.8 oz/week     8 Standard drinks or equivalent per week      Comment: not for a month            Family History:     Family History   Problem Relation Age of Onset     Obesity Mother      Diabetes  Father      Coronary Artery Disease Father      Hypertension Father      Hyperlipidemia Father      Depression Father      Anxiety Disorder Father      Mental Illness Father      Substance Abuse Father      Breast Cancer Maternal Grandmother      Depression Maternal Grandmother      Mental Illness Maternal Grandmother      Substance Abuse Maternal Grandmother      Prostate Cancer Maternal Grandfather      Diabetes Paternal Grandmother      Breast Cancer Paternal Grandmother      Depression Paternal Grandmother      Mental Illness Paternal Grandmother      Diabetes Paternal Grandfather      Asthma Brother      Diabetes Paternal Uncle      Cerebrovascular Disease No family hx of      Anesthesia Reaction No family hx of      Osteoporosis No family hx of      Genetic Disorder No family hx of      Thyroid Disease No family hx of      Liver Disease No family hx of             Allergies:     Allergies   Allergen Reactions     Tramadol Other (See Comments)     Shellfish-Derived Products             Medications:     Current Outpatient Prescriptions   Medication     Acetaminophen (TYLENOL PO)     Apremilast (OTEZLA) 10 & 20 & 30 MG TBPK     apremilast (OTEZLA) 30 MG tablet     citalopram (CELEXA) 40 MG tablet     cyclobenzaprine (FLEXERIL) 10 MG tablet     gabapentin (NEURONTIN) 300 MG capsule     multivitamin, therapeutic with minerals (MULTI-VITAMIN) TABS tablet     NONFORMULARY     order for DME     oxyCODONE IR (ROXICODONE) 5 MG tablet     oxyCODONE IR (ROXICODONE) 5 MG tablet     senna-docusate (SENOKOT-S;PERICOLACE) 8.6-50 MG per tablet     sulfamethoxazole-trimethoprim (BACTRIM DS/SEPTRA DS) 800-160 MG per tablet     No current facility-administered medications for this visit.              Review of Systems:   A focused musculoskeletal and neurologic ROS was performed with pertinent positives and negatives noted in the HPI.  Additional systems were also reviewed and are documented at the bottom of the note.          "Physical Exam:   Vitals: Ht 1.956 m (6' 5\")  Wt (!) 151.5 kg (334 lb)  BMI 39.61 kg/m2  Musculoskeletal, Neurologic, and Spine:       There is about a 3 mm spot of what looks to be serous, scab-like  drainage on the dressing.  No surrounding erythema or cellulitis around the wound and no active drainage.         Imaging:     None today       Assessment and Plan:     38 year old male doing relatively well.  I told him we should continue the antibiotics for another week since that does seem to be slowly drying up the wound.  I do not see any active cellulitis and there is really minimal drainage at this time with no separation of the skin edges.  I took a picture for the medical record.  I asked him to let us know if it gets worse.  I am going to have another appointment with him in a week to check the wound.  I gave him some red flag signs to watch out for.  He is going to return to work this week and I think that is reasonable.  He is going to transition to Tylenol and anti-inflammatories as well as Vistaril for back pain.      Again, thank you for allowing me to participate in the care of your patient.      Sincerely,    Favio Miranda MD      "

## 2018-12-06 ENCOUNTER — OFFICE VISIT (OUTPATIENT)
Dept: FAMILY MEDICINE | Facility: CLINIC | Age: 38
End: 2018-12-06
Payer: COMMERCIAL

## 2018-12-06 VITALS
HEIGHT: 77 IN | OXYGEN SATURATION: 98 % | DIASTOLIC BLOOD PRESSURE: 79 MMHG | HEART RATE: 81 BPM | TEMPERATURE: 98.2 F | BODY MASS INDEX: 37.19 KG/M2 | WEIGHT: 315 LBS | SYSTOLIC BLOOD PRESSURE: 131 MMHG | RESPIRATION RATE: 20 BRPM

## 2018-12-06 DIAGNOSIS — J00 ACUTE NASOPHARYNGITIS: Primary | ICD-10-CM

## 2018-12-06 DIAGNOSIS — H61.23 BILATERAL IMPACTED CERUMEN: ICD-10-CM

## 2018-12-06 DIAGNOSIS — Z23 NEED FOR PROPHYLACTIC VACCINATION AND INOCULATION AGAINST INFLUENZA: ICD-10-CM

## 2018-12-06 LAB
DEPRECATED S PYO AG THROAT QL EIA: NORMAL
SPECIMEN SOURCE: NORMAL

## 2018-12-06 PROCEDURE — 99213 OFFICE O/P EST LOW 20 MIN: CPT | Performed by: PHYSICIAN ASSISTANT

## 2018-12-06 PROCEDURE — 87081 CULTURE SCREEN ONLY: CPT | Performed by: PHYSICIAN ASSISTANT

## 2018-12-06 PROCEDURE — 87880 STREP A ASSAY W/OPTIC: CPT | Performed by: PHYSICIAN ASSISTANT

## 2018-12-06 RX ORDER — GUAIFENESIN AND DEXTROMETHORPHAN HYDROBROMIDE 1200; 60 MG/1; MG/1
1 TABLET, EXTENDED RELEASE ORAL 2 TIMES DAILY
Qty: 28 TABLET | Refills: 0 | Status: SHIPPED | OUTPATIENT
Start: 2018-12-06 | End: 2019-02-06

## 2018-12-06 RX ORDER — PSEUDOEPHEDRINE HCL 120 MG/1
120 TABLET, FILM COATED, EXTENDED RELEASE ORAL EVERY 12 HOURS
Qty: 28 TABLET | Refills: 0 | Status: SHIPPED | OUTPATIENT
Start: 2018-12-06 | End: 2019-02-06

## 2018-12-06 ASSESSMENT — PAIN SCALES - GENERAL: PAINLEVEL: MODERATE PAIN (5)

## 2018-12-06 ASSESSMENT — PATIENT HEALTH QUESTIONNAIRE - PHQ9
SUM OF ALL RESPONSES TO PHQ QUESTIONS 1-9: 12
5. POOR APPETITE OR OVEREATING: MORE THAN HALF THE DAYS

## 2018-12-06 ASSESSMENT — ANXIETY QUESTIONNAIRES
GAD7 TOTAL SCORE: 4
2. NOT BEING ABLE TO STOP OR CONTROL WORRYING: NOT AT ALL
6. BECOMING EASILY ANNOYED OR IRRITABLE: SEVERAL DAYS
IF YOU CHECKED OFF ANY PROBLEMS ON THIS QUESTIONNAIRE, HOW DIFFICULT HAVE THESE PROBLEMS MADE IT FOR YOU TO DO YOUR WORK, TAKE CARE OF THINGS AT HOME, OR GET ALONG WITH OTHER PEOPLE: SOMEWHAT DIFFICULT
7. FEELING AFRAID AS IF SOMETHING AWFUL MIGHT HAPPEN: NOT AT ALL
1. FEELING NERVOUS, ANXIOUS, OR ON EDGE: SEVERAL DAYS
5. BEING SO RESTLESS THAT IT IS HARD TO SIT STILL: NOT AT ALL
3. WORRYING TOO MUCH ABOUT DIFFERENT THINGS: NOT AT ALL

## 2018-12-06 NOTE — PATIENT INSTRUCTIONS
Mucinex dm 1 tablet twice a day for 10-14 days for mucosus and cough  Sudafed 1 tablet twice a day as needed for ear pressure   Ibuprofen 600 mg every 6 hours as needed for sore throat, pain or fever   Cepacol lozenges 1 every 2- hours as needed for sore throat   Adult Self-Care for Colds    Colds are caused by viruses. They can't be cured with antibiotics. However, you can ease symptoms and support your body's efforts to heal itself. No matter which symptoms you have, be sure to:    Drink plenty of fluids (water or clear soup)    Stop smoking and drinking alcohol    Get plenty of rest  Understand a fever    Take your temperature several times a day. If your fever is 100.4 F (38.0 C) for more than a day, call your healthcare provider.    Relax, lie down. Go to bed if you want. Just get off your feet and rest. Also, drink plenty of fluids to avoid dehydration.    Take acetaminophen or a nonsteroidal anti-inflammatory agent (NSAID), such as ibuprofen.  Treat a troubled nose kindly    Breathe steam or heated humidified air to open blocked nasal passages.  a hot shower or use a vaporizer. Be careful not to get burned by the steam.    Saline nasal sprays and decongestant tablets help open a stuffy nose. Antihistamines can also help, but they can cause side effects such as drowsiness and drying of the eyes, nose, and mouth.  Soothe a sore throat and cough    Gargle every 2 hours with 1/4 teaspoon of salt dissolved in 1/2 cup of warm water. Suck on throat lozenges and cough drops to moisten your throat.    Cough medicines are available but it is unclear how well they actually work.    Take acetaminophen or an NSAID, such as ibuprofen, to ease throat pain  Ease digestive problems    Put fluids back into your body. Take frequent sips of clear liquids such as water or broth. Avoid drinks that have a lot of sugar in them, such as juices and sodas. These can make diarrhea worse. Older children and adults can drink  sports drinks.    As your appetite returns, you can resume your normal diet. Ask your healthcare provider if there are any foods you should avoid.  When to seek medical care  When you first notice symptoms, ask your healthcare provider if antiviral medicines are appropriate. Antibiotics should not be taken for colds or flu. Also, call your healthcare provider if you have any of the following symptoms or if you aren't feeling better after 7 days:    Shortness of breath    Pain or pressure in the chest or belly (abdomen)    Worsening symptoms, especially after a period of improvement    Fever of 100.4 F  (38.0 C) or higher, or fever that doesn't go down with medicine    Sudden dizziness or confusion    Severe or continued vomiting    Signs of dehydration, including extreme thirst, dark urine, infrequent urination, dry mouth    Spotted, red, or very sore throat   Date Last Reviewed: 12/1/2016 2000-2018 [employer]. [address] [place] [address], [place] [address]. All rights reserved. This information is not intended as a substitute for professional medical care. Always follow your healthcare professional's instructions.

## 2018-12-06 NOTE — MR AVS SNAPSHOT
After Visit Summary   12/6/2018    Lakhwinder Jones    MRN: [id number]           Patient Information     Date Of Birth          1980        Visit Information        Provider Department      12/6/2018 11:40 AM Simona Prasad PA-C [facility]        Today's Diagnoses     Acute nasopharyngitis    -  1    Bilateral impacted cerumen        Need for prophylactic vaccination and inoculation against influenza          Care Instructions    Mucinex dm 1 tablet twice a day for 10-14 days for mucosus and cough  Sudafed 1 tablet twice a day as needed for ear pressure   Ibuprofen 600 mg every 6 hours as needed for sore throat, pain or fever   Cepacol lozenges 1 every 2- hours as needed for sore throat   Adult Self-Care for Colds    Colds are caused by viruses. They can't be cured with antibiotics. However, you can ease symptoms and support your body's efforts to heal itself. No matter which symptoms you have, be sure to:    Drink plenty of fluids (water or clear soup)    Stop smoking and drinking alcohol    Get plenty of rest  Understand a fever    Take your temperature several times a day. If your fever is 100.4 F (38.0 C) for more than a day, call your healthcare provider.    Relax, lie down. Go to bed if you want. Just get off your feet and rest. Also, drink plenty of fluids to avoid dehydration.    Take acetaminophen or a nonsteroidal anti-inflammatory agent (NSAID), such as ibuprofen.  Treat a troubled nose kindly    Breathe steam or heated humidified air to open blocked nasal passages.  a hot shower or use a vaporizer. Be careful not to get burned by the steam.    Saline nasal sprays and decongestant tablets help open a stuffy nose. Antihistamines can also help, but they can cause side effects such as drowsiness and drying of the eyes, nose, and mouth.  Soothe a sore throat and cough    Gargle every 2 hours with 1/4 teaspoon of salt dissolved in 1/2 cup of warm  water. Suck on throat lozenges and cough drops to moisten your throat.    Cough medicines are available but it is unclear how well they actually work.    Take acetaminophen or an NSAID, such as ibuprofen, to ease throat pain  Ease digestive problems    Put fluids back into your body. Take frequent sips of clear liquids such as water or broth. Avoid drinks that have a lot of sugar in them, such as juices and sodas. These can make diarrhea worse. Older children and adults can drink sports drinks.    As your appetite returns, you can resume your normal diet. Ask your healthcare provider if there are any foods you should avoid.  When to seek medical care  When you first notice symptoms, ask your healthcare provider if antiviral medicines are appropriate. Antibiotics should not be taken for colds or flu. Also, call your healthcare provider if you have any of the following symptoms or if you aren't feeling better after 7 days:    Shortness of breath    Pain or pressure in the chest or belly (abdomen)    Worsening symptoms, especially after a period of improvement    Fever of 100.4 F  (38.0 C) or higher, or fever that doesn't go down with medicine    Sudden dizziness or confusion    Severe or continued vomiting    Signs of dehydration, including extreme thirst, dark urine, infrequent urination, dry mouth    Spotted, red, or very sore throat   Date Last Reviewed: 12/1/2016 2000-2018 [employer]. [address] [place] [address]. All rights reserved. This information is not intended as a substitute for professional medical care. Always follow your healthcare professional's instructions.                Follow-ups after your visit        Your next 10 appointments already scheduled     Dec 14, 2018 12:40 PM CST   (Arrive by 12:25 PM)   Post-Op with Favio Miranda MD   [facility] ([facility])    [address] [facility]  [address] [facility]  "[id number] [phone]            Feb 01, 2019 12:40 PM CST   (Arrive by 12:25 PM)   Post-Op with Favio Miranda MD   [facility] ([facility])    [address]  [address] [facility] [id number] [phone]              Who to contact     If you have questions or need follow up information about today's clinic visit or your schedule please contact [facility] directly at [phone].  Normal or non-critical lab and imaging results will be communicated to you by [employer], letter or phone within 4 business days after the clinic has received the results. If you do not hear from us within 7 days, please contact the clinic through [employer] or phone. If you have a critical or abnormal lab result, we will notify you by phone as soon as possible.  Submit refill requests through [employer] or call your pharmacy and they will forward the refill request to us. Please allow 3 business days for your refill to be completed.          Additional Information About Your Visit        [employer] Information     [employer] gives you secure access to your electronic health record. If you see a primary care provider, you can also send messages to your care team and make appointments. If you have questions, please call your primary care clinic.  If you do not have a primary care provider, please call [phone] and they will assist you.        Care EveryWhere ID     This is your Care EveryWhere ID. This could be used by other organizations to access your [place] medical records  [id number]        Your Vitals Were     Pulse Temperature Respirations Height Pulse Oximetry BMI (Body Mass Index)    81 98.2  F (36.8  C) (Oral) 20 6' 5\" (1.956 m) 98% 40.64 kg/m2       Blood Pressure from Last 3 Encounters:   12/06/18 131/79   11/01/18 126/83   10/25/18 142/88    Weight from Last 3 Encounters:   12/06/18 (!) 342 lb 11.2 oz (155.4 kg)   11/13/18 (!) 334 lb (151.5 kg) "   11/06/18 (!) 334 lb (151.5 kg)              We Performed the Following     Beta strep group A culture     Strep, Rapid Screen          Today's Medication Changes          These changes are accurate as of 12/6/18 12:17 PM.  If you have any questions, ask your nurse or doctor.               Start taking these medicines.        Dose/Directions    Dextromethorphan-Guaifenesin  MG Tb12   Used for:  Bilateral impacted cerumen   Started by:  Simona Prasad PA-C        Dose:  1 tablet   Take 1 tablet by mouth 2 times daily   Quantity:  28 tablet   Refills:  0       pseudoePHEDrine 120 MG 12 hr tablet   Commonly known as:  SUDAFED   Used for:  Acute nasopharyngitis   Started by:  Simona Prasad PA-C        Dose:  120 mg   Take 1 tablet (120 mg) by mouth every 12 hours   Quantity:  28 tablet   Refills:  0            Where to get your medicines      These medications were sent to [facility] - [place], [place] - [address]  [address], [facility] [address]     Phone:  [phone]     Dextromethorphan-Guaifenesin  MG Tb12         Some of these will need a paper prescription and others can be bought over the counter.  Ask your nurse if you have questions.     Bring a paper prescription for each of these medications     pseudoePHEDrine 120 MG 12 hr tablet                Primary Care Provider Office Phone # Fax #    Simona Prasad PA-C [phone]       [address]  [facility] [address]        Equal Access to Services     [facility]: Hadii aad ku hadasho Soomaali, waaxda luqadaha, qaybta kaalmada adeegyada, adina idiin hayleah keller . So [facility] [phone].    ATENCIÓN: Si habla español, tiene a rose disposición servicios gratuitos de asistencia lingüística. Llame al [phone].    We comply with applicable federal civil rights laws and [place] laws. We do not discriminate on the basis of race,  color, national origin, age, disability, sex, sexual orientation, or gender identity.            Thank you!     Thank you for choosing [facility]  for your care. Our goal is always to provide you with excellent care. Hearing back from our patients is one way we can continue to improve our services. Please take a few minutes to complete the written survey that you may receive in the mail after your visit with us. Thank you!             Your Updated Medication List - Protect others around you: Learn how to safely use, store and throw away your medicines at www.disposemymeds.org.          This list is accurate as of 12/6/18 12:17 PM.  Always use your most recent med list.                   Brand Name Dispense Instructions for use Diagnosis    * Apremilast 10 & 20 & 30 MG Tbpk    OTEZLA    27 tablet    Take one tablet in the morning on day 1, then take one tablet every 12 hours on days 2 thru 14, according to the instructions on the packet.    Psoriasis with arthropathy (H)       * apremilast 30 MG tablet    OTEZLA    60 tablet    Take 1 tablet (30 mg) by mouth 2 times daily    Psoriasis, Psoriasis with arthropathy (H)       citalopram 40 MG tablet    celeXA    30 tablet    Take 1 tablet (40 mg) by mouth daily    Anxiety state, Panic disorder without agoraphobia       cyclobenzaprine 10 MG tablet    FLEXERIL    60 tablet    Take 1 tablet (10 mg) by mouth 2 times daily as needed for muscle spasms    Acute midline low back pain with left-sided sciatica       Dextromethorphan-Guaifenesin  MG Tb12     28 tablet    Take 1 tablet by mouth 2 times daily    Bilateral impacted cerumen       gabapentin 300 MG capsule    NEURONTIN    60 capsule    Take 1 capsule (300 mg) by mouth At Bedtime    Acute midline low back pain with left-sided sciatica, Lumbar paraspinal muscle spasm       hydrOXYzine 25 MG capsule    VISTARIL    60 capsule    Take 1-2 capsules (25-50 mg) by mouth 3 times daily as needed for  itching (for muscle spasms)    Back muscle spasm       Multi-vitamin tablet      Take 1 tablet by mouth every morning        NONFORMULARY      as needed CBD OIL        order for DME     1 Device    Equipment being ordered: [id number] $249  Walking Boot XL [employer]mil[employer]o Pneumatic    Closed nondisplaced fracture of fifth metatarsal bone of right foot, initial encounter       * oxyCODONE 5 MG tablet    ROXICODONE    50 tablet    Take 1 tablet (5 mg) by mouth every 6 hours as needed for moderate to severe pain    S/P discectomy       * oxyCODONE 5 MG tablet    ROXICODONE    50 tablet    Take 1-2 tablets (5-10 mg) by mouth every 6 hours as needed for moderate to severe pain    S/P discectomy       pseudoePHEDrine 120 MG 12 hr tablet    SUDAFED    28 tablet    Take 1 tablet (120 mg) by mouth every 12 hours    Acute nasopharyngitis       senna-docusate 8.6-50 MG tablet    SENOKOT-S/PERICOLACE    30 tablet    Take 1-2 tablets by mouth 2 times daily as needed for constipation (While on oral opioids.)    S/P discectomy       sulfamethoxazole-trimethoprim 800-160 MG tablet    BACTRIM DS/SEPTRA DS    20 tablet    Take 1 tablet by mouth 2 times daily    S/P discectomy       TYLENOL PO      Take 1,000 mg by mouth as needed for mild pain or fever        * Notice:  This list has 4 medication(s) that are the same as other medications prescribed for you. Read the directions carefully, and ask your doctor or other care provider to review them with you.

## 2018-12-06 NOTE — PROGRESS NOTES
SUBJECTIVE:   Lakhwinder Jones is a 38 year old male who presents to clinic today for the following health issues:    Acute Illness   Acute illness concerns: sore throat   Onset: 2 days     Fever: no    Chills/Sweats: no    Headache (location?): no    Sinus Pressure:YES    Conjunctivitis:  no    Ear Pain: YES: bilateral    Rhinorrhea: YES    Congestion: YES    Sore Throat: YES     Cough: YES-productive of yellow sputum    Wheeze: no    Decreased Appetite: no    Nausea: no    Vomiting: no    Diarrhea:  YES    Dysuria/Freq.: no    Fatigue/Achiness: YES    Sick/Strep Exposure: no     Therapies Tried and outcome: None          Problem list and histories reviewed & adjusted, as indicated.  Additional history: as documented    Patient Active Problem List   Diagnosis     Anxiety state     Chronic back pain     Depression     Panic disorder without agoraphobia     CARDIOVASCULAR SCREENING; LDL GOAL LESS THAN 160     Abnormal results of liver function studies     Psoriatic arthritis (H)     Morbid obesity (H)     Elevated blood-pressure reading without diagnosis of hypertension     Closed nondisplaced fracture of fifth metatarsal bone of right foot, initial encounter     Pain in joint involving ankle and foot, right     Aftercare following surgery of the musculoskeletal system     Past Surgical History:   Procedure Laterality Date     DISCECTOMY LUMBAR POSTERIOR MICROSCOPIC ONE LEVEL Left 11/1/2018    Procedure: Left Lumbar 4-5 Microdisectomy ;  Surgeon: Favio Miranda MD;  Location: [facility] OR     FOOT SURGERY Right      NOSE SURGERY      3 times     right elbow surgey      12 yo     TESTICLE SURGERY      21 yo       Social History   Substance Use Topics     Smoking status: Former Smoker     Packs/day: 0.20     Years: 10.00     Types: Cigarettes     Quit date: 1/1/2015     Smokeless tobacco: Never Used     Alcohol use 4.8 oz/week     8 Standard drinks or equivalent per week      Comment: not for a month      Family History   Problem Relation Age of Onset     Obesity Mother      Diabetes Father      Coronary Artery Disease Father      Hypertension Father      Hyperlipidemia Father      Depression Father      Anxiety Disorder Father      Mental Illness Father      Substance Abuse Father      Breast Cancer Maternal Grandmother      Depression Maternal Grandmother      Mental Illness Maternal Grandmother      Substance Abuse Maternal Grandmother      Prostate Cancer Maternal Grandfather      Diabetes Paternal Grandmother      Breast Cancer Paternal Grandmother      Depression Paternal Grandmother      Mental Illness Paternal Grandmother      Diabetes Paternal Grandfather      Asthma Brother      Diabetes Paternal Uncle      Cerebrovascular Disease No family hx of      Anesthesia Reaction No family hx of      Osteoporosis No family hx of      Genetic Disorder No family hx of      Thyroid Disease No family hx of      Liver Disease No family hx of          Current Outpatient Prescriptions   Medication Sig Dispense Refill     Acetaminophen (TYLENOL PO) Take 1,000 mg by mouth as needed for mild pain or fever       Apremilast (OTEZLA) 10 & 20 & 30 MG TBPK Take one tablet in the morning on day 1, then take one tablet every 12 hours on days 2 thru 14, according to the instructions on the packet. 27 tablet 0     apremilast (OTEZLA) 30 MG tablet Take 1 tablet (30 mg) by mouth 2 times daily 60 tablet 3     citalopram (CELEXA) 40 MG tablet Take 1 tablet (40 mg) by mouth daily 30 tablet 0     cyclobenzaprine (FLEXERIL) 10 MG tablet Take 1 tablet (10 mg) by mouth 2 times daily as needed for muscle spasms 60 tablet 1     Dextromethorphan-Guaifenesin  MG TB12 Take 1 tablet by mouth 2 times daily 28 tablet 0     multivitamin, therapeutic with minerals (MULTI-VITAMIN) TABS tablet Take 1 tablet by mouth every morning       NONFORMULARY as needed CBD OIL       order for DME Equipment being ordered: [id number] $796  Walking Boot XL  "[employer] Pneumatic 1 Device 0     pseudoePHEDrine (SUDAFED) 120 MG 12 hr tablet Take 1 tablet (120 mg) by mouth every 12 hours 28 tablet 0     senna-docusate (SENOKOT-S;PERICOLACE) 8.6-50 MG per tablet Take 1-2 tablets by mouth 2 times daily as needed for constipation (While on oral opioids.) 30 tablet 0     sulfamethoxazole-trimethoprim (BACTRIM DS/SEPTRA DS) 800-160 MG per tablet Take 1 tablet by mouth 2 times daily 20 tablet 0     gabapentin (NEURONTIN) 300 MG capsule Take 1 capsule (300 mg) by mouth At Bedtime (Patient not taking: Reported on 12/6/2018) 60 capsule 1     hydrOXYzine (VISTARIL) 25 MG capsule Take 1-2 capsules (25-50 mg) by mouth 3 times daily as needed for itching (for muscle spasms) (Patient not taking: Reported on 12/6/2018) 60 capsule 0     oxyCODONE IR (ROXICODONE) 5 MG tablet Take 1-2 tablets (5-10 mg) by mouth every 6 hours as needed for moderate to severe pain (Patient not taking: Reported on 12/6/2018) 50 tablet 0     oxyCODONE IR (ROXICODONE) 5 MG tablet Take 1 tablet (5 mg) by mouth every 6 hours as needed for moderate to severe pain (Patient not taking: Reported on 12/6/2018) 50 tablet 0     Allergies   Allergen Reactions     Tramadol Other (See Comments)     Shellfish-Derived Products        Reviewed and updated as needed this visit by clinical staff  Tobacco  Allergies  Meds  Med Hx  Surg Hx  Fam Hx  Soc Hx      Reviewed and updated as needed this visit by Provider         ROS:  Constitutional, HEENT, cardiovascular, pulmonary, gi and gu systems are negative, except as otherwise noted.    OBJECTIVE:     /79 (BP Location: Right arm, Patient Position: Sitting, Cuff Size: Adult Large)  Pulse 81  Temp 98.2  F (36.8  C) (Oral)  Resp 20  Ht 6' 5\" (1.956 m)  Wt (!) 342 lb 11.2 oz (155.4 kg)  SpO2 98%  BMI 40.64 kg/m2  Body mass index is 40.64 kg/(m^2).  GENERAL: healthy, alert and no distress  EYES: Eyes grossly normal to inspection, PERRL and conjunctivae and sclerae " normal  HENT: normal cephalic/atraumatic, ear canals ear wax impaction, nose and mouth without ulcers or lesions, nasal mucosa edematous , rhinorrhea clear and oral mucous membranes moist  NECK: no adenopathy, no asymmetry, masses, or scars and thyroid normal to palpation  RESP: lungs clear to auscultation - no rales, rhonchi or wheezes  CV: regular rate and rhythm, normal S1 S2, no S3 or S4, no murmur, click or rub, no peripheral edema and peripheral pulses strong  ABDOMEN: soft, nontender, no hepatosplenomegaly, no masses and bowel sounds normal  MS: no gross musculoskeletal defects noted, no edema    Diagnostic Test Results:  Results for orders placed or performed in visit on 12/06/18 (from the past 24 hour(s))   Strep, Rapid Screen   Result Value Ref Range    Specimen Description Throat     Rapid Strep A Screen       NEGATIVE: No Group A streptococcal antigen detected by immunoassay, await culture report.       ASSESSMENT/PLAN:       ICD-10-CM    1. Acute nasopharyngitis J00 Strep, Rapid Screen     Beta strep group A culture     pseudoePHEDrine (SUDAFED) 120 MG 12 hr tablet   2. Bilateral impacted cerumen H61.23 Dextromethorphan-Guaifenesin  MG TB12   3. Need for prophylactic vaccination and inoculation against influenza Z23      Mucinex dm 1 tablet twice a day for 10-14 days for mucosus and cough  Sudafed 1 tablet twice a day as needed for ear pressure   Ibuprofen 600 mg every 6 hours as needed for sore throat, pain or fever   Cepacol lozenges 1 every 2- hours as needed for sore throat       Simona Prasad PA-C  [facility]

## 2018-12-07 ENCOUNTER — DOCUMENTATION ONLY (OUTPATIENT)
Dept: ORTHOPEDICS | Facility: CLINIC | Age: 38
End: 2018-12-07

## 2018-12-07 LAB
BACTERIA SPEC CULT: NORMAL
SPECIMEN SOURCE: NORMAL

## 2018-12-07 ASSESSMENT — ANXIETY QUESTIONNAIRES: GAD7 TOTAL SCORE: 4

## 2018-12-12 DIAGNOSIS — M54.50 LUMBAR PAIN: Primary | ICD-10-CM

## 2018-12-14 ENCOUNTER — ANCILLARY PROCEDURE (OUTPATIENT)
Dept: GENERAL RADIOLOGY | Facility: CLINIC | Age: 38
End: 2018-12-14
Attending: ORTHOPAEDIC SURGERY
Payer: COMMERCIAL

## 2018-12-14 ENCOUNTER — OFFICE VISIT (OUTPATIENT)
Dept: ORTHOPEDICS | Facility: CLINIC | Age: 38
End: 2018-12-14
Payer: COMMERCIAL

## 2018-12-14 VITALS — BODY MASS INDEX: 37.19 KG/M2 | WEIGHT: 315 LBS | HEIGHT: 77 IN

## 2018-12-14 DIAGNOSIS — M54.16 LUMBAR RADICULOPATHY: Primary | ICD-10-CM

## 2018-12-14 DIAGNOSIS — M54.50 LUMBAR PAIN: ICD-10-CM

## 2018-12-14 ASSESSMENT — MIFFLIN-ST. JEOR: SCORE: 2593.68

## 2018-12-14 NOTE — NURSING NOTE
"Reason For Visit:   Chief Complaint   Patient presents with     Surgical Followup     POP Lumbar microdiscectomy DOS: 11/1/18       Primary MD: Simona Prasad  Ref. MD: vania     Date of surgery: 11/1/18   Type of surgery: Dr. Miranda   Smoker: No  Request smoking cessation information: No    Ht 1.956 m (6' 5\")   Wt (!) 155.1 kg (342 lb)   BMI 40.56 kg/m      Pain Assessment  Patient Currently in Pain: Yes  0-10 Pain Scale: 4  Primary Pain Location: Back  Pain Orientation: Right    Oswestry (ALEXANDRA) Questionnaire    OSWESTRY DISABILITY INDEX 10/19/2018   Count 10   Sum 14   Oswestry Score (%) 28   Some recent data might be hidden            Neck Disability Index (NDI) Questionnaire    No flowsheet data found.           Visual Analog Pain Scale  Back Pain Scale 0-10: 5.5  Right leg pain: 0  Left leg pain: 0    Promis 10 Assessment    No flowsheet data found.             Ham Fairbanks, ATC  "

## 2018-12-14 NOTE — PROGRESS NOTES
Spine Surgery Return Clinic Visit      Chief Complaint:   Post op check     Surgery:   L L4-5 microdiscectomy     Date: 11/1/18    Interval HPI:  Symptom Profile Including: location of symptoms, onset, severity, exacerbating/alleviating factors, previous treatments:        Lakhwinder Jones is a 38 year old male who who presents for follow-up after the above surgery.  He has been doing well postoperatively.  His left lower extremity symptoms have resolved.  He does have some continued soreness in the back which radiates to his left low back and buttock.  This is worse with inactivity and become stiff.  Once he gets moving it usually feels better.  He is taking Flexeril and ibuprofen at night to try to help him sleep.  He is starting to get back some of his activities.  He is doing more walking.            Past Medical History:     Past Medical History:   Diagnosis Date     Anxiety      Back pain     chronic     Obesity      Psoriatic arthritis (H)             Past Surgical History:     Past Surgical History:   Procedure Laterality Date     DISCECTOMY LUMBAR POSTERIOR MICROSCOPIC ONE LEVEL Left 11/1/2018    Procedure: Left Lumbar 4-5 Microdisectomy ;  Surgeon: Favio Miranda MD;  Location: [facility]     FOOT SURGERY Right      NOSE SURGERY      3 times     right elbow surgey      12 yo     TESTICLE SURGERY      21 yo            Social History:     Social History     Tobacco Use     Smoking status: Former Smoker     Packs/day: 0.20     Years: 10.00     Pack years: 2.00     Types: Cigarettes     Last attempt to quit: 1/1/2015     Years since quitting: 3.9     Smokeless tobacco: Never Used   Substance Use Topics     Alcohol use: Yes     Alcohol/week: 4.8 oz     Types: 8 Standard drinks or equivalent per week     Comment: not for a month            Family History:     Family History   Problem Relation Age of Onset     Obesity Mother      Diabetes Father      Coronary Artery Disease Father      Hypertension  Father      Hyperlipidemia Father      Depression Father      Anxiety Disorder Father      Mental Illness Father      Substance Abuse Father      Breast Cancer Maternal Grandmother      Depression Maternal Grandmother      Mental Illness Maternal Grandmother      Substance Abuse Maternal Grandmother      Prostate Cancer Maternal Grandfather      Diabetes Paternal Grandmother      Breast Cancer Paternal Grandmother      Depression Paternal Grandmother      Mental Illness Paternal Grandmother      Diabetes Paternal Grandfather      Asthma Brother      Diabetes Paternal Uncle      Cerebrovascular Disease No family hx of      Anesthesia Reaction No family hx of      Osteoporosis No family hx of      Genetic Disorder No family hx of      Thyroid Disease No family hx of      Liver Disease No family hx of             Allergies:     Allergies   Allergen Reactions     Tramadol Other (See Comments)     Shellfish-Derived Products             Medications:     Current Outpatient Medications   Medication     Acetaminophen (TYLENOL PO)     Apremilast (OTEZLA) 10 & 20 & 30 MG TBPK     apremilast (OTEZLA) 30 MG tablet     citalopram (CELEXA) 40 MG tablet     cyclobenzaprine (FLEXERIL) 10 MG tablet     Dextromethorphan-Guaifenesin  MG TB12     gabapentin (NEURONTIN) 300 MG capsule     hydrOXYzine (VISTARIL) 25 MG capsule     multivitamin, therapeutic with minerals (MULTI-VITAMIN) TABS tablet     NONFORMULARY     order for DME     oxyCODONE IR (ROXICODONE) 5 MG tablet     oxyCODONE IR (ROXICODONE) 5 MG tablet     pseudoePHEDrine (SUDAFED) 120 MG 12 hr tablet     senna-docusate (SENOKOT-S;PERICOLACE) 8.6-50 MG per tablet     sulfamethoxazole-trimethoprim (BACTRIM DS/SEPTRA DS) 800-160 MG per tablet     No current facility-administered medications for this visit.              Review of Systems:   A focused musculoskeletal and neurologic ROS was performed with pertinent positives and negatives noted in the HPI.  Additional systems  "were also reviewed and are documented at the bottom of the note.         Physical Exam:   Vitals: Ht 1.956 m (6' 5\")   Wt (!) 155.1 kg (342 lb)   BMI 40.56 kg/m    Musculoskeletal, Neurologic, and Spine:   Incision is well-healed with no signs of erythema or drainage.  He has 5 out of 5 strength throughout his lower extremities.  His sensation is intact in all dermatomes.  He ambulate without a limp and rises from the exam chair without difficulty.         Imaging:   We ordered and independently reviewed new radiographs at this clinic visit. The results were discussed with the patient. Findings include: X-rays were obtained today which show no signs of postoperative deformity or fracture.  He has slight evidence of some arthritis at the L5-S1 disc.       Assessment and Plan:     38 year old male status post left L4-5 microdiscectomy on 11/1/2018.    We reviewed the patient's postoperative course with him.  He is making good progress.  We discussed that he should have continued improvement in his symptoms from healing of the muscles which were dissected for the surgery.  However, we did discuss that there may be some component of low back pain that he will continue to deal with given the slight evidence of arthritis on his x-rays.  He should continue to increase his activities.  He should proceed without a lifting restriction.  We did discuss that weight loss may improve his pain down the road and make him generally healthy or person.  The patient will follow up on as-needed basis if he is having any difficulty or concerns.  All questions were answered.    Mickey Lopez MD   Orthopedic Surgery; PGY-4    This patient was seen, examined and counseled by Dr. Grimaldo.     Attending MD (Dr. Favio Miranda) :  I reviewed and verified the history and physical exam of the patient and discussed the patient's management with the other clinical providers involved in this patient's care including any involved " residents or physicians assistants. I reviewed the above note and agree with the documented findings and plan of care, which were communicated to the patient.      Favio Miranda MD

## 2018-12-14 NOTE — LETTER
12/14/2018       RE: Lakhwinder Jones  [address]  [facility] [address]     Dear Colleague,    Thank you for referring your patient, Lakhwinder Jones, to the [facility]. Please see a copy of my visit note below.    Spine Surgery Return Clinic Visit    Chief Complaint:   Post op check     Surgery:   L L4-5 microdiscectomy     Date: 11/1/18    Interval HPI:  Symptom Profile Including: location of symptoms, onset, severity, exacerbating/alleviating factors, previous treatments:        Lakhwinder Jones is a 38 year old male who who presents for follow-up after the above surgery.  He has been doing well postoperatively.  His left lower extremity symptoms have resolved.  He does have some continued soreness in the back which radiates to his left low back and buttock.  This is worse with inactivity and become stiff.  Once he gets moving it usually feels better.  He is taking Flexeril and ibuprofen at night to try to help him sleep.  He is starting to get back some of his activities.  He is doing more walking.         Past Medical History:     Past Medical History:   Diagnosis Date     Anxiety      Back pain     chronic     Obesity      Psoriatic arthritis (H)           Past Surgical History:     Past Surgical History:   Procedure Laterality Date     DISCECTOMY LUMBAR POSTERIOR MICROSCOPIC ONE LEVEL Left 11/1/2018    Procedure: Left Lumbar 4-5 Microdisectomy ;  Surgeon: Favio Miranda MD;  [facility]     FOOT SURGERY Right      NOSE SURGERY      3 times     right elbow surgey      14 yo     TESTICLE SURGERY      21 yo          Social History:     Social History     Tobacco Use     Smoking status: Former Smoker     Packs/day: 0.20     Years: 10.00     Pack years: 2.00     Types: Cigarettes     Last attempt to quit: 1/1/2015     Years since quitting: 3.9     Smokeless tobacco: Never Used   Substance Use Topics     Alcohol use:  Yes     Alcohol/week: 4.8 oz     Types: 8 Standard drinks or equivalent per week     Comment: not for a month          Family History:     Family History   Problem Relation Age of Onset     Obesity Mother      Diabetes Father      Coronary Artery Disease Father      Hypertension Father      Hyperlipidemia Father      Depression Father      Anxiety Disorder Father      Mental Illness Father      Substance Abuse Father      Breast Cancer Maternal Grandmother      Depression Maternal Grandmother      Mental Illness Maternal Grandmother      Substance Abuse Maternal Grandmother      Prostate Cancer Maternal Grandfather      Diabetes Paternal Grandmother      Breast Cancer Paternal Grandmother      Depression Paternal Grandmother      Mental Illness Paternal Grandmother      Diabetes Paternal Grandfather      Asthma Brother      Diabetes Paternal Uncle      Cerebrovascular Disease No family hx of      Anesthesia Reaction No family hx of      Osteoporosis No family hx of      Genetic Disorder No family hx of      Thyroid Disease No family hx of      Liver Disease No family hx of           Allergies:     Allergies   Allergen Reactions     Tramadol Other (See Comments)     Shellfish-Derived Products           Medications:     Current Outpatient Medications   Medication     Acetaminophen (TYLENOL PO)     Apremilast (OTEZLA) 10 & 20 & 30 MG TBPK     apremilast (OTEZLA) 30 MG tablet     citalopram (CELEXA) 40 MG tablet     cyclobenzaprine (FLEXERIL) 10 MG tablet     Dextromethorphan-Guaifenesin  MG TB12     gabapentin (NEURONTIN) 300 MG capsule     hydrOXYzine (VISTARIL) 25 MG capsule     multivitamin, therapeutic with minerals (MULTI-VITAMIN) TABS tablet     NONFORMULARY     order for DME     oxyCODONE IR (ROXICODONE) 5 MG tablet     oxyCODONE IR (ROXICODONE) 5 MG tablet     pseudoePHEDrine (SUDAFED) 120 MG 12 hr tablet     senna-docusate (SENOKOT-S;PERICOLACE) 8.6-50 MG per tablet     sulfamethoxazole-trimethoprim  "(BACTRIM DS/SEPTRA DS) 800-160 MG per tablet     No current facility-administered medications for this visit.           Physical Exam:   Vitals: Ht 1.956 m (6' 5\")   Wt (!) 155.1 kg (342 lb)   BMI 40.56 kg/m     Musculoskeletal, Neurologic, and Spine:   Incision is well-healed with no signs of erythema or drainage.  He has 5 out of 5 strength throughout his lower extremities.  His sensation is intact in all dermatomes.  He ambulate without a limp and rises from the exam chair without difficulty.         Imaging:   We ordered and independently reviewed new radiographs at this clinic visit. The results were discussed with the patient. Findings include: X-rays were obtained today which show no signs of postoperative deformity or fracture.  He has slight evidence of some arthritis at the L5-S1 disc.     Assessment and Plan:     38 year old male status post left L4-5 microdiscectomy on 11/1/2018.    We reviewed the patient's postoperative course with him.  He is making good progress.  We discussed that he should have continued improvement in his symptoms from healing of the muscles which were dissected for the surgery.  However, we did discuss that there may be some component of low back pain that he will continue to deal with given the slight evidence of arthritis on his x-rays.  He should continue to increase his activities.  He should proceed without a lifting restriction.  We did discuss that weight loss may improve his pain down the road and make him generally healthy or person.  The patient will follow up on as-needed basis if he is having any difficulty or concerns.  All questions were answered.    Mickey Lopez MD   Orthopedic Surgery; PGY-4    This patient was seen, examined and counseled by Dr. Grimaldo.     Attending MD (Dr. Favio Miranda) :  I reviewed and verified the history and physical exam of the patient and discussed the patient's management with the other clinical providers involved in " this patient's care including any involved residents or physicians assistants. I reviewed the above note and agree with the documented findings and plan of care, which were communicated to the patient.      Favio Miranda MD

## 2018-12-20 ENCOUNTER — TELEPHONE (OUTPATIENT)
Dept: PSYCHIATRY | Facility: CLINIC | Age: 38
End: 2018-12-20

## 2018-12-20 NOTE — TELEPHONE ENCOUNTER
PSYCHIATRY CLINIC PHONE INTAKE     SERVICES REQUESTED / INTERESTED IN          Med Management    Presenting Problem and Brief History                              What would you like to be seen for? (brief description):  Pt was diagnosed at 17 and started on medications. He currently takes Citalapram 40 mg per day. Pt has taken ativan and adderall in the past but it was during school. Now that's he's out of school he hasn't taken them. At this time he's having moments og feeling absebt-minded and not feeling otivated. He still gets panic attacks, last one was a month and a half ago, ut they can come anytime. He's not sure what triggers the panic, but he can feel himself getting overhyoed and he can't settle down. When he has a panic attack he has rracing heart and chest tightening. It can last anywhere from half hour to 4 hours or longer. He doesn;t sleep very good. It;s hard to fall asleep and when he does he wakes up frequently. He gets about 5-8 hours of sleep. He normally has to take a tylenol PM to fall asleep. The medication has been prescribed by his PCP. He feels the medication he's taking isn;t addressing the concerns he has. He is no longer taking the following meds:   Dextromethorphan-Guaifenesin  MG TB12   gabapentin (NEURONTIN) 300 MG capsule   hydrOXYzine (VISTARIL) 25 MG capsule   multivitamin, therapeutic with minerals (MULTI-VITAMIN) TABS tablet   NONFORMULARY   order for DME   oxyCODONE IR (ROXICODONE) 5 MG tablet   oxyCODONE IR (ROXICODONE) 5 MG tablet   pseudoePHEDrine (SUDAFED) 120 MG 12 hr tablet   senna-docusate (SENOKOT-S;PERICOLACE) 8.6-50 MG per tablet   sulfamethoxazole-trimethoprim (BACTRIM DS/SEPTRA DS) 800-160 MG per tablet     Have you received a mental health diagnosis? Yes   Which one (s): ADD, Depression, and Anxiety  Is there any history of developmental delay?  No   Are you currently seeing a mental health provider?  No            Who / month last seen:  NA  Do you have  mental health records elsewhere?  No  Will you sign a release so we can obtain them?  No    Have you ever been hospitalized for psychiatric reasons?  No  Describe:  He's been hospital for panic attacks but never had to stay there.     Do you have current thoughts of self-harm?  No    Do you currently have thoughts of harming others?  No       Substance Use History     Do you have any history of alcohol / illicit drug use?  Yes  Describe:  When he was in his 20s he had a drinking problem, but no longer has that problem.   Have you ever received treatment for this?  Yes    Describe:  [facility]     Social History     Does the patient have a guardian?  No    Name / number: NA  Have you had an ACT team in last 12 months?  No  Describe: NA   Do you have any current or past legal issues?  No  Describe: NA   OK to leave a detailed voicemail?  Yes    Medical/ Surgical History                                   Patient Active Problem List   Diagnosis     Anxiety state     Chronic back pain     Depression     Panic disorder without agoraphobia     CARDIOVASCULAR SCREENING; LDL GOAL LESS THAN 160     Abnormal results of liver function studies     Psoriatic arthritis (H)     Morbid obesity (H)     Elevated blood-pressure reading without diagnosis of hypertension     Closed nondisplaced fracture of fifth metatarsal bone of right foot, initial encounter     Pain in joint involving ankle and foot, right     Aftercare following surgery of the musculoskeletal system          Medications             Current Outpatient Medications   Medication Sig Dispense Refill     Acetaminophen (TYLENOL PO) Take 1,000 mg by mouth as needed for mild pain or fever       Apremilast (OTEZLA) 10 & 20 & 30 MG TBPK Take one tablet in the morning on day 1, then take one tablet every 12 hours on days 2 thru 14, according to the instructions on the packet. 27 tablet 0     apremilast (OTEZLA) 30 MG tablet Take 1 tablet (30 mg) by mouth 2 times daily 60 tablet 3      citalopram (CELEXA) 40 MG tablet Take 1 tablet (40 mg) by mouth daily 30 tablet 0     cyclobenzaprine (FLEXERIL) 10 MG tablet Take 1 tablet (10 mg) by mouth 2 times daily as needed for muscle spasms 60 tablet 1     Dextromethorphan-Guaifenesin  MG TB12 Take 1 tablet by mouth 2 times daily 28 tablet 0     gabapentin (NEURONTIN) 300 MG capsule Take 1 capsule (300 mg) by mouth At Bedtime (Patient not taking: Reported on 12/6/2018) 60 capsule 1     hydrOXYzine (VISTARIL) 25 MG capsule Take 1-2 capsules (25-50 mg) by mouth 3 times daily as needed for itching (for muscle spasms) (Patient not taking: Reported on 12/6/2018) 60 capsule 0     multivitamin, therapeutic with minerals (MULTI-VITAMIN) TABS tablet Take 1 tablet by mouth every morning       NONFORMULARY as needed CBD OIL       order for DME Equipment being ordered: [id number] $249  Walking Boot XL [place] Pneumatic 1 Device 0     oxyCODONE IR (ROXICODONE) 5 MG tablet Take 1-2 tablets (5-10 mg) by mouth every 6 hours as needed for moderate to severe pain (Patient not taking: Reported on 12/6/2018) 50 tablet 0     oxyCODONE IR (ROXICODONE) 5 MG tablet Take 1 tablet (5 mg) by mouth every 6 hours as needed for moderate to severe pain (Patient not taking: Reported on 12/6/2018) 50 tablet 0     pseudoePHEDrine (SUDAFED) 120 MG 12 hr tablet Take 1 tablet (120 mg) by mouth every 12 hours 28 tablet 0     senna-docusate (SENOKOT-S;PERICOLACE) 8.6-50 MG per tablet Take 1-2 tablets by mouth 2 times daily as needed for constipation (While on oral opioids.) 30 tablet 0     sulfamethoxazole-trimethoprim (BACTRIM DS/SEPTRA DS) 800-160 MG per tablet Take 1 tablet by mouth 2 times daily 20 tablet 0         DISPOSITION      12/20/18 Intake completed. Ok for AGE w/ Resident or NP. Scheduled for AGE w/ Dajuan Nicolas on 2/26/19 at 8:00am.   Rosa Sherwood,

## 2019-01-09 ENCOUNTER — OFFICE VISIT (OUTPATIENT)
Dept: FAMILY MEDICINE | Facility: CLINIC | Age: 39
End: 2019-01-09
Payer: COMMERCIAL

## 2019-01-09 VITALS
SYSTOLIC BLOOD PRESSURE: 140 MMHG | TEMPERATURE: 97.1 F | HEIGHT: 77 IN | DIASTOLIC BLOOD PRESSURE: 90 MMHG | WEIGHT: 315 LBS | BODY MASS INDEX: 37.19 KG/M2 | HEART RATE: 76 BPM | OXYGEN SATURATION: 98 %

## 2019-01-09 DIAGNOSIS — R03.0 ELEVATED BLOOD PRESSURE READING WITHOUT DIAGNOSIS OF HYPERTENSION: ICD-10-CM

## 2019-01-09 DIAGNOSIS — L40.50 PSORIATIC ARTHRITIS (H): ICD-10-CM

## 2019-01-09 DIAGNOSIS — F41.0 PANIC DISORDER WITHOUT AGORAPHOBIA: ICD-10-CM

## 2019-01-09 DIAGNOSIS — F41.1 ANXIETY STATE: Primary | ICD-10-CM

## 2019-01-09 DIAGNOSIS — J06.9 VIRAL UPPER RESPIRATORY TRACT INFECTION: ICD-10-CM

## 2019-01-09 PROCEDURE — 99214 OFFICE O/P EST MOD 30 MIN: CPT | Performed by: NURSE PRACTITIONER

## 2019-01-09 RX ORDER — LORAZEPAM 1 MG/1
1 TABLET ORAL 2 TIMES DAILY PRN
Qty: 16 TABLET | Refills: 0 | Status: SHIPPED | OUTPATIENT
Start: 2019-01-09 | End: 2019-03-19

## 2019-01-09 RX ORDER — CITALOPRAM HYDROBROMIDE 40 MG/1
40 TABLET ORAL DAILY
Qty: 90 TABLET | Refills: 1 | Status: SHIPPED | OUTPATIENT
Start: 2019-01-09 | End: 2019-07-10

## 2019-01-09 ASSESSMENT — MIFFLIN-ST. JEOR: SCORE: 2567.37

## 2019-01-09 NOTE — PATIENT INSTRUCTIONS
At [facility], we strive to deliver an exceptional experience to you, every time we see you.  If you receive a survey in the mail, please send us back your thoughts. We really do value your feedback.    Your care team:                            Family Medicine Internal Medicine   MD Constantino Walker MD Shantel Branch-Fleming, MD Katya Georgiev PA-C Megan Hill, APRN CNP    Luke Tyler, MD Pediatrics   Agustin Watts, NIRU Steen, MD Stephie Ramirez APRN CNP   MD Ann Winkler MD Deborah Mielke, MD Luba Rodriguez, APRN [facility]      Clinic hours: Monday - Thursday 7 am-7 pm; Fridays 7 am-5 pm.   Urgent care: Monday - Friday 11 am-9 pm; Saturday and Sunday 9 am-5 pm.  Pharmacy : Monday -Thursday 8 am-8 pm; Friday 8 am-6 pm; Saturday and Sunday 9 am-5 pm.     Clinic: [phone]   Pharmacy: [phone]        Patient Education     Treating Anxiety Disorders with Medicine  An anxiety disorder can make you feel nervous or apprehensive, even without a clear reason. In people age 65 and older, generalized anxiety disorder is one of the most commonly diagnosed anxiety disorders. Many times it occurs with depression. Certain anxiety disorders can cause intense feelings of fear or panic. You may even have physical symptoms such as a racing heartbeat, sweating, or dizziness. If you have these feelings, you don t have to suffer anymore. Treatment to help you overcome your fears will likely include therapy (also called counseling). Medicine may also be prescribed to help control your symptoms.    Medicines  Certain medicines may be prescribed to help control your symptoms. So you may feel less anxious. You may also feel able to move forward with therapy. At first, medicines and dosages may need to be adjusted to find what works best for you. Try to be patient. Tell your healthcare provider how a medicine makes you feel. This way, you can work  together to find the treatment that s best for you. Keep in mind that medicines can have side effects. Talk with your provider about any side effects that are bothering you. Changing the dose or type of medicine may help. Don t stop taking medicine on your own. That can cause symptoms to come back.    Anti-anxiety medicine. This medicine eases symptoms and helps you relax. Your healthcare provider will explain when and how to use it. It may be prescribed for use before situations that make you anxious. You may also be told to take medicine on a regular schedule. Anti-anxiety medicine may make you feel a little sleepy or  out of it.  Don t drive a car or operate machinery while on this medicine, until you know how it affects you.  Caution  Never use alcohol or other drugs with anti-anxiety medicines. This could result in loss of muscular control, sedation, coma, or death. Also, use only the amount of medicine prescribed for you. If you think you may have taken too much, get emergency care right away.     Antidepressant medicine. This kind of medicine is often used to treat anxiety, even if you aren t depressed. An antidepressant helps balance out brain chemicals. This helps keep anxiety under control. This medicine is taken on a schedule. It takes a few weeks to start working. If you don t notice a change at first, you may just need more time. But if you don t notice results after the first few weeks, tell your provider.  Keep taking medicines as prescribed  Never change your dosage, share or use another person's medicine, or stop taking your medicines without talking to your healthcare provider first. Keep the following in mind:    Some medicines must be taken on a schedule. Make this part of your daily routine. For instance, always take your pill before brushing your teeth. A pillbox can help you remember if you ve taken your medicine each day.    Medicines are often taken for 6 to 12 months. Your healthcare  provider will then evaluate whether you need to stay on them. Many people who have also had therapy may no longer need medicine to manage anxiety.    You may need to stop taking medicine slowly to give your body time to adjust. When it s time to stop, your healthcare provider will tell you more. Remember: Never stop taking your medicine without talking to your provider first.    If symptoms return, you may need to start taking medicines again. This isn t your fault. It s just the nature of your anxiety disorder.  Special concerns    Side effects. Medicines may cause side effects. Ask your healthcare provider or pharmacist what you can expect. They may have ideas for avoiding some side effects.    Sexual problems. Some antidepressants can affect your desire for sex or your ability to have an orgasm. A change in dosage or medicine often solves the problem. If you have a sexual side effect that concerns you, tell your healthcare provider.    Addiction. If you ve never had a problem with drugs or alcohol, you may not have a problem with medicines used to treat anxiety disorders. But always discuss the medicines with your healthcare provider before taking them. If you have a history of addiction, you may not be able to use certain medicines used to treat anxiety disorders.    Medicine interactions. Always check with your pharmacist before using any over-the-counter medicines, including herbal supplements.   Date Last Reviewed: 5/1/2017 2000-2018 [employer]. [address] [place] [address], [place] [address]. All rights reserved. This information is not intended as a substitute for professional medical care. Always follow your healthcare professional's instructions.

## 2019-01-09 NOTE — PROGRESS NOTES
SUBJECTIVE:   Lakhwinder Jones is a 38 year old male who presents to clinic today for the following helth issues:      Depression and Anxiety Follow-Up    Status since last visit: Worsened     Other associated symptoms:None    Complicating factors:     Significant life event: Yes-       Current substance abuse: None  Patient has been out of Celexa for over a week, woke with a panic attack today and is requesting Ativan for prn use.  He's had more stress recently- his Father chucky was recently diagnosed with spine cancer and his dog got out and attacked a neighbor and is now in quarantine. His pet is his support animal.  He has Psychiatry appt set up for 2/26/19.  PHQ 2/1/2017 8/21/2017 12/6/2018   PHQ-9 Total Score 3 16 12   Q9: Suicide Ideation Not at all Several days Not at all     BISMARK-7 SCORE 2/1/2017 8/21/2017 12/6/2018   Total Score 3 19 4     In the past two weeks have you had thoughts of suicide or self-harm?  No.    Do you have concerns about your personal safety or the safety of others?   No  PHQ-9  English  PHQ-9   Any Language  BISMARK-7  Suicide Assessment Five-step Evaluation and Treatment (SAFE-T)    Amount of exercise or physical activity: 4-5 days/week for an average of greater than 60 minutes    Problems taking medications regularly: No    Medication side effects: none    Diet: regular (no restrictions)    URI symptoms for 5 days- cough, yellow mucinous nasal congestion, facial pressure, no ear pain, fever, chills, shortness of breath, wheezing.  He has been taking Nyquil and sudafed for congestion.      Psoriasis- Patient continues on Otezla but doesn't think it is working well. Followed by Dermatology in [place].      Problem list and histories reviewed & adjusted, as indicated.  Additional history: as documented    Patient Active Problem List   Diagnosis     Anxiety state     Chronic back pain     Depression     Panic disorder without agoraphobia     CARDIOVASCULAR SCREENING; LDL GOAL LESS  THAN 160     Abnormal results of liver function studies     Psoriatic arthritis (H)     Morbid obesity (H)     Elevated blood-pressure reading without diagnosis of hypertension     Closed nondisplaced fracture of fifth metatarsal bone of right foot, initial encounter     Pain in joint involving ankle and foot, right     Aftercare following surgery of the musculoskeletal system     Alcohol abuse, in remission     Vitamin D deficiency     Past Surgical History:   Procedure Laterality Date     DISCECTOMY LUMBAR POSTERIOR MICROSCOPIC ONE LEVEL Left 2018    Procedure: Left Lumbar 4-5 Microdisectomy ;  Surgeon: Favio Miranda MD;  Location: [facility]     FOOT SURGERY Right      NOSE SURGERY      3 times     right elbow surgey      14 yo     TESTICLE SURGERY      21 yo       Social History     Tobacco Use     Smoking status: Former Smoker     Packs/day: 0.20     Years: 10.00     Pack years: 2.00     Types: Cigarettes     Last attempt to quit: 2015     Years since quittin.0     Smokeless tobacco: Never Used   Substance Use Topics     Alcohol use: Yes     Alcohol/week: 4.8 oz     Types: 8 Standard drinks or equivalent per week     Comment: not for a month     Family History   Problem Relation Age of Onset     Obesity Mother      Diabetes Father      Coronary Artery Disease Father      Hypertension Father      Hyperlipidemia Father      Depression Father      Anxiety Disorder Father      Mental Illness Father      Substance Abuse Father      Breast Cancer Maternal Grandmother      Depression Maternal Grandmother      Mental Illness Maternal Grandmother      Substance Abuse Maternal Grandmother      Prostate Cancer Maternal Grandfather      Diabetes Paternal Grandmother      Breast Cancer Paternal Grandmother      Depression Paternal Grandmother      Mental Illness Paternal Grandmother      Diabetes Paternal Grandfather      Asthma Brother      Diabetes Paternal Uncle      Cerebrovascular Disease No  family hx of      Anesthesia Reaction No family hx of      Osteoporosis No family hx of      Genetic Disorder No family hx of      Thyroid Disease No family hx of      Liver Disease No family hx of          Current Outpatient Medications   Medication Sig Dispense Refill     Acetaminophen (TYLENOL PO) Take 1,000 mg by mouth as needed for mild pain or fever       Apremilast (OTEZLA) 10 & 20 & 30 MG TBPK Take one tablet in the morning on day 1, then take one tablet every 12 hours on days 2 thru 14, according to the instructions on the packet. 27 tablet 0     apremilast (OTEZLA) 30 MG tablet Take 1 tablet (30 mg) by mouth 2 times daily 60 tablet 3     citalopram (CELEXA) 40 MG tablet Take 1 tablet (40 mg) by mouth daily 90 tablet 1     cyclobenzaprine (FLEXERIL) 10 MG tablet Take 1 tablet (10 mg) by mouth 2 times daily as needed for muscle spasms 60 tablet 1     Dextromethorphan-Guaifenesin  MG TB12 Take 1 tablet by mouth 2 times daily 28 tablet 0     LORazepam (ATIVAN) 1 MG tablet Take 1 tablet (1 mg) by mouth 2 times daily as needed for anxiety 16 tablet 0     multivitamin, therapeutic with minerals (MULTI-VITAMIN) TABS tablet Take 1 tablet by mouth every morning       NONFORMULARY as needed CBD OIL       order for DME Equipment being ordered: [id number] $249  Walking Boot XL [employer]mil[employer] Pneumatic 1 Device 0     pseudoePHEDrine (SUDAFED) 120 MG 12 hr tablet Take 1 tablet (120 mg) by mouth every 12 hours 28 tablet 0     senna-docusate (SENOKOT-S;PERICOLACE) 8.6-50 MG per tablet Take 1-2 tablets by mouth 2 times daily as needed for constipation (While on oral opioids.) 30 tablet 0     sulfamethoxazole-trimethoprim (BACTRIM DS/SEPTRA DS) 800-160 MG per tablet Take 1 tablet by mouth 2 times daily 20 tablet 0     gabapentin (NEURONTIN) 300 MG capsule Take 1 capsule (300 mg) by mouth At Bedtime (Patient not taking: Reported on 12/6/2018) 60 capsule 1     hydrOXYzine (VISTARIL) 25 MG capsule Take 1-2 capsules (25-50 mg)  "by mouth 3 times daily as needed for itching (for muscle spasms) (Patient not taking: Reported on 12/6/2018) 60 capsule 0     oxyCODONE IR (ROXICODONE) 5 MG tablet Take 1 tablet (5 mg) by mouth every 6 hours as needed for moderate to severe pain (Patient not taking: Reported on 12/6/2018) 50 tablet 0     BP Readings from Last 3 Encounters:   01/09/19 140/90   12/06/18 131/79   11/01/18 126/83    Wt Readings from Last 3 Encounters:   01/09/19 (!) 152.5 kg (336 lb 3.2 oz)   12/14/18 (!) 155.1 kg (342 lb)   12/06/18 (!) 155.4 kg (342 lb 11.2 oz)                    Reviewed and updated as needed this visit by clinical staff  Tobacco  Allergies  Meds  Med Hx  Surg Hx  Fam Hx  Soc Hx      Reviewed and updated as needed this visit by Provider  Tobacco         ROS:  Constitutional, HEENT, cardiovascular, pulmonary, gi and gu systems are negative, except as otherwise noted.    OBJECTIVE:     /90   Pulse 76   Temp 97.1  F (36.2  C) (Oral)   Ht 1.956 m (6' 5\")   Wt (!) 152.5 kg (336 lb 3.2 oz)   SpO2 98%   BMI 39.87 kg/m    Body mass index is 39.87 kg/m .  GENERAL: healthy, alert and no distress  EYES: Eyes grossly normal to inspection, PERRL and conjunctivae and sclerae normal  HENT: ear canals and TM's normal, nose and mouth without ulcers or lesions  NECK: no adenopathy, no asymmetry, masses, or scars and thyroid normal to palpation  RESP: lungs clear to auscultation - no rales, rhonchi or wheezes  CV: regular rate and rhythm, normal S1 S2, no S3 or S4, no murmur, click or rub, no peripheral edema and peripheral pulses strong  ABDOMEN: soft, nontender, no hepatosplenomegaly, no masses and bowel sounds normal  MS: no gross musculoskeletal defects noted, no edema  SKIN:erythematous, scaling  plaques on dorsum of wrists, hands, LE, abdomen consistent with psoriasis.  NEURO: Normal strength and tone, mentation intact and speech normal  BACK: no CVA tenderness, no paralumbar tenderness  PSYCH: mentation " "appears normal, affect normal/bright    Diagnostic Test Results:  none     ASSESSMENT/PLAN:         BP Screening:   Last 3 BP Readings:    BP Readings from Last 3 Encounters:   01/09/19 140/90   12/06/18 131/79   11/01/18 126/83       The following was recommended to the patient:  Re-screen within 4 weeks and recommend lifestyle modifications  BMI:   Estimated body mass index is 39.87 kg/m  as calculated from the following:    Height as of this encounter: 1.956 m (6' 5\").    Weight as of this encounter: 152.5 kg (336 lb 3.2 oz).   Weight management plan: Discussed healthy diet and exercise guidelines      1. Anxiety state  Refilled Celexa and gave #16 tabs of Ativan, reviewed refill procedure.  He is to follow with Psychaitry for medication management as scheduled.    - citalopram (CELEXA) 40 MG tablet; Take 1 tablet (40 mg) by mouth daily  Dispense: 90 tablet; Refill: 1  - LORazepam (ATIVAN) 1 MG tablet; Take 1 tablet (1 mg) by mouth 2 times daily as needed for anxiety  Dispense: 16 tablet; Refill: 0    2. Panic disorder without agoraphobia  Refilled Celexa, follow with Psychaitry. He has a counselor, needs to schedule back.  - citalopram (CELEXA) 40 MG tablet; Take 1 tablet (40 mg) by mouth daily  Dispense: 90 tablet; Refill: 1    3. Viral upper respiratory tract infection  push fluids, rest, symptomatic treatment as needed.  Avoid Nyquil and sudafed as they raise BP, recommended Coricidinb HBP for congestion instead.    4. Elevated blood pressure reading without diagnosis of hypertension  Likely due to Sudafed and Nyquil, recheck BP in 1 month.    5. Psoriatic arthritis (H)  Follow back with Dermatology, reviewed general skin care.      See Patient Instructions    LINNEA Krishna [facility]  "

## 2019-01-24 PROBLEM — Z47.89 AFTERCARE FOLLOWING SURGERY OF THE MUSCULOSKELETAL SYSTEM: Status: RESOLVED | Noted: 2018-05-10 | Resolved: 2019-01-24

## 2019-01-24 PROBLEM — M25.571 PAIN IN JOINT INVOLVING ANKLE AND FOOT, RIGHT: Status: RESOLVED | Noted: 2018-05-10 | Resolved: 2019-01-24

## 2019-01-31 ENCOUNTER — TELEPHONE (OUTPATIENT)
Dept: ORTHOPEDICS | Facility: CLINIC | Age: 39
End: 2019-01-31

## 2019-02-06 ENCOUNTER — OFFICE VISIT (OUTPATIENT)
Dept: FAMILY MEDICINE | Facility: CLINIC | Age: 39
End: 2019-02-06
Payer: COMMERCIAL

## 2019-02-06 VITALS
DIASTOLIC BLOOD PRESSURE: 96 MMHG | SYSTOLIC BLOOD PRESSURE: 156 MMHG | HEIGHT: 77 IN | BODY MASS INDEX: 37.19 KG/M2 | OXYGEN SATURATION: 97 % | TEMPERATURE: 98 F | WEIGHT: 315 LBS | HEART RATE: 78 BPM

## 2019-02-06 DIAGNOSIS — Z13.6 CARDIOVASCULAR SCREENING; LDL GOAL LESS THAN 130: ICD-10-CM

## 2019-02-06 DIAGNOSIS — F10.11 ALCOHOL ABUSE, IN REMISSION: ICD-10-CM

## 2019-02-06 DIAGNOSIS — F41.0 PANIC DISORDER WITHOUT AGORAPHOBIA: Primary | ICD-10-CM

## 2019-02-06 DIAGNOSIS — F33.1 MODERATE EPISODE OF RECURRENT MAJOR DEPRESSIVE DISORDER (H): ICD-10-CM

## 2019-02-06 DIAGNOSIS — L40.50 PSORIATIC ARTHRITIS (H): ICD-10-CM

## 2019-02-06 DIAGNOSIS — I10 ESSENTIAL HYPERTENSION: ICD-10-CM

## 2019-02-06 PROCEDURE — 99214 OFFICE O/P EST MOD 30 MIN: CPT | Performed by: NURSE PRACTITIONER

## 2019-02-06 RX ORDER — CHLORTHALIDONE 25 MG/1
25 TABLET ORAL DAILY
Qty: 90 TABLET | Refills: 3 | Status: SHIPPED | OUTPATIENT
Start: 2019-02-06 | End: 2019-07-09

## 2019-02-06 RX ORDER — HYDROXYZINE HYDROCHLORIDE 25 MG/1
25 TABLET, FILM COATED ORAL EVERY 6 HOURS PRN
Qty: 60 TABLET | Refills: 1 | Status: SHIPPED | OUTPATIENT
Start: 2019-02-06 | End: 2019-02-26

## 2019-02-06 ASSESSMENT — ANXIETY QUESTIONNAIRES
GAD7 TOTAL SCORE: 17
1. FEELING NERVOUS, ANXIOUS, OR ON EDGE: MORE THAN HALF THE DAYS
IF YOU CHECKED OFF ANY PROBLEMS ON THIS QUESTIONNAIRE, HOW DIFFICULT HAVE THESE PROBLEMS MADE IT FOR YOU TO DO YOUR WORK, TAKE CARE OF THINGS AT HOME, OR GET ALONG WITH OTHER PEOPLE: SOMEWHAT DIFFICULT
7. FEELING AFRAID AS IF SOMETHING AWFUL MIGHT HAPPEN: NEARLY EVERY DAY
2. NOT BEING ABLE TO STOP OR CONTROL WORRYING: MORE THAN HALF THE DAYS
3. WORRYING TOO MUCH ABOUT DIFFERENT THINGS: MORE THAN HALF THE DAYS
6. BECOMING EASILY ANNOYED OR IRRITABLE: NEARLY EVERY DAY
5. BEING SO RESTLESS THAT IT IS HARD TO SIT STILL: MORE THAN HALF THE DAYS

## 2019-02-06 ASSESSMENT — PAIN SCALES - GENERAL: PAINLEVEL: NO PAIN (0)

## 2019-02-06 ASSESSMENT — MIFFLIN-ST. JEOR: SCORE: 2560.12

## 2019-02-06 ASSESSMENT — PATIENT HEALTH QUESTIONNAIRE - PHQ9
5. POOR APPETITE OR OVEREATING: NEARLY EVERY DAY
SUM OF ALL RESPONSES TO PHQ QUESTIONS 1-9: 10

## 2019-02-06 NOTE — PROGRESS NOTES
"  SUBJECTIVE:   Lakhwinder Jones is a 38 year old male who presents to clinic today for the following health issues:      Anxiety Follow-Up    Status since last visit: Worsened     Other associated symptoms: Waking up with panic attacks or freaked out in the middle of the night. Wife would have to calm him down.    Complicating factors:   Significant life event: Yes-  A lot of stressful things that's happened in the last few months   Current substance abuse: None  Depression symptoms: Yes-  A little bit of depression, \"i don't feel good in general. Hard for me to explain it\"    BISMARK-7 SCORE 8/21/2017 12/6/2018 2/6/2019   Total Score 19 4 17   Patient has multiple stressors- work/finaancial stress, Father in-law recently diagnosed with cancer and his pet recently was quarantined for attacking a neighbor. His anxiety has worsened over the last month. He is not sleeping well, sleeps 4-5 hours/noc, nonrestorative.  Has a difficulty time getting to sleep and has early am awakening, worries about the following day.  He has appt with Psychiatry at [facility] on 2/21/19. PHQ=-9=10, BISMARK-7=17 today, no SI/HI. He's done well wiith Celexa 40 mg daily but states it is not covering his symptoms currently.    BISMARK-7    Amount of exercise or physical activity: None    Problems taking medications regularly: No    Medication side effects: none    Diet: regular (no restrictions)        Problem list and histories reviewed & adjusted, as indicated.  Additional history: as documented    Patient Active Problem List   Diagnosis     Anxiety state     Chronic back pain     Moderate episode of recurrent major depressive disorder (H)     Panic disorder without agoraphobia     CARDIOVASCULAR SCREENING; LDL GOAL LESS THAN 160     Abnormal results of liver function studies     Psoriatic arthritis (H)     Morbid obesity (H)     Elevated blood-pressure reading without diagnosis of hypertension     Closed nondisplaced fracture of fifth metatarsal bone of " right foot, initial encounter     Alcohol abuse, in remission     Vitamin D deficiency     Essential hypertension     Past Surgical History:   Procedure Laterality Date     DISCECTOMY LUMBAR POSTERIOR MICROSCOPIC ONE LEVEL Left 2018    Procedure: Left Lumbar 4-5 Microdisectomy ;  Surgeon: Favio Miranda MD;  Location: [facility]     FOOT SURGERY Right      NOSE SURGERY      3 times     right elbow surgey      12 yo     TESTICLE SURGERY      21 yo       Social History     Tobacco Use     Smoking status: Former Smoker     Packs/day: 0.20     Years: 10.00     Pack years: 2.00     Types: Cigarettes     Last attempt to quit: 2015     Years since quittin.1     Smokeless tobacco: Never Used   Substance Use Topics     Alcohol use: Yes     Alcohol/week: 4.8 oz     Types: 8 Standard drinks or equivalent per week     Comment: not for a month     Family History   Problem Relation Age of Onset     Obesity Mother      Diabetes Father      Coronary Artery Disease Father      Hypertension Father      Hyperlipidemia Father      Depression Father      Anxiety Disorder Father      Mental Illness Father      Substance Abuse Father      Breast Cancer Maternal Grandmother      Depression Maternal Grandmother      Mental Illness Maternal Grandmother      Substance Abuse Maternal Grandmother      Prostate Cancer Maternal Grandfather      Diabetes Paternal Grandmother      Breast Cancer Paternal Grandmother      Depression Paternal Grandmother      Mental Illness Paternal Grandmother      Diabetes Paternal Grandfather      Asthma Brother      Diabetes Paternal Uncle      Cerebrovascular Disease No family hx of      Anesthesia Reaction No family hx of      Osteoporosis No family hx of      Genetic Disorder No family hx of      Thyroid Disease No family hx of      Liver Disease No family hx of          Current Outpatient Medications   Medication Sig Dispense Refill     Acetaminophen (TYLENOL PO) Take 1,000 mg by mouth as  "needed for mild pain or fever       apremilast (OTEZLA) 30 MG tablet Take 1 tablet (30 mg) by mouth 2 times daily 60 tablet 3     chlorthalidone (HYGROTON) 25 MG tablet Take 1 tablet (25 mg) by mouth daily 90 tablet 3     citalopram (CELEXA) 40 MG tablet Take 1 tablet (40 mg) by mouth daily 90 tablet 1     cyclobenzaprine (FLEXERIL) 10 MG tablet Take 1 tablet (10 mg) by mouth 2 times daily as needed for muscle spasms 60 tablet 1     hydrOXYzine (ATARAX) 25 MG tablet Take 1 tablet (25 mg) by mouth every 6 hours as needed for anxiety 60 tablet 1     LORazepam (ATIVAN) 1 MG tablet Take 1 tablet (1 mg) by mouth 2 times daily as needed for anxiety 16 tablet 0     multivitamin, therapeutic with minerals (MULTI-VITAMIN) TABS tablet Take 1 tablet by mouth every morning       NONFORMULARY as needed CBD OIL       order for DME Equipment being ordered: Digital home blood pressure monitor kit 1 Dose 0     oxyCODONE IR (ROXICODONE) 5 MG tablet Take 1 tablet (5 mg) by mouth every 6 hours as needed for moderate to severe pain (Patient not taking: Reported on 2/6/2019) 50 tablet 0     BP Readings from Last 3 Encounters:   02/06/19 (!) 156/96   01/09/19 140/90   12/06/18 131/79    Wt Readings from Last 3 Encounters:   02/06/19 (!) 151.8 kg (334 lb 9.6 oz)   01/09/19 (!) 152.5 kg (336 lb 3.2 oz)   12/14/18 (!) 155.1 kg (342 lb)                    Reviewed and updated as needed this visit by clinical staff  Tobacco  Allergies  Meds  Med Hx  Surg Hx  Fam Hx  Soc Hx      Reviewed and updated as needed this visit by Provider         ROS:  Constitutional, HEENT, cardiovascular, pulmonary, gi and gu systems are negative, except as otherwise noted.    OBJECTIVE:     BP (!) 156/96   Pulse 78   Temp 98  F (36.7  C) (Tympanic)   Ht 1.956 m (6' 5\")   Wt (!) 151.8 kg (334 lb 9.6 oz)   SpO2 97%   BMI 39.68 kg/m     Body mass index is 39.68 kg/m .  GENERAL: healthy, alert and no distress  EYES: Eyes grossly normal to inspection, PERRL " "and conjunctivae and sclerae normal, no bruits  HENT: ear canals and TM's normal, nose and mouth without ulcers or lesions  NECK: no adenopathy, no asymmetry, masses, or scars and thyroid normal to palpation  RESP: lungs clear to auscultation - no rales, rhonchi or wheezes  CV: regular rate and rhythm, normal S1 S2, no S3 or S4, no murmur, click or rub, no peripheral edema and peripheral pulses strong  ABDOMEN: soft, nontender, no hepatosplenomegaly, no masses and bowel sounds normal  MS: no gross musculoskeletal defects noted, no edema  SKIN: no suspicious lesions or rashes and plaques/lichenification - arms, hands and lower legs  Consistent with Psoriasis.  NEURO: Normal strength and tone, mentation intact and speech normal  PSYCH: mentation appears normal, anxious and appearance well groomed  LYMPH: normal ant/post cervical, supraclavicular nodes    Diagnostic Test Results:  none     ASSESSMENT/PLAN:       BP Screening:   Last 3 BP Readings:    BP Readings from Last 3 Encounters:   02/06/19 (!) 156/96   01/09/19 140/90   12/06/18 131/79       The following was recommended to the patient:  Recommend lifestyle modifications and Anti-hypertensive phramacology therapy  BMI:   Estimated body mass index is 39.68 kg/m  as calculated from the following:    Height as of this encounter: 1.956 m (6' 5\").    Weight as of this encounter: 151.8 kg (334 lb 9.6 oz).   Weight management plan: Discussed healthy diet and exercise guidelines      1. Panic disorder without agoraphobia  Will keep him on the Celexa 40 mg for now and he is to follow with Psychaitry for medication change as scheduled.  Adding Atarax up to QID for anxiety, reviewed sleep hygiene in detail, self soothing activities.   - hydrOXYzine (ATARAX) 25 MG tablet; Take 1 tablet (25 mg) by mouth every 6 hours as needed for anxiety  Dispense: 60 tablet; Refill: 1    2. Alcohol abuse, in remission  Stable at this time, congratulated him on his continued " sobriety.,    3. Psoriatic arthritis (H)  Follow with Rheumatology.    4. Moderate episode of recurrent major depressive disorder (H)  PHQ-9=10.  Continue with Celexa.  Psychaitry my change medication on 2/23/19. No SI/HI, wife is supportive, reviewed ER precautions, indications for urgent care visit. Return to clinic 2 weeks.    5. Essential hypertension  Starting chlorthalidone and pt to return to clinic 2 weeks for BP recheck/labs.  Patient to work on losing some weight, low salt diet reviewed in detail, benefits of regular exercise.  - chlorthalidone (HYGROTON) 25 MG tablet; Take 1 tablet (25 mg) by mouth daily  Dispense: 90 tablet; Refill: 3  - **Basic metabolic panel FUTURE 14d; Future  - order for DME; Equipment being ordered: Digital home blood pressure monitor kit  Dispense: 1 Dose; Refill: 0    Work on weight loss  Regular exercise  See Patient Instructions    LINNEA Krishna [facility]

## 2019-02-06 NOTE — PATIENT INSTRUCTIONS
Patient Education     Treating Anxiety Disorders with Medicine  An anxiety disorder can make you feel nervous or apprehensive, even without a clear reason. In people age 65 and older, generalized anxiety disorder is one of the most commonly diagnosed anxiety disorders. Many times it occurs with depression. Certain anxiety disorders can cause intense feelings of fear or panic. You may even have physical symptoms such as a racing heartbeat, sweating, or dizziness. If you have these feelings, you don t have to suffer anymore. Treatment to help you overcome your fears will likely include therapy (also called counseling). Medicine may also be prescribed to help control your symptoms.    Medicines  Certain medicines may be prescribed to help control your symptoms. So you may feel less anxious. You may also feel able to move forward with therapy. At first, medicines and dosages may need to be adjusted to find what works best for you. Try to be patient. Tell your healthcare provider how a medicine makes you feel. This way, you can work together to find the treatment that s best for you. Keep in mind that medicines can have side effects. Talk with your provider about any side effects that are bothering you. Changing the dose or type of medicine may help. Don t stop taking medicine on your own. That can cause symptoms to come back.    Anti-anxiety medicine. This medicine eases symptoms and helps you relax. Your healthcare provider will explain when and how to use it. It may be prescribed for use before situations that make you anxious. You may also be told to take medicine on a regular schedule. Anti-anxiety medicine may make you feel a little sleepy or  out of it.  Don t drive a car or operate machinery while on this medicine, until you know how it affects you.  Caution  Never use alcohol or other drugs with anti-anxiety medicines. This could result in loss of muscular control, sedation, coma, or death. Also, use only the  amount of medicine prescribed for you. If you think you may have taken too much, get emergency care right away.     Antidepressant medicine. This kind of medicine is often used to treat anxiety, even if you aren t depressed. An antidepressant helps balance out brain chemicals. This helps keep anxiety under control. This medicine is taken on a schedule. It takes a few weeks to start working. If you don t notice a change at first, you may just need more time. But if you don t notice results after the first few weeks, tell your provider.  Keep taking medicines as prescribed  Never change your dosage, share or use another person's medicine, or stop taking your medicines without talking to your healthcare provider first. Keep the following in mind:    Some medicines must be taken on a schedule. Make this part of your daily routine. For instance, always take your pill before brushing your teeth. A pillbox can help you remember if you ve taken your medicine each day.    Medicines are often taken for 6 to 12 months. Your healthcare provider will then evaluate whether you need to stay on them. Many people who have also had therapy may no longer need medicine to manage anxiety.    You may need to stop taking medicine slowly to give your body time to adjust. When it s time to stop, your healthcare provider will tell you more. Remember: Never stop taking your medicine without talking to your provider first.    If symptoms return, you may need to start taking medicines again. This isn t your fault. It s just the nature of your anxiety disorder.  Special concerns    Side effects. Medicines may cause side effects. Ask your healthcare provider or pharmacist what you can expect. They may have ideas for avoiding some side effects.    Sexual problems. Some antidepressants can affect your desire for sex or your ability to have an orgasm. A change in dosage or medicine often solves the problem. If you have a sexual side effect that  concerns you, tell your healthcare provider.    Addiction. If you ve never had a problem with drugs or alcohol, you may not have a problem with medicines used to treat anxiety disorders. But always discuss the medicines with your healthcare provider before taking them. If you have a history of addiction, you may not be able to use certain medicines used to treat anxiety disorders.    Medicine interactions. Always check with your pharmacist before using any over-the-counter medicines, including herbal supplements.   Date Last Reviewed: 5/1/2017 2000-2018 [employer]. [address] [place] [address]. All rights reserved. This information is not intended as a substitute for professional medical care. Always follow your healthcare professional's instructions.           Patient Education     Patient Education    Hydroxyzine Hydrochloride Oral solution    Hydroxyzine Hydrochloride Oral tablet    Hydroxyzine Hydrochloride Solution for injection    Hydroxyzine Pamoate Oral capsule    Hydroxyzine Pamoate Oral suspension  Hydroxyzine Hydrochloride Oral tablet  What is this medicine?  HYDROXYZINE (qing DROX i zeen) is an antihistamine. This medicine is used to treat allergy symptoms. It is also used to treat anxiety and tension. This medicine can be used with other medicines to induce sleep before surgery.  This medicine may be used for other purposes; ask your health care provider or pharmacist if you have questions.  What should I tell my health care provider before I take this medicine?  They need to know if you have any of these conditions:    any chronic illness    difficulty passing urine    glaucoma    heart disease    kidney disease    liver disease    lung disease    an unusual or allergic reaction to hydroxyzine, cetirizine, other medicines, foods, dyes, or preservatives    pregnant or trying to get pregnant    breast-feeding  How should I use this medicine?  Take this medicine by mouth with  a full glass of water. Follow the directions on the prescription label. You may take this medicine with food or on an empty stomach. Take your medicine at regular intervals. Do not take your medicine more often than directed.  Talk to your pediatrician regarding the use of this medicine in children. Special care may be needed. While this drug may be prescribed for children as young as 6 years of age for selected conditions, precautions do apply.  Patients over 65 years old may have a stronger reaction and need a smaller dose.  Overdosage: If you think you have taken too much of this medicine contact a poison control center or emergency room at once.  NOTE: This medicine is only for you. Do not share this medicine with others.  What if I miss a dose?  If you miss a dose, take it as soon as you can. If it is almost time for your next dose, take only that dose. Do not take double or extra doses.  What may interact with this medicine?    alcohol    barbiturate medicines for sleep or seizures    medicines for colds, allergies    medicines for depression, anxiety, or emotional disturbances    medicines for pain    medicines for sleep    muscle relaxants  This list may not describe all possible interactions. Give your health care provider a list of all the medicines, herbs, non-prescription drugs, or dietary supplements you use. Also tell them if you smoke, drink alcohol, or use illegal drugs. Some items may interact with your medicine.  What should I watch for while using this medicine?  Tell your doctor or health care professional if your symptoms do not improve.  You may get drowsy or dizzy. Do not drive, use machinery, or do anything that needs mental alertness until you know how this medicine affects you. Do not stand or sit up quickly, especially if you are an older patient. This reduces the risk of dizzy or fainting spells. Alcohol may interfere with the effect of this medicine. Avoid alcoholic drinks.  Your mouth  may get dry. Chewing sugarless gum or sucking hard candy, and drinking plenty of water may help. Contact your doctor if the problem does not go away or is severe.  This medicine may cause dry eyes and blurred vision. If you wear contact lenses you may feel some discomfort. Lubricating drops may help. See your eye doctor if the problem does not go away or is severe.  If you are receiving skin tests for allergies, tell your doctor you are using this medicine.  What side effects may I notice from receiving this medicine?  Side effects that you should report to your doctor or health care professional as soon as possible:    fast or irregular heartbeat    difficulty passing urine    seizures    slurred speech or confusion    tremor  Side effects that usually do not require medical attention (report to your doctor or health care professional if they continue or are bothersome):    constipation    drowsiness    fatigue    headache    stomach upset  This list may not describe all possible side effects. Call your doctor for medical advice about side effects. You may report side effects to FDA at 4-004-FDA-4125.  Where should I keep my medicine?  Keep out of the reach of children.  Store at room temperature between 15 and 30 degrees C (59 and 86 degrees F). Keep container tightly closed. Throw away any unused medicine after the expiration date.  NOTE:This sheet is a summary. It may not cover all possible information. If you have questions about this medicine, talk to your doctor, pharmacist, or health care provider. Copyright  2016 [employer]           Patient Education     Chlorthalidone tablets  Brand Name: Thalitone  What is this medicine?  CHLORTHALIDONE (klor KIM i done) is a diuretic. It increases the amount of urine passed, which causes the body to lose salt and water. This medicine is used to treat high blood pressure and edema or water retention.  How should I use this medicine?  Take this medicine by mouth with  a glass of water. Follow the directions on the prescription label. It is best to take your dose in the morning with food. Take your medicine at regular intervals. Do not take your medicine more often than directed. Do not stop taking except on your doctor's advice.  Talk to your pediatrician regarding the use of this medicine in children. Special care may be needed.  What side effects may I notice from receiving this medicine?  Side effects that you should report to your doctor or health care professional as soon as possible:    allergic reactions like skin rash, itching or hives, swelling of the face, lips, or tongue    dark urine    dry mouth    excess thirst    fast, irregular heart rate    fever, chills    muscle pain, cramps, or spasm    nausea, vomiting    redness, blistering, peeling or loosening of the skin, including inside the mouth    tingling, pain or numbness in the hands or feet    unusually weak or tired    yellowing of the eyes or skin  Side effects that usually do not require medical attention (report to your doctor or health care professional if they continue or are bothersome):    diarrhea or constipation    headache    impotence    loss of appetite    stomach upset  What may interact with this medicine?    barbiturate medicines for sleep or seizure control    digoxin    lithium    medicines for diabetes    norepinephrine    other medicines for high blood pressure    some pain medicines    steroid hormones like prednisone, cortisone, hydrocortisone, corticotropin    tubocurarine    What if I miss a dose?  If you miss a dose, take it as soon as you can. If it is almost time for your next dose, take only that dose. Do not take double or extra doses.  Where should I keep my medicine?  Keep out of the reach of children.  Store at room temperature between 15 and 30 degrees C (59 and 86 degrees F). Keep container tightly closed. Throw away any unused medicine after the expiration date.  What should I  tell my health care provider before I take this medicine?  They need to know if you have any of these conditions:    asthma    diabetes    gout    kidney disease    liver disease    parathyroid disease    systemic lupus erythematosus (SLE)    taking cortisone, digoxin, lithium carbonate, or drugs for diabetes    an unusual or allergic reaction to chlorthalidone, sulfa drugs, other medicines, foods, dyes, or preservatives    pregnant or trying to get pregnant    breast-feeding  What should I watch for while using this medicine?  Visit your doctor or health care professional for regular check ups. Check your blood pressure as directed. Ask your doctor or health care professional what your blood pressure should be and when you should contact him or her.  You may need to be on a special diet while taking this medicine. Ask your doctor.  You may get drowsy or dizzy. Do not drive, use machinery, or do anything that needs mental alertness until you know how this medicine affects you. Do not stand or sit up quickly, especially if you are an older patient. This reduces the risk of dizzy or fainting spells. Alcohol may interfere with the effect of this medicine. Avoid alcoholic drinks.  This medicine may affect your blood sugar level. If you have diabetes, check with your doctor or health care professional before changing the dose of your diabetic medicine.  This medicine can make you more sensitive to the sun. Keep out of the sun. If you cannot avoid being in the sun, wear protective clothing and use sunscreen. Do not use sun lamps or tanning beds/booths.  NOTE:This sheet is a summary. It may not cover all possible information. If you have questions about this medicine, talk to your doctor, pharmacist, or health care provider. Copyright  2018 [employer]           Patient Education     High Blood Pressure, New, Begin Treatment  Your blood pressure was high enough today to start treatment with medicines. Often healthcare  providers don t know what causes high blood pressure (hypertension). But it can be controlled with lifestyle changes and medicines. High blood pressure usually has no symptoms. But it can sometimes cause headache, dizziness, blurred vision, a rushing sound in your ears, chest pain, or shortness of breath. But even without symptoms, high blood pressure that s not treated raises your risk for heart attack, heart failure, and stroke. High blood pressure is a serious health risk and shouldn t be ignored.    Blood pressure measurements are given as 2 numbers. Systolic blood pressure is the upper number. This is the pressure when the heart contracts. Diastolic blood pressure is the lower number. This is the pressure when the heart relaxes between beats. You will see your blood pressure readings written together. For example, a person with a systolic pressure of 118 and a diastolic pressure of 78 will have 118/78 written in the medical record.   Blood pressure is categorized as normal, elevated, or stage 1 or stage 2 high blood pressure:    Normal blood pressure is systolic of less than 120 and diastolic of less than 80 (120/80)    Elevated blood pressure is systolic of 120 to 129 and diastolic less than 80    Stage 1 high blood pressure is systolic is 130 to 139 or diastolic between 80 to 89    Stage 2 high blood pressure is when systolic is 140 or higher or the diastolic is 90 or higher  Home care  If you have high blood pressure, you should do what is listed below to lower your blood pressure. If you are taking medicines for high blood pressure, these methods may reduce or end your need for medicines in the future.    Begin a weight-loss program if you are overweight.    Cut back on how much salt you get in your diet. Here s how to do this:  ? Don t eat foods that have a lot of salt. These include olives, pickles, smoked meats, and salted potato chips.  ? Don t add salt to your food at the table.  ? Use only small  amounts of salt when cooking.  ? Review food labels to track how much salt is in prepared foods.  ? When eating out, ask that no additional salt be added to your food order.    Begin an exercise program. Talk with your healthcare provider about the type of exercise program that would be best for you. It doesn't have to be hard. Even brisk walking for 20 minutes 3 times a week is a good form of exercise.    Don t take medicines that have heart stimulants. This includes many over-the-counter cold and sinus decongestant pills and sprays, as well as diet pills. Check the warnings about high blood pressure on the label. Before purchasing any over-the-counter medicines or supplements, always ask the pharmacist about the product's potential interaction with your high blood pressure and your high blood pressure medicines.    Stimulants such as amphetamine or cocaine could be lethal for someone with high blood pressure. Never take these.    Limit how much caffeine you get in your diet. Switch to caffeine-free products.    Stop smoking. If you are a long-time smoker, this can be hard. Enroll in a stop-smoking program to make it more likely that you will quit for good.    Learn how to handle stress. This is an important part of any program to lower blood pressure. Learn about relaxation methods like meditation, yoga, or biofeedback.    If your provider prescribed medicines, take them exactly as directed. Missing doses may cause your blood pressure get out of control.    If you miss a dose or doses, check with your healthcare provider or pharmacist about what to do.    Consider buying an automatic blood pressure machine. Your provider can make a recommendation. You can get one of these at most pharmacies.  The American Heart Association recommends the following guidelines for home blood pressure monitoring:    Don't smoke or drink coffee or other caffeinated drinks for 30 minutes before taking your blood pressure.    Go to the  bathroom before the test.    Relax for 5 minutes before taking the measurement.    Sit with your back supported (don't sit on a couch or soft chair); keep your feet on the floor uncrossed. Place your arm on a solid flat surface (like a table) with the upper part of the arm at heart level. Place the middle of the cuff directly above the bend of the elbow. Check the monitor's instruction manual for an illustration.    Take multiple readings. When you measure, take 2 to 3 readings one minute apart and record all of the results.    Take your blood pressure at the same time every day, or as your healthcare provider recommends.    Record the date, time, and blood pressure reading.    Take the record with you to your next medical appointment. If your blood pressure monitor has a built-in memory, simply take the monitor with you to your next appointment.    Call your provider if you have several high readings. Don't be frightened by a single high blood pressure reading, but if you get several high readings, check in with your healthcare provider.    Note: When blood pressure reaches a systolic (top number) of 180 or higher OR diastolic (bottom number) of 110 or higher, seek emergency medical treatment.  Follow-up care  Because a new blood pressure medicine was started today, it s important that you have your blood pressure rechecked. This is to make sure that the medicine is working and that you have no serious side effects. Keep all your follow up appointments. Write down medicine and blood pressure questions and bring them to your next appointment. If you have pressing concerns about your new medicine or your blood pressure, call your provider. Unless told otherwise, follow up with your healthcare provider or this facility within the next 3 days.  When to seek medical advice  Call your healthcare provider right away if any of these occur:    Blood pressure reaches a systolic (top number) of 180 or higher, OR diastolic  (bottom number) of 110 or higher, seek emergency medical treatment.    Chest pain or shortness of breath    Severe headache    Throbbing or rushing sound in the ears    Nosebleed    Sudden severe pain in your belly (abdomen)    Extreme drowsiness, confusion, or fainting    Dizziness or dizziness with a spinning sensation (vertigo)    Weakness of an arm or leg or one side of the face    You have problems speaking or seeing   Date Last Reviewed: 12/1/2016 2000-2018 [employer]. [address] [place] [address]. All rights reserved. This information is not intended as a substitute for professional medical care. Always follow your healthcare professional's instructions.

## 2019-02-08 ASSESSMENT — ANXIETY QUESTIONNAIRES: GAD7 TOTAL SCORE: 17

## 2019-02-12 ENCOUNTER — TELEPHONE (OUTPATIENT)
Dept: ORTHOPEDICS | Facility: CLINIC | Age: 39
End: 2019-02-12

## 2019-02-12 NOTE — TELEPHONE ENCOUNTER
[facility] Call Center    Phone Message    May a detailed message be left on voicemail: yes    Reason for Call: Other: pt is needing a return to work letter with no restrictions faxed to [phone] attn to Isaiah BOJORQUEZ. Pt has been waiting for this for over a month. Please call pt for further discussion   Pt needs this today    Action Taken: Message routed to:  [facility] ([facility] orthopedics

## 2019-02-26 ENCOUNTER — OFFICE VISIT (OUTPATIENT)
Dept: PSYCHIATRY | Facility: CLINIC | Age: 39
End: 2019-02-26
Attending: NURSE PRACTITIONER
Payer: COMMERCIAL

## 2019-02-26 VITALS
WEIGHT: 315 LBS | BODY MASS INDEX: 39.16 KG/M2 | HEART RATE: 81 BPM | DIASTOLIC BLOOD PRESSURE: 78 MMHG | SYSTOLIC BLOOD PRESSURE: 145 MMHG

## 2019-02-26 DIAGNOSIS — F41.0 PANIC DISORDER WITHOUT AGORAPHOBIA: ICD-10-CM

## 2019-02-26 PROCEDURE — G0463 HOSPITAL OUTPT CLINIC VISIT: HCPCS | Mod: ZF

## 2019-02-26 RX ORDER — HYDROXYZINE HYDROCHLORIDE 25 MG/1
25-50 TABLET, FILM COATED ORAL 2 TIMES DAILY
Qty: 120 TABLET | Refills: 1 | Status: SHIPPED | OUTPATIENT
Start: 2019-02-26 | End: 2019-07-10

## 2019-02-26 RX ORDER — BUSPIRONE HYDROCHLORIDE 7.5 MG/1
7.5 TABLET ORAL 2 TIMES DAILY
Qty: 60 TABLET | Refills: 1 | Status: SHIPPED | OUTPATIENT
Start: 2019-02-26 | End: 2019-03-19 | Stop reason: SINTOL

## 2019-02-26 ASSESSMENT — PAIN SCALES - GENERAL: PAINLEVEL: MODERATE PAIN (5)

## 2019-02-26 NOTE — NURSING NOTE
Chief Complaint   Patient presents with     Eval/Assessment     Panic disorder without agoraphobia

## 2019-02-26 NOTE — PATIENT INSTRUCTIONS
Thank you for coming to the [facility].    Lab Testing:  If you had lab testing today and your results are reassuring or normal they will be mailed to you or sent through [employer] within 7 days.   If the lab tests need quick action we will call you with the results.  The phone number we will call with results is # [phone] (home) . If this is not the best number please call our clinic and change the number.    Medication Refills:  If you need any refills please call your pharmacy and they will contact us. Our fax number for refills is [phone]. Please allow three business for refill processing.   If you need to  your refill at a new pharmacy, please contact the new pharmacy directly. The new pharmacy will help you get your medications transferred.     Scheduling:  If you have any concerns about today's visit or wish to schedule another appointment please call our office during normal business hours [phone] (8-5:00 M-F)    Contact Us:  Please call [phone] during business hours (8-5:00 M-F).  If after clinic hours, or on the weekend, please call  [phone].    Financial Assistance [phone]  [facility] Billing [phone]  [facility] Billing [phone]  Medical Records [phone]      MENTAL HEALTH CRISIS NUMBERS:  [facility]:   [facility] - [phone]   Crisis Residence [facility] - [phone]   Walk-In Counseling [facility] [phone]   COPE 24/7 [facility] Team for Adults - [[phone]]; Child - [[phone]]     Crisis Connection - [phone]     [facility]:   [facility] - [phone]   Walk-in counseling [facility] - [phone]   Walk-in counseling [facility] - [phone]   Crisis Residence [facility] - [phone]   Urgent Care Adult Mental Health:   --Drop-in, 24/7 crisis line, and [place] Mobile Team [[phone]]    CRISIS TEXT  LINE: Text [phone] from anywhere, anytime, any crisis 24/7;    OR SEE [email or web address]     Poison Control Center - [phone]    CHILD: [facility] needs assessment team - [phone]     [facility] - [phone]; or [facility] - [phone]    If you have a medical emergency please call 911or go to the nearest ER.                    _____________________________________________    Again thank you for choosing [facility] and please let us know how we can best partner with you to improve you and your family's health.  You may be receiving a survey in the mail regarding this appointment. We would love to have your feedback, both positive and negative, so please fill out the survey and return it using the provided envelope. The survey is done by an external company, so your answers are anonymous.
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
## 2019-03-19 ENCOUNTER — OFFICE VISIT (OUTPATIENT)
Dept: PSYCHIATRY | Facility: CLINIC | Age: 39
End: 2019-03-19
Attending: NURSE PRACTITIONER
Payer: COMMERCIAL

## 2019-03-19 VITALS
WEIGHT: 315 LBS | HEART RATE: 71 BPM | DIASTOLIC BLOOD PRESSURE: 88 MMHG | SYSTOLIC BLOOD PRESSURE: 134 MMHG | BODY MASS INDEX: 39.2 KG/M2

## 2019-03-19 DIAGNOSIS — F41.1 ANXIETY STATE: ICD-10-CM

## 2019-03-19 DIAGNOSIS — F41.0 PANIC DISORDER WITHOUT AGORAPHOBIA: ICD-10-CM

## 2019-03-19 DIAGNOSIS — F90.8 ATTENTION DEFICIT HYPERACTIVITY DISORDER (ADHD), OTHER TYPE: Primary | ICD-10-CM

## 2019-03-19 PROCEDURE — G0463 HOSPITAL OUTPT CLINIC VISIT: HCPCS | Mod: ZF

## 2019-03-19 RX ORDER — ATOMOXETINE 40 MG/1
40 CAPSULE ORAL DAILY
Qty: 30 CAPSULE | Refills: 0 | Status: SHIPPED | OUTPATIENT
Start: 2019-03-19 | End: 2019-04-18

## 2019-03-19 ASSESSMENT — PAIN SCALES - GENERAL: PAINLEVEL: NO PAIN (0)
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
## 2019-03-25 ENCOUNTER — TELEPHONE (OUTPATIENT)
Dept: PSYCHIATRY | Facility: CLINIC | Age: 39
End: 2019-03-25

## 2019-03-25 NOTE — TELEPHONE ENCOUNTER
On 2/26/2019 the patient signed an C[facility] authorizing medical records to be released from [facility] to [facility]  for the purpose of continuing care. I faxed the request to [phone]. I sent the original to [facility] to scanning and kept a copy in psychiatry until scanning is complete.  Renee Ferris, CMA

## 2019-03-28 ENCOUNTER — TELEPHONE (OUTPATIENT)
Dept: PSYCHIATRY | Facility: CLINIC | Age: 39
End: 2019-03-28

## 2019-03-28 NOTE — TELEPHONE ENCOUNTER
On 03/27/2019, 19 pages of records were received from [facility]. The original copies were sent to scanning and copies were put in Dajuan's folder. A note was routed to Dajuan to shred his copies. Sandi Whaley/MA

## 2019-04-08 ASSESSMENT — PATIENT HEALTH QUESTIONNAIRE - PHQ9: SUM OF ALL RESPONSES TO PHQ QUESTIONS 1-9: 5

## 2019-04-12 ENCOUNTER — OFFICE VISIT (OUTPATIENT)
Dept: FAMILY MEDICINE | Facility: CLINIC | Age: 39
End: 2019-04-12
Payer: COMMERCIAL

## 2019-04-12 ENCOUNTER — ANCILLARY PROCEDURE (OUTPATIENT)
Dept: GENERAL RADIOLOGY | Facility: CLINIC | Age: 39
End: 2019-04-12
Attending: PHYSICIAN ASSISTANT
Payer: COMMERCIAL

## 2019-04-12 VITALS
RESPIRATION RATE: 18 BRPM | DIASTOLIC BLOOD PRESSURE: 81 MMHG | BODY MASS INDEX: 37.19 KG/M2 | HEART RATE: 78 BPM | SYSTOLIC BLOOD PRESSURE: 132 MMHG | WEIGHT: 315 LBS | HEIGHT: 77 IN | TEMPERATURE: 97.8 F | OXYGEN SATURATION: 97 %

## 2019-04-12 DIAGNOSIS — E66.01 MORBID OBESITY (H): ICD-10-CM

## 2019-04-12 DIAGNOSIS — S99.922A INJURY OF LEFT FOOT, INITIAL ENCOUNTER: ICD-10-CM

## 2019-04-12 DIAGNOSIS — S93.602A SPRAIN OF LEFT FOOT, INITIAL ENCOUNTER: Primary | ICD-10-CM

## 2019-04-12 PROCEDURE — 99213 OFFICE O/P EST LOW 20 MIN: CPT | Performed by: PHYSICIAN ASSISTANT

## 2019-04-12 PROCEDURE — 73630 X-RAY EXAM OF FOOT: CPT | Mod: LT

## 2019-04-12 ASSESSMENT — PAIN SCALES - GENERAL: PAINLEVEL: MODERATE PAIN (5)

## 2019-04-12 ASSESSMENT — MIFFLIN-ST. JEOR: SCORE: 2526.55

## 2019-04-12 NOTE — PROGRESS NOTES
SUBJECTIVE:   Lakhwinder Jones is a 38 year old male who presents to clinic today for the following   health issues:    Joint Pain    Onset: Monday    Description:   Location: Left foot and ankle  Character: Sharp    Intensity: 5/10    Progression of Symptoms: same    Accompanying Signs & Symptoms:  Other symptoms: numbness, warmth, swelling, redness and radiation pain to toes    History:   Previous similar pain: no       Precipitating factors:   Trauma or overuse: YES- rolled it while going down stairs     Alleviating factors:  Improved by: nothing    Therapies Tried and outcome: Ice, Ibuprofen, and CBD- no relief        Additional history: as documented    Reviewed  and updated as needed this visit by clinical staff         Reviewed and updated as needed this visit by Provider         Patient Active Problem List   Diagnosis     Anxiety state     Chronic back pain     Moderate episode of recurrent major depressive disorder (H)     Panic disorder without agoraphobia     CARDIOVASCULAR SCREENING; LDL GOAL LESS THAN 130     Abnormal results of liver function studies     Psoriatic arthritis (H)     Morbid obesity (H)     Elevated blood-pressure reading without diagnosis of hypertension     Closed nondisplaced fracture of fifth metatarsal bone of right foot, initial encounter     Alcohol abuse, in remission     Vitamin D deficiency     Essential hypertension     Past Surgical History:   Procedure Laterality Date     DISCECTOMY LUMBAR POSTERIOR MICROSCOPIC ONE LEVEL Left 2018    Procedure: Left Lumbar 4-5 Microdisectomy ;  Surgeon: Favio Miranda MD;  [facility]     FOOT SURGERY Right      NOSE SURGERY      3 times     right elbow surgey      14 yo     TESTICLE SURGERY      21 yo       Social History     Tobacco Use     Smoking status: Former Smoker     Packs/day: 0.20     Years: 10.00     Pack years: 2.00     Types: Cigarettes     Last attempt to quit: 2015     Years since quittin.3      Smokeless tobacco: Never Used   Substance Use Topics     Alcohol use: Yes     Alcohol/week: 4.8 oz     Types: 8 Standard drinks or equivalent per week     Comment: not for a month     Family History   Problem Relation Age of Onset     Obesity Mother      Diabetes Father      Coronary Artery Disease Father      Hypertension Father      Hyperlipidemia Father      Depression Father      Anxiety Disorder Father      Mental Illness Father      Substance Abuse Father      Breast Cancer Maternal Grandmother      Depression Maternal Grandmother      Mental Illness Maternal Grandmother      Substance Abuse Maternal Grandmother      Prostate Cancer Maternal Grandfather      Diabetes Paternal Grandmother      Breast Cancer Paternal Grandmother      Depression Paternal Grandmother      Mental Illness Paternal Grandmother      Diabetes Paternal Grandfather      Asthma Brother      Diabetes Paternal Uncle      Cerebrovascular Disease No family hx of      Anesthesia Reaction No family hx of      Osteoporosis No family hx of      Genetic Disorder No family hx of      Thyroid Disease No family hx of      Liver Disease No family hx of          Current Outpatient Medications   Medication Sig Dispense Refill     Acetaminophen (TYLENOL PO) Take 1,000 mg by mouth as needed for mild pain or fever       apremilast (OTEZLA) 30 MG tablet Take 1 tablet (30 mg) by mouth 2 times daily 60 tablet 3     chlorthalidone (HYGROTON) 25 MG tablet Take 1 tablet (25 mg) by mouth daily 90 tablet 3     citalopram (CELEXA) 40 MG tablet Take 1 tablet (40 mg) by mouth daily 90 tablet 1     hydrOXYzine (ATARAX) 25 MG tablet Take 1-2 tablets (25-50 mg) by mouth 2 times daily For anxiety and sleep 120 tablet 1     multivitamin, therapeutic with minerals (MULTI-VITAMIN) TABS tablet Take 1 tablet by mouth every morning       NONFORMULARY as needed CBD OIL       order for DME Equipment being ordered: Digital home blood pressure monitor kit 1 Dose 0     atomoxetine  "(STRATTERA) 80 MG capsule Take 1 capsule (80 mg) by mouth daily 45 capsule 0     Allergies   Allergen Reactions     Tramadol Other (See Comments)     Shellfish-Derived Products        ROS:  Constitutional, HEENT, cardiovascular, pulmonary, gi and gu systems are negative, except as otherwise noted.    OBJECTIVE:     /81 (BP Location: Left arm, Patient Position: Sitting, Cuff Size: Thigh)   Pulse 78   Temp 97.8  F (36.6  C) (Oral)   Resp 18   Ht 1.956 m (6' 5\")   Wt 148.4 kg (327 lb 3.2 oz)   SpO2 97%   BMI 38.80 kg/m    Body mass index is 38.8 kg/m .  GENERAL: healthy, alert and no distress  MS: left foot -no swelling, no erythema, no bruising  Skin: severe psoriasis plaques all over  Diagnostic Test Results:  Xray - independent read-left foot, no acute fracture or dislocation     ASSESSMENT/PLAN:       ICD-10-CM    1. Sprain of left foot, initial encounter S93.602A XR Foot Left G/E 3 Views   2. Morbid obesity (H) E66.01      Ibuprofen 600 mg every 6 hours with food for 5 days   Use ace bandage for support  Apply ice   rest      Simona Prasad PA-C  [facility]      "

## 2019-04-12 NOTE — PATIENT INSTRUCTIONS
Ibuprofen 600 mg every 6 hours with food for 5 days   Use ace bandage for support  Apply ice   rest    Patient Education     Foot Sprain    A sprain is a stretching or tearing of the ligaments that hold a joint together. There are usually no broken bones. Sprains generally take from 3 to 6 weeks to heal. A sprain may be treated with a splint, walking cast, or special boot. Mild sprains may not need any additional support.  Home care  The following guidelines will help you care for your injury at home:    Keep your leg elevated when sitting or lying down. This is very important during the first 48 hours to reduce swelling. Stay off the injured foot as much as possible until you can walk on it without pain. If needed, you may use crutches during the first week for this purpose. Crutches can be rented at many pharmacies or surgical/orthopedic supply stores.    You may be given a cast shoe to wear to prevent movement in your foot. If not, you can use a sandal or any shoe that does not put pressure on the injured area until the swelling and pain go away. If using a sandal, be careful not to hit your foot against anything, since another injury could make the sprain worse.    Apply an ice pack over the injured area for 15 to 20 minutes every 3 to 6 hours. You should do this for the first 24 to 48 hours. You can make an ice pack by filling a plastic bag that seals at the top with ice cubes and then wrapping it with a thin towel. Continue to use ice packs for relief of pain and swelling as needed. As the ice melts, try not to get the wrap, splint, or cast wet. After 48 hours, apply heat from a warm shower or bath for 20 minutes several times daily. Alternating ice and heat may also be helpful.    You may use over-the-counter pain medicine to control pain, unless another medicine was prescribed. If you have chronic liver or kidney disease or ever had a stomach ulcer or gastrointestinal bleeding, talk with your healthcare  provider before using these medicines.    If you were given a splint or cast, keep it dry. Bathe with your splint or cast well out of the water, protected with 2 large plastic bags, sealed with tape or rubber-bands at the top end. If a fiberglass splint or cast gets wet, you can dry it with a hair dryer on cool setting.    You may return to sports after healing, when you can run without pain.  Follow-up care  Follow up with your healthcare provider as directed. Sometimes fractures don t show up on the first X-ray. Bruises and sprains can sometimes hurt as much as a fracture. These injuries can take time to heal completely. If your symptoms don t improve or they get worse, talk with your healthcare provider. You may need a repeat X-ray or other tests.  When to seek medical advice  Call your healthcare provider right away if any of these occur:    The plaster cast or splint gets wet or soft    The fiberglass cast or splint gets wet and does not dry for 24 hours    Pain or swelling increases, or redness appears    A bad odor comes from within the cast    Fever of 100.4 F (38 C) or above lasting for 24 to 48 hours, or as advised    Chills    Toes on the injured foot become cold, blue, numb, or tingly   Date Last Reviewed: 5/1/2018 2000-2018 [employer]. [address] [place] [address], [place] [address]. All rights reserved. This information is not intended as a substitute for professional medical care. Always follow your healthcare professional's instructions.

## 2019-04-18 ENCOUNTER — OFFICE VISIT (OUTPATIENT)
Dept: PSYCHIATRY | Facility: CLINIC | Age: 39
End: 2019-04-18
Attending: NURSE PRACTITIONER
Payer: COMMERCIAL

## 2019-04-18 VITALS
WEIGHT: 315 LBS | DIASTOLIC BLOOD PRESSURE: 91 MMHG | BODY MASS INDEX: 38.66 KG/M2 | HEART RATE: 76 BPM | SYSTOLIC BLOOD PRESSURE: 135 MMHG

## 2019-04-18 DIAGNOSIS — F90.8 ATTENTION DEFICIT HYPERACTIVITY DISORDER (ADHD), OTHER TYPE: ICD-10-CM

## 2019-04-18 PROCEDURE — G0463 HOSPITAL OUTPT CLINIC VISIT: HCPCS | Mod: ZF

## 2019-04-18 RX ORDER — ATOMOXETINE 80 MG/1
80 CAPSULE ORAL DAILY
Qty: 35 CAPSULE | Refills: 0 | Status: SHIPPED | OUTPATIENT
Start: 2019-04-18 | End: 2019-04-18

## 2019-04-18 RX ORDER — ATOMOXETINE 80 MG/1
80 CAPSULE ORAL DAILY
Qty: 45 CAPSULE | Refills: 0 | Status: SHIPPED | OUTPATIENT
Start: 2019-04-18 | End: 2019-06-04

## 2019-04-18 ASSESSMENT — PAIN SCALES - GENERAL: PAINLEVEL: NO PAIN (0)
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
## 2019-04-18 NOTE — NURSING NOTE
Chief Complaint   Patient presents with     Recheck Medication     Attention deficit hyperactivity disorder (ADHD), other type

## 2019-04-25 ENCOUNTER — OFFICE VISIT (OUTPATIENT)
Dept: FAMILY MEDICINE | Facility: CLINIC | Age: 39
End: 2019-04-25
Payer: COMMERCIAL

## 2019-04-25 ENCOUNTER — TELEPHONE (OUTPATIENT)
Dept: FAMILY MEDICINE | Facility: CLINIC | Age: 39
End: 2019-04-25

## 2019-04-25 VITALS — SYSTOLIC BLOOD PRESSURE: 136 MMHG | DIASTOLIC BLOOD PRESSURE: 66 MMHG

## 2019-04-25 DIAGNOSIS — L40.50 PSORIASIS WITH ARTHROPATHY (H): Primary | ICD-10-CM

## 2019-04-25 DIAGNOSIS — L40.50 PSORIASIS WITH ARTHROPATHY (H): ICD-10-CM

## 2019-04-25 PROCEDURE — 86481 TB AG RESPONSE T-CELL SUSP: CPT | Performed by: FAMILY MEDICINE

## 2019-04-25 PROCEDURE — 36415 COLL VENOUS BLD VENIPUNCTURE: CPT | Performed by: FAMILY MEDICINE

## 2019-04-25 PROCEDURE — 99213 OFFICE O/P EST LOW 20 MIN: CPT | Performed by: FAMILY MEDICINE

## 2019-04-25 NOTE — PROGRESS NOTES
[facility] - PR[facility]    CC : Psoriasis with arthropathy  SUBJECTIVE:                                                    Lakhwinder Jones is a 38 ear old male who presents to clinic today for follow-up of chronic psoriasis with arthropathy that started in adolescence at age 19. He has continued to have diffuse plaques on the arms, legs, trunk, and scalp. Otezla has not been effective.    Previous ineffective therapies tried : methotrexate, phototherapy, various topical creams, Enbrel, Humira, Otezla    No side effects with Otezla.    Personal Medical History  Skin Cancer : NO  Eczema Psoriasis Rosacea Autoimmune   NO YES - history of psoriasis with arthropathy with previous diagnosis by rheumatology NO NO     Family Medical History  Skin Cancer : YES - in grandfather  Eczema Psoriasis Rosacea Autoimmune   NO YES - in uncle NO YES - in grandmother but not psoriatic arthritis     Occupation : sales (indoor).    Patient Active Problem List   Diagnosis     Anxiety state     Chronic back pain     Moderate episode of recurrent major depressive disorder (H)     Panic disorder without agoraphobia     CARDIOVASCULAR SCREENING; LDL GOAL LESS THAN 130     Abnormal results of liver function studies     Psoriatic arthritis (H)     Morbid obesity (H)     Elevated blood-pressure reading without diagnosis of hypertension     Closed nondisplaced fracture of fifth metatarsal bone of right foot, initial encounter     Alcohol abuse, in remission     Vitamin D deficiency     Essential hypertension       Past Medical History:   Diagnosis Date     Anxiety      Back pain     chronic     Obesity      Psoriatic arthritis (H)     Past Surgical History:   Procedure Laterality Date     DISCECTOMY LUMBAR POSTERIOR MICROSCOPIC ONE LEVEL Left 11/1/2018    Procedure: Left Lumbar 4-5 Microdisectomy ;  Surgeon: Favio Miranda MD;  [facility]     FOOT SURGERY Right      NOSE SURGERY      3 times     right elbow surgey       14 yo     TESTICLE SURGERY      21 yo      Social History     Tobacco Use     Smoking status: Former Smoker     Packs/day: 0.20     Years: 10.00     Pack years: 2.00     Types: Cigarettes     Last attempt to quit: 2015     Years since quittin.3     Smokeless tobacco: Never Used   Substance Use Topics     Alcohol use: Yes     Alcohol/week: 4.8 oz     Types: 8 Standard drinks or equivalent per week     Comment: not for a month     Drug use: Yes     Types: Marijuana     Comment: PRN pain    Family History     Problem (# of Occurrences) Relation (Name,Age of Onset)    Anxiety Disorder (1) Father    Asthma (1) Brother    Breast Cancer (2) Maternal Grandmother, Paternal Grandmother    Coronary Artery Disease (1) Father    Depression (3) Father, Maternal Grandmother, Paternal Grandmother    Diabetes (4) Father, Paternal Grandmother, Paternal Grandfather, Paternal Uncle    Hyperlipidemia (1) Father    Hypertension (1) Father    Mental Illness (3) Father, Maternal Grandmother, Paternal Grandmother    Obesity (1) Mother    Prostate Cancer (1) Maternal Grandfather    Substance Abuse (2) Father, Maternal Grandmother       Negative family history of: Cerebrovascular Disease, Anesthesia Reaction, Osteoporosis, Genetic Disorder, Thyroid Disease, Liver Disease           Current Outpatient Medications   Medication Sig Dispense Refill     Acetaminophen (TYLENOL PO) Take 1,000 mg by mouth as needed for mild pain or fever       atomoxetine (STRATTERA) 80 MG capsule Take 1 capsule (80 mg) by mouth daily 45 capsule 0     chlorthalidone (HYGROTON) 25 MG tablet Take 1 tablet (25 mg) by mouth daily 90 tablet 3     citalopram (CELEXA) 40 MG tablet Take 1 tablet (40 mg) by mouth daily 90 tablet 1     hydrOXYzine (ATARAX) 25 MG tablet Take 1-2 tablets (25-50 mg) by mouth 2 times daily For anxiety and sleep 120 tablet 1     multivitamin, therapeutic with minerals (MULTI-VITAMIN) TABS tablet Take 1 tablet by mouth every morning        NONFORMULARY as needed CBD OIL       order for DME Equipment being ordered: Digital home blood pressure monitor kit 1 Dose 0     apremilast (OTEZLA) 30 MG tablet Take 1 tablet (30 mg) by mouth 2 times daily (Patient not taking: Reported on 4/25/2019) 60 tablet 3         Allergies   Allergen Reactions     Tramadol Other (See Comments)     Shellfish-Derived Products         ROS : 14 point review of systems was negative except the symptoms listed above in the HPI.    This document serves as a record of the services and decisions personally performed and made by Suellen Page MD and was created by Eron Abarca, a trained medical scribe, based on personal observations and provider statements to the medical scribe.  April 25, 2019 1:49 PM   Eron Abarca    OBJECTIVE:                                                      Wt Readings from Last 2 Encounters:   04/12/19 327 lb 3.2 oz (148.4 kg)   02/06/19 (!) 334 lb 9.6 oz (151.8 kg)     GENERAL: healthy, alert, in moderate distress and obese  SKIN: Victoria Skin Type - I.  Face, Neck, Trunk, Arms, Legs examined. The dermatoscope was used to help evaluate pigmented lesions.  Skin Pertinent Findings:  Multiple plaques on dorsa of hands, forearms, legs, face, abdomen, back.    TBSA > 20%    Diagnostic Test Results:  No results found for this or any previous visit (from the past 24 hour(s)).    MDM: His insurance previously stated he had to trial Otezla first before considering stelara.    ASSESSMENT:                                                      Encounter Diagnosis   Name Primary?     Psoriasis with arthropathy (H) Yes         PLAN:                                                    Patient Instructions   FUTURE APPOINTMENTS  Please get labs done today.  Follow up after starting Stelara    BIOLOGICS INSTRUCTIONS  Due to the expense of the medication, it needs to be sent to your insurance company for prior authorization. Please call back to [facility] in 2 weeks if you have not received the medication.    Follow instructions from the pharmaceutical company nurse regarding medication administration.    Be aware of any illnesses, fever, nausea, headaches, because Humira modulates the immune system. Potential side effects include decreased immunity, hepatic dysfunction, risk of developing lymphoma, increased risk of cardiovascular events, reactivation of latent tuberculosis or other respiratory fungal infections.      TT : 20 minutes.  CT : 15 minutes. Discussion regarding etiology, spectrum of psoriasis, treatment options, aggravating factors.    The information in this document, created by the medical scribe for me, accurately reflects the services I personally performed and the decisions made by me. I have reviewed and approved this document for accuracy prior to leaving the patient care area.  April 25, 2019 1:49 PM  Suellen Page MD  [facility]

## 2019-04-25 NOTE — LETTER
4/25/2019         RE: Lakhwinder Jones  [address]  [place] [address]        Dear Colleague,    Thank you for referring your patient, Lakhwinder Jones, to the [facility]. Please see a copy of my visit note below.    [facility] - PRIMARY CARE SKIN    CC : Psoriasis with arthropathy  SUBJECTIVE:                                                    Lakhwinder Jones is a 38 ear old male who presents to clinic today for follow-up of chronic psoriasis with arthropathy that started in adolescence at age 19. He has continued to have diffuse plaques on the arms, legs, trunk, and scalp. Otezla has not been effective.    Previous ineffective therapies tried : methotrexate, phototherapy, various topical creams, Enbrel, Humira, Otezla    No side effects with Otezla.    Personal Medical History  Skin Cancer : NO  Eczema Psoriasis Rosacea Autoimmune   NO YES - history of psoriasis with arthropathy with previous diagnosis by rheumatology NO NO     Family Medical History  Skin Cancer : YES - in grandfather  Eczema Psoriasis Rosacea Autoimmune   NO YES - in uncle NO YES - in grandmother but not psoriatic arthritis     Occupation : sales (indoor).    Patient Active Problem List   Diagnosis     Anxiety state     Chronic back pain     Moderate episode of recurrent major depressive disorder (H)     Panic disorder without agoraphobia     CARDIOVASCULAR SCREENING; LDL GOAL LESS THAN 130     Abnormal results of liver function studies     Psoriatic arthritis (H)     Morbid obesity (H)     Elevated blood-pressure reading without diagnosis of hypertension     Closed nondisplaced fracture of fifth metatarsal bone of right foot, initial encounter     Alcohol abuse, in remission     Vitamin D deficiency     Essential hypertension       Past Medical History:   Diagnosis Date     Anxiety      Back pain     chronic     Obesity      Psoriatic arthritis (H)     Past Surgical History:   Procedure Laterality  Date     DISCECTOMY LUMBAR POSTERIOR MICROSCOPIC ONE LEVEL Left 2018    Procedure: Left Lumbar 4-5 Microdisectomy ;  Surgeon: Favio Miranda MD;  Location: [facility]     FOOT SURGERY Right      NOSE SURGERY      3 times     right elbow surgey      12 yo     TESTICLE SURGERY      23 yo      Social History     Tobacco Use     Smoking status: Former Smoker     Packs/day: 0.20     Years: 10.00     Pack years: 2.00     Types: Cigarettes     Last attempt to quit: 2015     Years since quittin.3     Smokeless tobacco: Never Used   Substance Use Topics     Alcohol use: Yes     Alcohol/week: 4.8 oz     Types: 8 Standard drinks or equivalent per week     Comment: not for a month     Drug use: Yes     Types: Marijuana     Comment: PRN pain    Family History     Problem (# of Occurrences) Relation (Name,Age of Onset)    Anxiety Disorder (1) Father    Asthma (1) Brother    Breast Cancer (2) Maternal Grandmother, Paternal Grandmother    Coronary Artery Disease (1) Father    Depression (3) Father, Maternal Grandmother, Paternal Grandmother    Diabetes (4) Father, Paternal Grandmother, Paternal Grandfather, Paternal Uncle    Hyperlipidemia (1) Father    Hypertension (1) Father    Mental Illness (3) Father, Maternal Grandmother, Paternal Grandmother    Obesity (1) Mother    Prostate Cancer (1) Maternal Grandfather    Substance Abuse (2) Father, Maternal Grandmother       Negative family history of: Cerebrovascular Disease, Anesthesia Reaction, Osteoporosis, Genetic Disorder, Thyroid Disease, Liver Disease           Current Outpatient Medications   Medication Sig Dispense Refill     Acetaminophen (TYLENOL PO) Take 1,000 mg by mouth as needed for mild pain or fever       atomoxetine (STRATTERA) 80 MG capsule Take 1 capsule (80 mg) by mouth daily 45 capsule 0     chlorthalidone (HYGROTON) 25 MG tablet Take 1 tablet (25 mg) by mouth daily 90 tablet 3     citalopram (CELEXA) 40 MG tablet Take 1 tablet (40 mg) by  mouth daily 90 tablet 1     hydrOXYzine (ATARAX) 25 MG tablet Take 1-2 tablets (25-50 mg) by mouth 2 times daily For anxiety and sleep 120 tablet 1     multivitamin, therapeutic with minerals (MULTI-VITAMIN) TABS tablet Take 1 tablet by mouth every morning       NONFORMULARY as needed CBD OIL       order for DME Equipment being ordered: Digital home blood pressure monitor kit 1 Dose 0     apremilast (OTEZLA) 30 MG tablet Take 1 tablet (30 mg) by mouth 2 times daily (Patient not taking: Reported on 4/25/2019) 60 tablet 3         Allergies   Allergen Reactions     Tramadol Other (See Comments)     Shellfish-Derived Products         ROS : 14 point review of systems was negative except the symptoms listed above in the HPI.    This document serves as a record of the services and decisions personally performed and made by Suellen Page MD and was created by Eron Abarca, a trained medical scribe, based on personal observations and provider statements to the medical scribe.  April 25, 2019 1:49 PM   Eron Abarca    OBJECTIVE:                                                      Wt Readings from Last 2 Encounters:   04/12/19 327 lb 3.2 oz (148.4 kg)   02/06/19 (!) 334 lb 9.6 oz (151.8 kg)     GENERAL: healthy, alert, in moderate distress and obese  SKIN: Victoria Skin Type - I.  Face, Neck, Trunk, Arms, Legs examined. The dermatoscope was used to help evaluate pigmented lesions.  Skin Pertinent Findings:  Multiple plaques on dorsa of hands, forearms, legs, face, abdomen, back.    TBSA > 20%    Diagnostic Test Results:  No results found for this or any previous visit (from the past 24 hour(s)).    MDM: His insurance previously stated he had to trial Otezla first before considering stelara.    ASSESSMENT:                                                      Encounter Diagnosis   Name Primary?     Psoriasis with arthropathy (H) Yes         PLAN:                                                    Patient Instructions    FUTURE APPOINTMENTS  Please get labs done today.  Follow up after starting Stelara    BIOLOGICS INSTRUCTIONS  Due to the expense of the medication, it needs to be sent to your insurance company for prior authorization. Please call back to [facility] in 2 weeks if you have not received the medication.    Follow instructions from the pharmaceutical company nurse regarding medication administration.    Be aware of any illnesses, fever, nausea, headaches, because Humira modulates the immune system. Potential side effects include decreased immunity, hepatic dysfunction, risk of developing lymphoma, increased risk of cardiovascular events, reactivation of latent tuberculosis or other respiratory fungal infections.      TT : 20 minutes.  CT : 15 minutes. Discussion regarding etiology, spectrum of psoriasis, treatment options, aggravating factors.    The information in this document, created by the medical scribe for me, accurately reflects the services I personally performed and the decisions made by me. I have reviewed and approved this document for accuracy prior to leaving the patient care area.  April 25, 2019 1:49 PM  Suellen Page MD  [facility]    Again, thank you for allowing me to participate in the care of your patient.        Sincerely,        Suellen Page MD

## 2019-04-25 NOTE — TELEPHONE ENCOUNTER
Prior Authorization Retail Medication Request    Medication/Dose: Jessie  ICD code (if different than what is on RX):    Previously Tried and Failed:  methotrexate, phototherapy, various topical creams, Enbrel, Humira, Otezla     Rationale:      Insurance Name:    Insurance ID:        Pharmacy Information (if different than what is on RX)  Name:    Phone:

## 2019-04-25 NOTE — PATIENT INSTRUCTIONS
FUTURE APPOINTMENTS  Please get labs done today.  Follow up after starting Stelara    BIOLOGICS INSTRUCTIONS  Due to the expense of the medication, it needs to be sent to your insurance company for prior authorization. Please call back to [facility] in 2 weeks if you have not received the medication.    Follow instructions from the pharmaceutical company nurse regarding medication administration.    Be aware of any illnesses, fever, nausea, headaches, because Humira modulates the immune system. Potential side effects include decreased immunity, hepatic dysfunction, risk of developing lymphoma, increased risk of cardiovascular events, reactivation of latent tuberculosis or other respiratory fungal infections.

## 2019-04-26 NOTE — TELEPHONE ENCOUNTER
PA Initiation    Medication: Jessie hoyt pending   Insurance Company: [employer] - Phone [phone] Fax [phone]  Pharm[facility] Filling the Rx[facility] [place] - [address] [place] [address]  Filling Pharmacy Phone:    Filling Pharmacy Fax:    Start Date: 4/26/2019

## 2019-04-27 LAB
GAMMA INTERFERON BACKGROUND BLD IA-ACNC: 0.02 IU/ML
M TB IFN-G BLD-IMP: NEGATIVE
M TB IFN-G CD4+ BCKGRND COR BLD-ACNC: >10 IU/ML
MITOGEN IGNF BCKGRD COR BLD-ACNC: 0 IU/ML
MITOGEN IGNF BCKGRD COR BLD-ACNC: 0 IU/ML

## 2019-04-29 NOTE — TELEPHONE ENCOUNTER
Prior Authorization Approval    Authorization Effective Date: 4/29/2019  Authorization Expiration Date: 4/29/2021  Medication: Jessie hoyt approved  Approved Dose/Quantity: 90mg/ml  Reference #: [id number]   Insurance Company: [employer]ICA - Phone [phone] Fax [phone]  Expected CoPay: na     CoPay Card Available: Yes    Foundation Assistance Needed: na  Which Pharmacy is filling the prescription (Not needed for infusion/clinic administered): [facility] [address]  Pharmacy Notified: Yes  Patient Notified: Yes

## 2019-06-03 DIAGNOSIS — F90.8 ATTENTION DEFICIT HYPERACTIVITY DISORDER (ADHD), OTHER TYPE: ICD-10-CM

## 2019-06-03 NOTE — TELEPHONE ENCOUNTER
[facility] Call Center    Phone Message    May a detailed message be left on voicemail: yes    Reason for Call: Medication Refill Request    Has the patient contacted the pharmacy for the refill? Yes   Name of medication being requested: Stephani   Provider who prescribed the medication: Dajuan Nicolas  Pharmacy: [facility]  Date medication is needed: ASAP. Pt ran out yesterday. Next appt on 7/5 aldair/ Dajuan.          Action Taken: Other: Maria G King

## 2019-06-04 ENCOUNTER — TELEPHONE (OUTPATIENT)
Dept: FAMILY MEDICINE | Facility: CLINIC | Age: 39
End: 2019-06-04

## 2019-06-04 RX ORDER — ATOMOXETINE 80 MG/1
80 CAPSULE ORAL DAILY
Qty: 30 CAPSULE | Refills: 0 | Status: SHIPPED | OUTPATIENT
Start: 2019-06-04 | End: 2019-07-09

## 2019-06-04 NOTE — TELEPHONE ENCOUNTER
Last seen: 4/18/19  RTC: 1 month   Cancel: 5/24/19  No-show: none   Next appt: 7/5/19    Incoming refill from patient via phone     Medication requested: atomoxetine (STRATTERA) 80 MG capsule  Directions: Take 1 capsule (80 mg) by mouth daily - Oral  Qty: 45  Last refilled: 4/18/19    Writer placed a call to patient at [phone] to discuss medication regimen. No answer at number provided. LVM, requesting a call back. Clinic number provided.     Routed to provider

## 2019-06-04 NOTE — TELEPHONE ENCOUNTER
Refill Request (atomoxetine (STRATTERA) 80 MG capsule)     Roy LakeDajuan Elizabeth, APRN CNP  You 15 minutes ago (10:31 AM)      Chuck Diez,     I'm fine with this plan.  He will need to reschedule for future refills     Dajuan    Routing comment      30 day supply refilled per providers approval   Med tab changed to reflect this   Placed a call to patient at [phone]. No answer at number provided. LVM, notifying the refill was completed but need an appt for future refills. Clinic number provided.     No further action needed by this writer

## 2019-06-04 NOTE — TELEPHONE ENCOUNTER
"Prior Authorization Retail Medication Request  \"plan limit exceeded\"  Medication/Dose: Stelara 90mg/1ml  ICD code (if different than what is on RX):    Previously Tried and Failed:    Rationale:      Insurance Name:  [employer]  Insurance ID:  [id number]      Pharmacy Information (if different than what is on RX)  Name:  [facility] {Prior auth department   Phone:      "

## 2019-06-06 NOTE — TELEPHONE ENCOUNTER
"PA Initiation    Medication: Stelara 90mg/1 ml \"plan limit exceeded\"  Insurance Company: [employer] - Phone [phone] Fax [phone]  Pharmacy Filling the Rx: [facility] [place] - [address] [place] [address]  Filling Pharmacy Phone: [phone]  Filling Pharmacy Fax:    Start Date: 6/6/2019    [facility] Prior Authorization Team   Phone: [phone]        Awaiting additional questions via CMM  "

## 2019-06-11 NOTE — TELEPHONE ENCOUNTER
Called [facility] [phone] to check the status of this prior authorization, per CoverMyMeds this is coming back that no additional PA is required. Per rep at [facility] no PA is needed, just that the pharmacy has to call their help desk to get this to go through. Called [facility] at [phone] and relayed this information but rep there stated that this is a trend happening with insurances, but they need a medical prior authorization done for this medication per they type of plan he has, she even stated there is a note from a supervisor, and that to call [phone] and specifically ask for a medical prior authorization, once that is done they will be able to run the medication. Tried [facility] insurance once more to see if he is actually restricted to certain specialty pharmacies but rep confirmed that [facility], [place], or [facility] specialty pharmacies are covered under his plan.   Routing back to clinic as PA team does not handle medical prior authorizations, and to call [phone] and specifically ask for a medical prior authorization.

## 2019-06-11 NOTE — TELEPHONE ENCOUNTER
Called insurance - they state the pharmacy was running an 84 day supply when the pa was a 31 days supply  Rep adjusted the qty on the prior auth and states he states

## 2019-06-11 NOTE — TELEPHONE ENCOUNTER
Called [facility]- after 16 minutes of being transferred to different reps/techs and pharmacists- no one knows what is going on with this script- no one works in this department and they do not know who to send the call to. I advised that this was very poor customer service since the patient states he should have gotten his prescription today.    I called the patient and left a voice mail that he would have to call the pharmacy and figure out what is going on with his prescription. Advised that the insurance states script prior auth is still good and pharmacy just needs to rerun this    Jennifer OspinaRN BSN  [facility]  [phone]

## 2019-06-11 NOTE — TELEPHONE ENCOUNTER
Lakhwinder is calling regarding his PA. He states he was able to get it once, and doesn't understand why he can't get it again. Is very upset and began cursing mildly.

## 2019-06-12 DIAGNOSIS — L40.50 PSORIASIS WITH ARTHROPATHY (H): ICD-10-CM

## 2019-06-12 NOTE — TELEPHONE ENCOUNTER
Called [facility]- spoke with a pharmacist and gave a verbal order for the Stelara as the system had not uploaded the prescription yet.  [facility] and advised that patient is upset that he is not getting the next dose- feels that he is already noticing a flare since he did not get the next dose.  Pharmacist is going to call patient and get more information    Jennifer Ospina RN BSN  [facility]  [phone]

## 2019-06-12 NOTE — TELEPHONE ENCOUNTER
patient called and states that he wants to switch to a different pharmacy-has lost kaiden in [facility]  sent refill to [facility]- I will call them at 8:30 am when they open and discuss with the pharmacy what is needed.  Put a note on script for pharmacy that he has a savings plan card and a prior auth that is for 84 day supply  Jennifer Ospina RN BSN  [facility]  [phone]

## 2019-06-12 NOTE — TELEPHONE ENCOUNTER
[facility] called and said they ran the script and it comes back stating that patient must use [facility].  patient wants [facility]- I called [facility] at [phone] and they are going to put in a request to change the designated pharmacy to [facility].    Representative did this while I was on the phone with her and we are waiting for the  to review this- states this should be completed by tomorrow morning- she made this a high priority status since patient is out of his medications  Case resolution # [id number]  Jennifer Ospina RN BSN  [facility]  [phone]

## 2019-06-12 NOTE — TELEPHONE ENCOUNTER
[facility] called: they cannot change patients pharmacy- patient wll need to call his main insurance and make this change himself  I called patient and notified of this - he states he will call this afternoon.    Jennifer Ospina RN BSN  [facility]  [phone]

## 2019-06-12 NOTE — TELEPHONE ENCOUNTER
Left message on answering machine for patient to call back.  Jennifer Ospina,RN BSN  [facility]  [phone]

## 2019-06-18 NOTE — TELEPHONE ENCOUNTER
Called [facility] at [phone] spoke with a rep who again states that the prior auth is approved and [facility] is not running it for the correct days supply  I advised rep that I called [facility] and they ran it and it comes back denies needing a new prior auth  Rep states that he ran a test claim and it went through.  Rep will call [facility] himself and walk them through running the refill  [facility] rep states that she and the [facility] rep ran a test claim and it went through patient has a delivery scheduled for 05/20/19    Jennifer OspinaRN BSN  [facility]  [phone]

## 2019-07-08 DIAGNOSIS — F90.8 ATTENTION DEFICIT HYPERACTIVITY DISORDER (ADHD), OTHER TYPE: ICD-10-CM

## 2019-07-08 NOTE — TELEPHONE ENCOUNTER
Jak Crump, Maria G Restrepo RN   Phone Number: [phone]          Previous Messages      ----- Message -----   From: Jak Crump RN   Sent: 7/5/2019   9:45 AM   To: Jak Crump RN   Subject: FW: Refill                                           ----- Message -----   From: Chris Valenzuela   Sent: 7/5/2019   8:40 AM   To: [facility]   Subject: Refill                                           Caller:  Lakhwinder   Relationship to pt: pt   Medication: atomoxetine (STRATTERA) 80 MG capsule   How many days left of med do you have left: Didn't specify   Pharmacy and location: [facility] [place] - [address]   Pending appt date: 7/10/19 @ 10:30am   Okay to leave detailed VM:  Yes     Thank You:)     Tania Valenzuela   Patient Representative   [facility]   [phone]

## 2019-07-08 NOTE — TELEPHONE ENCOUNTER
Last seen: 4/18/19  RTC: 1 month  Cancel: 7/5/19  No-show: none  Next appt: 7/10/19     Medication requested: atomoxetine (STRATTERA) 80 MG capsule  Directions: Take 1 capsule (80 mg) by mouth daily  Qty: 30  Last refilled: approximately 6/4/19 per med tab     Routed to provider due to one cancellation

## 2019-07-09 ENCOUNTER — OFFICE VISIT (OUTPATIENT)
Dept: FAMILY MEDICINE | Facility: CLINIC | Age: 39
End: 2019-07-09
Payer: COMMERCIAL

## 2019-07-09 VITALS
HEIGHT: 77 IN | TEMPERATURE: 97.6 F | BODY MASS INDEX: 37.19 KG/M2 | HEART RATE: 62 BPM | OXYGEN SATURATION: 97 % | DIASTOLIC BLOOD PRESSURE: 97 MMHG | WEIGHT: 315 LBS | SYSTOLIC BLOOD PRESSURE: 151 MMHG | RESPIRATION RATE: 19 BRPM

## 2019-07-09 DIAGNOSIS — M25.50 MULTIPLE JOINT PAIN: Primary | ICD-10-CM

## 2019-07-09 DIAGNOSIS — L40.9 PSORIASIS: ICD-10-CM

## 2019-07-09 DIAGNOSIS — R10.32 LLQ ABDOMINAL PAIN: ICD-10-CM

## 2019-07-09 DIAGNOSIS — I10 ESSENTIAL HYPERTENSION: ICD-10-CM

## 2019-07-09 DIAGNOSIS — R53.83 FATIGUE, UNSPECIFIED TYPE: ICD-10-CM

## 2019-07-09 LAB
ALBUMIN SERPL-MCNC: 4 G/DL (ref 3.4–5)
ALP SERPL-CCNC: 85 U/L (ref 40–150)
ALT SERPL W P-5'-P-CCNC: 71 U/L (ref 0–70)
ANION GAP SERPL CALCULATED.3IONS-SCNC: 6 MMOL/L (ref 3–14)
AST SERPL W P-5'-P-CCNC: 25 U/L (ref 0–45)
BASOPHILS # BLD AUTO: 0.1 10E9/L (ref 0–0.2)
BASOPHILS NFR BLD AUTO: 0.8 %
BILIRUB SERPL-MCNC: 0.3 MG/DL (ref 0.2–1.3)
BUN SERPL-MCNC: 14 MG/DL (ref 7–30)
CALCIUM SERPL-MCNC: 9.4 MG/DL (ref 8.5–10.1)
CHLORIDE SERPL-SCNC: 107 MMOL/L (ref 94–109)
CO2 SERPL-SCNC: 26 MMOL/L (ref 20–32)
CREAT SERPL-MCNC: 0.86 MG/DL (ref 0.66–1.25)
CRP SERPL-MCNC: <2.9 MG/L (ref 0–8)
DIFFERENTIAL METHOD BLD: NORMAL
EOSINOPHIL # BLD AUTO: 0.2 10E9/L (ref 0–0.7)
EOSINOPHIL NFR BLD AUTO: 2 %
ERYTHROCYTE [DISTWIDTH] IN BLOOD BY AUTOMATED COUNT: 12.4 % (ref 10–15)
ERYTHROCYTE [SEDIMENTATION RATE] IN BLOOD BY WESTERGREN METHOD: 4 MM/H (ref 0–15)
GFR SERPL CREATININE-BSD FRML MDRD: >90 ML/MIN/{1.73_M2}
GLUCOSE SERPL-MCNC: 91 MG/DL (ref 70–99)
HCT VFR BLD AUTO: 41.5 % (ref 40–53)
HGB BLD-MCNC: 14.9 G/DL (ref 13.3–17.7)
LYMPHOCYTES # BLD AUTO: 1.8 10E9/L (ref 0.8–5.3)
LYMPHOCYTES NFR BLD AUTO: 23.6 %
MCH RBC QN AUTO: 30.3 PG (ref 26.5–33)
MCHC RBC AUTO-ENTMCNC: 35.9 G/DL (ref 31.5–36.5)
MCV RBC AUTO: 85 FL (ref 78–100)
MONOCYTES # BLD AUTO: 0.6 10E9/L (ref 0–1.3)
MONOCYTES NFR BLD AUTO: 8.1 %
NEUTROPHILS # BLD AUTO: 5 10E9/L (ref 1.6–8.3)
NEUTROPHILS NFR BLD AUTO: 65.5 %
PLATELET # BLD AUTO: 239 10E9/L (ref 150–450)
POTASSIUM SERPL-SCNC: 3.9 MMOL/L (ref 3.4–5.3)
PROT SERPL-MCNC: 7.3 G/DL (ref 6.8–8.8)
RBC # BLD AUTO: 4.91 10E12/L (ref 4.4–5.9)
SODIUM SERPL-SCNC: 139 MMOL/L (ref 133–144)
TSH SERPL DL<=0.005 MIU/L-ACNC: 1.77 MU/L (ref 0.4–4)
WBC # BLD AUTO: 7.6 10E9/L (ref 4–11)

## 2019-07-09 PROCEDURE — 85652 RBC SED RATE AUTOMATED: CPT | Performed by: PHYSICIAN ASSISTANT

## 2019-07-09 PROCEDURE — 99214 OFFICE O/P EST MOD 30 MIN: CPT | Performed by: PHYSICIAN ASSISTANT

## 2019-07-09 PROCEDURE — 84443 ASSAY THYROID STIM HORMONE: CPT | Performed by: PHYSICIAN ASSISTANT

## 2019-07-09 PROCEDURE — 36415 COLL VENOUS BLD VENIPUNCTURE: CPT | Performed by: PHYSICIAN ASSISTANT

## 2019-07-09 PROCEDURE — 86140 C-REACTIVE PROTEIN: CPT | Performed by: PHYSICIAN ASSISTANT

## 2019-07-09 PROCEDURE — 80053 COMPREHEN METABOLIC PANEL: CPT | Performed by: PHYSICIAN ASSISTANT

## 2019-07-09 PROCEDURE — 85025 COMPLETE CBC W/AUTO DIFF WBC: CPT | Performed by: PHYSICIAN ASSISTANT

## 2019-07-09 RX ORDER — ATOMOXETINE 80 MG/1
80 CAPSULE ORAL DAILY
Qty: 30 CAPSULE | Refills: 1 | Status: SHIPPED | OUTPATIENT
Start: 2019-07-09 | End: 2019-07-10

## 2019-07-09 RX ORDER — CHLORTHALIDONE 25 MG/1
25 TABLET ORAL DAILY
Qty: 90 TABLET | Refills: 0 | Status: SHIPPED | OUTPATIENT
Start: 2019-07-09 | End: 2020-07-08

## 2019-07-09 RX ORDER — HYDROCODONE BITARTRATE AND ACETAMINOPHEN 5; 325 MG/1; MG/1
1 TABLET ORAL EVERY 6 HOURS PRN
Qty: 12 TABLET | Refills: 0 | Status: SHIPPED | OUTPATIENT
Start: 2019-07-09 | End: 2019-08-02

## 2019-07-09 ASSESSMENT — PAIN SCALES - GENERAL: PAINLEVEL: SEVERE PAIN (7)

## 2019-07-09 ASSESSMENT — MIFFLIN-ST. JEOR: SCORE: 2489.35

## 2019-07-09 NOTE — PATIENT INSTRUCTIONS
Restart your blood pressure medication   Follow up with rheumatology as soon as possible.    Follow up with your primary within 3 weeks to recheck your blood pressure.   Return urgently if any change in symptoms.    Take vicodin sparingly as needed for pain  May continue to take motrin up to 800 mg three times a day with food

## 2019-07-09 NOTE — LETTER
My Depression Action Plan  Name: Lakhwinder Jones   Date of Birth 1980  Date: 7/9/2019    My doctor: Simona Prasad   My clinic: [address] [id number] [phone]          GREEN    ZONE   Good Control    What it looks like:     Things are going generally well. You have normal up s and down s. You may even feel depressed from time to time, but bad moods usually last less than a day.   What you need to do:  1. Continue to care for yourself (see self care plan)  2. Check your depression survival kit and update it as needed  3. Follow your physician s recommendations including any medication.  4. Do not stop taking medication unless you consult with your physician first.           YELLOW         ZONE Getting Worse    What it looks like:     Depression is starting to interfere with your life.     It may be hard to get out of bed; you may be starting to isolate yourself from others.    Symptoms of depression are starting to last most all day and this has happened for several days.     You may have suicidal thoughts but they are not constant.   What you need to do:     1. Call your care team, your response to treatment will improve if you keep your care team informed of your progress. Yellow periods are signs an adjustment may need to be made.     2. Continue your self-care, even if you have to fake it!    3. Talk to someone in your support network    4. Open up your depression survival kit           RED    ZONE Medical Alert - Get Help    What it looks like:     Depression is seriously interfering with your life.     You may experience these or other symptoms: You can t get out of bed most days, can t work or engage in other necessary activities, you have trouble taking care of basic hygiene, or basic responsibilities, thoughts of suicide or death that will not go away, self-injurious behavior.     What you need to do:  1. Call  your care team and request a same-day appointment. If they are not available (weekends or after hours) call your local crisis line, emergency room or 911.            Depression Self Care Plan / Survival Kit    Self-Care for Depression  Here s the deal. Your body and mind are really not as separate as most people think.  What you do and think affects how you feel and how you feel influences what you do and think. This means if you do things that people who feel good do, it will help you feel better.  Sometimes this is all it takes.  There is also a place for medication and therapy depending on how severe your depression is, so be sure to consult with your medical provider and/ or Behavioral Health Consultant if your symptoms are worsening or not improving.     In order to better manage my stress, I will:    Exercise  Get some form of exercise, every day. This will help reduce pain and release endorphins, the  feel good  chemicals in your brain. This is almost as good as taking antidepressants!  This is not the same as joining a gym and then never going! (they count on that by the way ) It can be as simple as just going for a walk or doing some gardening, anything that will get you moving.      Hygiene   Maintain good hygiene (Get out of bed in the morning, Make your bed, Brush your teeth, Take a shower, and Get dressed like you were going to work, even if you are unemployed).  If your clothes don't fit try to get ones that do.    Diet  I will strive to eat foods that are good for me, drink plenty of water, and avoid excessive sugar, caffeine, alcohol, and other mood-altering substances.  Some foods that are helpful in depression are: complex carbohydrates, B vitamins, flaxseed, fish or fish oil, fresh fruits and vegetables.    Psychotherapy  I agree to participate in Individual Therapy (if recommended).    Medication  If prescribed medications, I agree to take them.  Missing doses can result in serious side effects.   I understand that drinking alcohol, or other illicit drug use, may cause potential side effects.  I will not stop my medication abruptly without first discussing it with my provider.    Staying Connected With Others  I will stay in touch with my friends, family members, and my primary care provider/team.    Use your imagination  Be creative.  We all have a creative side; it doesn t matter if it s oil painting, sand castles, or mud pies! This will also kick up the endorphins.    Witness Beauty  (AKA stop and smell the roses) Take a look outside, even in mid-winter. Notice colors, textures. Watch the squirrels and birds.     Service to others  Be of service to others.  There is always someone else in need.  By helping others we can  get out of ourselves  and remember the really important things.  This also provides opportunities for practicing all the other parts of the program.    Humor  Laugh and be silly!  Adjust your TV habits for less news and crime-drama and more comedy.    Control your stress  Try breathing deep, massage therapy, biofeedback, and meditation. Find time to relax each day.     My support system    Clinic Contact:  Phone number:    Contact 1:  Phone number:    Contact 2:  Phone number:    Judaism/:  Phone number:    Therapist:  Phone number:    [facility] crisis center:    Phone number:    Other community support:  Phone number:

## 2019-07-09 NOTE — PROGRESS NOTES
Subjective     Lakhwinder Jones is a 38 year old male who presents to clinic today for the following health issues:    HPI   Joint Pain    Onset:  3 days     Description:   Location: shoulders, knees and back  Character: Sharp and Dull ache    Intensity: 7/10    Progression of Symptoms: worse    Accompanying Signs & Symptoms:  Other symptoms: redness    History:   Previous similar pain: no       Precipitating factors:   Trauma or overuse: none known    Alleviating factors:  Improved by: none     Therapies Tried and outcome: tylenol, ibuprofen, CBD    Blood pressure was fine for awhile  Pain started Monday am. (yesterday am)   I think it's flare up of psoriatic arthritis.   On sterola but skin starting to clear up.   Not this sore in a long time.    Rheumatology years ago.   Radiating pain.  Broken ankle before and different type of pain  Took 3 ibuprofen and 2 tylenol without improvement in pain   Dropped 15 pounds and exercising and feeling better but feel like crap today  Last time was in for pscyhiatry appointment and reports blood pressure was Fine  Different kind of pain. Both sores radiating.  Feel like nerve endings flaring up - getting hit every 20 seconds.  Doesn't feel like muscle spasm.  achey and hurts quite a lot.  In severe pain for 3 days  [employer] for a living  Got a Headache with tramadol       Patient Active Problem List   Diagnosis     Anxiety state     Chronic back pain     Moderate episode of recurrent major depressive disorder (H)     Panic disorder without agoraphobia     CARDIOVASCULAR SCREENING; LDL GOAL LESS THAN 130     Abnormal results of liver function studies     Psoriatic arthritis (H)     Morbid obesity (H)     Elevated blood-pressure reading without diagnosis of hypertension     Closed nondisplaced fracture of fifth metatarsal bone of right foot, initial encounter     Alcohol abuse, in remission     Vitamin D deficiency     Essential hypertension     Past Surgical History:    Procedure Laterality Date     DISCECTOMY LUMBAR POSTERIOR MICROSCOPIC ONE LEVEL Left 2018    Procedure: Left Lumbar 4-5 Microdisectomy ;  Surgeon: Favio Miranda MD;  Location: [facility]     FOOT SURGERY Right      NOSE SURGERY      3 times     right elbow surgey      12 yo     TESTICLE SURGERY      23 yo       Social History     Tobacco Use     Smoking status: Former Smoker     Packs/day: 0.20     Years: 10.00     Pack years: 2.00     Types: Cigarettes     Last attempt to quit: 2015     Years since quittin.5     Smokeless tobacco: Never Used   Substance Use Topics     Alcohol use: Yes     Alcohol/week: 4.8 oz     Types: 8 Standard drinks or equivalent per week     Comment: not for a month     Family History   Problem Relation Age of Onset     Obesity Mother      Diabetes Father      Coronary Artery Disease Father      Hypertension Father      Hyperlipidemia Father      Depression Father      Anxiety Disorder Father      Mental Illness Father      Substance Abuse Father      Breast Cancer Maternal Grandmother      Depression Maternal Grandmother      Mental Illness Maternal Grandmother      Substance Abuse Maternal Grandmother      Prostate Cancer Maternal Grandfather      Diabetes Paternal Grandmother      Breast Cancer Paternal Grandmother      Depression Paternal Grandmother      Mental Illness Paternal Grandmother      Diabetes Paternal Grandfather      Asthma Brother      Diabetes Paternal Uncle      Cerebrovascular Disease No family hx of      Anesthesia Reaction No family hx of      Osteoporosis No family hx of      Genetic Disorder No family hx of      Thyroid Disease No family hx of      Liver Disease No family hx of          Current Outpatient Medications   Medication Sig Dispense Refill     Acetaminophen (TYLENOL PO) Take 1,000 mg by mouth as needed for mild pain or fever       chlorthalidone (HYGROTON) 25 MG tablet Take 1 tablet (25 mg) by mouth daily 90 tablet 0     multivitamin,  "therapeutic with minerals (MULTI-VITAMIN) TABS tablet Take 1 tablet by mouth every morning       NONFORMULARY as needed CBD OIL       order for DME Equipment being ordered: Digital home blood pressure monitor kit 1 Dose 0     ustekinumab (STELARA) 90 MG/ML 90 mg subcutaneous  q 12 weeks. Weight 327 lb ( > 100 kg ) 1 mL 6     atomoxetine (STRATTERA) 80 MG capsule Take 1 capsule (80 mg) by mouth daily 90 capsule 0     citalopram (CELEXA) 40 MG tablet Take 1 tablet (40 mg) by mouth daily 90 tablet 1     hydrOXYzine (ATARAX) 25 MG tablet Take 1-2 tablets (25-50 mg) by mouth 2 times daily For anxiety and sleep 120 tablet 1     BP Readings from Last 3 Encounters:   07/09/19 (!) 151/97   04/25/19 136/66   04/12/19 132/81    Wt Readings from Last 3 Encounters:   07/09/19 144.7 kg (319 lb)   04/12/19 148.4 kg (327 lb 3.2 oz)   02/06/19 (!) 151.8 kg (334 lb 9.6 oz)                      Reviewed and updated as needed this visit by Provider  Tobacco  Allergies  Meds  Problems  Med Hx  Surg Hx  Fam Hx  Soc Hx          Review of Systems   ROS COMP: Constitutional, HEENT, cardiovascular, pulmonary, gi and gu systems are negative, except as otherwise noted.      Objective    BP (!) 151/97   Pulse 62   Temp 97.6  F (36.4  C) (Oral)   Resp 19   Ht 1.956 m (6' 5\")   Wt 144.7 kg (319 lb)   SpO2 97%   BMI 37.83 kg/m    There is no height or weight on file to calculate BMI.  Physical Exam   GENERAL: alert, no distress and obese  NECK: no adenopathy, no asymmetry, masses, or scars and thyroid normal to palpation  RESP: lungs clear to auscultation - no rales, rhonchi or wheezes  CV: regular rate and rhythm, normal S1 S2, no S3 or S4, no murmur, click or rub, no peripheral edema and peripheral pulses strong  ABDOMEN: soft, mild tenderness left lower quadrant, no hepatosplenomegaly, no masses and bowel sounds normal  MS: normal range of motion of all extremities without effusion or erythema.  Tender in joints of shoulder and " ankles bilaterally   SKIN: impressive plaque psoriasis diffusely     Diagnostic Test Results:  Results for orders placed or performed in visit on 07/09/19   CBC with platelets differential   Result Value Ref Range    WBC 7.6 4.0 - 11.0 10e9/L    RBC Count 4.91 4.4 - 5.9 10e12/L    Hemoglobin 14.9 13.3 - 17.7 g/dL    Hematocrit 41.5 40.0 - 53.0 %    MCV 85 78 - 100 fl    MCH 30.3 26.5 - 33.0 pg    MCHC 35.9 31.5 - 36.5 g/dL    RDW 12.4 10.0 - 15.0 %    Platelet Count 239 150 - 450 10e9/L    % Neutrophils 65.5 %    % Lymphocytes 23.6 %    % Monocytes 8.1 %    % Eosinophils 2.0 %    % Basophils 0.8 %    Absolute Neutrophil 5.0 1.6 - 8.3 10e9/L    Absolute Lymphocytes 1.8 0.8 - 5.3 10e9/L    Absolute Monocytes 0.6 0.0 - 1.3 10e9/L    Absolute Eosinophils 0.2 0.0 - 0.7 10e9/L    Absolute Basophils 0.1 0.0 - 0.2 10e9/L    Diff Method Automated Method    Comprehensive metabolic panel   Result Value Ref Range    Sodium 139 133 - 144 mmol/L    Potassium 3.9 3.4 - 5.3 mmol/L    Chloride 107 94 - 109 mmol/L    Carbon Dioxide 26 20 - 32 mmol/L    Anion Gap 6 3 - 14 mmol/L    Glucose 91 70 - 99 mg/dL    Urea Nitrogen 14 7 - 30 mg/dL    Creatinine 0.86 0.66 - 1.25 mg/dL    GFR Estimate >90 >60 mL/min/[1.73_m2]    GFR Estimate If Black >90 >60 mL/min/[1.73_m2]    Calcium 9.4 8.5 - 10.1 mg/dL    Bilirubin Total 0.3 0.2 - 1.3 mg/dL    Albumin 4.0 3.4 - 5.0 g/dL    Protein Total 7.3 6.8 - 8.8 g/dL    Alkaline Phosphatase 85 40 - 150 U/L    ALT 71 (H) 0 - 70 U/L    AST 25 0 - 45 U/L   ESR: Erythrocyte sedimentation rate   Result Value Ref Range    Sed Rate 4 0 - 15 mm/h   TSH with free T4 reflex   Result Value Ref Range    TSH 1.77 0.40 - 4.00 mU/L   CRP inflammation   Result Value Ref Range    CRP Inflammation <2.9 0.0 - 8.0 mg/L           Assessment & Plan     1. Multiple joint pain  Likely psoriatic arthritis.  Inflammatory markers normal.  Limited supply of vicodin given  Follow up with rheumatology  - ESR: Erythrocyte  "sedimentation rate  - CRP inflammation  - RHEUMATOLOGY REFERRAL  - HYDROcodone-acetaminophen (NORCO) 5-325 MG tablet; Take 1 tablet by mouth every 6 hours as needed for pain  Dispense: 12 tablet; Refill: 0    2. Psoriasis  As above   - RHEUMATOLOGY REFERRAL  - HYDROcodone-acetaminophen (NORCO) 5-325 MG tablet; Take 1 tablet by mouth every 6 hours as needed for pain  Dispense: 12 tablet; Refill: 0    3. Fatigue, unspecified type  Rule out anemia and hypothyroidism.  Check liver and kidney function   - CBC with platelets differential  - Comprehensive metabolic panel  - ESR: Erythrocyte sedimentation rate  - TSH with free T4 reflex    4. LLQ abdominal pain  Has not really had abdominal pain but a bit tender on exam but normal cbc   - CBC with platelets differential  - Comprehensive metabolic panel    5. Essential hypertension  Had been off of hypertension med - encouraged to restart med and follow up with PCP in approximately 3 weeks  - chlorthalidone (HYGROTON) 25 MG tablet; Take 1 tablet (25 mg) by mouth daily  Dispense: 90 tablet; Refill: 0     BMI:   Estimated body mass index is 37.83 kg/m  as calculated from the following:    Height as of this encounter: 1.956 m (6' 5\").    Weight as of this encounter: 144.7 kg (319 lb).   Weight management plan: Discussed healthy diet and exercise guidelines        Patient Instructions   Restart your blood pressure medication   Follow up with rheumatology as soon as possible.    Follow up with your primary within 3 weeks to recheck your blood pressure.   Return urgently if any change in symptoms.    Take vicodin sparingly as needed for pain  May continue to take motrin up to 800 mg three times a day with food       Return in about 3 weeks (around 7/30/2019) for BP Recheck, Routine Visit.    Sandi Gaitan PA-C  [facility]"

## 2019-07-09 NOTE — TELEPHONE ENCOUNTER
Dajuan Nicolas, APRN Maria G Donald, RN   Caller: Unspecified (Yesterday, 10:35 AM)             Chuck Cummins,     Yes we can refill for a month plus another refill.     Thanks     Dajuan          30 d/s with 1 additional refill sent to pt's preferred pharmacy

## 2019-07-09 NOTE — RESULT ENCOUNTER NOTE
Latoya Keane  Your inflammatory marker was normal   Your blood counts and hemoglobin were normal. You are not anemic.   I will notify you of the rest of your labs as they become available.   Please call or MyChart my office with any questions or concerns.    Sandi Gaitan, PAC

## 2019-07-10 ENCOUNTER — OFFICE VISIT (OUTPATIENT)
Dept: PSYCHIATRY | Facility: CLINIC | Age: 39
End: 2019-07-10
Attending: NURSE PRACTITIONER
Payer: COMMERCIAL

## 2019-07-10 VITALS
HEART RATE: 71 BPM | DIASTOLIC BLOOD PRESSURE: 79 MMHG | BODY MASS INDEX: 37.47 KG/M2 | WEIGHT: 315 LBS | SYSTOLIC BLOOD PRESSURE: 115 MMHG

## 2019-07-10 DIAGNOSIS — F41.1 ANXIETY STATE: ICD-10-CM

## 2019-07-10 DIAGNOSIS — F90.8 ATTENTION DEFICIT HYPERACTIVITY DISORDER (ADHD), OTHER TYPE: ICD-10-CM

## 2019-07-10 DIAGNOSIS — F41.0 PANIC DISORDER WITHOUT AGORAPHOBIA: ICD-10-CM

## 2019-07-10 PROCEDURE — G0463 HOSPITAL OUTPT CLINIC VISIT: HCPCS | Mod: ZF

## 2019-07-10 RX ORDER — CITALOPRAM HYDROBROMIDE 40 MG/1
40 TABLET ORAL DAILY
Qty: 90 TABLET | Refills: 1 | Status: SHIPPED | OUTPATIENT
Start: 2019-07-10 | End: 2020-08-25

## 2019-07-10 RX ORDER — ATOMOXETINE 80 MG/1
80 CAPSULE ORAL DAILY
Qty: 90 CAPSULE | Refills: 0 | Status: SHIPPED | OUTPATIENT
Start: 2019-07-10 | End: 2021-01-12

## 2019-07-10 RX ORDER — HYDROXYZINE HYDROCHLORIDE 25 MG/1
25-50 TABLET, FILM COATED ORAL 2 TIMES DAILY
Qty: 120 TABLET | Refills: 1 | Status: SHIPPED | OUTPATIENT
Start: 2019-07-10 | End: 2020-07-08

## 2019-07-10 ASSESSMENT — PAIN SCALES - GENERAL: PAINLEVEL: MODERATE PAIN (5)

## 2019-07-10 NOTE — RESULT ENCOUNTER NOTE
Latoya Keane  Your thyroid function was normal.   Your inflammatory markers were normal.   Your electrolytes, blood sugar, kidney function and liver function were normal.   Return urgently if any change in symptoms.    Please call or MyChart my office with any questions or concerns.    Sandi Gaitan, PAC
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
## 2019-07-11 ASSESSMENT — PATIENT HEALTH QUESTIONNAIRE - PHQ9: SUM OF ALL RESPONSES TO PHQ QUESTIONS 1-9: 4

## 2019-08-02 ENCOUNTER — ANCILLARY PROCEDURE (OUTPATIENT)
Dept: GENERAL RADIOLOGY | Facility: CLINIC | Age: 39
End: 2019-08-02
Attending: NURSE PRACTITIONER
Payer: COMMERCIAL

## 2019-08-02 ENCOUNTER — OFFICE VISIT (OUTPATIENT)
Dept: FAMILY MEDICINE | Facility: CLINIC | Age: 39
End: 2019-08-02
Payer: COMMERCIAL

## 2019-08-02 VITALS
DIASTOLIC BLOOD PRESSURE: 80 MMHG | TEMPERATURE: 98 F | HEART RATE: 89 BPM | RESPIRATION RATE: 20 BRPM | WEIGHT: 315 LBS | BODY MASS INDEX: 37.19 KG/M2 | OXYGEN SATURATION: 97 % | SYSTOLIC BLOOD PRESSURE: 124 MMHG | HEIGHT: 77 IN

## 2019-08-02 DIAGNOSIS — E66.01 MORBID OBESITY (H): ICD-10-CM

## 2019-08-02 DIAGNOSIS — R10.31 RLQ ABDOMINAL PAIN: Primary | ICD-10-CM

## 2019-08-02 DIAGNOSIS — I10 ESSENTIAL HYPERTENSION: ICD-10-CM

## 2019-08-02 DIAGNOSIS — L40.50 PSORIATIC ARTHRITIS (H): ICD-10-CM

## 2019-08-02 PROCEDURE — 74019 RADEX ABDOMEN 2 VIEWS: CPT | Mod: FY

## 2019-08-02 PROCEDURE — 99214 OFFICE O/P EST MOD 30 MIN: CPT | Performed by: NURSE PRACTITIONER

## 2019-08-02 ASSESSMENT — PAIN SCALES - GENERAL: PAINLEVEL: EXTREME PAIN (8)

## 2019-08-02 ASSESSMENT — MIFFLIN-ST. JEOR: SCORE: 2480.28

## 2019-08-02 NOTE — PROGRESS NOTES
Subjective     Lakhwinder Jones is a 38 year old male who presents to clinic today for the following health issues:    HPI   ABDOMINAL   PAIN     Onset: yesterday    Description:   Character: Cramping  Location: right upper quadrant right lower quadrant  Radiation: None and Back    Intensity: severe    Progression of Symptoms:  worsening    Accompanying Signs & Symptoms:  Fever/Chills?: YES  Gas/Bloating: no   Nausea: no   Vomitting: no   Diarrhea?: no   Constipation:YES  Dysuria or Hematuria: no    History:   Trauma: no   Previous similar pain: no    Previous tests done: none    Precipitating factors:   Does the pain change with:     Food: YES- gets worse      BM: YES- saw some blood in stool    Urination: no     Alleviating factors:  nothing    Therapies Tried and outcome: rest     LMP:  not applicable         2 days ago had painful bowel movement. Right lower quandrant pain started yesterday morning. Saw some rectal blood with wiping. Urinating okay. A little fever/chills. Never had diverticulitis/osis before. No vomiting. Appetite is okay, when he eats has pain RLQ. No one he is aware of has been sick. But works in the public. Patient appears very uncomfortable.   Patient denies change in his psoriasis     No recent medication changes.     One cig once every 2 weeks. Alcohol: once a week.    Patient Active Problem List   Diagnosis     Anxiety state     Chronic back pain     Moderate episode of recurrent major depressive disorder (H)     Panic disorder without agoraphobia     CARDIOVASCULAR SCREENING; LDL GOAL LESS THAN 130     Abnormal results of liver function studies     Psoriatic arthritis (H)     Morbid obesity (H)     Elevated blood-pressure reading without diagnosis of hypertension     Closed nondisplaced fracture of fifth metatarsal bone of right foot, initial encounter     Alcohol abuse, in remission     Vitamin D deficiency     Essential hypertension     Past Surgical History:   Procedure Laterality  Date     DISCECTOMY LUMBAR POSTERIOR MICROSCOPIC ONE LEVEL Left 2018    Procedure: Left Lumbar 4-5 Microdisectomy ;  Surgeon: Favio Miranda MD;  Location: [facility]     FOOT SURGERY Right      NOSE SURGERY      3 times     right elbow surgey      12 yo     TESTICLE SURGERY      21 yo       Social History     Tobacco Use     Smoking status: Former Smoker     Packs/day: 0.20     Years: 10.00     Pack years: 2.00     Types: Cigarettes     Last attempt to quit: 2015     Years since quittin.5     Smokeless tobacco: Never Used   Substance Use Topics     Alcohol use: Yes     Alcohol/week: 4.8 oz     Types: 8 Standard drinks or equivalent per week     Comment: not for a month     Family History   Problem Relation Age of Onset     Obesity Mother      Diabetes Father      Coronary Artery Disease Father      Hypertension Father      Hyperlipidemia Father      Depression Father      Anxiety Disorder Father      Mental Illness Father      Substance Abuse Father      Breast Cancer Maternal Grandmother      Depression Maternal Grandmother      Mental Illness Maternal Grandmother      Substance Abuse Maternal Grandmother      Prostate Cancer Maternal Grandfather      Diabetes Paternal Grandmother      Breast Cancer Paternal Grandmother      Depression Paternal Grandmother      Mental Illness Paternal Grandmother      Diabetes Paternal Grandfather      Asthma Brother      Diabetes Paternal Uncle      Cerebrovascular Disease No family hx of      Anesthesia Reaction No family hx of      Osteoporosis No family hx of      Genetic Disorder No family hx of      Thyroid Disease No family hx of      Liver Disease No family hx of          Current Outpatient Medications   Medication Sig Dispense Refill     Acetaminophen (TYLENOL PO) Take 1,000 mg by mouth as needed for mild pain or fever       atomoxetine (STRATTERA) 80 MG capsule Take 1 capsule (80 mg) by mouth daily 90 capsule 0     chlorthalidone (HYGROTON) 25 MG  "tablet Take 1 tablet (25 mg) by mouth daily 90 tablet 0     citalopram (CELEXA) 40 MG tablet Take 1 tablet (40 mg) by mouth daily 90 tablet 1     hydrOXYzine (ATARAX) 25 MG tablet Take 1-2 tablets (25-50 mg) by mouth 2 times daily For anxiety and sleep 120 tablet 1     multivitamin, therapeutic with minerals (MULTI-VITAMIN) TABS tablet Take 1 tablet by mouth every morning       NONFORMULARY as needed CBD OIL       order for DME Equipment being ordered: Digital home blood pressure monitor kit 1 Dose 0     ustekinumab (STELARA) 90 MG/ML 90 mg subcutaneous  q 12 weeks. Weight 327 lb ( > 100 kg ) 1 mL 6     Allergies   Allergen Reactions     Tramadol Other (See Comments)     Shellfish-Derived Products          Reviewed and updated as needed this visit by Provider  Tobacco  Allergies  Meds  Problems  Med Hx  Surg Hx  Fam Hx         Review of Systems   ROS COMP: Constitutional, cardiovascular, pulmonary, gi-as above and gu systems are negative, except as otherwise noted.      Objective    /80 (BP Location: Right arm, Patient Position: Sitting, Cuff Size: Adult Large)   Pulse 89   Temp 98  F (36.7  C) (Oral)   Resp 20   Ht 1.956 m (6' 5\")   Wt 143.8 kg (317 lb)   SpO2 97%   BMI 37.59 kg/m    Body mass index is 37.59 kg/m .  Physical Exam   GENERAL: healthy, alert and no distress  RESP: lungs clear to auscultation - no rales, rhonchi or wheezes  CV: regular rate and rhythm, normal S1 S2, no S3 or S4, no murmur, click or rub, no peripheral edema and peripheral pulses strong  ABDOMEN: semi-hard, guarding, rebound tenderness patient feels it on the right side when pushing on the left, DENIA hepatosplenomegaly or masses due to guarding, and bowel sounds hypoactive  MS: no gross musculoskeletal defects note   SKIN: psoriasis noted on right middle to lower abdominal quadrants, also noted over middle to left abdomen.     Diagnostic Test Results:  Labs reviewed in Epic  No results found for this or any previous " "visit (from the past 24 hour(s)).        Assessment & Plan     1. RLQ abdominal pain  Go to the ER> patient in a lot of pain, he does not want to do CT and labs outpatient at this time. NP did call ahead to [place] [facility] to update patient is on his way. Safe to drive himself there. Rule out appendicitis  - XR Abdomen 2 Views  - Lipase    2. Essential hypertension  stable    3. Morbid obesity (H)  Needs to continue working on diet/exercise but was unable to go over this    4. Psoriatic arthritis (H)  Stable, on strattera       BMI:   Estimated body mass index is 37.59 kg/m  as calculated from the following:    Height as of this encounter: 1.956 m (6' 5\").    Weight as of this encounter: 143.8 kg (317 lb).         Return in about 1 week (around 8/9/2019), or if symptoms worsen or fail to improve.    LINNEA Gold, NP-C  [facility]      "

## 2019-08-03 ENCOUNTER — TELEPHONE (OUTPATIENT)
Dept: RHEUMATOLOGY | Facility: CLINIC | Age: 39
End: 2019-08-03

## 2019-08-03 NOTE — TELEPHONE ENCOUNTER
Health Call Center    Phone Message    May a detailed message be left on voicemail: yes    Reason for Call: New internal referral from Dr. Sandi Gaitan at [facility].  Please contact patient with appointment options    Action Taken: Message routed to:  [facility] ([facility] Rheumatology

## 2019-08-26 NOTE — TELEPHONE ENCOUNTER
Attempted to connect with patient, to set up a telephone appointment with Referral Specialist to complete intake form over the phone without success. Left a message for patient to call the clinic to schedule date and time to complete the intake form over the phone.     Minerva Fiore CMA CMA  8/26/2019 2:02 PM

## 2019-09-03 ENCOUNTER — OFFICE VISIT (OUTPATIENT)
Dept: PEDIATRICS | Facility: CLINIC | Age: 39
End: 2019-09-03
Payer: COMMERCIAL

## 2019-09-03 VITALS
WEIGHT: 315 LBS | DIASTOLIC BLOOD PRESSURE: 89 MMHG | TEMPERATURE: 96.4 F | OXYGEN SATURATION: 99 % | HEART RATE: 59 BPM | BODY MASS INDEX: 38.66 KG/M2 | SYSTOLIC BLOOD PRESSURE: 157 MMHG

## 2019-09-03 DIAGNOSIS — M54.16 LUMBAR RADICULOPATHY: Primary | ICD-10-CM

## 2019-09-03 DIAGNOSIS — M62.830 BACK MUSCLE SPASM: ICD-10-CM

## 2019-09-03 DIAGNOSIS — F40.240 CLAUSTROPHOBIA: ICD-10-CM

## 2019-09-03 PROCEDURE — 99214 OFFICE O/P EST MOD 30 MIN: CPT | Performed by: INTERNAL MEDICINE

## 2019-09-03 RX ORDER — DIAZEPAM 2 MG
2 TABLET ORAL ONCE
Qty: 1 TABLET | Refills: 0 | Status: SHIPPED | OUTPATIENT
Start: 2019-09-03 | End: 2019-09-03

## 2019-09-03 RX ORDER — HYDROCODONE BITARTRATE AND ACETAMINOPHEN 5; 325 MG/1; MG/1
1-2 TABLET ORAL EVERY 8 HOURS PRN
Qty: 42 TABLET | Refills: 0 | Status: SHIPPED | OUTPATIENT
Start: 2019-09-03 | End: 2019-09-09

## 2019-09-03 RX ORDER — CYCLOBENZAPRINE HCL 5 MG
5 TABLET ORAL 3 TIMES DAILY PRN
Qty: 21 TABLET | Refills: 0 | Status: SHIPPED | OUTPATIENT
Start: 2019-09-03 | End: 2021-01-12

## 2019-09-03 NOTE — LETTER
September 3, 2019      RE: Lakhwinder Jones  [address]  [place] [address]        To whom it may concern:    Lakhwinder Jones is under my professional care for    Lumbar radiculopathy  Claustrophobia  Back muscle spasm  Closed nondisplaced fracture of fifth metatarsal bone of right foot with delayed healing, subsequent encounter He  may return to work with the following: The employee is ABLE to return to work today.    When the patient returns to work, the following restrictions apply until 9/10/2019.     Light duty-unable to lift more than 10 pounds.        Sincerely,        Henrry Tyler MD PhD

## 2019-09-03 NOTE — PATIENT INSTRUCTIONS
Make appointment(s) for:   -- get MRI done.   -- follow up with PCP next week to reevaluate work restriction.         Medication(s) prescribed today:    Orders Placed This Encounter   Medications     diazepam (VALIUM) 2 MG tablet     Sig: Take 1 tablet (2 mg) by mouth once for 1 dose Take 1 tablet (2mg) by mouth once as needed for anxiety 30 min prior to MRI     Dispense:  1 tablet     Refill:  0     cyclobenzaprine (FLEXERIL) 5 MG tablet     Sig: Take 1 tablet (5 mg) by mouth 3 times daily as needed for muscle spasms     Dispense:  21 tablet     Refill:  0     HYDROcodone-acetaminophen (NORCO) 5-325 MG tablet     Sig: Take 1-2 tablets by mouth every 8 hours as needed for moderate to severe pain maximum 6 tablet(s) per day     Dispense:  42 tablet     Refill:  0

## 2019-09-03 NOTE — PROGRESS NOTES
Subjective     Lakhwinder Jones is a 38 year old male who presents to clinic today for the following health issues:    HPI   Musculoskeletal problem/pain      Duration: 8-25-19    Description  Location: Mid back    Intensity:  severe    Accompanying signs and symptoms: radiation of pain to both hips, numbness and tingling    History  Previous similar problem: YES  Previous evaluation:  Hx of back problems and surgery 1 year ago    Precipitating or alleviating factors:  Trauma or overuse: YES  Aggravating factors include: sitting, standing, walking, climbing stairs, lifting, exercise and cold or damp weather    Therapies tried and outcome: rest/inactivity, heat, ice, stretching and Ibuprofen    The pain started after lifting a cough at work on 8/25/2019.   He has similar pain in the past on the left side, underwent disectomy last November. Now the pain is on both sides but worse on the right side, going from the back to the toes.     ROS:  Constitutional, HEENT, cardiovascular, pulmonary, gi and gu systems are negative, except as otherwise noted.         Current Outpatient Medications on File Prior to Visit:  Acetaminophen (TYLENOL PO) Take 1,000 mg by mouth as needed for mild pain or fever   atomoxetine (STRATTERA) 80 MG capsule Take 1 capsule (80 mg) by mouth daily   chlorthalidone (HYGROTON) 25 MG tablet Take 1 tablet (25 mg) by mouth daily   citalopram (CELEXA) 40 MG tablet Take 1 tablet (40 mg) by mouth daily   hydrOXYzine (ATARAX) 25 MG tablet Take 1-2 tablets (25-50 mg) by mouth 2 times daily For anxiety and sleep   multivitamin, therapeutic with minerals (MULTI-VITAMIN) TABS tablet Take 1 tablet by mouth every morning   NONFORMULARY as needed CBD OIL   ustekinumab (STELARA) 90 MG/ML 90 mg subcutaneous  q 12 weeks. Weight 327 lb ( > 100 kg )   order for DME Equipment being ordered: Digital home blood pressure monitor kit     No current facility-administered medications on file prior to visit.        Patient  Active Problem List   Diagnosis     Anxiety state     Chronic back pain     Moderate episode of recurrent major depressive disorder (H)     Panic disorder without agoraphobia     Abnormal results of liver function studies     Psoriatic arthritis (H)     Morbid obesity (H)     Elevated blood-pressure reading without diagnosis of hypertension     Alcohol abuse, in remission     Vitamin D deficiency     Essential hypertension     Past Surgical History:   Procedure Laterality Date     DISCECTOMY LUMBAR POSTERIOR MICROSCOPIC ONE LEVEL Left 2018    Procedure: Left Lumbar 4-5 Microdisectomy ;  Surgeon: Favio Miranda MD;  [facility]     FOOT SURGERY Right      NOSE SURGERY      3 times     right elbow surgey      14 yo     TESTICLE SURGERY      23 yo       Social History     Tobacco Use     Smoking status: Former Smoker     Packs/day: 0.20     Years: 10.00     Pack years: 2.00     Types: Cigarettes     Last attempt to quit: 2015     Years since quittin.6     Smokeless tobacco: Never Used   Substance Use Topics     Alcohol use: Yes     Alcohol/week: 4.8 oz     Types: 8 Standard drinks or equivalent per week     Comment: not for a month     Family History   Problem Relation Age of Onset     Obesity Mother      Diabetes Father      Coronary Artery Disease Father      Hypertension Father      Hyperlipidemia Father      Depression Father      Anxiety Disorder Father      Mental Illness Father      Substance Abuse Father      Breast Cancer Maternal Grandmother      Depression Maternal Grandmother      Mental Illness Maternal Grandmother      Substance Abuse Maternal Grandmother      Prostate Cancer Maternal Grandfather      Diabetes Paternal Grandmother      Breast Cancer Paternal Grandmother      Depression Paternal Grandmother      Mental Illness Paternal Grandmother      Diabetes Paternal Grandfather      Asthma Brother      Diabetes Paternal Uncle      Cerebrovascular Disease No family hx of       Anesthesia Reaction No family hx of      Osteoporosis No family hx of      Genetic Disorder No family hx of      Thyroid Disease No family hx of      Liver Disease No family hx of              Problem list, Medication list, Allergies, and Medical/Social/Surgical histories reviewed in [facility] and updated as appropriate.    OBJECTIVE:                                                    BP (!) 157/89   Pulse 59   Temp 96.4  F (35.8  C) (Temporal)   Wt 147.9 kg (326 lb)   SpO2 99%   BMI 38.66 kg/m      GENERAL: healthy, alert and no distress    Back: limited bending and extension of the low back. Slightly rotation. Tender at the lower lumbar spine and paravertebral area bilaterally. DTR symmetric, bilateral positive SLR, no sensation abnormalities in the LE.       Diagnostic test results:        ASSESSMENT/PLAN:                                                      38 year old male with the following diagnoses and treatment plan:      ICD-10-CM    1. Lumbar radiculopathy M54.16 MR Lumbar Spine w/o & w Contrast   2. Claustrophobia F40.240 diazepam (VALIUM) 2 MG tablet     DISCONTINUED: diazepam (VALIUM) 2 MG tablet   3. Back muscle spasm M62.830 cyclobenzaprine (FLEXERIL) 5 MG tablet       --Lumbar radiculopathy with recurrent symptoms, history of discectomy less than a year ago.  We will obtain lumbar spine MRI, use hydrocodone for pain and cyclobenzaprine to relax the muscles.  Work note given for patient.  We will have him follow-up with his PCP next Monday if his symptoms are not improving.    Will call or return to clinic if worsening or symptoms not improving as discussed.  See Patient Instructions.      Henrry Tyler MD-PhD  [facility]    (Note: Chart documentation was done in part with Dragon Voice Recognition software. Although reviewed after completion, some word and grammatical errors may remain.)

## 2019-09-04 NOTE — TELEPHONE ENCOUNTER
Final attempt to reach out to patient without success regarding scheduling a telephone appointment to complete an intake form. Closing referral request.   Minerva Fiore CMA [facility]  9/4/2019 3:28 PM

## 2019-09-06 ENCOUNTER — ANCILLARY PROCEDURE (OUTPATIENT)
Dept: MRI IMAGING | Facility: CLINIC | Age: 39
End: 2019-09-06
Attending: INTERNAL MEDICINE
Payer: COMMERCIAL

## 2019-09-06 DIAGNOSIS — M54.16 LUMBAR RADICULOPATHY: ICD-10-CM

## 2019-09-06 RX ORDER — GADOBUTROL 604.72 MG/ML
10 INJECTION INTRAVENOUS ONCE
Status: COMPLETED | OUTPATIENT
Start: 2019-09-06 | End: 2019-09-06

## 2019-09-06 RX ADMIN — GADOBUTROL 10 ML: 604.72 INJECTION INTRAVENOUS at 18:26

## 2019-09-06 NOTE — DISCHARGE INSTRUCTIONS
MRI Contrast Discharge Instructions    The IV contrast you received today will pass out of your body in your  urine. This will happen in the next 24 hours. You will not feel this process.  Your urine will not change color.    Drink at least 4 extra glasses of water or juice today (unless your doctor  has restricted your fluids). This reduces the stress on your kidneys.  You may take your regular medicines.    If you are on dialysis: It is best to have dialysis today.    If you have a reaction: Most reactions happen right away. If you have  any new symptoms after leaving the hospital (such as hives or swelling),  call your hospital at the correct number below. Or call your family doctor.  If you have breathing distress or wheezing, call 911.    Special instructions: ***    I have read and understand the above information.    Signature:______________________________________ Date:___________    Staff:__________________________________________ Date:___________     Time:__________    [facility] Departments:    ___[place]: [phone]  ___[facility]: [phone]  ___[place]: [phone] ___[facility]: [phone]  ___[facility]: [phone]  ___[facility]: [phone]  ___[phone]  ___[facility]: [phone]  ___[phone]

## 2019-09-09 ENCOUNTER — OFFICE VISIT (OUTPATIENT)
Dept: PEDIATRICS | Facility: CLINIC | Age: 39
End: 2019-09-09
Payer: COMMERCIAL

## 2019-09-09 VITALS
HEART RATE: 69 BPM | TEMPERATURE: 97.4 F | HEIGHT: 77 IN | DIASTOLIC BLOOD PRESSURE: 73 MMHG | BODY MASS INDEX: 37.19 KG/M2 | WEIGHT: 315 LBS | SYSTOLIC BLOOD PRESSURE: 138 MMHG | OXYGEN SATURATION: 100 %

## 2019-09-09 DIAGNOSIS — M54.16 LUMBAR RADICULOPATHY: Primary | ICD-10-CM

## 2019-09-09 DIAGNOSIS — G89.29 CHRONIC RIGHT-SIDED LOW BACK PAIN WITH RIGHT-SIDED SCIATICA: ICD-10-CM

## 2019-09-09 DIAGNOSIS — M54.41 CHRONIC RIGHT-SIDED LOW BACK PAIN WITH RIGHT-SIDED SCIATICA: ICD-10-CM

## 2019-09-09 PROBLEM — S92.354A CLOSED NONDISPLACED FRACTURE OF FIFTH METATARSAL BONE OF RIGHT FOOT, INITIAL ENCOUNTER: Status: RESOLVED | Noted: 2017-11-07 | Resolved: 2019-09-09

## 2019-09-09 PROCEDURE — 99213 OFFICE O/P EST LOW 20 MIN: CPT | Performed by: FAMILY MEDICINE

## 2019-09-09 RX ORDER — HYDROCODONE BITARTRATE AND ACETAMINOPHEN 5; 325 MG/1; MG/1
1 TABLET ORAL EVERY 8 HOURS PRN
Qty: 30 TABLET | Refills: 0 | Status: SHIPPED | OUTPATIENT
Start: 2019-09-09 | End: 2021-07-21

## 2019-09-09 ASSESSMENT — MIFFLIN-ST. JEOR: SCORE: 2513.85

## 2019-09-09 NOTE — PROGRESS NOTES
"Subjective     Lakhwinder Jones is a 38 year old male who presents to clinic today for the following health issues:    HPI   Acute on chronic low back pain after work related injury 8/25/2019, pain started after lifting a couch at work. Pain radiates right buttock and Right lower leg and toe. Tingling on right hip and thigh. Pain currently on flexeril, norco and as needed diazepam. Worse with lying down. Sitting and standing also makes it worse. No alarming symptoms. He is scheduled with Dr Miranda 9/17/2019 at the [facility].    MRI result reviewed.  Postoperative changes of prior left hemilaminectomy and  microdiscectomy at L4-5. Decreased size of left paracentral disc  extrusion at L4-5 with improved left L5 nerve root abutment within the  left lateral recess since 8/2/2018.     He request pain medication refills and work note prior to seeing orth.    Reviewed and updated as needed this visit by Provider  Problems  Med Hx  Surg Hx  Fam Hx         Review of Systems   ROS COMP: Constitutional, HEENT, cardiovascular, pulmonary, GI, , musculoskeletal, neuro, skin, endocrine and psych systems are negative, except as otherwise noted.      Objective    /73   Pulse 69   Temp 97.4  F (36.3  C) (Oral)   Ht 1.956 m (6' 5\")   Wt 147.1 kg (324 lb 6.4 oz)   SpO2 100%   BMI 38.47 kg/m    Body mass index is 38.47 kg/m .  Physical Exam   GENERAL: healthy, alert and no distress  NECK: no adenopathy, no asymmetry, masses, or scars and thyroid normal to palpation  RESP: lungs clear to auscultation - no rales, rhonchi or wheezes  CV: regular rate and rhythm, normal S1 S2, no S3 or S4, no murmur, click or rub, no peripheral edema and peripheral pulses strong  ABDOMEN: soft, nontender, no hepatosplenomegaly, no masses and bowel sounds normal  Comprehensive back pain exam:  Bilateral lower lumbar paraspinal muscle tenderness, Pain limits the following motions: flexrion and extension, Lower extremity strength functional and " equal on both sides, Lower extremity reflexes within normal limits bilaterally, Lower extremity sensation normal and equal on both sides and Straight leg raise negative bilaterally          Assessment & Plan     1. Lumbar radiculopathy  Keep scheduled appointment with ortho  Refilled norco per epic care  Work note  Encouraged back exercises.    Return if symptoms worsen or fail to improve.    Dexter Farfan MD  [facility]

## 2019-09-09 NOTE — LETTER
September 9, 2019      RE: Lakhwinder Jones  [address]  [place] [address]        To whom it may concern:    Lakhwinder Jones is under my professional care for    Lumbar radiculopathy  Chronic right-sided low back pain with right-sided sciatica He  may return to work with the following: The employee is UNABLE to return to work until 9/12/2019.    When the patient returns to work, the following restrictions apply until cleared by Dr Miranda  A) Bend: Not at all (0 hours)  B) Squat: Not at all (0 hours)  C) Walk/Stand: Occasionally (1-3 hours)  D) Reach Above Shoulders: Frequently (4-8 hours)  E) Lift, carry, push, and pull no more than:  0-10 lbs.Light duty-unable to lift more than 10 pounds       Sincerely,      Dexter Rodrigues MD

## 2019-09-09 NOTE — RESULT ENCOUNTER NOTE
Dear Lakhwinder,   Your recent lab results showed the following:  -- by report, the prior disc bulge is smaller. You have post surgical changes but old findings but no new changes. If your symptoms have not improved, I would suggest discuss with your PCP about getting a referral back to the spine surgeon to evaluate.     Please call or Mychart to our office if you have further questions.     Henrry Tyler MD-PhD

## 2019-09-09 NOTE — PATIENT INSTRUCTIONS
Patient Education     Back Pain (Acute or Chronic)    Back pain is one of the most common problems. The good news is that most people feel better in 1 to 2 weeks, and most of the rest in 1 to 2 months. Most people can remain active.  People experience and describe pain differently; not everyone is the same.    The pain can be sharp, stabbing, shooting, aching, cramping or burning.    Movement, standing, bending, lifting, sitting, or walking may worsen pain.    It can be localized to one spot or area, or it can be more generalized.    It can spread or radiate upwards, to the front, or go down your arms or legs (sciatica).    It can cause muscle spasm.  Most of the time, mechanical problems with the muscles or spine cause the pain. Mechanical problems are usually caused by an injury to the muscles or ligaments. While illness can cause back pain, it is usually not caused by a serious illness. Mechanical problems include:     Physical activity such as sports, exercise, work, or normal activity    Overexertion, lifting, pushing, pulling incorrectly or too aggressively    Sudden twisting, bending, or stretching from an accident, or accidental movement    Poor posture    Stretching or moving wrong, without noticing pain at the time    Poor coordination, lack of regular exercise (check with your doctor about this)    Spinal disc disease or arthritis    Stress  Pain can also be related to pregnancy, or illness like appendicitis, bladder or kidney infections, pelvic infections, and many other things.  Acute back pain usually gets better in 1 to 2 weeks. Back pain related to disk disease, arthritis in the spinal joints or spinal stenosis (narrowing of the spinal canal) can become chronic and last for months or years.  Unless you had a physical injury (for example, a car accident or fall) X-rays are usually not needed for the initial evaluation of back pain. If pain continues and does not respond to medical treatment, X-rays  and other tests may be needed.  Home care  Try these home care recommendations:    When in bed, try to find a position of comfort. A firm mattress is best. Try lying flat on your back with pillows under your knees. You can also try lying on your side with your knees bent up towards your chest and a pillow between your knees.    At first, do not try to stretch out the sore spots. If there is a strain, it is not like the good soreness you get after exercising without an injury. In this case, stretching may make it worse.    Avoid prolong sitting, long car rides, or travel. This puts more stress on the lower back than standing or walking.    During the first 24 to 72 hours after an acute injury or flare up of chronic back pain, apply an ice pack to the painful area for 20 minutes and then remove it for 20 minutes. Do this over a period of 60 to 90 minutes or several times a day. This will reduce swelling and pain. Wrap the ice pack in a thin towel or plastic to protect your skin.    You can start with ice, then switch to heat. Heat (hot shower, hot bath, or heating pad) reduces pain and works well for muscle spasms. Heat can be applied to the painful area for 20 minutes then remove it for 20 minutes. Do this over a period of 60 to 90 minutes or several times a day. Do not sleep on a heating pad. It can lead to skin burns or tissue damage.    You can alternate ice and heat therapy. Talk with your doctor about the best treatment for your back pain.    Therapeutic massage can help relax the back muscles without stretching them.    Be aware of safe lifting methods and do not lift anything without stretching first.  Medicines  Talk to your doctor before using medicine, especially if you have other medical problems or are taking other medicines.    You may use over-the-counter medicine as directed on the bottle to control pain, unless another pain medicine was prescribed. If you have chronic conditions like diabetes, liver  or kidney disease, stomach ulcers, or gastrointestinal bleeding, or are taking blood thinners, talk to your doctor before taking any medicine.    Be careful if you are given a prescription medicines, narcotics, or medicine for muscle spasms. They can cause drowsiness, affect your coordination, reflexes, and judgement. Do not drive or operate heavy machinery.  Follow-up care  Follow up with your healthcare provider, or as advised.   A radiologist will review any X-rays that were taken. Your provide will notify you of any new findings that may affect your care.  Call 911  Call emergency services if any of the following occur:    Trouble breathing    Confusion    Very drowsy or trouble awakening    Fainting or loss of consciousness    Rapid or very slow heart rate    Loss of bowel or bladder control  When to seek medical advice  Call your healthcare provider right away if any of these occur:     Pain becomes worse or spreads to your legs    Weakness or numbness in one or both legs    Numbness in the groin or genital area  Date Last Reviewed: 7/1/2016 2000-2017 [employer]. [address] [place] [id number]. All rights reserved. This information is not intended as a substitute for professional medical care. Always follow your healthcare professional's instructions.

## 2019-10-01 ENCOUNTER — HEALTH MAINTENANCE LETTER (OUTPATIENT)
Age: 39
End: 2019-10-01

## 2020-01-14 ENCOUNTER — OFFICE VISIT (OUTPATIENT)
Dept: PEDIATRICS | Facility: CLINIC | Age: 40
End: 2020-01-14
Payer: COMMERCIAL

## 2020-01-14 VITALS — BODY MASS INDEX: 38.3 KG/M2 | WEIGHT: 315 LBS | HEART RATE: 77 BPM | TEMPERATURE: 96.1 F | OXYGEN SATURATION: 96 %

## 2020-01-14 DIAGNOSIS — G89.29 CHRONIC PAIN OF BOTH KNEES: ICD-10-CM

## 2020-01-14 DIAGNOSIS — M25.561 CHRONIC PAIN OF BOTH KNEES: ICD-10-CM

## 2020-01-14 DIAGNOSIS — M25.562 CHRONIC PAIN OF BOTH KNEES: ICD-10-CM

## 2020-01-14 DIAGNOSIS — L03.116 CELLULITIS OF HEEL, LEFT: Primary | ICD-10-CM

## 2020-01-14 PROCEDURE — 99214 OFFICE O/P EST MOD 30 MIN: CPT | Performed by: INTERNAL MEDICINE

## 2020-01-14 RX ORDER — CEPHALEXIN 500 MG/1
500 CAPSULE ORAL 2 TIMES DAILY
Qty: 20 CAPSULE | Refills: 0 | Status: SHIPPED | OUTPATIENT
Start: 2020-01-14 | End: 2020-02-14

## 2020-01-14 RX ORDER — HYDROCODONE BITARTRATE AND ACETAMINOPHEN 5; 325 MG/1; MG/1
1-2 TABLET ORAL 3 TIMES DAILY PRN
Qty: 15 TABLET | Refills: 0 | Status: SHIPPED | OUTPATIENT
Start: 2020-01-14 | End: 2020-02-14

## 2020-01-14 NOTE — PROGRESS NOTES
Subjective     Lakhwinder Jones is a 39 year old male who presents to clinic today for the following health issues:    HPI   1. Musculoskeletal problem/pain      Duration: several years    Description  Location: Bilat knees    Intensity:  moderate    Accompanying signs and symptoms: weakness of knees and swelling    History  Previous similar problem: YES  Previous evaluation:  x-ray and injections    Precipitating or alleviating factors:  Trauma or overuse: no   Aggravating factors include: walking, climbing stairs, exercise, overuse and cold or damp weather    Therapies tried and outcome: heat, immobilization, massage, exercises, support wrap, acetaminophen, Ibuprofen, chiropractor, physical therapy and injections.    His last injection was 2 years ago with Dr. Mijares in sports medicine here.  He was told that he was bone-on-bone, eventually will need knee replacement, however he is too young for that.  He would like to get back to see Dr. Mijares, wants to get a referral for that.    2. Left Heel pain, cut foot 2 days ago, Not sure what he cut it on, something on the floor. Last Tdap 2012.     He has had increasing pain of the heel, has difficulty putting weight on.  Has been limping.  He has tried high-dose ibuprofen and Tylenol, nothing has touched this pain.    ROS:  Constitutional, HEENT, cardiovascular, pulmonary, gi and gu systems are negative, except as otherwise noted.       Acetaminophen (TYLENOL PO), Take 1,000 mg by mouth as needed for mild pain or fever  atomoxetine (STRATTERA) 80 MG capsule, Take 1 capsule (80 mg) by mouth daily  chlorthalidone (HYGROTON) 25 MG tablet, Take 1 tablet (25 mg) by mouth daily  citalopram (CELEXA) 40 MG tablet, Take 1 tablet (40 mg) by mouth daily  hydrOXYzine (ATARAX) 25 MG tablet, Take 1-2 tablets (25-50 mg) by mouth 2 times daily For anxiety and sleep  multivitamin, therapeutic with minerals (MULTI-VITAMIN) TABS tablet, Take 1 tablet by mouth every  morning  NONFORMULARY, as needed CBD OIL  ustekinumab (STELARA) 90 MG/ML, 90 mg subcutaneous  q 12 weeks. Weight 327 lb ( > 100 kg )  cyclobenzaprine (FLEXERIL) 5 MG tablet, Take 1 tablet (5 mg) by mouth 3 times daily as needed for muscle spasms (Patient not taking: Reported on 2020)  [] diazepam (VALIUM) 2 MG tablet, Take 1 tablet (2 mg) by mouth once for 1 dose Take 1 tablet (2mg) by mouth once as needed for anxiety 30 min prior to MRI (Patient not taking: Reported on 2020)  HYDROcodone-acetaminophen (NORCO) 5-325 MG tablet, Take 1 tablet by mouth every 8 hours as needed for moderate to severe pain maximum 6 tablet(s) per day (Patient not taking: Reported on 2020)  order for DME, Equipment being ordered: Digital home blood pressure monitor kit    No current facility-administered medications on file prior to visit.          Patient Active Problem List   Diagnosis     Anxiety state     Chronic back pain     Moderate episode of recurrent major depressive disorder (H)     Panic disorder without agoraphobia     Abnormal results of liver function studies     Psoriatic arthritis (H)     Morbid obesity (H)     Elevated blood-pressure reading without diagnosis of hypertension     Alcohol abuse, in remission     Vitamin D deficiency     Essential hypertension     Past Surgical History:   Procedure Laterality Date     DISCECTOMY LUMBAR POSTERIOR MICROSCOPIC ONE LEVEL Left 2018    Procedure: Left Lumbar 4-5 Microdisectomy ;  Surgeon: Favio Miranda MD;  Location: [facility]     FOOT SURGERY Right      NOSE SURGERY      3 times     right elbow surgey      14 yo     TESTICLE SURGERY      23 yo       Social History     Tobacco Use     Smoking status: Former Smoker     Packs/day: 0.20     Years: 10.00     Pack years: 2.00     Types: Cigarettes     Last attempt to quit: 2015     Years since quittin.0     Smokeless tobacco: Never Used   Substance Use Topics     Alcohol use: Yes      Alcohol/week: 8.0 standard drinks     Types: 8 Standard drinks or equivalent per week     Comment: not for a month     Family History   Problem Relation Age of Onset     Obesity Mother      Diabetes Father      Coronary Artery Disease Father      Hypertension Father      Hyperlipidemia Father      Depression Father      Anxiety Disorder Father      Mental Illness Father      Substance Abuse Father      Breast Cancer Maternal Grandmother      Depression Maternal Grandmother      Mental Illness Maternal Grandmother      Substance Abuse Maternal Grandmother      Prostate Cancer Maternal Grandfather      Diabetes Paternal Grandmother      Breast Cancer Paternal Grandmother      Depression Paternal Grandmother      Mental Illness Paternal Grandmother      Diabetes Paternal Grandfather      Asthma Brother      Diabetes Paternal Uncle      Cerebrovascular Disease No family hx of      Anesthesia Reaction No family hx of      Osteoporosis No family hx of      Genetic Disorder No family hx of      Thyroid Disease No family hx of      Liver Disease No family hx of              Problem list, Medication list, Allergies, and Medical/Social/Surgical histories reviewed in [facility] and updated as appropriate.    OBJECTIVE:                                                    Pulse 77   Temp 96.1  F (35.6  C) (Temporal)   Wt 146.5 kg (323 lb)   SpO2 96%   BMI 38.30 kg/m      GENERAL: 39-year-old male, alert, moderate discomfort, limping.  Left heel: At the bottom of the heel there is a superficial skin scrape, dry cracked skin at the heel.  There is moderate tenderness to palpation around the cut area.  There is also mild erythema and warmth posterior to the heel.  There is moderate swelling as well.      Diagnostic test results:  No results found for any visits on 01/14/20.      ASSESSMENT/PLAN:                                                      39 year old male with the following diagnoses and treatment plan:      ICD-10-CM    1.  Cellulitis of heel, left L03.116 cephALEXin (KEFLEX) 500 MG capsule     HYDROcodone-acetaminophen (NORCO) 5-325 MG tablet   2. Chronic pain of both knees M25.561 SPORTS MEDICINE REFERRAL    M25.562     G89.29        --I am concerned that he is developing cellulitis of the heel.  I will put him on Keflex for 10 days.  Hydrocodone for pain.  --Chronic knee pain: Refer to sports medicine for cortisone injection    Will call or return to clinic if worsening or symptoms not improving as discussed.  See Patient Instructions.      Henrry Tyler MD-PhD  [facility]    (Note: Chart documentation was done in part with Dragon Voice Recognition software. Although reviewed after completion, some word and grammatical errors may remain.)

## 2020-01-14 NOTE — PATIENT INSTRUCTIONS
Make appointment(s) for:   -- Dr. Mijares follow         Medication(s) prescribed today:    Orders Placed This Encounter   Medications     cephALEXin (KEFLEX) 500 MG capsule     Sig: Take 1 capsule (500 mg) by mouth 2 times daily for 10 days     Dispense:  20 capsule     Refill:  0     HYDROcodone-acetaminophen (NORCO) 5-325 MG tablet     Sig: Take 1-2 tablets by mouth 3 times daily as needed for pain     Dispense:  15 tablet     Refill:  0

## 2020-02-14 ENCOUNTER — OFFICE VISIT (OUTPATIENT)
Dept: PEDIATRICS | Facility: CLINIC | Age: 40
End: 2020-02-14
Payer: COMMERCIAL

## 2020-02-14 ENCOUNTER — ANCILLARY PROCEDURE (OUTPATIENT)
Dept: GENERAL RADIOLOGY | Facility: CLINIC | Age: 40
End: 2020-02-14
Attending: INTERNAL MEDICINE
Payer: COMMERCIAL

## 2020-02-14 VITALS
OXYGEN SATURATION: 97 % | BODY MASS INDEX: 39.01 KG/M2 | TEMPERATURE: 97.6 F | WEIGHT: 315 LBS | SYSTOLIC BLOOD PRESSURE: 129 MMHG | DIASTOLIC BLOOD PRESSURE: 79 MMHG | HEART RATE: 62 BPM

## 2020-02-14 DIAGNOSIS — G56.21: ICD-10-CM

## 2020-02-14 DIAGNOSIS — L40.50 PSORIATIC ARTHRITIS (H): ICD-10-CM

## 2020-02-14 DIAGNOSIS — M77.11 LATERAL EPICONDYLITIS OF RIGHT ELBOW: Primary | ICD-10-CM

## 2020-02-14 PROCEDURE — 73070 X-RAY EXAM OF ELBOW: CPT | Mod: RT | Performed by: RADIOLOGY

## 2020-02-14 PROCEDURE — 99214 OFFICE O/P EST MOD 30 MIN: CPT | Performed by: INTERNAL MEDICINE

## 2020-02-14 NOTE — PATIENT INSTRUCTIONS
"Make appointment(s) for:   -- xray            Patient Education     Ulnar Nerve Palsy  The ulnar nerve travels down the arm from the shoulder to the hand. It controls movement and feeling in the wrist and hand. Damage to this nerve can cause a condition called ulnar nerve palsy.  A common cause of ulnar nerve palsy is leaning on the elbow for long periods of time. Injury to the arm or elbow, such as a fracture, can also damage the ulnar nerve.  Symptoms of ulnar nerve palsy include:    Numbness, tingling, or burning (often described as \"pins and needles\")    Pain    Weakness or muscle shrinking in the hand and fingers  The treatment depends on the cause of the nerve damage. In some cases, the problem will go away on its own once pressure to the nerve is relieved. Certain tests may be done to help determine the injury and extent of damage. These include nerve conduction studies (NCS) and electromyography (EMG). Splinting the wrist to limit movement may help with healing. If the problem is due to trauma or injury, physical therapy may be prescribed. Corticosteroid injections into the area may reduce swelling and pressure on the nerve. Surgery to repair the nerve may be needed for chronic symptoms that don't respond to simpler treatments.  Home care    Rest the wrist and arm until normal feeling and strength return.    If you were given a splint or sling, wear it as directed.    Don't lean on your elbows.    If medicines were prescribed for pain or nerve sensations, take them as directed.  Follow-up care  Follow up with your healthcare provider or as advised by our staff.  When to seek medical advice  Call your healthcare provider right away if you have any of the following:    Increasing arm swelling or pain    Numbness or weakness of the arm    Symptoms spread to other parts of the body    Slurred speech, confusion    Trouble speaking, walking, or seeing  Date Last Reviewed: 4/1/2018 2000-2019 [employer]. [address] [place] [address]. All rights reserved. This information is not intended as a substitute for professional medical care. Always follow your healthcare professional's instructions.           Patient Education     Treating Tennis Elbow    Your treatment will depend on how inflamed your tendon is. The goal is to relieve your symptoms and help you regain full use of your elbow.  Rest and medicine  Wearing a tennis elbow splint allows the inflamed tendon to rest. It must be worn properly. It should be placed down the arm past the painful area of the elbow. If it is directly over the inflamed tendon, it can worsen the symptoms. This brace can help the tendon heal. Using your other hand or changing your  also takes stress off the tendon. Oral nonsteroidal anti-inflammatory medicines (NSAIDs) and ice can relieve pain and reduce swelling.  Exercises and therapy  Your healthcare provider may give you an exercise program. He or she may refer you to a physical therapist. The physical therapist will teach you how to gently stretch and strengthen the muscles around your elbow.  Anti-inflammatory injections  Your healthcare provider may give you injections of an anti-inflammatory medicine, such as cortisone. This helps reduce swelling. You may have more pain at first. But in a few days, your elbow should feel better.  If surgery is needed  If your symptoms persist for a long time, or other treatments don t work, your healthcare provider may recommend surgery. Surgery repairs the inflamed tendon.  Date Last Reviewed: 1/1/2018 2000-2019 [employer]. [address] [place] [id number]. All rights reserved. This information is not intended as a substitute for professional medical care. Always follow your healthcare professional's instructions.

## 2020-02-14 NOTE — PROGRESS NOTES
Subjective     Lakhwinder Jones is a 39 year old male who presents to clinic today for the following health issues:    HPI   Musculoskeletal problem/pain      Duration: 7 days    Description  Location: Right elbow    Intensity:  moderate    Accompanying signs and symptoms: Sharp pain radiation of pain to fingers    History  Previous similar problem: no   Previous evaluation:  none    Precipitating or alleviating factors:  Trauma or overuse:unknown  Aggravating factors include: Setting elbow down on something    Therapies tried and outcome: unknown      He is a , oftentimes sits in the cart and put her right arm on the armrest.  In the last week when he does this or sets his arm on any armrest, it sends pain into his forearm into the little finger.  If he does not set it on the arm, the pain is not bad.    He also has a history of elbow injury when he was 13.  He also reports having somewhat chronic tennis elbow symptoms.  He denies having any injury a week ago before the symptoms started.  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    He has a history of psoriatic arthritis.  He is on Stelara for this.    ROS:  Constitutional, HEENT, cardiovascular, pulmonary, gi and gu systems are negative, except as otherwise noted.       Acetaminophen (TYLENOL PO), Take 1,000 mg by mouth as needed for mild pain or fever  atomoxetine (STRATTERA) 80 MG capsule, Take 1 capsule (80 mg) by mouth daily  chlorthalidone (HYGROTON) 25 MG tablet, Take 1 tablet (25 mg) by mouth daily  citalopram (CELEXA) 40 MG tablet, Take 1 tablet (40 mg) by mouth daily  hydrOXYzine (ATARAX) 25 MG tablet, Take 1-2 tablets (25-50 mg) by mouth 2 times daily For anxiety and sleep  multivitamin, therapeutic with minerals (MULTI-VITAMIN) TABS tablet, Take 1 tablet by mouth every morning  NONFORMULARY, as needed CBD OIL  ustekinumab (STELARA) 90 MG/ML, 90 mg subcutaneous  q 12 weeks. Weight 327 lb ( > 100 kg )  cyclobenzaprine  (FLEXERIL) 5 MG tablet, Take 1 tablet (5 mg) by mouth 3 times daily as needed for muscle spasms (Patient not taking: Reported on 2020)  [] diazepam (VALIUM) 2 MG tablet, Take 1 tablet (2 mg) by mouth once for 1 dose Take 1 tablet (2mg) by mouth once as needed for anxiety 30 min prior to MRI (Patient not taking: Reported on 2020)  HYDROcodone-acetaminophen (NORCO) 5-325 MG tablet, Take 1 tablet by mouth every 8 hours as needed for moderate to severe pain maximum 6 tablet(s) per day (Patient not taking: Reported on 2020)  [] order for DME, Equipment being ordered: Digital home blood pressure monitor kit    No current facility-administered medications on file prior to visit.          Patient Active Problem List   Diagnosis     Anxiety state     Chronic back pain     Moderate episode of recurrent major depressive disorder (H)     Panic disorder without agoraphobia     Abnormal results of liver function studies     Psoriatic arthritis (H)     Morbid obesity (H)     Elevated blood-pressure reading without diagnosis of hypertension     Alcohol abuse, in remission     Vitamin D deficiency     Essential hypertension     Past Surgical History:   Procedure Laterality Date     DISCECTOMY LUMBAR POSTERIOR MICROSCOPIC ONE LEVEL Left 2018    Procedure: Left Lumbar 4-5 Microdisectomy ;  Surgeon: Favio Miranda MD;  [facility]     FOOT SURGERY Right      NOSE SURGERY      3 times     right elbow surgey      14 yo     TESTICLE SURGERY      23 yo       Social History     Tobacco Use     Smoking status: Former Smoker     Packs/day: 0.20     Years: 10.00     Pack years: 2.00     Types: Cigarettes     Last attempt to quit: 2015     Years since quittin.1     Smokeless tobacco: Never Used   Substance Use Topics     Alcohol use: Yes     Alcohol/week: 8.0 standard drinks     Types: 8 Standard drinks or equivalent per week     Comment: not for a month     Family History   Problem  Relation Age of Onset     Obesity Mother      Diabetes Father      Coronary Artery Disease Father      Hypertension Father      Hyperlipidemia Father      Depression Father      Anxiety Disorder Father      Mental Illness Father      Substance Abuse Father      Breast Cancer Maternal Grandmother      Depression Maternal Grandmother      Mental Illness Maternal Grandmother      Substance Abuse Maternal Grandmother      Prostate Cancer Maternal Grandfather      Diabetes Paternal Grandmother      Breast Cancer Paternal Grandmother      Depression Paternal Grandmother      Mental Illness Paternal Grandmother      Diabetes Paternal Grandfather      Asthma Brother      Diabetes Paternal Uncle      Cerebrovascular Disease No family hx of      Anesthesia Reaction No family hx of      Osteoporosis No family hx of      Genetic Disorder No family hx of      Thyroid Disease No family hx of      Liver Disease No family hx of              Problem list, Medication list, Allergies, and Medical/Social/Surgical histories reviewed in [facility] and updated as appropriate.    OBJECTIVE:                                                    /79   Pulse 62   Temp 97.6  F (36.4  C) (Temporal)   Wt 149.2 kg (329 lb)   SpO2 97%   BMI 39.01 kg/m      GENERAL: healthy, alert and no distress  Right elbow: There is full range of flexion and extension.  Pain is induced with supination.  There is pain with wrist extension.  Tenderness to palpation over the ulnar groove reproducing the nerve pain.  Focal tenderness over the lateral epicondyles.  There is also tenderness over the olecranon.  No significant swelling.      Diagnostic test results:  No results found for any visits on 02/14/20.      ASSESSMENT/PLAN:                                                      39 year old male with the following diagnoses and treatment plan:      ICD-10-CM    1. Lateral epicondylitis of right elbow M77.11    2. Ulnar nerve entrapment at ulnar grove, right  G56.21 XR Elbow Right 2 Views   3. Psoriatic arthritis (H) L40.50        --Patient with lateral epicondylitis of the right elbow as well as ulnar nerve entrapment at the ulnar groove area.  He does have a history of psoriatic arthritis.  We will obtain an elbow x-ray to see if there is any involvement in the joints.  Printed home care instruction, conservative treatment in terms of ice rest avoid putting his elbow on the armrest.  May use a cushion.  We discussed wrist splint tennis elbow brace.  I also recommend physical therapy.  Patient stated his visit with his work, cannot afford a time for physical therapy.  He will follow through with home care instruction.  If no improvement, consider sports medicine referral with cortisone injection.    Will call or return to clinic if worsening or symptoms not improving as discussed.  See Patient Instructions.      Henrry Tyler MD-PhD  [facility]    (Note: Chart documentation was done in part with Dragon Voice Recognition software. Although reviewed after completion, some word and grammatical errors may remain.)

## 2020-02-14 NOTE — NURSING NOTE
Injectable Influenza Immunization Documentation    1.  Is the person to be vaccinated sick today?  No    2. Does the person to be vaccinated have an allergy to eggs or to a component of the vaccine?  No    3. Has the person to be vaccinated today ever had a serious reaction to influenza vaccine in the past?  No    4. Has the person to be vaccinated ever had Guillain-Marianna syndrome?  No     Form completed by Rhonda WARD

## 2020-02-19 NOTE — RESULT ENCOUNTER NOTE
Dear Lakhwinder,   Your recent lab results showed the following:  -- elbow xray didn't show fracture. There is subtle sclerosis (bone scarring) at the tennis elbow site. This is likely from chronic tennis elbow. If you have persistent or worsening pain, I recommend seeing sports medicine for evaluation.     Please call or Mychart to our office if you have further questions.     Henrry Tyler MD-PhD

## 2020-04-21 ENCOUNTER — VIRTUAL VISIT (OUTPATIENT)
Dept: PEDIATRICS | Facility: CLINIC | Age: 40
End: 2020-04-21
Payer: COMMERCIAL

## 2020-04-21 DIAGNOSIS — M54.16 LUMBAR RADICULOPATHY: Primary | ICD-10-CM

## 2020-04-21 PROCEDURE — 99213 OFFICE O/P EST LOW 20 MIN: CPT | Mod: 95 | Performed by: INTERNAL MEDICINE

## 2020-04-21 RX ORDER — HYDROCODONE BITARTRATE AND ACETAMINOPHEN 5; 325 MG/1; MG/1
1 TABLET ORAL EVERY 6 HOURS PRN
Qty: 15 TABLET | Refills: 0 | Status: SHIPPED | OUTPATIENT
Start: 2020-04-21 | End: 2020-04-24

## 2020-04-21 RX ORDER — CYCLOBENZAPRINE HCL 10 MG
10 TABLET ORAL 3 TIMES DAILY PRN
Qty: 30 TABLET | Refills: 0 | Status: SHIPPED | OUTPATIENT
Start: 2020-04-21 | End: 2023-07-20

## 2020-04-21 NOTE — PATIENT INSTRUCTIONS
Make appointment(s) for:   -- connect with your spine surgeon.      Medication(s) prescribed today:    Orders Placed This Encounter   Medications     cyclobenzaprine (FLEXERIL) 10 MG tablet     Sig: Take 1 tablet (10 mg) by mouth 3 times daily as needed for muscle spasms     Dispense:  30 tablet     Refill:  0     HYDROcodone-acetaminophen (NORCO) 5-325 MG tablet     Sig: Take 1 tablet by mouth every 6 hours as needed for pain     Dispense:  15 tablet     Refill:  0

## 2020-04-21 NOTE — PROGRESS NOTES
"Lakhwinder Jones is a 39 year old male who is being evaluated via a billable telephone visit.      The patient has been notified of following:     \"This telephone visit will be conducted via a call between you and your physician/provider. We have found that certain health care needs can be provided without the need for a physical exam.  This service lets us provide the care you need with a short phone conversation.  If a prescription is necessary we can send it directly to your pharmacy.  If lab work is needed we can place an order for that and you can then stop by our lab to have the test done at a later time.    Telephone visits are billed at different rates depending on your insurance coverage. During this emergency period, for some insurers they may be billed the same as an in-person visit.  Please reach out to your insurance provider with any questions.    If during the course of the call the physician/provider feels a telephone visit is not appropriate, you will not be charged for this service.\"    Patient has given verbal consent for Telephone visit?  Yes    How would you like to obtain your AVS? MyChart    Subjective     Lakhwinder Jones is a 39 year old male who presents to clinic today for the following health issues:    HPI  Chronic/Recurring Back Pain Follow Up      Where is your back pain located? (Select all that apply) low back both, worst on the left side    How would you describe your back pain?  cramping, dull ache, sharp, shooting, tighhtness and stabbing    Where does your back pain spread? the left buttock    Since your last clinic visit for back pain, how has your pain changed? gradually worsening    Does your back pain interfere with your job? No    Since your last visit, have you tried any new treatment? Yes -  CDB cream, tiger balm, tens unit, acetaminophen (Tylenol) and topical pain relievers      How many servings of fruits and vegetables do you eat daily?  2-3    On average, how many " sweetened beverages do you drink each day (Examples: soda, juice, sweet tea, etc.  Do NOT count diet or artificially sweetened beverages)?   2    How many days per week do you exercise enough to make your heart beat faster? 5    How many minutes a day do you exercise enough to make your heart beat faster? 30 - 60    How many days per week do you miss taking your medication? 0      Additional provider note:  Has been dealing with back pain in his 20s. Had back surgery last spring with microdissectomy. For a while, the back pain started to come on and off. Last week, this got worse. Not sure what may trigger it. Took his dog out a few times. Works at [employer].    Before his surgery, the pain was mainly to the left side. Used to go all the way to the leg. Now it is only at the buttock.     In the back, gabapentin dint' work. Had failed several injections until he had surgery. In the past, only thing that worse, it muscle spasm and pain meds.     He has the back stretching exercise and has been doing without improvement.     I have seen him in the past and am aware of his back pain issue.       ROS:  Constitutional, HEENT, cardiovascular, pulmonary, gi and gu systems are negative, except as otherwise noted.       Acetaminophen (TYLENOL PO), Take 1,000 mg by mouth as needed for mild pain or fever  chlorthalidone (HYGROTON) 25 MG tablet, Take 1 tablet (25 mg) by mouth daily  citalopram (CELEXA) 40 MG tablet, Take 1 tablet (40 mg) by mouth daily  hydrOXYzine (ATARAX) 25 MG tablet, Take 1-2 tablets (25-50 mg) by mouth 2 times daily For anxiety and sleep  multivitamin, therapeutic with minerals (MULTI-VITAMIN) TABS tablet, Take 1 tablet by mouth every morning  NONFORMULARY, as needed CBD OIL  ustekinumab (STELARA) 90 MG/ML, 90 mg subcutaneous  q 12 weeks. Weight 327 lb ( > 100 kg )  atomoxetine (STRATTERA) 80 MG capsule, Take 1 capsule (80 mg) by mouth daily (Patient not taking: Reported on 4/21/2020)  cyclobenzaprine  (FLEXERIL) 5 MG tablet, Take 1 tablet (5 mg) by mouth 3 times daily as needed for muscle spasms (Patient not taking: Reported on 2020)  HYDROcodone-acetaminophen (NORCO) 5-325 MG tablet, Take 1 tablet by mouth every 8 hours as needed for moderate to severe pain maximum 6 tablet(s) per day (Patient not taking: Reported on 2020)  [] order for DME, Equipment being ordered: Digital home blood pressure monitor kit    No current facility-administered medications on file prior to visit.          Patient Active Problem List   Diagnosis     Anxiety state     Chronic back pain     Moderate episode of recurrent major depressive disorder (H)     Panic disorder without agoraphobia     Abnormal results of liver function studies     Psoriatic arthritis (H)     Morbid obesity (H)     Elevated blood-pressure reading without diagnosis of hypertension     Alcohol abuse, in remission     Vitamin D deficiency     Essential hypertension     Past Surgical History:   Procedure Laterality Date     DISCECTOMY LUMBAR POSTERIOR MICROSCOPIC ONE LEVEL Left 2018    Procedure: Left Lumbar 4-5 Microdisectomy ;  Surgeon: Favio Miranda MD;  Location: [facility]     FOOT SURGERY Right      NOSE SURGERY      3 times     right elbow surgey      12 yo     TESTICLE SURGERY      23 yo       Social History     Tobacco Use     Smoking status: Former Smoker     Packs/day: 0.20     Years: 10.00     Pack years: 2.00     Types: Cigarettes     Last attempt to quit: 2015     Years since quittin.3     Smokeless tobacco: Never Used   Substance Use Topics     Alcohol use: Yes     Alcohol/week: 8.0 standard drinks     Types: 8 Standard drinks or equivalent per week     Comment: not for a month     Family History   Problem Relation Age of Onset     Obesity Mother      Diabetes Father      Coronary Artery Disease Father      Hypertension Father      Hyperlipidemia Father      Depression Father      Anxiety Disorder Father       Mental Illness Father      Substance Abuse Father      Breast Cancer Maternal Grandmother      Depression Maternal Grandmother      Mental Illness Maternal Grandmother      Substance Abuse Maternal Grandmother      Prostate Cancer Maternal Grandfather      Diabetes Paternal Grandmother      Breast Cancer Paternal Grandmother      Depression Paternal Grandmother      Mental Illness Paternal Grandmother      Diabetes Paternal Grandfather      Asthma Brother      Diabetes Paternal Uncle      Cerebrovascular Disease No family hx of      Anesthesia Reaction No family hx of      Osteoporosis No family hx of      Genetic Disorder No family hx of      Thyroid Disease No family hx of      Liver Disease No family hx of              Problem list, Medication list, Allergies, and Medical/Social/Surgical histories reviewed in [facility] and updated as appropriate.    OBJECTIVE:                                                    Vitals not obtained.  GENERAL: pleasant alert and no acute distress in conversation        Diagnostic test results:  No results found for any visits on 04/21/20.      ASSESSMENT/PLAN:                                                      39 year old male with the following diagnoses and treatment plan:      ICD-10-CM    1. Lumbar radiculopathy  M54.16 cyclobenzaprine (FLEXERIL) 10 MG tablet     HYDROcodone-acetaminophen (NORCO) 5-325 MG tablet       -- short term flexeril and norco. Advised pt to follow up with his surgeon with this possibly re-herniation.     See Patient Instructions on After Visit Summary.    Patient will follow up if worsening or symptoms not improving as discussed.    Phone call duration:8  minutes    Henrry Tyler MD-PhD  [facility]     (Note: Chart documentation was done in part with Dragon Voice Recognition software. Although reviewed after completion, some word and grammatical errors may remain.)

## 2020-06-02 ENCOUNTER — ANCILLARY PROCEDURE (OUTPATIENT)
Dept: GENERAL RADIOLOGY | Facility: CLINIC | Age: 40
End: 2020-06-02
Attending: PREVENTIVE MEDICINE
Payer: COMMERCIAL

## 2020-06-02 ENCOUNTER — OFFICE VISIT (OUTPATIENT)
Dept: ORTHOPEDICS | Facility: CLINIC | Age: 40
End: 2020-06-02
Payer: COMMERCIAL

## 2020-06-02 VITALS
DIASTOLIC BLOOD PRESSURE: 75 MMHG | BODY MASS INDEX: 37.19 KG/M2 | HEIGHT: 77 IN | SYSTOLIC BLOOD PRESSURE: 141 MMHG | WEIGHT: 315 LBS | HEART RATE: 69 BPM

## 2020-06-02 DIAGNOSIS — M51.26 LUMBAR HERNIATED DISC: ICD-10-CM

## 2020-06-02 DIAGNOSIS — M54.16 LUMBAR RADICULOPATHY: ICD-10-CM

## 2020-06-02 DIAGNOSIS — M25.561 CHRONIC PAIN OF BOTH KNEES: Primary | ICD-10-CM

## 2020-06-02 DIAGNOSIS — M25.561 BILATERAL KNEE PAIN: ICD-10-CM

## 2020-06-02 DIAGNOSIS — M17.0 ARTHRITIS OF BOTH KNEES: ICD-10-CM

## 2020-06-02 DIAGNOSIS — M25.562 CHRONIC PAIN OF BOTH KNEES: Primary | ICD-10-CM

## 2020-06-02 DIAGNOSIS — L40.50 PSORIASIS WITH ARTHROPATHY (H): ICD-10-CM

## 2020-06-02 DIAGNOSIS — M25.562 BILATERAL KNEE PAIN: ICD-10-CM

## 2020-06-02 DIAGNOSIS — M54.12 CERVICAL RADICULAR PAIN: ICD-10-CM

## 2020-06-02 DIAGNOSIS — G89.29 CHRONIC PAIN OF BOTH KNEES: Primary | ICD-10-CM

## 2020-06-02 PROCEDURE — 73564 X-RAY EXAM KNEE 4 OR MORE: CPT | Mod: RT | Performed by: RADIOLOGY

## 2020-06-02 PROCEDURE — 20610 DRAIN/INJ JOINT/BURSA W/O US: CPT | Mod: 50 | Performed by: PREVENTIVE MEDICINE

## 2020-06-02 PROCEDURE — 99213 OFFICE O/P EST LOW 20 MIN: CPT | Mod: 25 | Performed by: PREVENTIVE MEDICINE

## 2020-06-02 RX ORDER — METHYLPREDNISOLONE 4 MG
TABLET, DOSE PACK ORAL
Qty: 21 TABLET | Refills: 0 | Status: SHIPPED | OUTPATIENT
Start: 2020-06-02 | End: 2021-01-12

## 2020-06-02 RX ADMIN — TRIAMCINOLONE ACETONIDE 40 MG: 40 INJECTION, SUSPENSION INTRA-ARTICULAR; INTRAMUSCULAR at 09:14

## 2020-06-02 ASSESSMENT — MIFFLIN-ST. JEOR: SCORE: 2529.71

## 2020-06-02 NOTE — PROGRESS NOTES
"Lakhwinder Jones's chief complaint for this visit includes:  Chief Complaint   Patient presents with     Follow Up     Follow up for bilateral knee, shoulder and back pain      PCP: Simona Prasad    Referring Provider:  No referring provider defined for this encounter.    BP (!) 141/75   Pulse 69   Ht 1.956 m (6' 5\")   Wt 149.2 kg (329 lb)   BMI 39.01 kg/m    Data Unavailable     Do you need any medication refills at today's visit? No    Large Joint Injection/Arthocentesis: bilateral knee    Date/Time: 6/2/2020 9:14 AM  Performed by: Jak Mijares MD  Authorized by: Jak Mijares MD     Indications:  Pain  Needle Size:  22 G  Approach:  Lateral  Location:  Knee  Laterality:  Bilateral      Medications (Right):  40 mg triamcinolone 40 MG/ML  Medications (Left):  40 mg triamcinolone 40 MG/ML  Procedure discussed: discussed risks, benefits, and alternatives    Consent Given by:  Patient   NDC: [address]-7        "

## 2020-06-02 NOTE — PROGRESS NOTES
HISTORY OF PRESENT ILLNESS  Mr. Jones is a pleasant 39 year old year old male who presents to clinic today with low back and bilateral knee pain  Lakhwinder explains that he has had these symptoms for over 6 months  Has not made a plan to discuss his lumbar MRI he had a few months ago  Having pain in low back, legs, and knees  Location: see above  Quality:  achy pain    Severity: 5/10 at worst    Duration: worse over past few months  Timing: occurs intermittently  Context: occurs while walking and standing  Modifying factors:  resting and non-use makes it better, movement and use makes it worse  Associated signs & symptoms: bilateral knee swelling, radicular symptoms in legs    MEDICAL HISTORY  Patient Active Problem List   Diagnosis     Anxiety state     Chronic back pain     Moderate episode of recurrent major depressive disorder (H)     Panic disorder without agoraphobia     Abnormal results of liver function studies     Psoriatic arthritis (H)     Morbid obesity (H)     Elevated blood-pressure reading without diagnosis of hypertension     Alcohol abuse, in remission     Vitamin D deficiency     Essential hypertension       Current Outpatient Medications   Medication Sig Dispense Refill     Acetaminophen (TYLENOL PO) Take 1,000 mg by mouth as needed for mild pain or fever       atomoxetine (STRATTERA) 80 MG capsule Take 1 capsule (80 mg) by mouth daily 90 capsule 0     chlorthalidone (HYGROTON) 25 MG tablet Take 1 tablet (25 mg) by mouth daily 90 tablet 0     citalopram (CELEXA) 40 MG tablet Take 1 tablet (40 mg) by mouth daily 90 tablet 1     cyclobenzaprine (FLEXERIL) 10 MG tablet Take 1 tablet (10 mg) by mouth 3 times daily as needed for muscle spasms 30 tablet 0     cyclobenzaprine (FLEXERIL) 5 MG tablet Take 1 tablet (5 mg) by mouth 3 times daily as needed for muscle spasms 21 tablet 0     HYDROcodone-acetaminophen (NORCO) 5-325 MG tablet Take 1 tablet by mouth every 8 hours as needed for moderate to severe  pain maximum 6 tablet(s) per day 30 tablet 0     hydrOXYzine (ATARAX) 25 MG tablet Take 1-2 tablets (25-50 mg) by mouth 2 times daily For anxiety and sleep 120 tablet 1     multivitamin, therapeutic with minerals (MULTI-VITAMIN) TABS tablet Take 1 tablet by mouth every morning       NONFORMULARY as needed CBD OIL       ustekinumab (STELARA) 90 MG/ML 90 mg subcutaneous  q 12 weeks. Weight 327 lb ( > 100 kg ) 1 mL 6       Allergies   Allergen Reactions     Tramadol Other (See Comments)     Shellfish-Derived Products        Family History   Problem Relation Age of Onset     Obesity Mother      Diabetes Father      Coronary Artery Disease Father      Hypertension Father      Hyperlipidemia Father      Depression Father      Anxiety Disorder Father      Mental Illness Father      Substance Abuse Father      Breast Cancer Maternal Grandmother      Depression Maternal Grandmother      Mental Illness Maternal Grandmother      Substance Abuse Maternal Grandmother      Prostate Cancer Maternal Grandfather      Diabetes Paternal Grandmother      Breast Cancer Paternal Grandmother      Depression Paternal Grandmother      Mental Illness Paternal Grandmother      Diabetes Paternal Grandfather      Asthma Brother      Diabetes Paternal Uncle      Cerebrovascular Disease No family hx of      Anesthesia Reaction No family hx of      Osteoporosis No family hx of      Genetic Disorder No family hx of      Thyroid Disease No family hx of      Liver Disease No family hx of      Social History     Socioeconomic History     Marital status:      Spouse name: Shereen      Number of children: 0     Years of education: Not on file     Highest education level: Not on file   Occupational History     Not on file   Social Needs     Financial resource strain: Not on file     Food insecurity     Worry: Not on file     Inability: Not on file     Transportation needs     Medical: Not on file     Non-medical: Not on file   Tobacco Use      "Smoking status: Former Smoker     Packs/day: 0.20     Years: 10.00     Pack years: 2.00     Types: Cigarettes     Last attempt to quit: 2015     Years since quittin.4     Smokeless tobacco: Never Used   Substance and Sexual Activity     Alcohol use: Yes     Alcohol/week: 8.0 standard drinks     Types: 8 Standard drinks or equivalent per week     Comment: not for a month     Drug use: Yes     Types: Marijuana     Comment: PRN pain     Sexual activity: Yes     Partners: Female     Birth control/protection: None   Lifestyle     Physical activity     Days per week: Not on file     Minutes per session: Not on file     Stress: Not on file   Relationships     Social connections     Talks on phone: Not on file     Gets together: Not on file     Attends Yazidi service: Not on file     Active member of club or organization: Not on file     Attends meetings of clubs or organizations: Not on file     Relationship status: Not on file     Intimate partner violence     Fear of current or ex partner: Not on file     Emotionally abused: Not on file     Physically abused: Not on file     Forced sexual activity: Not on file   Other Topics Concern     Parent/sibling w/ CABG, MI or angioplasty before 65F 55M? Not Asked   Social History Narrative     Not on file       Additional medical/Social/Surgical histories reviewed in [facility] and updated as appropriate.     REVIEW OF SYSTEMS (2020)  10 point ROS of systems including Constitutional, Eyes, Respiratory, Cardiovascular, Gastroenterology, Genitourinary, Integumentary, Musculoskeletal, Psychiatric, Allergic/Immunologic were all negative except for pertinent positives noted in my HPI.     PHYSICAL EXAM  Vitals:    20 0802   BP: (!) 141/75   Pulse: 69   Weight: 149.2 kg (329 lb)   Height: 1.956 m (6' 5\")     Vital Signs: BP (!) 141/75   Pulse 69   Ht 1.956 m (6' 5\")   Wt 149.2 kg (329 lb)   BMI 39.01 kg/m   Patient declined being weighed. Body mass index is 39.01 " kg/m .    General  - normal appearance, in no obvious distress  HEENT  - conjunctivae not injected, moist mucous membranes, normocephalic/atraumatic head, ears normal appearance, no lesions, mouth normal appearance, no scars, normal dentition and teeth present  CV  - normal peripheral perfusion  Pulm  - normal respiratory pattern, non-labored  Musculoskeletal - lumbar spine  - stance: normal gait without limp, no obvious leg length discrepancy, normal heel and toe walk  - inspection: normal bone and joint alignment, no obvious scoliosis  - palpation: no paravertebral or bony tenderness  - ROM: flexion exacerbates pain, normal extension, sidebending, rotation  - strength: lower extremities 5/5 in all planes  - special tests:  (+) straight leg raise  (+) slump test  Neuro  - patellar and Achilles DTRs 2+ bilaterally, lower extremity sensory deficit throughout L5 distribution, grossly normal coordination, normal muscle tone  Skin  - no ecchymosis, erythema, warmth, or induration, no obvious rash  Psych  - interactive, appropriate, normal mood and affect  Bilateral knees: pain with flexion, mild effusions, ligaments intact, medial joint line tenderness  ASSESSMENT & PLAN  40 yo male with bilateral knee arthritis, lumbar disc herniation, radicular pain  Lumbar MRI reviewed: shows disc herniations  Reviewed bilateral knee xrays: shows arthritis  Ordered LALA lumbar  Given medrol  Given knee injections today  F/u after LALA      Jak Mijares MD, CAQSM  PROCEDURE:  bilateral          Knee joint injection   The patient was apprised of the risks and the benefits of the procedure and consented and a written consent was signed.   The patient s right and left  KNEE was sterilely prepped with chloraprep.   40 mg of triamcinolone suspension was drawn up for both knees into a 5 mL syringe with 4 mL of 1% lidocaine  The patient was injected into the right and left knee with a 1.5-inch 22-gauge needle at the_superiorlateral aspect of  their knee joint  There were no complications. The patient tolerated the procedure well. There was minimal bleeding.   The patient was instructed to ice the knees upon leaving clinic and refrain from overuse over the next 3 days.   The patient was instructed to go to the emergency room with any usual pain, swelling, or redness occurred in the injected area.   The patient was given a followup appointment to evaluate response to the injection to their increased range of motion and reduction of pain.  Follow up PRN  Dr Jak Mijares

## 2020-06-02 NOTE — PATIENT INSTRUCTIONS
Thanks for coming today.  [facility]  [address] [place] [address]    To schedule future appointments in Ortho Clinic, you may call [phone].    To schedule ordered imaging by your provider:   Call [facility] [phone]    To schedule an injection ordered by your provider:  Call [facility] Injection scheduling line: [phone]  [facility] available online at:  [email or web address]    Please call if any further questions or concerns ([phone]).  Clinic hours 8 am to 5 pm.    Return to clinic (call) if symptoms worsen or fail to improve.

## 2020-06-02 NOTE — LETTER
6/2/2020         RE: Lakhwinder Jones  [address]  [place] [address]        Dear Colleague,    Thank you for referring your patient, Lakhwinder Jones, to the [facility]. Please see a copy of my visit note below.    HISTORY OF PRESENT ILLNESS  Mr. Jones is a pleasant 39 year old year old male who presents to clinic today with low back and bilateral knee pain  Lakhwinder explains that he has had these symptoms for over 6 months  Has not made a plan to discuss his lumbar MRI he had a few months ago  Having pain in low back, legs, and knees  Location: see above  Quality:  achy pain    Severity: 5/10 at worst    Duration: worse over past few months  Timing: occurs intermittently  Context: occurs while walking and standing  Modifying factors:  resting and non-use makes it better, movement and use makes it worse  Associated signs & symptoms: bilateral knee swelling, radicular symptoms in legs    MEDICAL HISTORY  Patient Active Problem List   Diagnosis     Anxiety state     Chronic back pain     Moderate episode of recurrent major depressive disorder (H)     Panic disorder without agoraphobia     Abnormal results of liver function studies     Psoriatic arthritis (H)     Morbid obesity (H)     Elevated blood-pressure reading without diagnosis of hypertension     Alcohol abuse, in remission     Vitamin D deficiency     Essential hypertension       Current Outpatient Medications   Medication Sig Dispense Refill     Acetaminophen (TYLENOL PO) Take 1,000 mg by mouth as needed for mild pain or fever       atomoxetine (STRATTERA) 80 MG capsule Take 1 capsule (80 mg) by mouth daily 90 capsule 0     chlorthalidone (HYGROTON) 25 MG tablet Take 1 tablet (25 mg) by mouth daily 90 tablet 0     citalopram (CELEXA) 40 MG tablet Take 1 tablet (40 mg) by mouth daily 90 tablet 1     cyclobenzaprine (FLEXERIL) 10 MG tablet Take 1 tablet (10 mg) by mouth 3 times daily as needed for muscle spasms 30 tablet  0     cyclobenzaprine (FLEXERIL) 5 MG tablet Take 1 tablet (5 mg) by mouth 3 times daily as needed for muscle spasms 21 tablet 0     HYDROcodone-acetaminophen (NORCO) 5-325 MG tablet Take 1 tablet by mouth every 8 hours as needed for moderate to severe pain maximum 6 tablet(s) per day 30 tablet 0     hydrOXYzine (ATARAX) 25 MG tablet Take 1-2 tablets (25-50 mg) by mouth 2 times daily For anxiety and sleep 120 tablet 1     multivitamin, therapeutic with minerals (MULTI-VITAMIN) TABS tablet Take 1 tablet by mouth every morning       NONFORMULARY as needed CBD OIL       ustekinumab (STELARA) 90 MG/ML 90 mg subcutaneous  q 12 weeks. Weight 327 lb ( > 100 kg ) 1 mL 6       Allergies   Allergen Reactions     Tramadol Other (See Comments)     Shellfish-Derived Products        Family History   Problem Relation Age of Onset     Obesity Mother      Diabetes Father      Coronary Artery Disease Father      Hypertension Father      Hyperlipidemia Father      Depression Father      Anxiety Disorder Father      Mental Illness Father      Substance Abuse Father      Breast Cancer Maternal Grandmother      Depression Maternal Grandmother      Mental Illness Maternal Grandmother      Substance Abuse Maternal Grandmother      Prostate Cancer Maternal Grandfather      Diabetes Paternal Grandmother      Breast Cancer Paternal Grandmother      Depression Paternal Grandmother      Mental Illness Paternal Grandmother      Diabetes Paternal Grandfather      Asthma Brother      Diabetes Paternal Uncle      Cerebrovascular Disease No family hx of      Anesthesia Reaction No family hx of      Osteoporosis No family hx of      Genetic Disorder No family hx of      Thyroid Disease No family hx of      Liver Disease No family hx of      Social History     Socioeconomic History     Marital status:      Spouse name: Shereen      Number of children: 0     Years of education: Not on file     Highest education level: Not on file   Occupational  History     Not on file   Social Needs     Financial resource strain: Not on file     Food insecurity     Worry: Not on file     Inability: Not on file     Transportation needs     Medical: Not on file     Non-medical: Not on file   Tobacco Use     Smoking status: Former Smoker     Packs/day: 0.20     Years: 10.00     Pack years: 2.00     Types: Cigarettes     Last attempt to quit: 2015     Years since quittin.4     Smokeless tobacco: Never Used   Substance and Sexual Activity     Alcohol use: Yes     Alcohol/week: 8.0 standard drinks     Types: 8 Standard drinks or equivalent per week     Comment: not for a month     Drug use: Yes     Types: Marijuana     Comment: PRN pain     Sexual activity: Yes     Partners: Female     Birth control/protection: None   Lifestyle     Physical activity     Days per week: Not on file     Minutes per session: Not on file     Stress: Not on file   Relationships     Social connections     Talks on phone: Not on file     Gets together: Not on file     Attends Scientologist service: Not on file     Active member of club or organization: Not on file     Attends meetings of clubs or organizations: Not on file     Relationship status: Not on file     Intimate partner violence     Fear of current or ex partner: Not on file     Emotionally abused: Not on file     Physically abused: Not on file     Forced sexual activity: Not on file   Other Topics Concern     Parent/sibling w/ CABG, MI or angioplasty before 65F 55M? Not Asked   Social History Narrative     Not on file       Additional medical/Social/Surgical histories reviewed in Psychiatric and updated as appropriate.     REVIEW OF SYSTEMS (2020)  10 point ROS of systems including Constitutional, Eyes, Respiratory, Cardiovascular, Gastroenterology, Genitourinary, Integumentary, Musculoskeletal, Psychiatric, Allergic/Immunologic were all negative except for pertinent positives noted in my HPI.     PHYSICAL EXAM  Vitals:    20 0802  "  BP: (!) 141/75   Pulse: 69   Weight: 149.2 kg (329 lb)   Height: 1.956 m (6' 5\")     Vital Signs: BP (!) 141/75   Pulse 69   Ht 1.956 m (6' 5\")   Wt 149.2 kg (329 lb)   BMI 39.01 kg/m   Patient declined being weighed. Body mass index is 39.01 kg/m .    General  - normal appearance, in no obvious distress  HEENT  - conjunctivae not injected, moist mucous membranes, normocephalic/atraumatic head, ears normal appearance, no lesions, mouth normal appearance, no scars, normal dentition and teeth present  CV  - normal peripheral perfusion  Pulm  - normal respiratory pattern, non-labored  Musculoskeletal - lumbar spine  - stance: normal gait without limp, no obvious leg length discrepancy, normal heel and toe walk  - inspection: normal bone and joint alignment, no obvious scoliosis  - palpation: no paravertebral or bony tenderness  - ROM: flexion exacerbates pain, normal extension, sidebending, rotation  - strength: lower extremities 5/5 in all planes  - special tests:  (+) straight leg raise  (+) slump test  Neuro  - patellar and Achilles DTRs 2+ bilaterally, lower extremity sensory deficit throughout L5 distribution, grossly normal coordination, normal muscle tone  Skin  - no ecchymosis, erythema, warmth, or induration, no obvious rash  Psych  - interactive, appropriate, normal mood and affect  Bilateral knees: pain with flexion, mild effusions, ligaments intact, medial joint line tenderness  ASSESSMENT & PLAN  38 yo male with bilateral knee arthritis, lumbar disc herniation, radicular pain  Lumbar MRI reviewed: shows disc herniations  Reviewed bilateral knee xrays: shows arthritis  Ordered LALA lumbar  Given medrol  Given knee injections today  F/u after LALA      Jak Mijares MD, CAQSM  PROCEDURE:  bilateral          Knee joint injection   The patient was apprised of the risks and the benefits of the procedure and consented and a written consent was signed.   The patient s right and left  KNEE was sterilely " "prepped with chloraprep.   40 mg of triamcinolone suspension was drawn up for both knees into a 5 mL syringe with 4 mL of 1% lidocaine  The patient was injected into the right and left knee with a 1.5-inch 22-gauge needle at the_superiorlateral aspect of their knee joint  There were no complications. The patient tolerated the procedure well. There was minimal bleeding.   The patient was instructed to ice the knees upon leaving clinic and refrain from overuse over the next 3 days.   The patient was instructed to go to the emergency room with any usual pain, swelling, or redness occurred in the injected area.   The patient was given a followup appointment to evaluate response to the injection to their increased range of motion and reduction of pain.  Follow up PRN  Dr Jak Jones's chief complaint for this visit includes:  Chief Complaint   Patient presents with     Follow Up     Follow up for bilateral knee, shoulder and back pain      PCP: Simona Prasad    Referring Provider:  No referring provider defined for this encounter.    BP (!) 141/75   Pulse 69   Ht 1.956 m (6' 5\")   Wt 149.2 kg (329 lb)   BMI 39.01 kg/m    Data Unavailable     Do you need any medication refills at today's visit? No    Large Joint Injection/Arthocentesis: bilateral knee    Date/Time: 6/2/2020 9:14 AM  Performed by: Jak Mijares MD  Authorized by: Jak Mijares MD     Indications:  Pain  Needle Size:  22 G  Approach:  Lateral  Location:  Knee  Laterality:  Bilateral      Medications (Right):  40 mg triamcinolone 40 MG/ML  Medications (Left):  40 mg triamcinolone 40 MG/ML  Procedure discussed: discussed risks, benefits, and alternatives    Consent Given by:  Patient   NDC: [address]-0          Again, thank you for allowing me to participate in the care of your patient.        Sincerely,        Jak Mijares MD    "

## 2020-06-09 NOTE — TELEPHONE ENCOUNTER
Patient was advised 5/26 to schedule appointment to discuss different biologics. Sent mychart asking if he was wanting to continue with Jessie STYLESRN BSN  [facility]  [phone]        Requested Prescriptions   Pending Prescriptions Disp Refills     ustekinumab (STELARA) 90 MG/ML [Pharmacy Med Name: STELARA PRFLD SYR 1ML 90MG] 1 mL 3     Sig: INJECT 90 MG UNDER THE SKIN EVERY TWELVE WEEKS       There is no refill protocol information for this order

## 2020-06-09 NOTE — TELEPHONE ENCOUNTER
Left message on answering machine for patient to call back regarding refill request    Jennifer STYLESRN BSN  [facility]  [phone]

## 2020-06-11 RX ORDER — USTEKINUMAB 90 MG/ML
INJECTION, SOLUTION SUBCUTANEOUS
Qty: 1 ML | Refills: 3 | Status: SHIPPED | OUTPATIENT
Start: 2020-06-11 | End: 2021-01-12

## 2020-06-15 ENCOUNTER — TRANSFERRED RECORDS (OUTPATIENT)
Dept: HEALTH INFORMATION MANAGEMENT | Facility: CLINIC | Age: 40
End: 2020-06-15

## 2020-06-21 DIAGNOSIS — F41.0 PANIC DISORDER WITHOUT AGORAPHOBIA: ICD-10-CM

## 2020-06-21 DIAGNOSIS — I10 ESSENTIAL HYPERTENSION: ICD-10-CM

## 2020-06-23 RX ORDER — CHLORTHALIDONE 25 MG/1
25 TABLET ORAL DAILY
Qty: 90 TABLET | Refills: 0 | OUTPATIENT
Start: 2020-06-23

## 2020-06-23 RX ORDER — HYDROXYZINE HYDROCHLORIDE 25 MG/1
25-50 TABLET, FILM COATED ORAL 2 TIMES DAILY
Qty: 120 TABLET | Refills: 1 | OUTPATIENT
Start: 2020-06-23

## 2020-06-23 NOTE — TELEPHONE ENCOUNTER
Routing refill request to provider for review/approval because:  A break in medication  Seeing a different clinic now?  Melania Hoffman RN  [facility]

## 2020-06-23 NOTE — TELEPHONE ENCOUNTER
Has not been seen in our clinic for sometime  Looks like he has been going to other [facility].   Regardless he needs a virtual or e-visit with us before this prescription can be renewed

## 2020-06-26 DIAGNOSIS — F41.0 PANIC DISORDER WITHOUT AGORAPHOBIA: ICD-10-CM

## 2020-06-28 ENCOUNTER — TELEPHONE (OUTPATIENT)
Dept: FAMILY MEDICINE | Facility: CLINIC | Age: 40
End: 2020-06-28

## 2020-06-28 DIAGNOSIS — I10 ESSENTIAL HYPERTENSION: ICD-10-CM

## 2020-06-28 NOTE — LETTER
July 6, 2020      Lakhwinder Jones  [address]  [place] [facility] [address]        Dear Lakhwinder,     We have been attempting to reach you by phone and have sent you a [employer] message.  We are unable to refill your medication without a virtual visit. Please call or MyChart my office with any questions or concerns.        Sincerely,        Simona Prasad PA-C

## 2020-06-30 RX ORDER — HYDROXYZINE HYDROCHLORIDE 25 MG/1
TABLET, FILM COATED ORAL
Qty: 120 TABLET | Refills: 0 | OUTPATIENT
Start: 2020-06-30

## 2020-06-30 NOTE — TELEPHONE ENCOUNTER
Medication requested: hydrOXYzine (ATARAX) 25 MG tablet   Last refilled: 7/1/2019  Qty: 120/1      Last seen: 7/10/2019  RTC: 1 month  Cancel: 0  No-show: 0  Next appt: None    Refill decision:   .Refill pended and routed to the provider for review/determination due to outside of timeframe for refill x10 months. No appt in place. Scheduling has been notified to contact the pt for appointment.

## 2020-06-30 NOTE — TELEPHONE ENCOUNTER
Routing refill request to provider for review/approval because:  A break in medication - last prescribed on 7/9/19  Last BP elevated  BP Readings from Last 3 Encounters:   06/02/20 (!) 141/75   02/14/20 129/79   09/09/19 138/73     LARA CrawfordN, RN

## 2020-07-01 RX ORDER — TRIAMCINOLONE ACETONIDE 40 MG/ML
40 INJECTION, SUSPENSION INTRA-ARTICULAR; INTRAMUSCULAR
Status: DISCONTINUED | OUTPATIENT
Start: 2020-06-02 | End: 2022-04-07

## 2020-07-01 RX ORDER — CHLORTHALIDONE 25 MG/1
25 TABLET ORAL DAILY
Qty: 90 TABLET | Refills: 0 | OUTPATIENT
Start: 2020-07-01

## 2020-07-01 NOTE — TELEPHONE ENCOUNTER
This writer attempted to contact pt on 07/01/20      Reason for call schedule virtual visit and left message.      If patient calls back:   Schedule virtual appointment within 1 week with primary care, document that pt called and close encounter         Liberty Atkinson MA

## 2020-07-01 NOTE — TELEPHONE ENCOUNTER
Prescription declined.  Prescription was declined 6/21/2020 and advised needs telephone or video visit- looks like patient read scanRhart message but no appointment scheduled - please assist with scheduling appointment

## 2020-07-01 NOTE — TELEPHONE ENCOUNTER
Provider refused refill, as pt is outside of RTC timeframe and has no future appt. Appt must be scheduled for refills.

## 2020-07-02 NOTE — TELEPHONE ENCOUNTER
This writer attempted to contact Patient on 07/02/20      Reason for call needs visit and left message.      If patient calls back:   Schedule Video or Telephone appointment within 1 week with primary care, document that pt called and close encounter         Miriam Ventura MA

## 2020-07-06 NOTE — TELEPHONE ENCOUNTER
LM for pt to call clinic.  3rd attempt, with no response.  Please advise.      Liberty RAMSEY, Patient Care

## 2020-07-08 ENCOUNTER — TELEPHONE (OUTPATIENT)
Dept: PEDIATRICS | Facility: CLINIC | Age: 40
End: 2020-07-08

## 2020-07-08 ENCOUNTER — MYC REFILL (OUTPATIENT)
Dept: PSYCHIATRY | Facility: CLINIC | Age: 40
End: 2020-07-08

## 2020-07-08 DIAGNOSIS — F41.0 PANIC DISORDER WITHOUT AGORAPHOBIA: ICD-10-CM

## 2020-07-08 DIAGNOSIS — I10 ESSENTIAL HYPERTENSION: ICD-10-CM

## 2020-07-08 RX ORDER — CHLORTHALIDONE 25 MG/1
25 TABLET ORAL DAILY
Qty: 90 TABLET | Refills: 0 | Status: SHIPPED | OUTPATIENT
Start: 2020-07-08 | End: 2021-01-12

## 2020-07-08 RX ORDER — HYDROXYZINE HYDROCHLORIDE 25 MG/1
25-50 TABLET, FILM COATED ORAL 2 TIMES DAILY
Qty: 120 TABLET | Refills: 1 | Status: CANCELLED | OUTPATIENT
Start: 2020-07-08

## 2020-07-08 RX ORDER — HYDROXYZINE HYDROCHLORIDE 25 MG/1
25-50 TABLET, FILM COATED ORAL 2 TIMES DAILY
Qty: 120 TABLET | Refills: 0 | Status: SHIPPED | OUTPATIENT
Start: 2020-07-08 | End: 2021-01-14

## 2020-07-08 NOTE — TELEPHONE ENCOUNTER
Writer reviewed pt's chart. Hydroxyzine was filled by Dexter Farfan MD  Today at approximately 12:50 pm. Pt last seen in psychiatry approximately one year ago. Pt will need to schedule f/up for further refills.

## 2020-07-08 NOTE — TELEPHONE ENCOUNTER
Patient contacted, he is requesting a refill of Hydroxyzine and Chlorthalidone.  Per review of chart, last refills are as follows:    hydrOXYzine (ATARAX) 25 MG tablet   Last Written Prescription Date:  7/10/2019  Last Fill Quantity: 120,  # refills: 1   Last office visit: 2/14/2020 with prescribing provider:     Future Office Visit:      chlorthalidone (HYGROTON) 25 MG tablet   Last Written Prescription Date:  7/9/2019  Last Fill Quantity: 90,  # refills: 0   Last office visit: 2/14/2020 with prescribing provider:    Future Office Visit:      Patient has been seen in clinic and virtually with Dr. Tyler this year.  Patient states that he has been out of the chlorthalidone for about one month.  Review of history shows that Chlorthalidone was refilled x 1 year on 2/6/2019, then for 90 day supply on 7/9/2019, so may have been taking regularly. States that he doesn't take the Hydroxyzine daily, so supply may last quite some time.  Patient states his wife is being induced this evening and requests to  Rx prior to them having to present to [facility].    Forwarding to covering providers for review/recommendation.     Bettie Lozano RN

## 2020-07-08 NOTE — TELEPHONE ENCOUNTER
[facility] Call Center    Phone Message    May a detailed message be left on voicemail: yes     Reason for Call: Medication Question or concern regarding medication   Patient out of BP and anxiety medication. Typically sees Rachana Fernandez MD but doesn't like that clinic anymore..  They told patient he needs to be seen for a refill and because hes BEEN out, and his wife is pregnant and they'll have to be going to the hospital probably tonight..he needs refilled immediately....    He is NOT able to wait for an appt with anyone. Please call to advise. Wants a call back within the next few hours...      Action Taken: Message routed to:  Primary Care p [phone]    Travel Screening: Not Applicable

## 2020-07-08 NOTE — TELEPHONE ENCOUNTER
Patient contacted, informed that medications have been refilled.  Patient advised to follow up with PCP for medication follow up prior to next refill.  Patient agrees to plan.    Bettie Lozano RN

## 2020-08-24 ENCOUNTER — VIRTUAL VISIT (OUTPATIENT)
Dept: RHEUMATOLOGY | Facility: CLINIC | Age: 40
End: 2020-08-24
Attending: PHYSICIAN ASSISTANT
Payer: COMMERCIAL

## 2020-08-24 DIAGNOSIS — L40.50 PSORIATIC ARTHRITIS (H): Primary | ICD-10-CM

## 2020-08-24 PROCEDURE — 99204 OFFICE O/P NEW MOD 45 MIN: CPT | Mod: 95 | Performed by: STUDENT IN AN ORGANIZED HEALTH CARE EDUCATION/TRAINING PROGRAM

## 2020-08-24 NOTE — PROGRESS NOTES
"Lakhwinder Jones is a 39 year old male who is being evaluated via a billable video visit.      The patient has been notified of following:     \"This video visit will be conducted via a call between you and your physician/provider. We have found that certain health care needs can be provided without the need for an in-person physical exam.  This service lets us provide the care you need with a video conversation.  If a prescription is necessary we can send it directly to your pharmacy.  If lab work is needed we can place an order for that and you can then stop by our lab to have the test done at a later time.    Video visits are billed at different rates depending on your insurance coverage.  Please reach out to your insurance provider with any questions.    If during the course of the call the physician/provider feels a video visit is not appropriate, you will not be charged for this service.\"    Patient has given verbal consent for Video visit? Yes  How would you like to obtain your AVS? MyChart  If you are dropped from the video visit, the video invite should be resent to: Text to cell phone: [phone]  Will anyone else be joining your video visit? No        Katya Andre [facility]           Active Problem List:     Patient Active Problem List    Diagnosis Date Noted     Abnormal results of liver function studies 12/18/2015     Priority: High     Psoriatic arthritis (H) 12/18/2015     Priority: High     Essential hypertension 02/06/2019     Priority: Medium     Elevated blood-pressure reading without diagnosis of hypertension 08/21/2017     Priority: Medium     Morbid obesity (H) 02/06/2017     Priority: Medium     Moderate episode of recurrent major depressive disorder (H) 12/07/2015     Priority: Medium     Vitamin D deficiency 07/12/2010     Priority: Medium     Anxiety state 04/22/2009     Priority: Medium     Overview:   inactive icd-9 diagnosis auto replaced with icd-10, display name retained//mporz       " Chronic back pain 04/22/2009     Priority: Medium     Overview:   Lumbar and thoracic; Resulting from sports injuries.  Advil       Panic disorder without agoraphobia 04/22/2009     Priority: Medium     Alcohol abuse, in remission 01/05/2006     Priority: Medium     Overview:   LW Modifier:  Tx'd at care home house  ; Alcohol Abuse Remission              History of Present Illness:   Lakhwinder Jones is a 39 year old male with PMH of depression, anxiety, lumbar degenerative disc disease, morbid obesity, alcohol abuse, psoriasis, psoriatic arthritis evaluated via a billable virtual visit in consultation at request of Dr Gaitan for evaluation of joint pains.     He reports having pain in his joints especially knees, lower back, ankles, wrists and fingers for the last 10 years.  He follows up with orthopedic specialist for lumbar degenerative disc disease and had lumbar laminectomy surgery many years ago.  His recent MRI did show degenerative disc disease, foraminal stenosis.  He received epidural spine injection this spring which has helped some.  He also reports pain in bilateral knees and had bilateral knee injection by orthopedics few months ago.    He reports that occasionally his fingers swell up and has stiffness in the morning for about an hour.  He also noticed swelling over his right ankle.  He has history of plantar fasciitis in the past.  This spring he had a lot of pain in his right elbow related to epicondylitis.  Denies any history of dactylitis, iritis or chronic diarrhea.    He has psoriasis for many years and follows up with dermatologist outside the [place] system.  At present he is on Stelara for the past 1 year which is helping some but has not completely cleared off his psoriasis.  He was on methotrexate in the past.  According to the chart review the methotrexate was started by his primary care provider and took it only for 3 months at about 4 tablets twice a week dose.  Later he was started on  Humira by dermatologist.  He has also been on Otezla, Enbrel which helped to some extent but then lost its effectiveness.    He has seen Dr. Hoang in rheumatology in 2010.  His autoimmune work-up at that time showed negative rheumatoid factor, anti-CCP, serum protein electrophoresis, x-rays of bilateral hands, wrists and feet were normal.  He had normal CBC, liver and kidney function test at that time.  His last basic labs done in December 2019 showed normal CBC, liver and kidney function tests.  He has history of alcohol abuse in the past.              Review of Systems:     Review Of Systems  Constitutional: denies fever, chills, night sweats and weight loss.  Skin: + Psoriatic skin rash.  Eyes: No dryness or irritation in eyes. No episode of eye inflammation or redness.   Ears/Nose/Throat: no recurrent sinus infections.  Respiratory: No shortness of breath, dyspnea on exertion, cough, or hemoptysis  Cardiovascular: no chest pain or palpitations.  Gastrointestinal: no nausea, vomiting, abdominal pain.  Normal bowel movements.  Genitourinary: no dysuria, frequency  or hematuria.  Musculoskeletal: as in HPI  Neurologic: no numbness, tingling.  Psychiatric: + mood disorders.  Hematologic/Lymphatic/Immunologic: no history of easy bruising, petechia or purpura.  No abnormal bleeding.   Endocrine: no h/o thyroid disease or Diabetes.                  Past Medical History:     Past Medical History:   Diagnosis Date     Anxiety      Back pain     chronic     Obesity      Psoriatic arthritis (H)      Past Surgical History:   Procedure Laterality Date     DISCECTOMY LUMBAR POSTERIOR MICROSCOPIC ONE LEVEL Left 11/1/2018    Procedure: Left Lumbar 4-5 Microdisectomy ;  Surgeon: Favio Miranda MD;  Location: [facility] OR     FOOT SURGERY Right      NOSE SURGERY      3 times     right elbow surgey      12 yo     TESTICLE SURGERY      23 yo            Social History:     Social History     Occupational History     Not on file    Tobacco Use     Smoking status: Former Smoker     Packs/day: 0.20     Years: 10.00     Pack years: 2.00     Types: Cigarettes     Last attempt to quit: 2015     Years since quittin.6     Smokeless tobacco: Never Used   Substance and Sexual Activity     Alcohol use: Yes     Alcohol/week: 8.0 standard drinks     Types: 8 Standard drinks or equivalent per week     Comment: not for a month     Drug use: Yes     Types: Marijuana     Comment: PRN pain     Sexual activity: Yes     Partners: Female     Birth control/protection: None            Family History:     Family History   Problem Relation Age of Onset     Obesity Mother      Diabetes Father      Coronary Artery Disease Father      Hypertension Father      Hyperlipidemia Father      Depression Father      Anxiety Disorder Father      Mental Illness Father      Substance Abuse Father      Breast Cancer Maternal Grandmother      Depression Maternal Grandmother      Mental Illness Maternal Grandmother      Substance Abuse Maternal Grandmother      Prostate Cancer Maternal Grandfather      Diabetes Paternal Grandmother      Breast Cancer Paternal Grandmother      Depression Paternal Grandmother      Mental Illness Paternal Grandmother      Diabetes Paternal Grandfather      Asthma Brother      Diabetes Paternal Uncle      Cerebrovascular Disease No family hx of      Anesthesia Reaction No family hx of      Osteoporosis No family hx of      Genetic Disorder No family hx of      Thyroid Disease No family hx of      Liver Disease No family hx of             Allergies:     Allergies   Allergen Reactions     Tramadol Other (See Comments)     Shellfish-Derived Products             Medications:     Current Outpatient Medications   Medication Sig Dispense Refill     Acetaminophen (TYLENOL PO) Take 1,000 mg by mouth as needed for mild pain or fever       chlorthalidone (HYGROTON) 25 MG tablet Take 1 tablet (25 mg) by mouth daily 90 tablet 0     citalopram (CELEXA) 40 MG  tablet Take 1 tablet (40 mg) by mouth daily 90 tablet 1     cyclobenzaprine (FLEXERIL) 10 MG tablet Take 1 tablet (10 mg) by mouth 3 times daily as needed for muscle spasms 30 tablet 0     hydrOXYzine (ATARAX) 25 MG tablet Take 1-2 tablets (25-50 mg) by mouth 2 times daily For anxiety and sleep 120 tablet 0     multivitamin, therapeutic with minerals (MULTI-VITAMIN) TABS tablet Take 1 tablet by mouth every morning       NONFORMULARY as needed CBD OIL       ustekinumab (STELARA) 90 MG/ML INJECT 90 MG UNDER THE SKIN EVERY TWELVE WEEKS 1 mL 3     atomoxetine (STRATTERA) 80 MG capsule Take 1 capsule (80 mg) by mouth daily (Patient not taking: Reported on 8/24/2020) 90 capsule 0     cyclobenzaprine (FLEXERIL) 5 MG tablet Take 1 tablet (5 mg) by mouth 3 times daily as needed for muscle spasms (Patient not taking: Reported on 8/24/2020) 21 tablet 0     HYDROcodone-acetaminophen (NORCO) 5-325 MG tablet Take 1 tablet by mouth every 8 hours as needed for moderate to severe pain maximum 6 tablet(s) per day 30 tablet 0     methylPREDNISolone (MEDROL) 4 MG tablet therapy pack Follow Package Directions (Patient not taking: Reported on 8/24/2020) 21 tablet 0            Physical Exam:   Vitals not taken.   Wt Readings from Last 4 Encounters:   06/02/20 149.2 kg (329 lb)   02/14/20 149.2 kg (329 lb)   01/14/20 146.5 kg (323 lb)   09/09/19 147.1 kg (324 lb 6.4 oz)       Constitutional: morbid obese, appearing stated age; cooperative  MS: Reports tenderness over MCP, PIP joints.  Tenderness reported over right ankle.  Reports tenderness over the lower lumbar back.  No dactylitis noted.  He has psoriatic patches over his hands, forearms.  Psych: nl judgement, orientation, memory, affect.         Data:     No results found for any visits on 08/24/20.    Recent Labs   Lab Test 07/09/19  1420 10/25/18  1029 04/13/18  1043 01/05/18  0924  12/08/15  0834   WBC 7.6 7.2 7.0 7.4   < > 6.0   RBC 4.91 4.70 4.76 4.95   < > 4.94   HGB 14.9 14.3  14.7 15.0   < > 15.5   HCT 41.5 40.9 41.7 42.9   < > 42.6   MCV 85 87 88 87   < > 86   RDW 12.4 12.1 12.4 12.1   < > 11.9    199 225 218   < > 224   ALBUMIN 4.0 3.9  --  4.3   < > 4.0   CRP <2.9  --   --   --   --  3.0   BUN 14 11 16 15   < > 11    < > = values in this interval not displayed.      Recent Labs   Lab Test 07/09/19  1420 02/01/17  1024 12/08/15  0834   TSH 1.77 2.03 5.40*   T4  --   --  0.77     Cyclic Cit Pept IgG/IgA   Date Value Ref Range Status   12/08/2015 <20  Interpretation:  Negative   <20 UNITS Final     Hemoglobin   Date Value Ref Range Status   07/09/2019 14.9 13.3 - 17.7 g/dL Corrected     Comment:     CORRECTED ON 07/09 AT 1432: PREVIOUSLY REPORTED AS 14.8   10/25/2018 14.3 13.3 - 17.7 g/dL Final   04/13/2018 14.7 13.3 - 17.7 g/dL Final     Urea Nitrogen   Date Value Ref Range Status   07/09/2019 14 7 - 30 mg/dL Final   10/25/2018 11 7 - 30 mg/dL Final   04/13/2018 16 7 - 30 mg/dL Final     Sed Rate   Date Value Ref Range Status   07/09/2019 4 0 - 15 mm/h Final   06/03/2016 6 0 - 15 mm/h Final   12/08/2015 5 0 - 15 mm/h Final     CRP Inflammation   Date Value Ref Range Status   07/09/2019 <2.9 0.0 - 8.0 mg/L Final   12/08/2015 3.0 0.0 - 8.0 mg/L Final     AST   Date Value Ref Range Status   07/09/2019 25 0 - 45 U/L Final   10/25/2018 29 0 - 45 U/L Final   01/05/2018 49 (H) 0 - 45 U/L Final     Albumin   Date Value Ref Range Status   07/09/2019 4.0 3.4 - 5.0 g/dL Final   10/25/2018 3.9 3.4 - 5.0 g/dL Final   01/05/2018 4.3 3.4 - 5.0 g/dL Final     Alkaline Phosphatase   Date Value Ref Range Status   07/09/2019 85 40 - 150 U/L Final   10/25/2018 76 40 - 150 U/L Final   01/05/2018 86 40 - 150 U/L Final     ALT   Date Value Ref Range Status   07/09/2019 71 (H) 0 - 70 U/L Final   10/25/2018 95 (H) 0 - 70 U/L Final   01/05/2018 115 (H) 0 - 70 U/L Final     Rheumatoid Factor   Date Value Ref Range Status   12/08/2015 <20 <20 IU/mL Final     Recent Labs   Lab Test 07/09/19  1423  10/25/18  1029 04/13/18  1043 01/05/18  0924  02/01/17  1024  12/08/15  0834   WBC 7.6 7.2 7.0 7.4   < >  --    < > 6.0   HGB 14.9 14.3 14.7 15.0   < >  --    < > 15.5   HCT 41.5 40.9 41.7 42.9   < >  --    < > 42.6   MCV 85 87 88 87   < >  --    < > 86    199 225 218   < >  --    < > 224   BUN 14 11 16 15   < > 13   < > 11   TSH 1.77  --   --   --   --  2.03  --  5.40*   AST 25 29  --  49*   < > 51*   < > 25   ALT 71* 95*  --  115*   < > 135*   < > 71*   GGT  --   --   --   --   --  176*  --   --    ALKPHOS 85 76  --  86   < > 80   < > 80    < > = values in this interval not displayed.       Outside studies reviewed: Labs and x-rays from 2010 done at [facility], [place] rheumatology clinic reviewed.    Labs and X rays done in 2010    Negative anti-CCP, rheumatoid factor, serum protein electrophoresis, serum TSH, CRP, ESR, CBC, liver panel, BMP, hepatitis B/C and QuantiFERON TB Gold.    X-rays of bilateral feet, hands showed no evidence of erosive changes inflammatory arthritis.    X-ray of the chest was normal.    X-ray of C-spine demonstrated no subluxation or instability on flexion and extension views.       Reviewed Rheumatology lab flowsheet    Assessment     Inflammatory arthritis-?  Psoriatic arthritis  Psoriasis-on Stelara  Lumbar degenerative disc disease  Obesity  Depression, anxiety  History of alcohol abuse    Inflammatory arthritis - he reports pain in his fingers, wrists, elbows, knees and ankles for the last 10 years.  Also has history of lumbar degenerative disc disease and had lumbar laminectomy surgery done many years ago.  Still reports chronic low back pain.  He has psoriasis and is on Stelara for it.  It has not able to completely clear psoriasis.  His clinical history is suggestive of psoriatic arthritis.  He has seen rheumatology in 2010 at that time his rheumatoid serologies were negative and x-rays did not show any evidence of erosive disease.    He will benefit from addition of a oral  DMARD like methotrexate, sulfasalazine or Arava pending his liver function tests.  He reports drinking 2-3 beers once a week.  He was abusing alcohol in the past but has significantly reduced alcohol intake.    I will repeat his basic labs including CBC, liver kidney function test and x-rays of his hands, feet and wrist.  Will also get hepatitis B/C and TB QuantiFERON gold.  If his lab work is reasonable then will start him on methotrexate.  Hopefully addition of methotrexate to Stelara will help in better control of joint pains    The risks were discussed including fatigue, nausea, oral ulcers, increased risk of infection, pulmonary fibrosis, pancytopenia and elevated LFTs. Patient was informed that folic acid could help to minimize side effects. Was informed to avoid drinking ETOH since it could cause hepatotoxicity. MTX is slow acting, it takes 4-6 weeks to show benefit.     Plan    I have ordered basic lab tests, rheumatoid arthritis tests and x-rays of hands, wrists and feet to look for any bone damage.    We can add medications like methotrexate, sulfasalazine or leflunomide to Stelara to help with the joint pains.    Follow-up in 2 months.      Video-Visit Details    Type of service:  Video Visit    Video Start Time:9: 54 AM  Video End Time: 10: 15 AM    Originating Location (pt. Location): Home    Distant Location (provider location):  [facility]     Platform used for Video Visit: Helen Wiley MD

## 2020-08-24 NOTE — PATIENT INSTRUCTIONS
Your symptoms could be related to psoriatic arthritis.    I have ordered basic lab tests, rheumatoid arthritis tests and x-rays of hands, wrists and feet to look for any bone damage.    We can add medications like methotrexate, sulfasalazine or leflunomide to Stelara to help with the joint pains.    Follow-up in 2 months.

## 2020-09-10 ENCOUNTER — TRANSFERRED RECORDS (OUTPATIENT)
Dept: HEALTH INFORMATION MANAGEMENT | Facility: CLINIC | Age: 40
End: 2020-09-10

## 2020-10-13 ENCOUNTER — TRANSFERRED RECORDS (OUTPATIENT)
Dept: HEALTH INFORMATION MANAGEMENT | Facility: CLINIC | Age: 40
End: 2020-10-13

## 2020-10-22 ENCOUNTER — TRANSFERRED RECORDS (OUTPATIENT)
Dept: HEALTH INFORMATION MANAGEMENT | Facility: CLINIC | Age: 40
End: 2020-10-22

## 2021-01-05 DIAGNOSIS — F41.0 PANIC DISORDER WITHOUT AGORAPHOBIA: ICD-10-CM

## 2021-01-05 DIAGNOSIS — F41.1 ANXIETY STATE: ICD-10-CM

## 2021-01-05 NOTE — TELEPHONE ENCOUNTER
Hi,    Pt is looking for a refill on citalopram.  Asked him to call and make an appointment as well.    Mk Rey Pharm. D.  [facility] Pharmacy Manager  [phone]  1/5/2021

## 2021-01-08 RX ORDER — CITALOPRAM HYDROBROMIDE 40 MG/1
40 TABLET ORAL DAILY
Qty: 30 TABLET | Refills: 0 | Status: SHIPPED | OUTPATIENT
Start: 2021-01-08 | End: 2021-01-12

## 2021-01-12 ENCOUNTER — OFFICE VISIT (OUTPATIENT)
Dept: FAMILY MEDICINE | Facility: CLINIC | Age: 41
End: 2021-01-12
Payer: COMMERCIAL

## 2021-01-12 ENCOUNTER — ANCILLARY PROCEDURE (OUTPATIENT)
Dept: GENERAL RADIOLOGY | Facility: CLINIC | Age: 41
End: 2021-01-12
Attending: PHYSICIAN ASSISTANT
Payer: COMMERCIAL

## 2021-01-12 VITALS
HEART RATE: 69 BPM | OXYGEN SATURATION: 97 % | RESPIRATION RATE: 16 BRPM | BODY MASS INDEX: 40.99 KG/M2 | WEIGHT: 315 LBS | DIASTOLIC BLOOD PRESSURE: 78 MMHG | SYSTOLIC BLOOD PRESSURE: 138 MMHG

## 2021-01-12 DIAGNOSIS — M25.571 PAIN IN JOINT, ANKLE AND FOOT, RIGHT: ICD-10-CM

## 2021-01-12 DIAGNOSIS — F41.9 ANXIETY: Primary | ICD-10-CM

## 2021-01-12 DIAGNOSIS — Z13.1 SCREENING FOR DIABETES MELLITUS: ICD-10-CM

## 2021-01-12 DIAGNOSIS — F41.1 ANXIETY STATE: ICD-10-CM

## 2021-01-12 DIAGNOSIS — M79.671 RIGHT FOOT PAIN: ICD-10-CM

## 2021-01-12 DIAGNOSIS — Z23 NEED FOR INFLUENZA VACCINATION: ICD-10-CM

## 2021-01-12 DIAGNOSIS — F41.0 PANIC DISORDER WITHOUT AGORAPHOBIA: ICD-10-CM

## 2021-01-12 DIAGNOSIS — Z13.220 SCREENING FOR HYPERLIPIDEMIA: ICD-10-CM

## 2021-01-12 DIAGNOSIS — F33.1 MODERATE EPISODE OF RECURRENT MAJOR DEPRESSIVE DISORDER (H): ICD-10-CM

## 2021-01-12 DIAGNOSIS — Z23 NEED FOR PROPHYLACTIC VACCINATION AND INOCULATION AGAINST INFLUENZA: ICD-10-CM

## 2021-01-12 DIAGNOSIS — I10 ESSENTIAL HYPERTENSION: ICD-10-CM

## 2021-01-12 DIAGNOSIS — L40.50 PSORIATIC ARTHRITIS (H): ICD-10-CM

## 2021-01-12 DIAGNOSIS — E66.01 MORBID OBESITY (H): ICD-10-CM

## 2021-01-12 LAB
ALBUMIN SERPL-MCNC: 4.1 G/DL (ref 3.4–5)
ALP SERPL-CCNC: 83 U/L (ref 40–150)
ALT SERPL W P-5'-P-CCNC: 62 U/L (ref 0–70)
ANION GAP SERPL CALCULATED.3IONS-SCNC: 5 MMOL/L (ref 3–14)
AST SERPL W P-5'-P-CCNC: 19 U/L (ref 0–45)
BILIRUB SERPL-MCNC: 0.4 MG/DL (ref 0.2–1.3)
BUN SERPL-MCNC: 16 MG/DL (ref 7–30)
CALCIUM SERPL-MCNC: 9.2 MG/DL (ref 8.5–10.1)
CHLORIDE SERPL-SCNC: 104 MMOL/L (ref 94–109)
CHOLEST SERPL-MCNC: 201 MG/DL
CO2 SERPL-SCNC: 28 MMOL/L (ref 20–32)
CREAT SERPL-MCNC: 0.83 MG/DL (ref 0.66–1.25)
CREAT UR-MCNC: 178 MG/DL
GFR SERPL CREATININE-BSD FRML MDRD: >90 ML/MIN/{1.73_M2}
GLUCOSE SERPL-MCNC: 131 MG/DL (ref 70–99)
HDLC SERPL-MCNC: 41 MG/DL
LDLC SERPL CALC-MCNC: 122 MG/DL
MICROALBUMIN UR-MCNC: 14 MG/L
MICROALBUMIN/CREAT UR: 8.03 MG/G CR (ref 0–17)
NONHDLC SERPL-MCNC: 160 MG/DL
POTASSIUM SERPL-SCNC: 4 MMOL/L (ref 3.4–5.3)
PROT SERPL-MCNC: 7.5 G/DL (ref 6.8–8.8)
SODIUM SERPL-SCNC: 137 MMOL/L (ref 133–144)
TRIGL SERPL-MCNC: 192 MG/DL

## 2021-01-12 PROCEDURE — 36415 COLL VENOUS BLD VENIPUNCTURE: CPT | Performed by: PHYSICIAN ASSISTANT

## 2021-01-12 PROCEDURE — 99214 OFFICE O/P EST MOD 30 MIN: CPT | Mod: 25 | Performed by: PHYSICIAN ASSISTANT

## 2021-01-12 PROCEDURE — 90686 IIV4 VACC NO PRSV 0.5 ML IM: CPT | Performed by: PHYSICIAN ASSISTANT

## 2021-01-12 PROCEDURE — 73610 X-RAY EXAM OF ANKLE: CPT | Mod: RT | Performed by: RADIOLOGY

## 2021-01-12 PROCEDURE — 80061 LIPID PANEL: CPT | Performed by: PHYSICIAN ASSISTANT

## 2021-01-12 PROCEDURE — 82043 UR ALBUMIN QUANTITATIVE: CPT | Performed by: PHYSICIAN ASSISTANT

## 2021-01-12 PROCEDURE — 80053 COMPREHEN METABOLIC PANEL: CPT | Performed by: PHYSICIAN ASSISTANT

## 2021-01-12 PROCEDURE — 73630 X-RAY EXAM OF FOOT: CPT | Mod: RT | Performed by: RADIOLOGY

## 2021-01-12 PROCEDURE — 90471 IMMUNIZATION ADMIN: CPT | Performed by: PHYSICIAN ASSISTANT

## 2021-01-12 PROCEDURE — 96127 BRIEF EMOTIONAL/BEHAV ASSMT: CPT | Performed by: PHYSICIAN ASSISTANT

## 2021-01-12 RX ORDER — CITALOPRAM HYDROBROMIDE 40 MG/1
40 TABLET ORAL DAILY
Qty: 30 TABLET | Refills: 1 | Status: SHIPPED | OUTPATIENT
Start: 2021-01-12 | End: 2021-04-21

## 2021-01-12 RX ORDER — CHLORTHALIDONE 25 MG/1
25 TABLET ORAL DAILY
Qty: 90 TABLET | Refills: 0 | Status: SHIPPED | OUTPATIENT
Start: 2021-01-12 | End: 2023-02-14

## 2021-01-12 ASSESSMENT — ANXIETY QUESTIONNAIRES
3. WORRYING TOO MUCH ABOUT DIFFERENT THINGS: SEVERAL DAYS
IF YOU CHECKED OFF ANY PROBLEMS ON THIS QUESTIONNAIRE, HOW DIFFICULT HAVE THESE PROBLEMS MADE IT FOR YOU TO DO YOUR WORK, TAKE CARE OF THINGS AT HOME, OR GET ALONG WITH OTHER PEOPLE: SOMEWHAT DIFFICULT
1. FEELING NERVOUS, ANXIOUS, OR ON EDGE: SEVERAL DAYS
7. FEELING AFRAID AS IF SOMETHING AWFUL MIGHT HAPPEN: SEVERAL DAYS
2. NOT BEING ABLE TO STOP OR CONTROL WORRYING: SEVERAL DAYS
5. BEING SO RESTLESS THAT IT IS HARD TO SIT STILL: SEVERAL DAYS
6. BECOMING EASILY ANNOYED OR IRRITABLE: SEVERAL DAYS
GAD7 TOTAL SCORE: 7

## 2021-01-12 ASSESSMENT — PATIENT HEALTH QUESTIONNAIRE - PHQ9
SUM OF ALL RESPONSES TO PHQ QUESTIONS 1-9: 5
5. POOR APPETITE OR OVEREATING: SEVERAL DAYS

## 2021-01-12 NOTE — RESULT ENCOUNTER NOTE
Latoya Keane  The radiologist did not see anything new.  Schedule follow up with Dr. Mcneil.   Please call or MyChart my office with any questions or concerns.   Sandi Gaitan, PAC

## 2021-01-12 NOTE — PROGRESS NOTES
Assessment & Plan     Anxiety      Anxiety state  Scores not bad with phq9 and gad7 but reports symptoms not controlled.  Will continue with current medication and follow up with collaborative psychiatry  - MENTAL HEALTH REFERRAL  - Adult; Psychiatry; Psychiatry; [facility] [place] [facility]/Bridge to Long-Term Psychiatry as indicated ([phone]); Yes; Chronic Mental Health without improvement; Yes; We will contact you to schedule ...  - citalopram (CELEXA) 40 MG tablet  Dispense: 30 tablet; Refill: 1    Moderate episode of recurrent major depressive disorder (H)  As above     Panic disorder without agoraphobia  As above   - MENTAL HEALTH REFERRAL  - Adult; Psychiatry; Psychiatry; [facility]: [facility] Psychiatry Service/Bridge to Long-Term Psychiatry as indicated ([phone]); Yes; Chronic Mental Health without improvement; Yes; We will contact you to schedule ...  - citalopram (CELEXA) 40 MG tablet  Dispense: 30 tablet; Refill: 1    Essential hypertension  Blood pressure at goal.  Continue current medication  - chlorthalidone (HYGROTON) 25 MG tablet  Dispense: 90 tablet; Refill: 0  - Albumin Random Urine Quantitative with Creat Ratio  - Comprehensive metabolic panel (BMP + Alb, Alk Phos, ALT, AST, Total. Bili, TP)    Screening for hyperlipidemia    - Lipid panel reflex to direct LDL Non-fasting    Screening for diabetes mellitus    - Comprehensive metabolic panel (BMP + Alb, Alk Phos, ALT, AST, Total. Bili, TP)    Right foot pain  Xray normal and pin in place- follow up with podiatry   - XR Foot Right G/E 3 Views  - XR Ankle Right G/E 3 Views  - Orthopedic & Spine  Referral    Pain in joint, ankle and foot, right  I had some concerns for distal fibula fracture but read by radiologist as normal.  Follow up with podiatry- given gel splint - has crutches at home- given pain would recommend not weight bearing   - XR Foot Right G/E 3 Views  - XR Ankle Right G/E 3 Views  - Orthopedic  "& Spine  Referral  - ANKLE BRACE (SWEDE-O) XL    Need for prophylactic vaccination and inoculation against influenza    - INFLUENZA VACCINE IM > 6 MONTHS VALENT IIV4 [96552]    Psoriatic arthritis (H)  Has seen rheumatology-will need to return for labs as I did not release labs     Morbid obesity (H)  Unable to exercise given right foot and ankle pain- encouraged to work on diet     Need for influenza vaccination    - INFLUENZA VACCINE IM > 6 MONTHS VALENT IIV4 [30934]      Review of the result(s) of each unique test - xrays, lipids, cmp                    BMI:   Estimated body mass index is 40.99 kg/m  as calculated from the following:    Height as of 6/2/20: 1.956 m (6' 5\").    Weight as of this encounter: 156.8 kg (345 lb 11.2 oz).   Weight management plan: Discussed healthy diet and exercise guidelines      Patient Instructions   Continue medications as you have been taking  Someone from psychiatry will reach out to you  Follow up with Dr. Mcneil at [facility] ([phone]) formerly called [facility] for foot and ankle pain  Work on diet to reduce weight- you have gained 16 pounds since June   Work on exercise once able   Follow up with us in 6 months       Return in about 6 months (around 7/12/2021) for in person.    [facility] MARTIN Keane is a 40 year old who presents to clinic today for the following health issues     HPI       Depression Followup    How are you doing with your depression since your last visit? No change    Are you having other symptoms that might be associated with depression? No    Have you had a significant life event?  No     Are you feeling anxious or having panic attacks?   No    Do you have any concerns with your use of alcohol or other drugs? No    Social History     Tobacco Use     Smoking status: Former Smoker     Packs/day: 0.20     Years: 10.00     Pack years: 2.00     " Types: Cigarettes     Quit date: 2015     Years since quittin.0     Smokeless tobacco: Never Used   Substance Use Topics     Alcohol use: Yes     Alcohol/week: 8.0 standard drinks     Types: 8 Standard drinks or equivalent per week     Comment: not for a month     Drug use: Yes     Types: Marijuana     Comment: PRN pain     PHQ 2018   PHQ-9 Total Score 12 10 5   Q9: Thoughts of better off dead/self-harm past 2 weeks Not at all Not at all Not at all   Some encounter information is confidential and restricted. Go to Review Flowsheets activity to see all data.     BISMARK-7 SCORE 2018   Total Score 4 17 7     Last PHQ-9 2021   1.  Little interest or pleasure in doing things 1   2.  Feeling down, depressed, or hopeless 0   3.  Trouble falling or staying asleep, or sleeping too much 0   4.  Feeling tired or having little energy 1   5.  Poor appetite or overeating 1   6.  Feeling bad about yourself 1   7.  Trouble concentrating 1   8.  Moving slowly or restless 0   Q9: Thoughts of better off dead/self-harm past 2 weeks 0   PHQ-9 Total Score 5   Difficulty at work, home, or with people Somewhat difficult   Some encounter information is confidential and restricted. Go to Review [employer] activity to see all data.     BISMARK-7  2021   1. Feeling nervous, anxious, or on edge 1   2. Not being able to stop or control worrying 1   3. Worrying too much about different things 1   4. Trouble relaxing 1   5. Being so restless that it is hard to sit still 1   6. Becoming easily annoyed or irritable 1   7. Feeling afraid, as if something awful might happen 1   BISMARK-7 Total Score 7   If you checked any problems, how difficult have they made it for you to do your work, take care of things at home, or get along with other people? Somewhat difficult         Suicide Assessment Five-step Evaluation and Treatment (SAFE-T)      How many servings of fruits and vegetables do you eat  "daily?  2-3    On average, how many sweetened beverages do you drink each day (Examples: soda, juice, sweet tea, etc.  Do NOT count diet or artificially sweetened beverages)?   2    How many days per week do you exercise enough to make your heart beat faster? 3 or less walk a lot part of job but can't wlk due to apin. Lots of difficulties going up and down stairs.     How many minutes a day do you exercise enough to make your heart beat faster? 9 or less    How many days per week do you miss taking your medication? 0      Broke fifth metatarsal 3 yrs ago and surgery with pin placed.  Has right ankle and foot pain   Taking ibuprofen and tylenol and not helpful- can't hardly walk anymore  Feels like still broken.  Pain more recently last month.  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 1-2 panic attacks a month-   stratera- caused \"stomach rot \"  No side effrects - on citalopram for 14 years and doesn't' feel helpful  Review of Systems   Constitutional, HEENT, cardiovascular, pulmonary, gi and gu systems are negative, except as otherwise noted.      Objective    /78   Pulse 69   Resp 16   Wt (!) 156.8 kg (345 lb 11.2 oz)   SpO2 97%   BMI 40.99 kg/m    Body mass index is 40.99 kg/m .  Physical Exam   GENERAL: healthy, alert and no distress  RESP: lungs clear to auscultation - no rales, rhonchi or wheezes  CV: regular rate and rhythm, normal S1 S2, no S3 or S4, no murmur, click or rub, no peripheral edema and peripheral pulses strong  MS: right ankle tender distal lateral malleolus and dorsum of right foot and lateral fifth metatarsal with some edema. No erythema or warmth   PSYCH: mentation appears normal, affect normal/bright, judgement and insight intact and appearance well groomed    Xr Foot Right G/e 3 Views    Result Date: 1/12/2021  XR ANKLE RT G/E 3 VW, " XR FOOT RT G/E 3 VW 1/12/2021 11:04 AM HISTORY: Right foot pain; Pain in joint, ankle and foot, right     IMPRESSION: Fifth metatarsal base cancellous screw. No evidence of acute fracture. Mild plantar intracranial spur. Foot and ankle radiographs are otherwise unremarkable. CR JOYA MD    Xr Ankle Right G/e 3 Views    Result Date: 1/12/2021  XR ANKLE RT G/E 3 VW, XR FOOT RT G/E 3 VW 1/12/2021 11:04 AM HISTORY: Right foot pain; Pain in joint, ankle and foot, right     IMPRESSION: Fifth metatarsal base cancellous screw. No evidence of acute fracture. Mild plantar intracranial spur. Foot and ankle radiographs are otherwise unremarkable. CR JOYA MD

## 2021-01-12 NOTE — PATIENT INSTRUCTIONS
Continue medications as you have been taking  Someone from psychiatry will reach out to you  Follow up with Dr. Mcneil at [facility] ([phone]) formerly called [facility] for foot and ankle pain  Work on diet to reduce weight- you have gained 16 pounds since June   Work on exercise once able   Follow up with us in 6 months

## 2021-01-13 DIAGNOSIS — R73.9 ELEVATED RANDOM BLOOD GLUCOSE LEVEL: Primary | ICD-10-CM

## 2021-01-13 ASSESSMENT — ANXIETY QUESTIONNAIRES: GAD7 TOTAL SCORE: 7

## 2021-01-13 NOTE — RESULT ENCOUNTER NOTE
Latoya Keane  Your urine does not show excess protein.   Your blood sugar was 131.  Since you were not fasting this is not concerning but you may wish to schedule a fasting blood sugar.  (nothing to eat or drink for 9 hours prior except water and/or medication).  I have placed an order.   Your electrolytes, kidney function and liver function were normal.   Your cholesterol is acceptable but a bit high.  Poor diet, genetics and being overweight can contribute to this.    Maintaining a healthy diet with lean proteins, whole grains and healthy fats such as olive oil as well as regular exercise and maintaining an appropriate weight all contribute to healthier cholesterol levels.    Unfortunately I did not tell the lab appropriately to release the labs requested by your rheumatologist.   Please schedule a lab only appointment at your convenience.  You do not need to fast for those labs. Again, my apologies.   Please call or MyChart my office with any questions or concerns.    Sandi Gaitan, PAC

## 2021-01-15 ENCOUNTER — HEALTH MAINTENANCE LETTER (OUTPATIENT)
Age: 41
End: 2021-01-15

## 2021-03-07 DIAGNOSIS — F41.0 PANIC DISORDER WITHOUT AGORAPHOBIA: ICD-10-CM

## 2021-03-08 RX ORDER — HYDROXYZINE HYDROCHLORIDE 25 MG/1
TABLET, FILM COATED ORAL
Qty: 120 TABLET | Refills: 0 | OUTPATIENT
Start: 2021-03-08

## 2021-03-08 NOTE — TELEPHONE ENCOUNTER
Pending Prescriptions:                       Disp   Refills    hydrOXYzine (ATARAX) 25 MG tablet [Pharmac*120 ta*0        Sig: TAKE 1 TO 2 TABLETS(25 TO 50 MG) BY MOUTH TWICE DAILY           FOR ANXIETY OR SLEEP      Routing refill request to provider for review/approval because:  Drug not active on patient's medication list    Kim Matamoros RN on 3/8/2021 at 4:47 PM

## 2021-03-09 ENCOUNTER — MYC MEDICAL ADVICE (OUTPATIENT)
Dept: FAMILY MEDICINE | Facility: CLINIC | Age: 41
End: 2021-03-09

## 2021-03-23 ENCOUNTER — IMMUNIZATION (OUTPATIENT)
Dept: NURSING | Facility: CLINIC | Age: 41
End: 2021-03-23
Payer: COMMERCIAL

## 2021-03-23 PROCEDURE — 0001A PR COVID VAC PFIZER DIL RECON 30 MCG/0.3 ML IM: CPT

## 2021-03-23 PROCEDURE — 91300 PR COVID VAC PFIZER DIL RECON 30 MCG/0.3 ML IM: CPT

## 2021-04-20 ENCOUNTER — IMMUNIZATION (OUTPATIENT)
Dept: NURSING | Facility: CLINIC | Age: 41
End: 2021-04-20
Attending: INTERNAL MEDICINE
Payer: COMMERCIAL

## 2021-04-20 PROCEDURE — 0002A PR COVID VAC PFIZER DIL RECON 30 MCG/0.3 ML IM: CPT

## 2021-04-20 PROCEDURE — 91300 PR COVID VAC PFIZER DIL RECON 30 MCG/0.3 ML IM: CPT

## 2021-04-21 DIAGNOSIS — F41.1 ANXIETY STATE: ICD-10-CM

## 2021-04-21 DIAGNOSIS — F41.0 PANIC DISORDER WITHOUT AGORAPHOBIA: ICD-10-CM

## 2021-04-21 RX ORDER — CITALOPRAM HYDROBROMIDE 40 MG/1
40 TABLET ORAL DAILY
Qty: 30 TABLET | Refills: 1 | Status: SHIPPED | OUTPATIENT
Start: 2021-04-21 | End: 2021-07-08

## 2021-05-03 RX ORDER — USTEKINUMAB 90 MG/ML
INJECTION, SOLUTION SUBCUTANEOUS
OUTPATIENT
Start: 2021-05-03

## 2021-05-03 NOTE — TELEPHONE ENCOUNTER
Medication was stopped by patient for side effects      Requested Prescriptions   Pending Prescriptions Disp Refills     STELARA 90 MG/ML [Pharmacy Med Name: STELARA 90 MG/1 ML PFS]       Sig: INJECT 90 MG (1 ML) UNDER THE SKIN EVERY 12 WEEKS       There is no refill protocol information for this order

## 2021-05-27 ENCOUNTER — MYC MEDICAL ADVICE (OUTPATIENT)
Dept: FAMILY MEDICINE | Facility: CLINIC | Age: 41
End: 2021-05-27

## 2021-05-28 ENCOUNTER — MYC MEDICAL ADVICE (OUTPATIENT)
Dept: DERMATOLOGY | Facility: CLINIC | Age: 41
End: 2021-05-28

## 2021-05-28 NOTE — TELEPHONE ENCOUNTER
[employer] message sent:    Chuck Keane-    It looks like your last appointment with Dr. Page was in 2019.    You will need to schedule an office visit in order to obtain further refills of Stelara.    You can call [phone] to make that appointment.    Betty RN-BSN-N  [facility]

## 2021-05-28 NOTE — TELEPHONE ENCOUNTER
Liz,      I have not seen him since 2019 and in 2020 we told him he had to make an appointment to see me.      He needs an appointment , since he did not follow up before I am not going to extend refills until he gets in. I don't know if he is seeing someone else. He had lab work by Dr. Inez Hirsch in 08/2020.       So we need to clarify who he is seeing and how is writing for the stelara     Thank you,  Suellen Page M.D.

## 2021-06-02 NOTE — TELEPHONE ENCOUNTER
Please see [email or web address] message and advise.      Thank you,  Jennifer STAPLESRN BSN  [facility]  [phone]

## 2021-07-20 ENCOUNTER — ANCILLARY PROCEDURE (OUTPATIENT)
Dept: GENERAL RADIOLOGY | Facility: CLINIC | Age: 41
End: 2021-07-20
Attending: STUDENT IN AN ORGANIZED HEALTH CARE EDUCATION/TRAINING PROGRAM
Payer: COMMERCIAL

## 2021-07-20 ENCOUNTER — LAB (OUTPATIENT)
Dept: LAB | Facility: CLINIC | Age: 41
End: 2021-07-20
Payer: COMMERCIAL

## 2021-07-20 DIAGNOSIS — L40.50 PSORIATIC ARTHRITIS (H): ICD-10-CM

## 2021-07-20 LAB
ALBUMIN SERPL-MCNC: 4.1 G/DL (ref 3.4–5)
ALT SERPL W P-5'-P-CCNC: 56 U/L (ref 0–70)
AST SERPL W P-5'-P-CCNC: 20 U/L (ref 0–45)
CREAT SERPL-MCNC: 0.74 MG/DL (ref 0.66–1.25)
CRP SERPL-MCNC: 4.1 MG/L (ref 0–8)
ERYTHROCYTE [DISTWIDTH] IN BLOOD BY AUTOMATED COUNT: 12 % (ref 10–15)
ERYTHROCYTE [SEDIMENTATION RATE] IN BLOOD BY WESTERGREN METHOD: 6 MM/HR (ref 0–15)
GFR SERPL CREATININE-BSD FRML MDRD: >90 ML/MIN/1.73M2
HCT VFR BLD AUTO: 42.7 % (ref 40–53)
HGB BLD-MCNC: 15.4 G/DL (ref 13.3–17.7)
MCH RBC QN AUTO: 29.7 PG (ref 26.5–33)
MCHC RBC AUTO-ENTMCNC: 36.1 G/DL (ref 31.5–36.5)
MCV RBC AUTO: 82 FL (ref 78–100)
PLATELET # BLD AUTO: 251 10E3/UL (ref 150–450)
RBC # BLD AUTO: 5.18 10E6/UL (ref 4.4–5.9)
WBC # BLD AUTO: 7.6 10E3/UL (ref 4–11)

## 2021-07-20 PROCEDURE — 86481 TB AG RESPONSE T-CELL SUSP: CPT

## 2021-07-20 PROCEDURE — 73130 X-RAY EXAM OF HAND: CPT | Mod: GC | Performed by: RADIOLOGY

## 2021-07-20 PROCEDURE — 84450 TRANSFERASE (AST) (SGOT): CPT

## 2021-07-20 PROCEDURE — 84460 ALANINE AMINO (ALT) (SGPT): CPT

## 2021-07-20 PROCEDURE — 86704 HEP B CORE ANTIBODY TOTAL: CPT

## 2021-07-20 PROCEDURE — 86200 CCP ANTIBODY: CPT

## 2021-07-20 PROCEDURE — 86803 HEPATITIS C AB TEST: CPT

## 2021-07-20 PROCEDURE — 86431 RHEUMATOID FACTOR QUANT: CPT

## 2021-07-20 PROCEDURE — 73630 X-RAY EXAM OF FOOT: CPT | Mod: GC | Performed by: RADIOLOGY

## 2021-07-20 PROCEDURE — 85652 RBC SED RATE AUTOMATED: CPT

## 2021-07-20 PROCEDURE — 85027 COMPLETE CBC AUTOMATED: CPT

## 2021-07-20 PROCEDURE — 87340 HEPATITIS B SURFACE AG IA: CPT

## 2021-07-20 PROCEDURE — 82565 ASSAY OF CREATININE: CPT

## 2021-07-20 PROCEDURE — 73110 X-RAY EXAM OF WRIST: CPT | Mod: GC | Performed by: RADIOLOGY

## 2021-07-20 PROCEDURE — 86140 C-REACTIVE PROTEIN: CPT

## 2021-07-20 PROCEDURE — 82040 ASSAY OF SERUM ALBUMIN: CPT

## 2021-07-20 PROCEDURE — 36415 COLL VENOUS BLD VENIPUNCTURE: CPT

## 2021-07-20 NOTE — PROGRESS NOTES
[facility] - PRIMARY CARE SKIN    CC : Psoriasis with arthropathy  SUBJECTIVE:                                                    Lakhwinder Jones is a 40 ear old male who presents to clinic today for follow-up of chronic psoriasis with arthropathy that started in adolescence at age 19. He has continued to have diffuse plaques on the arms, legs, trunk, and scalp. He is followed by rheumatology, he has had more joint aching, especially noticed since he missed his last injection. He has has persisting residual erythematous non scaling plaques on the forearms .    Previous ineffective therapies tried : methotrexate, phototherapy, various topical creams, Enbrel, Humira, Otezla  Current treatment : ustekinumab q 3 months - good control of the psoriasis altho still some joint achy  Response to treatment :   Last [place] TB- 07/20/2021      Personal Medical History  Skin Cancer : NO  Eczema Psoriasis Rosacea Autoimmune   NO YES - history of psoriasis with arthropathy with previous diagnosis by rheumatology NO NO     Family Medical History  Skin Cancer : YES - in grandfather  Eczema Psoriasis Rosacea Autoimmune   NO YES - in uncle NO YES - in grandmother but not psoriatic arthritis     Occupation : sales (indoor).    Patient Active Problem List   Diagnosis     Anxiety state     Chronic back pain     Moderate episode of recurrent major depressive disorder (H)     Panic disorder without agoraphobia     Abnormal results of liver function studies     Psoriatic arthritis (H)     Morbid obesity (H)     Elevated blood-pressure reading without diagnosis of hypertension     Alcohol abuse, in remission     Vitamin D deficiency     Essential hypertension       Past Medical History:   Diagnosis Date     Anxiety      Back pain     chronic     Obesity      Psoriatic arthritis (H)     Past Surgical History:   Procedure Laterality Date     DISCECTOMY LUMBAR POSTERIOR MICROSCOPIC ONE LEVEL Left 11/1/2018    Procedure: Left Lumbar 4-5  Microdisectomy ;  Surgeon: Favio Miranda MD;  Location: [facility] OR     FOOT SURGERY Right      NOSE SURGERY      3 times     right elbow surgey      14 yo     TESTICLE SURGERY      21 yo      Social History     Tobacco Use     Smoking status: Former Smoker     Packs/day: 0.20     Years: 10.00     Pack years: 2.00     Types: Cigarettes     Quit date: 2015     Years since quittin.5     Smokeless tobacco: Never Used   Substance Use Topics     Alcohol use: Yes     Alcohol/week: 8.0 standard drinks     Types: 8 Standard drinks or equivalent per week     Comment: not for a month     Drug use: Yes     Types: Marijuana     Comment: PRN pain    Family History     Problem (# of Occurrences) Relation (Name,Age of Onset)    Anxiety Disorder (1) Father    Asthma (1) Brother    Breast Cancer (2) Maternal Grandmother, Paternal Grandmother    Coronary Artery Disease (1) Father    Depression (3) Father, Maternal Grandmother, Paternal Grandmother    Diabetes (4) Father, Paternal Grandmother, Paternal Grandfather, Paternal Uncle    Hyperlipidemia (1) Father    Hypertension (1) Father    Mental Illness (3) Father, Maternal Grandmother, Paternal Grandmother    Obesity (1) Mother    Prostate Cancer (1) Maternal Grandfather    Substance Abuse (2) Father, Maternal Grandmother       Negative family history of: Cerebrovascular Disease, Anesthesia Reaction, Osteoporosis, Genetic Disorder, Thyroid Disease, Liver Disease           Current Outpatient Medications   Medication Sig Dispense Refill     Acetaminophen (TYLENOL PO) Take 1,000 mg by mouth as needed for mild pain or fever       chlorthalidone (HYGROTON) 25 MG tablet Take 1 tablet (25 mg) by mouth daily 90 tablet 0     citalopram (CELEXA) 40 MG tablet TAKE 1 TABLET(40 MG) BY MOUTH DAILY 30 tablet 0     cyclobenzaprine (FLEXERIL) 10 MG tablet Take 1 tablet (10 mg) by mouth 3 times daily as needed for muscle spasms (Patient not taking: Reported on 2021) 30 tablet 0      HYDROcodone-acetaminophen (NORCO) 5-325 MG tablet Take 1 tablet by mouth every 8 hours as needed for moderate to severe pain maximum 6 tablet(s) per day 30 tablet 0     multivitamin, therapeutic with minerals (MULTI-VITAMIN) TABS tablet Take 1 tablet by mouth every morning       NONFORMULARY as needed CBD OIL       ustekinumab (STELARA) 90 MG/ML 90 mg subcutaneous  q 12 weeks. Weight 327 lb ( > 100 kg ) 1 mL 0         Allergies   Allergen Reactions     Tramadol Other (See Comments)     Shellfish-Derived Products         ROS : 14 point review of systems was negative except the symptoms listed above in the HPI.      OBJECTIVE:                                                      Wt Readings from Last 2 Encounters:   01/12/21 (!) 345 lb 11.2 oz (156.8 kg)   06/02/20 329 lb (149.2 kg)     GENERAL: healthy, alert, in moderate distress and obese  SKIN: Victoria Skin Type - I.  Face, Neck, Trunk, Arms, Legs examined. The dermatoscope was used to help evaluate pigmented lesions.  Skin Pertinent Findings:  Trunk, arms, legs - multiple large plaques and erythema, scaling.    Diagnostic Test Results:  Results for orders placed or performed in visit on 07/20/21 (from the past 24 hour(s))   Quantiferon TB Gold Plus    Specimen: Blood    Narrative    The following orders were created for panel order Quantiferon TB Gold Plus.  Procedure                               Abnormality         Status                     ---------                               -----------         ------                     Quantiferon TB Gold Plus...[[id number]]                                                 Quantiferon TB Gold Plus...[[id number]]                                                 Quantiferon TB Gold Plus...[[id number]]                                                 Quantiferon TB Gold Plus...[[id number]]                                                   Please view results for these tests on the individual orders.         ASSESSMENT:                                                       Encounter Diagnoses   Name Primary?     Psoriasis Yes     Psoriatic arthritis (H)      Psoriasis with arthropathy (H)      MDM : may need to consider different biologic based on clinical response after restarting the ustekinumab     PLAN:                                                    Patient Instructions   ustekinumab 90 mg/ml injection every 3 months  Betamethasone diproprinate 0.05% augmented ointment - apply two times per day for 3 weeks then decrease to once per day for one week then use one week on and one week off.

## 2021-07-21 ENCOUNTER — OFFICE VISIT (OUTPATIENT)
Dept: FAMILY MEDICINE | Facility: CLINIC | Age: 41
End: 2021-07-21
Payer: COMMERCIAL

## 2021-07-21 VITALS — DIASTOLIC BLOOD PRESSURE: 78 MMHG | SYSTOLIC BLOOD PRESSURE: 122 MMHG

## 2021-07-21 DIAGNOSIS — L40.50 PSORIASIS WITH ARTHROPATHY (H): ICD-10-CM

## 2021-07-21 DIAGNOSIS — L40.50 PSORIATIC ARTHRITIS (H): ICD-10-CM

## 2021-07-21 DIAGNOSIS — L40.9 PSORIASIS: Primary | ICD-10-CM

## 2021-07-21 LAB
CCP AB SER IA-ACNC: 0.5 U/ML
HBV CORE AB SERPL QL IA: NONREACTIVE
HBV SURFACE AG SERPL QL IA: NONREACTIVE
HCV AB SERPL QL IA: NONREACTIVE
QUANTIFERON MITOGEN: 10 IU/ML
QUANTIFERON NIL TUBE: 0.04 IU/ML
QUANTIFERON TB1 TUBE: 0.1 IU/ML
QUANTIFERON TB2 TUBE: 0.09
RHEUMATOID FACT SER NEPH-ACNC: <7 IU/ML

## 2021-07-21 PROCEDURE — 99213 OFFICE O/P EST LOW 20 MIN: CPT | Performed by: FAMILY MEDICINE

## 2021-07-21 RX ORDER — BETAMETHASONE DIPROPIONATE 0.5 MG/G
OINTMENT, AUGMENTED TOPICAL 2 TIMES DAILY
Qty: 50 G | Refills: 3 | Status: SHIPPED | OUTPATIENT
Start: 2021-07-21 | End: 2023-02-14

## 2021-07-21 NOTE — PATIENT INSTRUCTIONS
ustekinumab 90 mg/ml injection every 3 months  Betamethasone diproprinate 0.05% augmented ointment - apply two times per day for 3 weeks then decrease to once per day for one week then use one week on and one week off.

## 2021-07-21 NOTE — LETTER
7/21/2021         RE: Lakhwinder Jones  [address]  [place] [address]        Dear Colleague,    Thank you for referring your patient, Lakhwinder Jones, to the [facility]. Please see a copy of my visit note below.    [facility] - PRIMARY CARE SKIN    CC : Psoriasis with arthropathy  SUBJECTIVE:                                                    Lakhwinder Jones is a 40 ear old male who presents to clinic today for follow-up of chronic psoriasis with arthropathy that started in adolescence at age 19. He has continued to have diffuse plaques on the arms, legs, trunk, and scalp. He is followed by rheumatology, he has had more joint aching, especially noticed since he missed his last injection. He has has persisting residual erythematous non scaling plaques on the forearms .    Previous ineffective therapies tried : methotrexate, phototherapy, various topical creams, Enbrel, Humira, Otezla  Current treatment : ustekinumab q 3 months - good control of the psoriasis altho still some joint achy  Response to treatment :   Last [place] TB- 07/20/2021      Personal Medical History  Skin Cancer : NO  Eczema Psoriasis Rosacea Autoimmune   NO YES - history of psoriasis with arthropathy with previous diagnosis by rheumatology NO NO     Family Medical History  Skin Cancer : YES - in grandfather  Eczema Psoriasis Rosacea Autoimmune   NO YES - in uncle NO YES - in grandmother but not psoriatic arthritis     Occupation : sales (indoor).    Patient Active Problem List   Diagnosis     Anxiety state     Chronic back pain     Moderate episode of recurrent major depressive disorder (H)     Panic disorder without agoraphobia     Abnormal results of liver function studies     Psoriatic arthritis (H)     Morbid obesity (H)     Elevated blood-pressure reading without diagnosis of hypertension     Alcohol abuse, in remission     Vitamin D deficiency     Essential hypertension       Past Medical  History:   Diagnosis Date     Anxiety      Back pain     chronic     Obesity      Psoriatic arthritis (H)     Past Surgical History:   Procedure Laterality Date     DISCECTOMY LUMBAR POSTERIOR MICROSCOPIC ONE LEVEL Left 2018    Procedure: Left Lumbar 4-5 Microdisectomy ;  Surgeon: Favio Miranda MD;  Location: [facility] OR     FOOT SURGERY Right      NOSE SURGERY      3 times     right elbow surgey      14 yo     TESTICLE SURGERY      21 yo      Social History     Tobacco Use     Smoking status: Former Smoker     Packs/day: 0.20     Years: 10.00     Pack years: 2.00     Types: Cigarettes     Quit date: 2015     Years since quittin.5     Smokeless tobacco: Never Used   Substance Use Topics     Alcohol use: Yes     Alcohol/week: 8.0 standard drinks     Types: 8 Standard drinks or equivalent per week     Comment: not for a month     Drug use: Yes     Types: Marijuana     Comment: PRN pain    Family History     Problem (# of Occurrences) Relation (Name,Age of Onset)    Anxiety Disorder (1) Father    Asthma (1) Brother    Breast Cancer (2) Maternal Grandmother, Paternal Grandmother    Coronary Artery Disease (1) Father    Depression (3) Father, Maternal Grandmother, Paternal Grandmother    Diabetes (4) Father, Paternal Grandmother, Paternal Grandfather, Paternal Uncle    Hyperlipidemia (1) Father    Hypertension (1) Father    Mental Illness (3) Father, Maternal Grandmother, Paternal Grandmother    Obesity (1) Mother    Prostate Cancer (1) Maternal Grandfather    Substance Abuse (2) Father, Maternal Grandmother       Negative family history of: Cerebrovascular Disease, Anesthesia Reaction, Osteoporosis, Genetic Disorder, Thyroid Disease, Liver Disease           Current Outpatient Medications   Medication Sig Dispense Refill     Acetaminophen (TYLENOL PO) Take 1,000 mg by mouth as needed for mild pain or fever       chlorthalidone (HYGROTON) 25 MG tablet Take 1 tablet (25 mg) by mouth daily 90 tablet 0      citalopram (CELEXA) 40 MG tablet TAKE 1 TABLET(40 MG) BY MOUTH DAILY 30 tablet 0     cyclobenzaprine (FLEXERIL) 10 MG tablet Take 1 tablet (10 mg) by mouth 3 times daily as needed for muscle spasms (Patient not taking: Reported on 1/12/2021) 30 tablet 0     HYDROcodone-acetaminophen (NORCO) 5-325 MG tablet Take 1 tablet by mouth every 8 hours as needed for moderate to severe pain maximum 6 tablet(s) per day 30 tablet 0     multivitamin, therapeutic with minerals (MULTI-VITAMIN) TABS tablet Take 1 tablet by mouth every morning       NONFORMULARY as needed CBD OIL       ustekinumab (STELARA) 90 MG/ML 90 mg subcutaneous  q 12 weeks. Weight 327 lb ( > 100 kg ) 1 mL 0         Allergies   Allergen Reactions     Tramadol Other (See Comments)     Shellfish-Derived Products         ROS : 14 point review of systems was negative except the symptoms listed above in the HPI.      OBJECTIVE:                                                      Wt Readings from Last 2 Encounters:   01/12/21 (!) 345 lb 11.2 oz (156.8 kg)   06/02/20 329 lb (149.2 kg)     GENERAL: healthy, alert, in moderate distress and obese  SKIN: Victoria Skin Type - I.  Face, Neck, Trunk, Arms, Legs examined. The dermatoscope was used to help evaluate pigmented lesions.  Skin Pertinent Findings:  Trunk, arms, legs - multiple large plaques and erythema, scaling.    Diagnostic Test Results:  Results for orders placed or performed in visit on 07/20/21 (from the past 24 hour(s))   Quantiferon TB Gold Plus    Specimen: Blood    Narrative    The following orders were created for panel order Quantiferon TB Gold Plus.  Procedure                               Abnormality         Status                     ---------                               -----------         ------                     Quantiferon TB Gold Plus...[[id number]]                                                 Quantiferon TB Gold Plus...[[id number]]                                                  Quantiferon TB Gold Plus...[[id number]]                                                 Quantiferon TB Gold Plus...[[id number]]                                                   Please view results for these tests on the individual orders.         ASSESSMENT:                                                      Encounter Diagnoses   Name Primary?     Psoriasis Yes     Psoriatic arthritis (H)      Psoriasis with arthropathy (H)      MDM : may need to consider different biologic based on clinical response after restarting the ustekinumab     PLAN:                                                    Patient Instructions   ustekinumab 90 mg/ml injection every 3 months  Betamethasone diproprinate 0.05% augmented ointment - apply two times per day for 3 weeks then decrease to once per day for one week then use one week on and one week off.            Again, thank you for allowing me to participate in the care of your patient.        Sincerely,        Suellen Page MD

## 2021-07-22 LAB
GAMMA INTERFERON BACKGROUND BLD IA-ACNC: 0.04 IU/ML
M TB IFN-G BLD-IMP: NEGATIVE
M TB IFN-G CD4+ BCKGRND COR BLD-ACNC: 9.96 IU/ML
MITOGEN IGNF BCKGRD COR BLD-ACNC: 0.05 IU/ML
MITOGEN IGNF BCKGRD COR BLD-ACNC: 0.06 IU/ML

## 2021-08-12 ENCOUNTER — NURSE TRIAGE (OUTPATIENT)
Dept: NURSING | Facility: CLINIC | Age: 41
End: 2021-08-12

## 2021-08-13 ENCOUNTER — VIRTUAL VISIT (OUTPATIENT)
Dept: FAMILY MEDICINE | Facility: CLINIC | Age: 41
End: 2021-08-13
Payer: COMMERCIAL

## 2021-08-13 DIAGNOSIS — Z53.9 ERRONEOUS ENCOUNTER--DISREGARD: Primary | ICD-10-CM

## 2021-08-13 NOTE — PROGRESS NOTES
Lakhwinder is a 40 year old who is being evaluated via a billable video visit.      How would you like to obtain your AVS?   Unable to complete video visit- I was running behind and no answers on cell and unable to connect with him for video visit         Subjective   Lakhwinder is a 40 year old who presents for the following health issues     History of Present Illness       He eats 0-1 servings of fruits and vegetables daily.He consumes 3 sweetened beverage(s) daily.He exercises with enough effort to increase his heart rate 9 or less minutes per day.  He exercises with enough effort to increase his heart rate 3 or less days per week.   He is taking medications regularly.             Review of Systems         Objective           Vitals:  No vitals were obtained today due to virtual visit.    Physical Exam                   Video-Visit Details    Type of service:  Video Visit    Video End Time:    Originating Location (pt. Location):     Distant Location (provider location):  [facility]     Platform used for Video Visit:   Answers for HPI/ROS submitted by the patient on 8/13/2021  How many servings of fruits and vegetables do you eat daily?: 0-1  On average, how many sweetened beverages do you drink each day (Examples: soda, juice, sweet tea, etc.  Do NOT count diet or artificially sweetened beverages)?: 3  How many minutes a day do you exercise enough to make your heart beat faster?: 9 or less  How many days a week do you exercise enough to make your heart beat faster?: 3 or less  How many days per week do you miss taking your medication?: 0

## 2021-08-13 NOTE — TELEPHONE ENCOUNTER
"\"I think I did something to my foot. There is a big red spot (3\") on the left side of my right foot that is warm and swollen and painful. It began hurting @ 3pm today. Currently pain=\"7.5\". I have taken Ibuprofen 800mg @ 8pm, Tylenol 1 regular strength @ 8pm: it did not relieve the pain. I can hobble, it hurts to put pressure on my foot.\" No known injury.    PCP: [place] [facility]: Dr Tyler.   Triaged to a disposition of See HCP within 4 hours or PCP triage.   Answering service contacted @ 10:10pm: Dr Octavio Harris is on call. Contacted via cell phone.   Possible gout. Patient could go to the UC or ER or wait until tomorrow to be seen in clinic or video visit with clinic.   Contacted patient with this information: he requested to be connected to  for clinic or video visit for tomorrow.     Betty Salmeron RN Triage Nurse Advisor 10:21 PM 8/12/2021    Reason for Disposition    [1] SEVERE pain (e.g., excruciating, unable to do any normal activities) AND [2] not improved after 2 hours of pain medicine    [1] Looks infected (spreading redness, pus) AND [2] large red area (> 2 in. or 5 cm)    Additional Information    Negative: Followed a foot injury    Negative: Diabetes    Negative: Ankle pain is main symptom    Negative: Thigh or calf pain is main symptom    Negative: Entire foot is cool or blue in comparison to other foot    Negative: Purple or black skin on foot or toe    Negative: [1] Red area or streak AND [2] fever    Negative: [1] Swollen foot AND [2] fever    Negative: Patient sounds very sick or weak to the triager    Protocols used: FOOT PAIN-A-AH  COVID 19 Nurse Triage Plan/Patient Instructions    Please be aware that novel coronavirus (COVID-19) may be circulating in the community. If you develop symptoms such as fever, cough, or SOB or if you have concerns about the presence of another infection including coronavirus (COVID-19), please contact your health care provider or visit " [email or web address].     Disposition/Instructions    In-Person Visit with provider recommended. Reference Visit Selection Guide.    Thank you for taking steps to prevent the spread of this virus.  o Limit your contact with others.  o Wear a simple mask to cover your cough.  o Wash your hands well and often.    Resources    [facility]: About COVID-19: [email or web address]    CDC: What to Do If You're Sick: www.cdc.gov/coronavirus/2019-ncov/about/steps-when-sick.html    CDC: Ending Home Isolation: www.cdc.gov/coronavirus/2019-ncov/hcp/disposition-in-home-patients.html     CDC: Caring for Someone: www.cdc.gov/coronavirus/2019-ncov/if-you-are-sick/care-for-someone.html     [facility]: Interim Guidance for Hospital Discharge to Home: www.[email or web address]/diseases/coronavirus/hcp/hospdischarge.pdf    [facility] clinical trials (COVID-19 research studies): clinicalaffairs.[facility]-clinical-trials     Below are the COVID-19 hotlines at the [place] Department of Health ([facility]). Interpreters are available.   o For health questions: Call 256-052-2819 or 1-772.937.9646 (7 a.m. to 7 p.m.)  o For questions about schools and childcare: Call 967-706-6141 or 1-518.207.2752 (7 a.m. to 7 p.m.)

## 2021-09-03 DIAGNOSIS — F41.1 ANXIETY STATE: ICD-10-CM

## 2021-09-03 DIAGNOSIS — F41.0 PANIC DISORDER WITHOUT AGORAPHOBIA: ICD-10-CM

## 2021-09-03 RX ORDER — CITALOPRAM HYDROBROMIDE 40 MG/1
TABLET ORAL
Qty: 90 TABLET | Refills: 0 | Status: SHIPPED | OUTPATIENT
Start: 2021-09-03 | End: 2022-01-05

## 2021-09-04 ENCOUNTER — HEALTH MAINTENANCE LETTER (OUTPATIENT)
Age: 41
End: 2021-09-04

## 2021-10-04 ASSESSMENT — PATIENT HEALTH QUESTIONNAIRE - PHQ9: SUM OF ALL RESPONSES TO PHQ QUESTIONS 1-9: 3

## 2021-10-05 ENCOUNTER — OFFICE VISIT (OUTPATIENT)
Dept: FAMILY MEDICINE | Facility: CLINIC | Age: 41
End: 2021-10-05
Payer: COMMERCIAL

## 2021-10-05 ENCOUNTER — IMMUNIZATION (OUTPATIENT)
Dept: NURSING | Facility: CLINIC | Age: 41
End: 2021-10-05
Payer: COMMERCIAL

## 2021-10-05 VITALS
DIASTOLIC BLOOD PRESSURE: 81 MMHG | HEIGHT: 77 IN | BODY MASS INDEX: 37.19 KG/M2 | SYSTOLIC BLOOD PRESSURE: 137 MMHG | WEIGHT: 315 LBS | HEART RATE: 61 BPM | TEMPERATURE: 98.1 F | OXYGEN SATURATION: 98 %

## 2021-10-05 DIAGNOSIS — R59.9 ENLARGED LYMPH NODES: ICD-10-CM

## 2021-10-05 DIAGNOSIS — M25.571 PAIN IN JOINT, ANKLE AND FOOT, RIGHT: Primary | ICD-10-CM

## 2021-10-05 DIAGNOSIS — Z23 NEED FOR PROPHYLACTIC VACCINATION AND INOCULATION AGAINST INFLUENZA: ICD-10-CM

## 2021-10-05 PROCEDURE — 90686 IIV4 VACC NO PRSV 0.5 ML IM: CPT | Performed by: PHYSICIAN ASSISTANT

## 2021-10-05 PROCEDURE — 0003A PR COVID VAC PFIZER DIL RECON 30 MCG/0.3 ML IM: CPT

## 2021-10-05 PROCEDURE — 99214 OFFICE O/P EST MOD 30 MIN: CPT | Mod: 25 | Performed by: PHYSICIAN ASSISTANT

## 2021-10-05 PROCEDURE — 91300 PR COVID VAC PFIZER DIL RECON 30 MCG/0.3 ML IM: CPT

## 2021-10-05 PROCEDURE — 90471 IMMUNIZATION ADMIN: CPT | Performed by: PHYSICIAN ASSISTANT

## 2021-10-05 RX ORDER — HYDROCODONE BITARTRATE AND ACETAMINOPHEN 5; 325 MG/1; MG/1
1 TABLET ORAL
Qty: 20 TABLET | Refills: 0 | Status: SHIPPED | OUTPATIENT
Start: 2021-10-05 | End: 2022-04-07

## 2021-10-05 ASSESSMENT — PATIENT HEALTH QUESTIONNAIRE - PHQ9
SUM OF ALL RESPONSES TO PHQ QUESTIONS 1-9: 3
SUM OF ALL RESPONSES TO PHQ QUESTIONS 1-9: 3
10. IF YOU CHECKED OFF ANY PROBLEMS, HOW DIFFICULT HAVE THESE PROBLEMS MADE IT FOR YOU TO DO YOUR WORK, TAKE CARE OF THINGS AT HOME, OR GET ALONG WITH OTHER PEOPLE: NOT DIFFICULT AT ALL

## 2021-10-05 ASSESSMENT — MIFFLIN-ST. JEOR: SCORE: 2591.85

## 2021-10-05 ASSESSMENT — PAIN SCALES - GENERAL: PAINLEVEL: SEVERE PAIN (7)

## 2021-10-05 NOTE — PROGRESS NOTES
"Answers for HPI/ROS submitted by the patient on 10/5/2021  If you checked off any problems, how difficult have these problems made it for you to do your work, take care of things at home, or get along with other people?: Not difficult at all  PHQ9 TOTAL SCORE: 3        Assessment & Plan   Problem List Items Addressed This Visit     None      Visit Diagnoses     Pain in joint, ankle and foot, right    -  Primary    Relevant Medications    HYDROcodone-acetaminophen (NORCO) 5-325 MG tablet    Other Relevant Orders    Orthopedic  Referral    MR Ankle Right w/o Contrast    Enlarged lymph nodes        Need for prophylactic vaccination and inoculation against influenza        Relevant Orders    INFLUENZA VACCINE IM > 6 MONTHS VALENT IIV4 (AFLURIA/FLUZONE) (Completed)         Lymph node is reactive due to recent cellulitis secondary to excoriated psoriatic plaque  Patient was reassured. Size is less than 1 cm.     Possibly tendon injury/tear that is causing the chronic ankle pain   Wear ankle stability brace-patient has one at home  Schedule MRI   Follow up with ortho   Patient reported severe pain, so he was given Norco 5/325 1 tablet at bedtime as needed   Continue Ibuprofen 600 mg every 6 hours as needed     20 minutes spent on the date of the encounter doing chart review, history and exam, documentation and further activities per the note       BMI:   Estimated body mass index is 40.77 kg/m  as calculated from the following:    Height as of this encounter: 1.956 m (6' 5\").    Weight as of this encounter: 155.9 kg (343 lb 12.8 oz).   Weight management plan: Discussed healthy diet and exercise guidelines        Return in about 2 weeks (around 10/19/2021).    Carisa Ruvalcaba PA-C  [facility]    Vale Keane is a 40 year old who presents for the following health issues     HPI     Musculoskeletal problem/pain  Onset/Duration: 2 years  Description  Location: left ankle and " "lateral foot   Joint Swelling: no  Redness: no  Pain: YES  Warmth: no  Intensity:  moderate, severe  Progression of Symptoms:  same  Accompanying signs and symptoms:   Fevers: no  Numbness/tingling/weakness: no  History  Trauma to the area: YES- patient broke 5th metatarsal and had severe sprain of the ankle 2 years ago. Patient had a surgery for 5th metatarsal and a screw was placed. Since that injury and subsequent surgery patient has had lateral foot and ankle pain. MRI has never been done per patient. No records in the chart, either   Recent illness:  no  Previous similar problem: no  Previous evaluation:  YES  Precipitating or alleviating factors:  Aggravating factors include: standing and walking  Therapies tried and outcome: acetaminophen and Ibuprofen    Concern - small bump on the left shin  Onset: 1 months ago  Description: small rubbery pellet like bump that is mildly tender   Intensity: mild  Progression of Symptoms:  same  Accompanying Signs & Symptoms: patient had cellulitis of the left shin 1 month ago and was placed on antibiotic, after the swelling post infection resolved he was able to palpate the small bump  Previous history of similar problem: no  Precipitating factors:        Worsened by: none  Alleviating factors:        Improved by: none  Therapies tried and outcome:  pt was on antibiotic for cellulitis         Review of Systems   Constitutional, HEENT, cardiovascular, pulmonary, gi and gu systems are negative, except as otherwise noted.      Objective    /81 (BP Location: Left arm, Patient Position: Sitting, Cuff Size: Adult Large)   Pulse 61   Temp 98.1  F (36.7  C) (Tympanic)   Ht 1.956 m (6' 5\")   Wt (!) 155.9 kg (343 lb 12.8 oz)   SpO2 98%   BMI 40.77 kg/m    Body mass index is 40.77 kg/m .     Physical Exam   GENERAL: healthy, alert and no distress  MS: right ankle-mild swelling and tenderness of the lateral aspect inferior to malleolus; mild tenderness over base of the 5th " metatarsal  SKIN: severe psoriatic plaques of both extremities  Right shin-a psoriatic plaque with healing scab over, no surrounding erythema, no swelling, no tenderness. Next to the psoriatic plaque a 5 mm in size oval, mobile, rubbery nodule, slightly tender    Lab on 07/20/2021   Component Date Value Ref Range Status     CRP Inflammation 07/20/2021 4.1  0.0 - 8.0 mg/L Final     Erythrocyte Sedimentation Rate 07/20/2021 6  0 - 15 mm/hr Final     Cyclic Citrullinated Peptide Antib* 07/20/2021 0.5  U/mL Final     Rheumatoid Factor 07/20/2021 <7  <20 IU/mL Final     Hepatitis C Antibody 07/20/2021 Nonreactive  Nonreactive Final     Hepatitis B Surface Antigen 07/20/2021 Nonreactive  Nonreactive Final     Hepatitis B Core Antibody Total 07/20/2021 Nonreactive  Nonreactive Final     Creatinine 07/20/2021 0.74  0.66 - 1.25 mg/dL Final     GFR Estimate 07/20/2021 >90  >60 mL/min/1.73m2 Final    As of July 11, 2021, eGFR is calculated by the CKD-EPI creatinine equation, without race adjustment. eGFR can be influenced by muscle mass, exercise, and diet. The reported eGFR is an estimation only and is only applicable if the renal function is stable.     Albumin 07/20/2021 4.1  3.4 - 5.0 g/dL Final     ALT 07/20/2021 56  0 - 70 U/L Final     AST 07/20/2021 20  0 - 45 U/L Final     WBC Count 07/20/2021 7.6  4.0 - 11.0 10e3/uL Final     RBC Count 07/20/2021 5.18  4.40 - 5.90 10e6/uL Final     Hemoglobin 07/20/2021 15.4  13.3 - 17.7 g/dL Final     Hematocrit 07/20/2021 42.7  40.0 - 53.0 % Final     MCV 07/20/2021 82  78 - 100 fL Final     MCH 07/20/2021 29.7  26.5 - 33.0 pg Final     MCHC 07/20/2021 36.1  31.5 - 36.5 g/dL Final     RDW 07/20/2021 12.0  10.0 - 15.0 % Final     Platelet Count 07/20/2021 251  150 - 450 10e3/uL Final     Quantiferon Nil Tube 07/20/2021 0.04  IU/mL Final     Quantiferon TB1 Tube 07/20/2021 0.10  IU/mL Final     Quantiferon TB2 Tube 07/20/2021 0.09   Final     Quantiferon Mitogen 07/20/2021 10.00   IU/mL Final     Quantiferon-TB Gold Plus 07/20/2021 Negative  Negative Final    No interferon gamma response to M.tuberculosis antigens was detected. Infection with M.tuberculosis is unlikely, however a single negative result does not exclude infection. In patients at high risk for infection, a second test should be considered in accordance with the 2017 ATS/IDSA/CDC Clinical Pract  ice Guidelines for Diagnosis of Tuberculosis in Adults and Children      TB1 Ag minus Nil Value 07/20/2021 0.06  IU/mL Final     TB2 Ag minus Nil Value 07/20/2021 0.05  IU/mL Final     Mitogen minus Nil Result 07/20/2021 9.96  IU/mL Final     Nil Result 07/20/2021 0.04  IU/mL Final

## 2021-10-06 ASSESSMENT — PATIENT HEALTH QUESTIONNAIRE - PHQ9: SUM OF ALL RESPONSES TO PHQ QUESTIONS 1-9: 3

## 2021-10-07 ENCOUNTER — MYC REFILL (OUTPATIENT)
Dept: FAMILY MEDICINE | Facility: CLINIC | Age: 41
End: 2021-10-07

## 2021-10-07 DIAGNOSIS — F41.0 PANIC DISORDER WITHOUT AGORAPHOBIA: ICD-10-CM

## 2021-10-07 DIAGNOSIS — F41.1 ANXIETY STATE: ICD-10-CM

## 2021-10-07 RX ORDER — CITALOPRAM HYDROBROMIDE 40 MG/1
40 TABLET ORAL DAILY
Qty: 90 TABLET | Refills: 0 | Status: CANCELLED | OUTPATIENT
Start: 2021-10-07

## 2021-10-12 ENCOUNTER — OFFICE VISIT (OUTPATIENT)
Dept: PODIATRY | Facility: CLINIC | Age: 41
End: 2021-10-12
Payer: COMMERCIAL

## 2021-10-12 VITALS
WEIGHT: 315 LBS | SYSTOLIC BLOOD PRESSURE: 126 MMHG | DIASTOLIC BLOOD PRESSURE: 62 MMHG | BODY MASS INDEX: 40.67 KG/M2 | HEART RATE: 86 BPM

## 2021-10-12 DIAGNOSIS — M76.71 PERONEAL TENDINITIS OF RIGHT LOWER EXTREMITY: Primary | ICD-10-CM

## 2021-10-12 PROBLEM — F10.929 ACUTE ALCOHOL INTOXICATION (H): Status: ACTIVE | Noted: 2019-10-17

## 2021-10-12 PROCEDURE — 99243 OFF/OP CNSLTJ NEW/EST LOW 30: CPT | Performed by: PODIATRIST

## 2021-10-12 NOTE — PROGRESS NOTES
Subjective:    Pt is seen today in consult from Carisa Rvualcabawith the c/c of painful right foot.  This has been symptomatic for the past 10 years.  Pt denies any hx of trauma.  Aggravated by activity and releaved by rest.  Describes as burning pain.   Feels better wearing good shoes.  Point to peroneal tendon course lateral calcaneus as to wear it hurts.  Denies erythema, ecchymosis,  Weakness, or numbness.  Patient has history of psoriatic arthritis.  He works on his feet.     ROS: See above       Allergies   Allergen Reactions     Tramadol Other (See Comments)     Shellfish-Derived Products        Current Outpatient Medications   Medication Sig Dispense Refill     Acetaminophen (TYLENOL PO) Take 1,000 mg by mouth as needed for mild pain or fever       augmented betamethasone dipropionate (DIPROLENE-AF) 0.05 % external ointment Apply topically 2 times daily Apply to arms bid for 3 weeks then when needed 50 g 3     chlorthalidone (HYGROTON) 25 MG tablet Take 1 tablet (25 mg) by mouth daily 90 tablet 0     citalopram (CELEXA) 40 MG tablet TAKE 1 TABLET(40 MG) BY MOUTH DAILY 90 tablet 0     cyclobenzaprine (FLEXERIL) 10 MG tablet Take 1 tablet (10 mg) by mouth 3 times daily as needed for muscle spasms 30 tablet 0     HYDROcodone-acetaminophen (NORCO) 5-325 MG tablet Take 1 tablet by mouth nightly as needed for pain 20 tablet 0     multivitamin, therapeutic with minerals (MULTI-VITAMIN) TABS tablet Take 1 tablet by mouth every morning       NONFORMULARY as needed CBD OIL       ustekinumab (STELARA) 90 MG/ML 90 mg subcutaneous  q 12 weeks. Weight 327 lb ( > 100 kg ) 1 mL 3       Patient Active Problem List   Diagnosis     Anxiety state     Chronic back pain     Moderate episode of recurrent major depressive disorder (H)     Panic disorder without agoraphobia     Abnormal results of liver function studies     Psoriatic arthritis (H)     Morbid obesity (H)     Elevated blood-pressure reading without diagnosis of  hypertension     Alcohol abuse, in remission     Vitamin D deficiency     Essential hypertension     Acute alcohol intoxication (H)     Depressive disorder       Past Medical History:   Diagnosis Date     Anxiety      Back pain     chronic     Obesity      Psoriatic arthritis (H)        Past Surgical History:   Procedure Laterality Date     DISCECTOMY LUMBAR POSTERIOR MICROSCOPIC ONE LEVEL Left 2018    Procedure: Left Lumbar 4-5 Microdisectomy ;  Surgeon: Favio Miranda MD;  Location: [facility]     FOOT SURGERY Right      NOSE SURGERY      3 times     right elbow surgey      12 yo     TESTICLE SURGERY      21 yo       Family History   Problem Relation Age of Onset     Obesity Mother      Diabetes Father      Coronary Artery Disease Father      Hypertension Father      Hyperlipidemia Father      Depression Father      Anxiety Disorder Father      Mental Illness Father      Substance Abuse Father      Breast Cancer Maternal Grandmother      Depression Maternal Grandmother      Mental Illness Maternal Grandmother      Substance Abuse Maternal Grandmother      Prostate Cancer Maternal Grandfather      Diabetes Paternal Grandmother      Breast Cancer Paternal Grandmother      Depression Paternal Grandmother      Mental Illness Paternal Grandmother      Diabetes Paternal Grandfather      Asthma Brother      Diabetes Paternal Uncle      Cerebrovascular Disease No family hx of      Anesthesia Reaction No family hx of      Osteoporosis No family hx of      Genetic Disorder No family hx of      Thyroid Disease No family hx of      Liver Disease No family hx of        Social History     Tobacco Use     Smoking status: Former Smoker     Packs/day: 0.20     Years: 10.00     Pack years: 2.00     Types: Cigarettes     Quit date: 2015     Years since quittin.7     Smokeless tobacco: Never Used   Substance Use Topics     Alcohol use: Yes     Alcohol/week: 8.0 standard drinks     Types: 8 Standard drinks or  equivalent per week     Comment: not for a month             O:  /62   Pulse 86   Wt (!) 155.6 kg (343 lb)   BMI 40.67 kg/m  .     Constitutional/ general:  Pt is in no apparent distress, appears well-nourished.  Cooperative with history and physical exam.     Psych:  The patient answered questions appropriately.  Normal affect.  Seems to have reasonable expectations, in terms of treatment.      Lungs:  Non labored breathing, non labored speech. No cough.  No audible wheezing. Even, quiet breathing.       Vascular:  Pedal pulses are palpable bilaterally for both the DP and PT arteries.  CFT < 3 sec.  No edema.  Pedal hair growth noted.     Neuro:  Alert and oriented x 3. Coordinated gait.  Light touch sensation is intact to the L4, L5, S1 distributions. No obvious deficits.  No evidence of neurological-based weakness, spasticity, or contracture in the lower extremities.     Derm: Normal texture and turgor.  No erythema, ecchymosis, or cyanosis.      Musculoskeletal:    Lower extremity muscle strength is normal.  Patient is ambulatory without an assistive device or brace.     Cavus foot with weightbearing bilateral.  No forefoot or rear foot deformities noted.  MS 5/5 all compartments.  Normal ROM all fore foot and rearfoot joints with no pain or crepitus.  No equinus.  Pain right foot over peroneal tendon course lateral calcaneus and lateral malleoli.  No subluxation.  Slight edema.  No masses.  No ecchymosis.      Radiographic Exam:  X-Ray Findings:  I personally reviewed the films, normal    A:  Cavus foot with right peroneal tendonitis    Plan:  X-rays taken today.  Discussed etiology and treatment options in detail w/ the pt. discussed how cavus foot puts more stress on these tendons.  This has been a longstanding problem.  Discussed MRI for further evaluation of peroneal tendons.  Patient in agreement.  We placed an order.  He will call and set this up.  We will call him once we have the results and  decide on how to proceed.  Thank you for allowing me participate in the care of this patient.        Luciano Alcantara DPM, [facility] already

## 2021-10-12 NOTE — PATIENT INSTRUCTIONS
We wish you continued good healing. If you have any questions or concerns, please do not hesitate to contact us at  [phone]    [employer] (secure e-mail communication and access to your chart) to send a message or to make an appointment.    Please remember to call and schedule a follow up appointment if one was recommended at your earliest convenience.     PODIATRY CLINIC HOURS  TELEPHONE NUMBER    Dr. Luciano MILANPKATERINA [facility]        Clinics:  [facility] Barajas CMA   Tuesday 1PM-6PM  [place]  Wednesday 745AM-330PM  [place]  Thursday/Friday 745AM-230PM  [facility] DAI/[facility] APPOINTMENTS  [phone]    [place] APPOINTMENTS  [phone]          If you need a medication refill, please contact us you may need lab work and/or a follow up visit prior to your refill (i.e. Antifungal medications).    If MRI needed please call Imaging at [phone] or [phone]    HOW DO I GET MY KNEE SCOOTER? Knee scooters can be picked up at ANY Medical Supply stores with your knee scooter Prescription.  OR    Bring your signed prescription to an [facility] showroom.

## 2021-10-12 NOTE — LETTER
10/12/2021         RE: Lakhwinder Jones  [address]  [place] [address]        Dear Colleague,    Thank you for referring your patient, Lakhwinder Jones, to the [facility]. Please see a copy of my visit note below.    Subjective:    Pt is seen today in consult from Carisa Ruvalcabawith the c/c of painful right foot.  This has been symptomatic for the past 10 years.  Pt denies any hx of trauma.  Aggravated by activity and releaved by rest.  Describes as burning pain.   Feels better wearing good shoes.  Point to peroneal tendon course lateral calcaneus as to wear it hurts.  Denies erythema, ecchymosis,  Weakness, or numbness.  Patient has history of psoriatic arthritis.  He works on his feet.     ROS: See above       Allergies   Allergen Reactions     Tramadol Other (See Comments)     Shellfish-Derived Products        Current Outpatient Medications   Medication Sig Dispense Refill     Acetaminophen (TYLENOL PO) Take 1,000 mg by mouth as needed for mild pain or fever       augmented betamethasone dipropionate (DIPROLENE-AF) 0.05 % external ointment Apply topically 2 times daily Apply to arms bid for 3 weeks then when needed 50 g 3     chlorthalidone (HYGROTON) 25 MG tablet Take 1 tablet (25 mg) by mouth daily 90 tablet 0     citalopram (CELEXA) 40 MG tablet TAKE 1 TABLET(40 MG) BY MOUTH DAILY 90 tablet 0     cyclobenzaprine (FLEXERIL) 10 MG tablet Take 1 tablet (10 mg) by mouth 3 times daily as needed for muscle spasms 30 tablet 0     HYDROcodone-acetaminophen (NORCO) 5-325 MG tablet Take 1 tablet by mouth nightly as needed for pain 20 tablet 0     multivitamin, therapeutic with minerals (MULTI-VITAMIN) TABS tablet Take 1 tablet by mouth every morning       NONFORMULARY as needed CBD OIL       ustekinumab (STELARA) 90 MG/ML 90 mg subcutaneous  q 12 weeks. Weight 327 lb ( > 100 kg ) 1 mL 3       Patient Active Problem List   Diagnosis     Anxiety state     Chronic back pain      Moderate episode of recurrent major depressive disorder (H)     Panic disorder without agoraphobia     Abnormal results of liver function studies     Psoriatic arthritis (H)     Morbid obesity (H)     Elevated blood-pressure reading without diagnosis of hypertension     Alcohol abuse, in remission     Vitamin D deficiency     Essential hypertension     Acute alcohol intoxication (H)     Depressive disorder       Past Medical History:   Diagnosis Date     Anxiety      Back pain     chronic     Obesity      Psoriatic arthritis (H)        Past Surgical History:   Procedure Laterality Date     DISCECTOMY LUMBAR POSTERIOR MICROSCOPIC ONE LEVEL Left 11/1/2018    Procedure: Left Lumbar 4-5 Microdisectomy ;  Surgeon: Favio Miranda MD;  [facility]: [facility]     FOOT SURGERY Right      NOSE SURGERY      3 times     right elbow surgey      14 yo     TESTICLE SURGERY      21 yo       Family History   Problem Relation Age of Onset     Obesity Mother      Diabetes Father      Coronary Artery Disease Father      Hypertension Father      Hyperlipidemia Father      Depression Father      Anxiety Disorder Father      Mental Illness Father      Substance Abuse Father      Breast Cancer Maternal Grandmother      Depression Maternal Grandmother      Mental Illness Maternal Grandmother      Substance Abuse Maternal Grandmother      Prostate Cancer Maternal Grandfather      Diabetes Paternal Grandmother      Breast Cancer Paternal Grandmother      Depression Paternal Grandmother      Mental Illness Paternal Grandmother      Diabetes Paternal Grandfather      Asthma Brother      Diabetes Paternal Uncle      Cerebrovascular Disease No family hx of      Anesthesia Reaction No family hx of      Osteoporosis No family hx of      Genetic Disorder No family hx of      Thyroid Disease No family hx of      Liver Disease No family hx of        Social History     Tobacco Use     Smoking status: Former Smoker     Packs/day: 0.20      Years: 10.00     Pack years: 2.00     Types: Cigarettes     Quit date: 2015     Years since quittin.7     Smokeless tobacco: Never Used   Substance Use Topics     Alcohol use: Yes     Alcohol/week: 8.0 standard drinks     Types: 8 Standard drinks or equivalent per week     Comment: not for a month             O:  /62   Pulse 86   Wt (!) 155.6 kg (343 lb)   BMI 40.67 kg/m  .     Constitutional/ general:  Pt is in no apparent distress, appears well-nourished.  Cooperative with history and physical exam.     Psych:  The patient answered questions appropriately.  Normal affect.  Seems to have reasonable expectations, in terms of treatment.      Lungs:  Non labored breathing, non labored speech. No cough.  No audible wheezing. Even, quiet breathing.       Vascular:  Pedal pulses are palpable bilaterally for both the DP and PT arteries.  CFT < 3 sec.  No edema.  Pedal hair growth noted.     Neuro:  Alert and oriented x 3. Coordinated gait.  Light touch sensation is intact to the L4, L5, S1 distributions. No obvious deficits.  No evidence of neurological-based weakness, spasticity, or contracture in the lower extremities.     Derm: Normal texture and turgor.  No erythema, ecchymosis, or cyanosis.      Musculoskeletal:    Lower extremity muscle strength is normal.  Patient is ambulatory without an assistive device or brace.     Cavus foot with weightbearing bilateral.  No forefoot or rear foot deformities noted.  MS 5/5 all compartments.  Normal ROM all fore foot and rearfoot joints with no pain or crepitus.  No equinus.  Pain right foot over peroneal tendon course lateral calcaneus and lateral malleoli.  No subluxation.  Slight edema.  No masses.  No ecchymosis.      Radiographic Exam:  X-Ray Findings:  I personally reviewed the films, normal    A:  Cavus foot with right peroneal tendonitis    Plan:  X-rays taken today.  Discussed etiology and treatment options in detail w/ the pt. discussed how cavus foot  puts more stress on these tendons.  This has been a longstanding problem.  Discussed MRI for further evaluation of peroneal tendons.  Patient in agreement.  We placed an order.  He will call and set this up.  We will call him once we have the results and decide on how to proceed.  Thank you for allowing me participate in the care of this patient.        Luciano Alcantara DPM, [facility] already      Again, thank you for allowing me to participate in the care of your patient.        Sincerely,        Luciano Alcantara DPM

## 2021-11-09 ENCOUNTER — OFFICE VISIT (OUTPATIENT)
Dept: FAMILY MEDICINE | Facility: CLINIC | Age: 41
End: 2021-11-09
Payer: COMMERCIAL

## 2021-11-09 VITALS
OXYGEN SATURATION: 97 % | SYSTOLIC BLOOD PRESSURE: 122 MMHG | DIASTOLIC BLOOD PRESSURE: 80 MMHG | TEMPERATURE: 96.7 F | HEART RATE: 72 BPM

## 2021-11-09 DIAGNOSIS — J15.9 COMMUNITY ACQUIRED BACTERIAL PNEUMONIA: Primary | ICD-10-CM

## 2021-11-09 PROCEDURE — 99213 OFFICE O/P EST LOW 20 MIN: CPT | Performed by: INTERNAL MEDICINE

## 2021-11-09 RX ORDER — PREDNISONE 20 MG/1
40 TABLET ORAL DAILY
Qty: 10 TABLET | Refills: 0 | Status: SHIPPED | OUTPATIENT
Start: 2021-11-09 | End: 2021-11-14

## 2021-11-09 RX ORDER — CEFDINIR 300 MG/1
300 CAPSULE ORAL 2 TIMES DAILY
Qty: 20 CAPSULE | Refills: 0 | Status: SHIPPED | OUTPATIENT
Start: 2021-11-09 | End: 2021-11-19

## 2021-11-09 NOTE — PROGRESS NOTES
Assessment & Plan   Problem List Items Addressed This Visit     None      Visit Diagnoses     Community acquired bacterial pneumonia    -  Primary    Relevant Medications    cefdinir (OMNICEF) 300 MG capsule    predniSONE (DELTASONE) 20 MG tablet         Patient > 2 weeks out from onset with progression of symtpoms   Covid status unlikely to           Work on weight loss  Regular exercise    No follow-ups on file.    Luciano Cool MD  [facility] SUHAS Keane is a 41 year old who presents for the following health issues  accompanied by his self.    History of Present Illness       He eats 0-1 servings of fruits and vegetables daily.He consumes 2 sweetened beverage(s) daily.He exercises with enough effort to increase his heart rate 9 or less minutes per day.  He exercises with enough effort to increase his heart rate 3 or less days per week.   He is taking medications regularly.     Covid negative  At car [employer]      Cough x2wks and SOB  No known sick contacts  No fever   Progressive symptoms noted  History of psoriatic arthritis on immunosuppressing medicaiton    Will treat based on risk , length of symptoms and worsening status      Review of Systems   Constitutional, HEENT, cardiovascular, pulmonary, gi and gu systems are negative, except as otherwise noted.      Objective    /80 (BP Location: Right arm, Patient Position: Chair, Cuff Size: Adult Large)   Pulse 72   Temp (!) 96.7  F (35.9  C) (Temporal)   SpO2 97%   There is no height or weight on file to calculate BMI.  Physical Exam   GENERAL: healthy, alert and no distress  EYES: Eyes grossly normal to inspection, PERRL and conjunctivae and sclerae normal  HENT: ear canals and TM's normal, nose and mouth without ulcers or lesions  NECK: no adenopathy, no asymmetry, masses, or scars and thyroid normal to palpation  RESP: lungs clear to auscultation - no rales, rhonchi or wheezes  CV: regular rate  and rhythm, normal S1 S2, no S3 or S4, no murmur, click or rub, no peripheral edema and peripheral pulses strong  ABDOMEN: soft, nontender, no hepatosplenomegaly, no masses and bowel sounds normal  MS: no gross musculoskeletal defects noted, no edema  SKIN: no suspicious lesions or rashes  NEURO: Normal strength and tone, mentation intact and speech normal  BACK: no CVA tenderness, no paralumbar tenderness  PSYCH: mentation appears normal, affect normal/bright  LYMPH: no cervical, supraclavicular, axillary, or inguinal adenopathy    No results found for any visits on 11/09/21.

## 2021-12-27 ENCOUNTER — MYC MEDICAL ADVICE (OUTPATIENT)
Dept: FAMILY MEDICINE | Facility: CLINIC | Age: 41
End: 2021-12-27
Payer: COMMERCIAL

## 2022-01-03 ENCOUNTER — E-VISIT (OUTPATIENT)
Dept: URGENT CARE | Facility: URGENT CARE | Age: 42
End: 2022-01-03
Payer: COMMERCIAL

## 2022-01-03 DIAGNOSIS — J02.9 SORE THROAT: ICD-10-CM

## 2022-01-03 DIAGNOSIS — Z20.822 SUSPECTED COVID-19 VIRUS INFECTION: ICD-10-CM

## 2022-01-03 PROCEDURE — 99421 OL DIG E/M SVC 5-10 MIN: CPT | Performed by: PHYSICIAN ASSISTANT

## 2022-01-03 NOTE — PATIENT INSTRUCTIONS
Lakhwinder,    Your symptoms show that you may have coronavirus (COVID-19). This illness can cause fever, cough and trouble breathing. Many people get a mild case and get better on their own. Some people can get very sick.    Because you also reported sore throat, I would like to also test you for Strep Throat to determine if we need to treat you for that as well.    What should I do?  We would like to test you for COVID-19 virus and Strep Throat. I have placed orders for these tests. To schedule: go to your [employer] home page and scroll down to the section that says  You have an appointment that needs to be scheduled  and click the large green button that says  Schedule Now  and follow the steps to find the next available openings. It is important that when you are asked what the reason for your appointment is that you mention you need BOTH COVID and Strep tests.    If you are unable to complete these [employer] scheduling steps, please call [phone] to schedule your testing.     Return to work/school/ guidance:   Please let your workplace manager and staffing office know when your isolation ends.       If you receive a positive COVID-19 test result, follow the guidance of the those who are giving you the results. Usually the return to work is 10 days from symptom onset or positive test date (or in some cases 20 days if you are immunocompromised). If your symptoms started after your positive test, the 10 days should start when your symptoms started.   o If you work at [employer] [facility] [employer], you must also be cleared by Employee Occupational Health and Safety to return to work.      If you receive a negative COVID-19 test result and did not have a high risk exposure to someone with a known positive COVID-19 test, you can return to work once you're free of fever for 24 hours without fever-reducing medication and your symptoms are improving or resolved.    If you receive a negative COVID-19 test and if you had a high  risk exposure to someone who has tested positive for COVID-19 then you can return to work 14 days after your last contact with the positive individual. Follow quarantine guidance given by your doctor or public health officials.    Sign up for [employer].   We know it's scary to hear that you might have COVID-19. We want to track your symptoms to make sure you're okay over the next 2 weeks. Please look for an email from [employer]--this is a free, online program that we'll use to keep in touch. To sign up, follow the link in the email you will receive. Learn more at [email or web address]    How can I take care of myself?  Over the counter medications may help with your symptoms like congestion, cough, chills, or fever.    There are not many effective prescription treatments for early COVID-19. Hydroxychloroquine, ivermectin, and azithromycin are not effective or recommended for COVID-19.    If your symptoms started in the last 10 days, you may be able to receive a treatment with monoclonal antibodies. This treatment can lower your risk of severe illness and going to the hospital. It is given through an IV or under your skin (subcutaneous) and must be given at an infusion center. You must be 12 or older, weight at least 88 pounds, and have a positive COVID-19 test.      If you would like to sign up to be considered to receive the monoclonal antibody medicine, please complete a participation form through the [facility] here: MNRAP ([email or web address]). You may also call the [facility] COVID-19 Public Hotline at 1-452.900.8923 (open Mon-Fri: 9am-7pm and Sat: 10am-6pm).     Not all people who are eligible will receive the medicine, since supply is limited. You will be contacted in the next 1 to 2 business days only if you are selected. If you do not receive a call, you have not been selected to receive the medicine. If you have any questions about  this medication, please contact your primary care provider. For more information, see [email or web address]/[email or web address]      Get lots of rest. Drink extra fluids (unless a doctor has told you not to)    Take Tylenol (acetaminophen) or ibuprofen for fever or pain. If you have liver or kidney problems, ask your family doctor if it's okay to take Tylenol or ibuprofen    Take over the counter medications for your symptoms, as directed by your doctor. You may also talk to your pharmacist.      If you have other health problems (like cancer, heart failure, an organ transplant or severe kidney disease): Call your specialty clinic if you don't feel better in the next 2 days.    Know when to call 911. Emergency warning signs include:  o Trouble breathing or shortness of breath  o Pain or pressure in the chest that doesn't go away  o Feeling confused like you haven't felt before, or not being able to wake up  o Bluish-colored lips or face    Where can I get more information?    [facility] - About COVID-19: [email or web address]     CDC - What to Do If You're Sick:   www.cdc.gov/coronavirus/2019-ncov/about/steps-when-sick.html    CDC - Ending Home Isolation:  https://www.cdc.gov/coronavirus/2019-ncov/your-health/quarantine-isolation.html    CDC - Caring for Someone:  www.cdc.gov/coronavirus/2019-ncov/if-you-are-sick/care-for-someone.html    [facility] clinical trials (COVID-19 research studies): clinicalaffairs.[facility]/n-clinical-trials    Below are the COVID-19 hotlines at the [facility] ([facility]). Interpreters are available.  o For health questions: Call 993-936-0433 or 1-914.959.4242 (7 a.m. to 7 p.m.)  o For questions about schools and childcare: Call 715-535-1957 or 1-712.580.8607 (7 a.m. to 7 p.m.)  January 3, 2022  RE:  Lakhwinder Jones                                                                                                                   [address]  [place] [address]      To whom it may concern:    I evaluated Lakhwinder Jones on January 3, 2022. Lakhwinder Jones should be excused from work/school.     They should let their workplace manager and staffing office know when their quarantine ends.    We can not give an exact date as it depends on the information below. They can calculate this on their own or work with their manager/staffing office to calculate this. (For example if they were exposed on 10/04, they would have to quarantine for 14 full days. That would be through 10/18. They could return on 10/19.)    Quarantine Guidelines:    If patient receives a positive COVID-19 test result, they should follow the guidance of those who are giving the results. Usually the return to work is 10 (or in some cases 20 days from symptom onset.) If they work at [employer], they must be cleared by Employee Occupational Health and Safety to return to work.      If patient receives a negative COVID-19 test result and did not have a high risk exposure to someone with a known positive COVID-19 test, they can return to work once they're free of fever for 24 hours without fever-reducing medication and their symptoms are improving or resolved.    If patient receives a negative COVID-19 test and if they had a high risk exposure to someone who has tested positive for COVID-19 then they can return to work 14 days after their last contact with the positive individual    Note: If there is ongoing exposure to the covid positive person, this quarantine period may be longer than 14 days. (For example, if they are continually exposed to their child, who tested positive and cannot isolate from them, then the quarantine of 7-14 days can't start until their child is no longer contagious. This is typically 10 days from onset to the child's symptoms. So the total duration may be 17-24 days in this case.)     Sincerely,  NIRU Acosta

## 2022-01-04 ENCOUNTER — VIRTUAL VISIT (OUTPATIENT)
Dept: FAMILY MEDICINE | Facility: CLINIC | Age: 42
End: 2022-01-04
Payer: COMMERCIAL

## 2022-01-04 ENCOUNTER — MYC MEDICAL ADVICE (OUTPATIENT)
Dept: FAMILY MEDICINE | Facility: CLINIC | Age: 42
End: 2022-01-04

## 2022-01-04 DIAGNOSIS — F33.1 MODERATE EPISODE OF RECURRENT MAJOR DEPRESSIVE DISORDER (H): Primary | ICD-10-CM

## 2022-01-04 DIAGNOSIS — E66.01 MORBID OBESITY (H): ICD-10-CM

## 2022-01-04 DIAGNOSIS — F41.1 GAD (GENERALIZED ANXIETY DISORDER): ICD-10-CM

## 2022-01-04 DIAGNOSIS — L40.50 PSORIASIS WITH ARTHROPATHY (H): ICD-10-CM

## 2022-01-04 PROBLEM — F10.929 ACUTE ALCOHOL INTOXICATION (H): Status: RESOLVED | Noted: 2019-10-17 | Resolved: 2022-01-04

## 2022-01-04 PROCEDURE — 96127 BRIEF EMOTIONAL/BEHAV ASSMT: CPT | Mod: 95 | Performed by: INTERNAL MEDICINE

## 2022-01-04 PROCEDURE — 99213 OFFICE O/P EST LOW 20 MIN: CPT | Mod: 95 | Performed by: INTERNAL MEDICINE

## 2022-01-04 RX ORDER — BUPROPION HYDROCHLORIDE 150 MG/1
150 TABLET ORAL EVERY MORNING
Qty: 30 TABLET | Refills: 0 | Status: SHIPPED | OUTPATIENT
Start: 2022-01-04 | End: 2022-02-01

## 2022-01-04 ASSESSMENT — ANXIETY QUESTIONNAIRES
6. BECOMING EASILY ANNOYED OR IRRITABLE: NEARLY EVERY DAY
GAD7 TOTAL SCORE: 16
4. TROUBLE RELAXING: NEARLY EVERY DAY
2. NOT BEING ABLE TO STOP OR CONTROL WORRYING: NEARLY EVERY DAY
3. WORRYING TOO MUCH ABOUT DIFFERENT THINGS: NEARLY EVERY DAY
1. FEELING NERVOUS, ANXIOUS, OR ON EDGE: MORE THAN HALF THE DAYS
GAD7 TOTAL SCORE: 16
GAD7 TOTAL SCORE: 16
7. FEELING AFRAID AS IF SOMETHING AWFUL MIGHT HAPPEN: SEVERAL DAYS
5. BEING SO RESTLESS THAT IT IS HARD TO SIT STILL: SEVERAL DAYS
7. FEELING AFRAID AS IF SOMETHING AWFUL MIGHT HAPPEN: SEVERAL DAYS

## 2022-01-04 ASSESSMENT — PATIENT HEALTH QUESTIONNAIRE - PHQ9
SUM OF ALL RESPONSES TO PHQ QUESTIONS 1-9: 11
SUM OF ALL RESPONSES TO PHQ QUESTIONS 1-9: 11
10. IF YOU CHECKED OFF ANY PROBLEMS, HOW DIFFICULT HAVE THESE PROBLEMS MADE IT FOR YOU TO DO YOUR WORK, TAKE CARE OF THINGS AT HOME, OR GET ALONG WITH OTHER PEOPLE: SOMEWHAT DIFFICULT

## 2022-01-04 NOTE — PROGRESS NOTES
"Lakhwinder is a 41 year old who is being evaluated via a billable telephone visit.      What phone number would you like to be contacted at? [phone]   How would you like to obtain your AVS? [employer]    Assessment & Plan     Diagnoses and all orders for this visit:    Moderate episode of recurrent major depressive disorder (H)  Comments:  continue [id number], add wellbutrin. follow up in 4 weeks.   Orders:  -     buPROPion (WELLBUTRIN XL) 150 MG 24 hr tablet; Take 1 tablet (150 mg) by mouth every morning  -     Adult Mental Health Referral; Future  -     REVIEW OF HEALTH MAINTENANCE PROTOCOL ORDERS    BISMARK (generalized anxiety disorder)    Psoriasis with arthropathy (H)  Comments:  follows with Rheumatology on [place]    Morbid obesity (H)  Comments:  not addressed today. pt will return in person visit in 3-4 weeks.       Pt will complete Phq9/BISMARK 7 today  via [employer] for review. May refer to behavior health clinician for more urgent counseling. Follow up will be in person.     I spent a total of 25 minutes on the day of the visit.  doing chart review, history and exam, documentation and further activities as noted above       Return in about 4 weeks (around 2/1/2022) for Clinic follow up.    Henrry Tyler MD PhD  [facility]    Vale Keane is a 41 year old who presents for the following health issues     HPI   Pt sent note stating:  \"I m finding that I m having more and more anxious and racing thoughts, depression, anxiety, mood swings and irritability.\"    Visit intimally made for video but pt not able to get on. Changed visit to telephone.     Taking citalopram 40 mg for 4 years for anxiety.   The last 6 weeks it didn't seem to work.   Stressful job, sell cars.   Pt wanted to get involved in seeing counselor. Not done so in the past.     PHQ-9 SCORE 10/4/2021 10/4/2021 10/5/2021   PHQ-9 Total Score MyChart - 3 (Minimal depression) 3 (Minimal depression)   PHQ-9 Total Score 3 3 3     BISMARK-7 " SCORE 12/6/2018 2/6/2019 1/12/2021   Total Score 4 17 7           Review of Systems   Constitutional, HEENT, cardiovascular, pulmonary, gi and gu systems are negative, except as otherwise noted.      Objective           Vitals:  No vitals were obtained today due to virtual visit.    Physical Exam   healthy, alert and no distress  PSYCH: Alert and oriented times 3; coherent speech, normal   rate and volume, able to articulate logical thoughts, able   to abstract reason, no tangential thoughts, no hallucinations   or delusions  His affect is normal  RESP: No cough, no audible wheezing, able to talk in full sentences  Remainder of exam unable to be completed due to telephone visits        Phone call duration: 12 minutes

## 2022-01-05 ASSESSMENT — PATIENT HEALTH QUESTIONNAIRE - PHQ9: SUM OF ALL RESPONSES TO PHQ QUESTIONS 1-9: 11

## 2022-01-05 ASSESSMENT — ANXIETY QUESTIONNAIRES: GAD7 TOTAL SCORE: 16

## 2022-01-06 NOTE — PROGRESS NOTES
PHQ-9 SCORE 10/4/2021 10/5/2021 1/4/2022   PHQ-9 Total Score MyChart 3 (Minimal depression) 3 (Minimal depression) 11 (Moderate depression)   PHQ-9 Total Score 3 3 11     BISMARK-7 SCORE 2/6/2019 1/12/2021 1/4/2022   Total Score - - 16 (severe anxiety)   Total Score 17 7 16

## 2022-02-10 ENCOUNTER — NURSE TRIAGE (OUTPATIENT)
Dept: NURSING | Facility: CLINIC | Age: 42
End: 2022-02-10
Payer: COMMERCIAL

## 2022-02-11 NOTE — TELEPHONE ENCOUNTER
"C/o L arm pain that began tonight. States he reached back and felt a sudden twinge in his arm earlier today. Pain goes from shoulder to fingers.Rates pain 7/10 severity. Says \"it feels like a pinched nerve\". Thinks there is mild swelling in arm but no bruising. Full ROM in shoulder, elbow, wrist, fingers. Says he can  and hold items in L hand. Took 2 ASA 30 min ago - no improvement yet. Applying ice pack now. Advised home care.     Reason for Disposition    Minor injury or pain from twisting or over-stretching    Additional Information    Negative: Serious injury with multiple fractures (broken bones)    Negative: [1] Major bleeding (e.g., actively dripping or spurting) AND [2] can't be stopped    Negative: Sounds like a life-threatening emergency to the triager    Negative: Wound looks infected    Negative: Arm pain from overuse (e.g., sports, lifting, physical work)    Negative: Arm pain not from an injury    Negative: Shoulder injury is main concern    Negative: Elbow injury is main concern    Negative: Wrist or hand injury is main concern    Negative: Finger injury is main concern    Negative: Bullet wound, stabbed by knife, or other serious penetrating wound    Negative: Looks like a broken bone (crooked or deformed)    Negative: Looks like a dislocated joint    Negative: Can't move injured arm at all    Negative: [1] Bleeding AND [2] won't stop after 10 minutes of direct pressure (using correct technique)    Negative: Skin is split open or gaping (or length > 1/2 inch or 12 mm)    Negative: [1] Dirt in the wound AND [2] not removed with 15 minutes of scrubbing    Negative: Sounds like a serious injury to the triager    Negative: [1] SEVERE pain AND [2] not improved 2 hours after pain medicine/ice packs    Negative: [1] Large swelling or bruise around joint (wrist, elbow, shoulder) AND [2] can't move injured arm normally (bend or straighten completely)    Negative: [1] After day 2 AND [2] pain at site of " injury becomes worse AND [3] unexplained fever occurs    Negative: Suspicious history for the injury    Negative: Can't move injured arm normally (bend or straighten completely)    Negative: Large swelling or bruise (> 2 inches or 5 cm)    Negative: [1] High-risk adult (e.g., age > 60, osteoporosis, chronic steroid use) AND [2] still hurts    Negative: [1] No prior tetanus shots (or is not fully vaccinated) AND [2] any wound (e.g., cut or scrape)    Negative: [1] HIV positive or severe immunodeficiency (severely weak immune system) AND [2] DIRTY cut or scrape    Negative: [1] Last tetanus shot > 5 years ago AND [2] DIRTY cut or scrape    Negative: [1] Last tetanus shot > 10 years ago AND [2] CLEAN cut or scrape (e.g., object AND skin were clean)    Negative: Biceps muscle tear suspected (new bulge in upper arm area of biceps muscle, felt a pop)    Negative: [1] After 3 days AND [2] pain not improving    Negative: [1] After 2 weeks AND [2] still painful    Negative: Minor injury or bruising from direct blow    Protocols used: ARM INJURY-A-AH

## 2022-02-14 ENCOUNTER — MYC MEDICAL ADVICE (OUTPATIENT)
Dept: FAMILY MEDICINE | Facility: CLINIC | Age: 42
End: 2022-02-14
Payer: COMMERCIAL

## 2022-02-19 ENCOUNTER — HEALTH MAINTENANCE LETTER (OUTPATIENT)
Age: 42
End: 2022-02-19

## 2022-04-07 ENCOUNTER — TELEPHONE (OUTPATIENT)
Dept: FAMILY MEDICINE | Facility: CLINIC | Age: 42
End: 2022-04-07

## 2022-04-07 ENCOUNTER — TELEPHONE (OUTPATIENT)
Dept: FAMILY MEDICINE | Facility: CLINIC | Age: 42
End: 2022-04-07
Payer: COMMERCIAL

## 2022-04-07 ENCOUNTER — VIRTUAL VISIT (OUTPATIENT)
Dept: FAMILY MEDICINE | Facility: CLINIC | Age: 42
End: 2022-04-07
Payer: COMMERCIAL

## 2022-04-07 DIAGNOSIS — B02.9 HERPES ZOSTER WITHOUT COMPLICATION: Primary | ICD-10-CM

## 2022-04-07 DIAGNOSIS — B02.9 HERPES ZOSTER WITHOUT COMPLICATION: ICD-10-CM

## 2022-04-07 PROCEDURE — 99213 OFFICE O/P EST LOW 20 MIN: CPT | Mod: 95 | Performed by: PHYSICIAN ASSISTANT

## 2022-04-07 RX ORDER — GABAPENTIN 300 MG/1
CAPSULE ORAL
Qty: 180 CAPSULE | Refills: 0 | Status: SHIPPED | OUTPATIENT
Start: 2022-04-07 | End: 2023-02-14

## 2022-04-07 RX ORDER — GABAPENTIN 100 MG/1
CAPSULE ORAL
Qty: 63 CAPSULE | Refills: 0 | Status: SHIPPED | OUTPATIENT
Start: 2022-04-07 | End: 2023-02-14

## 2022-04-07 RX ORDER — HYDROCODONE BITARTRATE AND ACETAMINOPHEN 5; 325 MG/1; MG/1
1 TABLET ORAL
Qty: 20 TABLET | Refills: 0 | Status: SHIPPED | OUTPATIENT
Start: 2022-04-07 | End: 2023-02-14

## 2022-04-07 RX ORDER — GABAPENTIN 100 MG/1
CAPSULE ORAL
Qty: 21 CAPSULE | Refills: 0 | Status: SHIPPED | OUTPATIENT
Start: 2022-04-07 | End: 2022-04-07

## 2022-04-07 NOTE — PROGRESS NOTES
"Lakhwinder is a 41 year old who is being evaluated via a billable video visit.      How would you like to obtain your AVS? MyChart  If the video visit is dropped, the invitation should be resent by: Text to cell phone: [phone]  Will anyone else be joining your video visit? No      Video Start Time: 10:36 AM    Assessment & Plan     Herpes zoster without complication  Increase gabapentin from 300 mg three times a day gradually to 600 mg three times a day - limited supply of vicodin given  Was already given valtrex 1 gram three times a day for 7 days    - gabapentin (NEURONTIN) 300 MG capsule  Dispense: 180 capsule; Refill: 0  - HYDROcodone-acetaminophen (NORCO) 5-325 MG tablet  Dispense: 20 tablet; Refill: 0      Prescription drug management         BMI:   Estimated body mass index is 40.67 kg/m  as calculated from the following:    Height as of 10/5/21: 1.956 m (6' 5\").    Weight as of 10/12/21: 155.6 kg (343 lb).   Weight management plan: Discussed healthy diet and exercise guidelines    Patient Instructions     Take vicodin sparingly as needed for pain  Continue with gabapentin and increase to 400 mg three times a day for one week then 500 mg three times a day for one week then 600 mg three times a day   Return urgently if any change in symptoms like increasing rash, fever, rash of face or other change in symptoms     Patient Education     Shingles (Herpes Zoster)     Talk to your healthcare provider about the shingles vaccine.   Shingles is also called herpes zoster. It's a painful skin rash caused by the herpes zoster virus. This is the same virus that causes chickenpox. After a person has chickenpox, the virus stays inactive in the nerve cells. Years later, the virus can become active again and travel along the nerve to the skin. Most people have shingles only once. But it's possible to have it more than once.   Who is at risk for shingles?  Anyone who has ever had chickenpox can get shingles. But your risk is " greater if you:     Are age 50 or older    Have an illness that weakens your immune system, such as HIV/AIDS    Have cancer, especially Hodgkin disease or lymphoma    Take medicines that weaken your immune system  What are the symptoms of shingles?    The first sign of shingles is often pain, burning, tingling, or itching on one part of your face or body. You may also feel as if you have the flu, with fever and chills.    A red rash with small blisters appears in a few days. The rash may look as follows:   ? The blisters can occur anywhere, but they re most common on the back, chest, or belly (abdomen).  ? They usually appear on only one side of the body, spreading along the nerve pathway where the virus is reactivating.   ? The rash can also form around an eye, along one side of the face or neck, or in the mouth.  ? In a few people, often those with a weak immune system, shingles appear on more than one part of the body at once.    After a few days, the blisters become dry and form a crust. The crust falls off in days to weeks. The blisters generally don't leave scars. But they can in severe cases. Or if someone has a weak immune system.    How is shingles treated?  For most people, shingles heals on its own in a few days or weeks. But treatment is advised to help ease pain, speed healing, and reduce the risk of complications. Antiviral medicines are most often only prescribed if you are seen by a healthcare provider within the first 72 hours of having the rash. But antiviral medicines may be prescribed even after 72 hours if someone's immune system is weak. Or if the infection is extensive, severe, or isn't going away. To reduce symptoms:     Apply ice packs or cool compresses, or soak in a cool bath. To make an ice pack, put ice cubes in a plastic bag that seals at the top. Wrap the bag in a clean, thin towel or cloth. Never put ice or an ice pack directly on the skin.    Use calamine lotion to calm itchy  skin.    Ask your provider about over-the-counter pain relievers. If your pain is severe, your provider may prescribe stronger pain medicines.  What are possible complications of shingles?   Shingles often goes away with no lasting effects. But some people have complications during or after the infection comes out:     Postherpetic neuralgia. This is the most common complication. It's more likely as people age, especially after age 60. It' is nerve pain at the place where the rash used to be. It can range from mild to severe. It can last for only a few days, or for months or even years after you have had shingles. Antiviral medicines given during the first 72 hours of the rash can reduce the chance of postherpetic neuralgia. Other medicines can be prescribed to help ease the pain and improve quality of life.    Bacterial infection. Shingles blisters may get infected with bacteria. Depending on the severity of the infection, topical, oral or IV (intravenous) antibiotic medicine is used to treat the infection.    Eye problems. If you have shingles on the face, see your healthcare provider right away. Shingles can cause serious problems with vision, and even blindness.  In very rare cases, shingles can also lead to pneumonia, hearing problems, brain inflammation, or even death.    When to get medical care  Call your healthcare provider if you have any of these:     New symptoms or symptoms that don t go away with treatment    A rash or blisters near your eye    More drainage, fever, or rash after treatment    Severe pain that doesn t go away  How can shingles be prevented?  You can only get shingles if you have had chickenpox in the past. Someone who never had chickenpox can get the virus from you. But instead of have shingles, the person may get chickenpox. Until your blisters form scabs, don't have any contact with others, especially the following:     Pregnant women who have never had chickenpox or the  vaccine    Babies who were born early (premature) or who had low weight at birth    People with weak immune systems (such as people getting chemotherapy for cancer, people who have had organ transplants, or people with HIV infections), particularly if they have never had chicken pox.  The shingles vaccine  The recombinant zoster vaccine (RZV) shingles vaccine is available to help prevent shingles or make it less painful.   You should get the RZV shingles vaccine if you are healthy and age 50 or older, even if you've had shingles in the past. Two shots of the RZV vaccine are recommended. You should get the second RZV shot 2 to 6 months after the first. The vaccine makes it less likely that you will develop shingles. If you do develop shingles, your symptoms will likely be milder than if you hadn t been vaccinated. RZV is also advised even if you had the older shingles vaccine (zoster live vaccine, ZVL) in the past. That's because the RZV vaccine works better and protects you from shingles longer.   Talk with your healthcare provider about the best time to get vaccinated.   [employer] last reviewed this educational content on 6/1/2019 2000-2021 [employer]. All rights reserved. This information is not intended as a substitute for professional medical care. Always follow your healthcare professional's instructions.               Return in about 4 weeks (around 5/5/2022), or if symptoms worsen or fail to improve, for in person.    [facility] is a 41 year old who presents for the following health issues     History of Present Illness       Reason for visit:  Shingles pain  Symptom onset:  3-7 days ago  Symptoms include:  Extreme nerve pain  Symptom intensity:  Severe  Symptom progression:  Worsening  Had these symptoms before:  No  What makes it worse:  No  What makes it better:  No    He eats 0-1 servings of fruits and vegetables  daily.He consumes 3 sweetened beverage(s) daily.He exercises with enough effort to increase his heart rate 9 or less minutes per day.  He exercises with enough effort to increase his heart rate 3 or less days per week. He is missing 1 dose(s) of medications per week.  He is not taking prescribed medications regularly due to remembering to take.     Complains of painful rash Under right under arm below hairline and right posterior shoulder wrapping around side to right anterior chest diagnosed with shingles in EMERGENCY DEPARTMENT 4 days ago and treated with valtrex, Gabapentin, tylenol and ibuprofen  Not able to sleep for a week.  Constant electricity type pain.  Taking   Gabapentin 300mg three times a day   Last week started burning and itching armpit and nipple area.  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      Objective    Vitals - Patient Reported  Pain Score: Worst Pain (10)  Pain Loc: Arm (and back)        Physical Exam   GENERAL: Healthy, alert and no distress  EYES: Eyes grossly normal to inspection.  No discharge or erythema, or obvious scleral/conjunctival abnormalities.  RESP: No audible wheeze, cough, or visible cyanosis.  No visible retractions or increased work of breathing.    SKIN: vesicular rash on erythematous base right axillary area wrapping to right anterior and posterior chest wall  Psoriatic lesions of lower back noted as well   NEURO: Cranial nerves grossly intact.  Mentation and speech appropriate for age.  PSYCH: Mentation appears normal, affect normal/bright, judgement and insight intact, normal speech and appearance well-groomed.                Video-Visit Details    Type of service:  Video Visit    Video End Time:1045    Originating Location (pt. Location): Home    Distant Location (provider location):    [facility]     Platform used for Video Visit: Samantha

## 2022-04-07 NOTE — TELEPHONE ENCOUNTER
This writer attempted to contact pt on 04/07/22      Reason for call virtual appt and left message.      If patient calls back:    Route call to Taina [phone] for virtual check in        Taina Dockery MA

## 2022-04-07 NOTE — TELEPHONE ENCOUNTER
Pharmacist called from [facility].    States the quantity of Gabapentin 100 mg capsules (21 capsules) does not match the directions.  Per directions, should dispense 63.    Routing to provider to please review.    Vicki Covington RN, [facility]

## 2022-04-08 NOTE — PATIENT INSTRUCTIONS
Take vicodin sparingly as needed for pain  Continue with gabapentin and increase to 400 mg three times a day for one week then 500 mg three times a day for one week then 600 mg three times a day   Return urgently if any change in symptoms like increasing rash, fever, rash of face or other change in symptoms     Patient Education     Shingles (Herpes Zoster)     Talk to your healthcare provider about the shingles vaccine.   Shingles is also called herpes zoster. It's a painful skin rash caused by the herpes zoster virus. This is the same virus that causes chickenpox. After a person has chickenpox, the virus stays inactive in the nerve cells. Years later, the virus can become active again and travel along the nerve to the skin. Most people have shingles only once. But it's possible to have it more than once.   Who is at risk for shingles?  Anyone who has ever had chickenpox can get shingles. But your risk is greater if you:     Are age 50 or older    Have an illness that weakens your immune system, such as HIV/AIDS    Have cancer, especially Hodgkin disease or lymphoma    Take medicines that weaken your immune system  What are the symptoms of shingles?    The first sign of shingles is often pain, burning, tingling, or itching on one part of your face or body. You may also feel as if you have the flu, with fever and chills.    A red rash with small blisters appears in a few days. The rash may look as follows:   ? The blisters can occur anywhere, but they re most common on the back, chest, or belly (abdomen).  ? They usually appear on only one side of the body, spreading along the nerve pathway where the virus is reactivating.   ? The rash can also form around an eye, along one side of the face or neck, or in the mouth.  ? In a few people, often those with a weak immune system, shingles appear on more than one part of the body at once.    After a few days, the blisters become dry and form a crust. The crust falls off in  days to weeks. The blisters generally don't leave scars. But they can in severe cases. Or if someone has a weak immune system.    How is shingles treated?  For most people, shingles heals on its own in a few days or weeks. But treatment is advised to help ease pain, speed healing, and reduce the risk of complications. Antiviral medicines are most often only prescribed if you are seen by a healthcare provider within the first 72 hours of having the rash. But antiviral medicines may be prescribed even after 72 hours if someone's immune system is weak. Or if the infection is extensive, severe, or isn't going away. To reduce symptoms:     Apply ice packs or cool compresses, or soak in a cool bath. To make an ice pack, put ice cubes in a plastic bag that seals at the top. Wrap the bag in a clean, thin towel or cloth. Never put ice or an ice pack directly on the skin.    Use calamine lotion to calm itchy skin.    Ask your provider about over-the-counter pain relievers. If your pain is severe, your provider may prescribe stronger pain medicines.  What are possible complications of shingles?   Shingles often goes away with no lasting effects. But some people have complications during or after the infection comes out:     Postherpetic neuralgia. This is the most common complication. It's more likely as people age, especially after age 60. It' is nerve pain at the place where the rash used to be. It can range from mild to severe. It can last for only a few days, or for months or even years after you have had shingles. Antiviral medicines given during the first 72 hours of the rash can reduce the chance of postherpetic neuralgia. Other medicines can be prescribed to help ease the pain and improve quality of life.    Bacterial infection. Shingles blisters may get infected with bacteria. Depending on the severity of the infection, topical, oral or IV (intravenous) antibiotic medicine is used to treat the infection.    Eye  problems. If you have shingles on the face, see your healthcare provider right away. Shingles can cause serious problems with vision, and even blindness.  In very rare cases, shingles can also lead to pneumonia, hearing problems, brain inflammation, or even death.    When to get medical care  Call your healthcare provider if you have any of these:     New symptoms or symptoms that don t go away with treatment    A rash or blisters near your eye    More drainage, fever, or rash after treatment    Severe pain that doesn t go away  How can shingles be prevented?  You can only get shingles if you have had chickenpox in the past. Someone who never had chickenpox can get the virus from you. But instead of have shingles, the person may get chickenpox. Until your blisters form scabs, don't have any contact with others, especially the following:     Pregnant women who have never had chickenpox or the vaccine    Babies who were born early (premature) or who had low weight at birth    People with weak immune systems (such as people getting chemotherapy for cancer, people who have had organ transplants, or people with HIV infections), particularly if they have never had chicken pox.  The shingles vaccine  The recombinant zoster vaccine (RZV) shingles vaccine is available to help prevent shingles or make it less painful.   You should get the RZV shingles vaccine if you are healthy and age 50 or older, even if you've had shingles in the past. Two shots of the RZV vaccine are recommended. You should get the second RZV shot 2 to 6 months after the first. The vaccine makes it less likely that you will develop shingles. If you do develop shingles, your symptoms will likely be milder than if you hadn t been vaccinated. RZV is also advised even if you had the older shingles vaccine (zoster live vaccine, ZVL) in the past. That's because the RZV vaccine works better and protects you from shingles longer.   Talk with your healthcare  provider about the best time to get vaccinated.   Cl[employer] last reviewed this educational content on 6/1/2019 2000-2021 [employer]. All rights reserved. This information is not intended as a substitute for professional medical care. Always follow your healthcare professional's instructions.

## 2022-06-15 ENCOUNTER — TELEPHONE (OUTPATIENT)
Dept: FAMILY MEDICINE | Facility: CLINIC | Age: 42
End: 2022-06-15
Payer: COMMERCIAL

## 2022-06-15 NOTE — TELEPHONE ENCOUNTER
Patient Quality Outreach    Patient is due for the following:   Depression  -  PHQ-9 Needed    NEXT STEPS:   No follow up needed at this time.    Type of outreach:    Sent [employer] message.    Next Steps:  Reach out within 90 days via "[employer]hart.    Max number of attempts reached: No. Will try again in 90 days if patient still on fail list.    Questions for provider review:    None     Taina Dockery CMA  Chart routed to Provider.

## 2022-06-20 DIAGNOSIS — L40.50 PSORIASIS WITH ARTHROPATHY (H): ICD-10-CM

## 2022-06-20 DIAGNOSIS — L40.9 PSORIASIS: ICD-10-CM

## 2022-06-20 NOTE — TELEPHONE ENCOUNTER
Requested Prescriptions   Pending Prescriptions Disp Refills     ustekinumab (STELARA) 90 MG/ML [Pharmacy Med Name: STELARA 90 MG/1 ML  PFS] 1 mL 3     Sig: INJECT 90 MG (1 ML) UNDER THE SKIN EVERY 12 WEEKS. WEIGHT 327 LB (GREATER THAN 100 KG)       There is no refill protocol information for this order        Last Written Prescription Date:  7/21/21  Last Fill Quantity: 1 mL,  # refills: 3   Last office visit: 7/21/2021 with prescribing provider:     Future Office Visit: Dr. Rodriguez 7/22/22          Daria QURESHI RN  [facility] [place]  [phone]

## 2022-06-24 RX ORDER — USTEKINUMAB 90 MG/ML
INJECTION, SOLUTION SUBCUTANEOUS
Qty: 1 ML | Refills: 3 | Status: SHIPPED | OUTPATIENT
Start: 2022-06-24 | End: 2023-08-16

## 2022-07-21 ENCOUNTER — NURSE TRIAGE (OUTPATIENT)
Dept: NURSING | Facility: CLINIC | Age: 42
End: 2022-07-21

## 2022-07-21 NOTE — TELEPHONE ENCOUNTER
Pt gave verbal consent to speak with Shereen about his medical care     Pt is having a lot of pain in his right ankle that shoots up to his knee     Pt can feel a lump in his right ankle - feels like it is floating     Rates pain 8/10    No fever     Pt states that it hurts to the point where pt is unable to walk     Writer could hear pt in pain when the shooting pain would hit    Per protocol - See HCP within 4 hours     Pt will be going to ED for evaluation because of the time of day     Care advice given per protocol and when to call back. Pt verbalized understanding and agrees to plan of care.    Zora Figueredo RN  [place] Nurse Advisor  2:51 AM 7/21/2022      COVID 19 Nurse Triage Plan/Patient Instructions    Please be aware that novel coronavirus (COVID-19) may be circulating in the community. If you develop symptoms such as fever, cough, or SOB or if you have concerns about the presence of another infection including coronavirus (COVID-19), please contact your health care provider or visit [email or web address].     Disposition/Instructions    In-Person Visit with provider recommended. Reference Visit Selection Guide.    Thank you for taking steps to prevent the spread of this virus.  o Limit your contact with others.  o Wear a simple mask to cover your cough.  o Wash your hands well and often.    Resources    [facility]: About COVID-19: [email or web address]    CDC: What to Do If You're Sick: www.cdc.gov/coronavirus/2019-ncov/about/steps-when-sick.html    CDC: Ending Home Isolation: www.cdc.gov/coronavirus/2019-ncov/hcp/disposition-in-home-patients.html     CDC: Caring for Someone: www.cdc.gov/coronavirus/2019-ncov/if-you-are-sick/care-for-someone.html     [facility]: Interim Guidance for Hospital Discharge to Home: www.health.[facility].mn.us/diseases/coronavirus/hcp/hospdischarge.pdf    [facility] clinical trials (COVID-19 research studies):  clinicalaffairs.[facility]-clinical-trials     Below are the COVID-19 hotlines at the [place] Department of Health ([facility]). Interpreters are available.   o For health questions: Call 020-250-1989 or 1-263.440.8648 (7 a.m. to 7 p.m.)  o For questions about schools and childcare: Call 639-542-9954 or 1-960.148.7515 (7 a.m. to 7 p.m.)                       Reason for Disposition    [1] SEVERE pain (e.g., excruciating, unable to walk) AND [2] not improved after 2 hours of pain medicine    Additional Information    Negative: Followed an ankle injury    Negative: Foot pain is main symptom    Negative: Thigh or calf pain is main symptom    Negative: Thigh or calf swelling is main symptom    Negative: Entire foot is cool or blue in comparison to other side    Negative: [1] Swollen joint AND [2] fever    Negative: [1] Red area or streak AND [2] fever    Negative: Patient sounds very sick or weak to the triager    Protocols used: ANKLE PAIN-A-AH

## 2022-10-22 ENCOUNTER — HEALTH MAINTENANCE LETTER (OUTPATIENT)
Age: 42
End: 2022-10-22

## 2022-10-30 ENCOUNTER — OFFICE VISIT (OUTPATIENT)
Dept: URGENT CARE | Facility: URGENT CARE | Age: 42
End: 2022-10-30
Payer: COMMERCIAL

## 2022-10-30 VITALS
TEMPERATURE: 97.6 F | DIASTOLIC BLOOD PRESSURE: 82 MMHG | BODY MASS INDEX: 41.5 KG/M2 | HEART RATE: 80 BPM | OXYGEN SATURATION: 97 % | SYSTOLIC BLOOD PRESSURE: 139 MMHG | WEIGHT: 315 LBS

## 2022-10-30 DIAGNOSIS — R73.9 ELEVATED BLOOD SUGAR: ICD-10-CM

## 2022-10-30 DIAGNOSIS — E66.9 OBESITY WITH SERIOUS COMORBIDITY, UNSPECIFIED CLASSIFICATION, UNSPECIFIED OBESITY TYPE: ICD-10-CM

## 2022-10-30 DIAGNOSIS — R63.1 EXCESSIVE THIRST: ICD-10-CM

## 2022-10-30 DIAGNOSIS — R35.0 URINARY FREQUENCY: Primary | ICD-10-CM

## 2022-10-30 LAB
ALBUMIN UR-MCNC: NEGATIVE MG/DL
APPEARANCE UR: CLEAR
BILIRUB UR QL STRIP: NEGATIVE
COLOR UR AUTO: YELLOW
ERYTHROCYTE [DISTWIDTH] IN BLOOD BY AUTOMATED COUNT: 11.9 % (ref 10–15)
GLUCOSE BLD-MCNC: 171 MG/DL (ref 60–99)
GLUCOSE UR STRIP-MCNC: NEGATIVE MG/DL
HBA1C MFR BLD: 5.4 % (ref 0–5.6)
HCT VFR BLD AUTO: 38.9 % (ref 40–53)
HGB BLD-MCNC: 13.9 G/DL (ref 13.3–17.7)
HGB UR QL STRIP: NEGATIVE
KETONES UR STRIP-MCNC: NEGATIVE MG/DL
LEUKOCYTE ESTERASE UR QL STRIP: NEGATIVE
MCH RBC QN AUTO: 29.6 PG (ref 26.5–33)
MCHC RBC AUTO-ENTMCNC: 35.7 G/DL (ref 31.5–36.5)
MCV RBC AUTO: 83 FL (ref 78–100)
NITRATE UR QL: NEGATIVE
PH UR STRIP: 6.5 [PH] (ref 5–7)
PLATELET # BLD AUTO: 206 10E3/UL (ref 150–450)
RBC # BLD AUTO: 4.69 10E6/UL (ref 4.4–5.9)
SP GR UR STRIP: 1.02 (ref 1–1.03)
UROBILINOGEN UR STRIP-ACNC: 0.2 E.U./DL
WBC # BLD AUTO: 7.5 10E3/UL (ref 4–11)

## 2022-10-30 PROCEDURE — 85027 COMPLETE CBC AUTOMATED: CPT | Performed by: NURSE PRACTITIONER

## 2022-10-30 PROCEDURE — 36415 COLL VENOUS BLD VENIPUNCTURE: CPT | Performed by: NURSE PRACTITIONER

## 2022-10-30 PROCEDURE — 83036 HEMOGLOBIN GLYCOSYLATED A1C: CPT | Performed by: NURSE PRACTITIONER

## 2022-10-30 PROCEDURE — 99214 OFFICE O/P EST MOD 30 MIN: CPT | Performed by: NURSE PRACTITIONER

## 2022-10-30 PROCEDURE — 82947 ASSAY GLUCOSE BLOOD QUANT: CPT | Performed by: NURSE PRACTITIONER

## 2022-10-30 PROCEDURE — 81003 URINALYSIS AUTO W/O SCOPE: CPT | Performed by: NURSE PRACTITIONER

## 2022-10-30 RX ORDER — RISPERIDONE 1 MG/1
1 TABLET ORAL 2 TIMES DAILY
COMMUNITY
Start: 2022-10-11

## 2022-10-30 RX ORDER — LAMOTRIGINE 150 MG/1
150 TABLET ORAL DAILY
COMMUNITY
Start: 2022-10-16

## 2022-10-30 NOTE — PATIENT INSTRUCTIONS
Make an appointment with primary care provider for further evaluation within a week    No diabetes currently    Reduce caffeine intake

## 2022-10-30 NOTE — PROGRESS NOTES
Assessment & Plan     Urinary frequency    - UA Macro with Reflex to Micro and Culture - lab collect  - Glucose, whole blood  - Hemoglobin A1c  - CBC with platelets  - UA Macro with Reflex to Micro and Culture - lab collect  - Glucose, whole blood  - Hemoglobin A1c  - CBC with platelets    Elevated blood sugar    Obesity with serious comorbidity, unspecified classification, unspecified obesity type    Excessive thirst       Reviewed UA showing no sign of UTI or kidney stone and cbc showing normal WBC, no sign of systemic infection such as pyelonephritis. Reviewed normal hgb a1c showing no diabetes currently and elevated blood sugar of 171 currently. Discussed excessive caffeine likely contributing to symptoms and recommend decrease caffeine and increase water intake. Work on weight loss to help prevent diabetes with current obesity through diet and exercise. Recommend recheck with PCP within a week if symptoms persist and scheduling annual exam with PCP.     Follow-up with PCP if symptoms persist for 7 days, and sooner if symptoms worsen or new symptoms develop.     Discussed red flag symptoms which warrant immediate visit in emergency room    All questions were answered and patient verbalized understanding. AVS reviewed with patient.     35 minutes spent during visit, chart review, and charting on day of encounter.    Grace Dickerson, DNP, APRN, CNP 10/30/2022 12:21 PM  [facility] GILESJENA Keane is a 42 year old male who presents to clinic today for the following health issues:  Chief Complaint   Patient presents with     Urinary Frequency     X2 days more than 20x in a day     UTI    Onset of symptoms was a couple months, worse in the past couple days  Course of illness is worsening  Current and associated symptoms urinary frequency and urgency, excessive thirst. He has been voiding more than 20 times a day  Denies fever, chills, dysuria, penis discharge, hematuria,  abdominal pain, flank pain, nausea, emesis  Treatment and measures tried None  Predisposing factors include none   Denies history of frequent UTI's, diabetes and kidney stones.   He drinks about 600 mg caffeine daily: 2 red bulls plus soda  He drinks at least 7-8 glasses of water daily   No concern for STD.   No hasn't drank alcohol in the past 3 days.  He has gained about 10 pounds in the past year  He has not been to a primary care provider recently  He is worried he might have diabetes as his dad and grandma have diabetes. No personal history of diabetes.     He has a history of obesity, HTN.     Problem list, Medication list, Allergies, and Medical history reviewed in EPIC.    ROS:  Review of systems negative except for noted above      He has a history of HTN, obesity, psoriatic arthritis.       Objective    /82   Pulse 80   Temp 97.6  F (36.4  C) (Tympanic)   Wt (!) 158.8 kg (350 lb)   SpO2 97%   BMI 41.50 kg/m    Physical Exam  Constitutional:       General: He is not in acute distress.     Appearance: He is not toxic-appearing or diaphoretic.   Cardiovascular:      Rate and Rhythm: Normal rate and regular rhythm.      Heart sounds: Normal heart sounds.   Pulmonary:      Effort: Pulmonary effort is normal. No respiratory distress.      Breath sounds: Normal breath sounds. No wheezing, rhonchi or rales.   Abdominal:      General: Bowel sounds are normal. There is no distension.      Palpations: Abdomen is soft.      Tenderness: There is no abdominal tenderness. There is left CVA tenderness. There is no right CVA tenderness, guarding or rebound.   Lymphadenopathy:      Cervical: No cervical adenopathy.   Skin:     General: Skin is warm and dry.   Neurological:      Mental Status: He is alert.          Labs:  Results for orders placed or performed in visit on 10/30/22   UA Macro with Reflex to Micro and Culture - lab collect     Status: Normal    Specimen: Urine, Clean Catch   Result Value Ref Range     Color Urine Yellow Colorless, Straw, Light Yellow, Yellow    Appearance Urine Clear Clear    Glucose Urine Negative Negative, 1000 , >=2000 mg/dL    Bilirubin Urine Negative Negative    Ketones Urine Negative Negative, 160  mg/dL    Specific Gravity Urine 1.025 1.003 - 1.035    Blood Urine Negative Negative    pH Urine 6.5 5.0 - 7.0    Protein Albumin Urine Negative Negative, 300 , >=2000 mg/dL    Urobilinogen Urine 0.2 0.2, 1.0 E.U./dL    Nitrite Urine Negative Negative    Leukocyte Esterase Urine Negative Negative    Narrative    Microscopic not indicated   Glucose, whole blood     Status: Abnormal   Result Value Ref Range    Glucose Whole Blood 171 (H) 60 - 99 mg/dL   Hemoglobin A1c     Status: Normal   Result Value Ref Range    Hemoglobin A1C 5.4 0.0 - 5.6 %   CBC with platelets     Status: Abnormal   Result Value Ref Range    WBC Count 7.5 4.0 - 11.0 10e3/uL    RBC Count 4.69 4.40 - 5.90 10e6/uL    Hemoglobin 13.9 13.3 - 17.7 g/dL    Hematocrit 38.9 (L) 40.0 - 53.0 %    MCV 83 78 - 100 fL    MCH 29.6 26.5 - 33.0 pg    MCHC 35.7 31.5 - 36.5 g/dL    RDW 11.9 10.0 - 15.0 %    Platelet Count 206 150 - 450 10e3/uL

## 2022-11-05 ENCOUNTER — MYC REFILL (OUTPATIENT)
Dept: FAMILY MEDICINE | Facility: CLINIC | Age: 42
End: 2022-11-05

## 2022-11-05 DIAGNOSIS — F41.1 ANXIETY STATE: ICD-10-CM

## 2022-11-05 DIAGNOSIS — F41.0 PANIC DISORDER WITHOUT AGORAPHOBIA: ICD-10-CM

## 2022-11-07 RX ORDER — CITALOPRAM HYDROBROMIDE 40 MG/1
40 TABLET ORAL DAILY
Qty: 90 TABLET | Refills: 0 | Status: SHIPPED | OUTPATIENT
Start: 2022-11-07 | End: 2023-02-05

## 2022-11-16 ENCOUNTER — OFFICE VISIT (OUTPATIENT)
Dept: DERMATOLOGY | Facility: CLINIC | Age: 42
End: 2022-11-16
Payer: COMMERCIAL

## 2022-11-16 DIAGNOSIS — Z51.81 THERAPEUTIC DRUG MONITORING: ICD-10-CM

## 2022-11-16 DIAGNOSIS — L40.9 PSORIASIS: Primary | ICD-10-CM

## 2022-11-16 DIAGNOSIS — L40.50 PSORIASIS WITH ARTHROPATHY (H): ICD-10-CM

## 2022-11-16 LAB
ALBUMIN SERPL-MCNC: 4.4 G/DL (ref 3.4–5)
ALP SERPL-CCNC: 85 U/L (ref 40–150)
ALT SERPL W P-5'-P-CCNC: 56 U/L (ref 0–70)
ANION GAP SERPL CALCULATED.3IONS-SCNC: 2 MMOL/L (ref 3–14)
AST SERPL W P-5'-P-CCNC: 20 U/L (ref 0–45)
BILIRUB SERPL-MCNC: 0.4 MG/DL (ref 0.2–1.3)
BUN SERPL-MCNC: 14 MG/DL (ref 7–30)
CALCIUM SERPL-MCNC: 9.2 MG/DL (ref 8.5–10.1)
CHLORIDE BLD-SCNC: 105 MMOL/L (ref 94–109)
CO2 SERPL-SCNC: 30 MMOL/L (ref 20–32)
CREAT SERPL-MCNC: 0.82 MG/DL (ref 0.66–1.25)
GFR SERPL CREATININE-BSD FRML MDRD: >90 ML/MIN/1.73M2
GLUCOSE BLD-MCNC: 85 MG/DL (ref 70–99)
POTASSIUM BLD-SCNC: 4.1 MMOL/L (ref 3.4–5.3)
PROT SERPL-MCNC: 7.9 G/DL (ref 6.8–8.8)
SODIUM SERPL-SCNC: 137 MMOL/L (ref 133–144)

## 2022-11-16 PROCEDURE — 36415 COLL VENOUS BLD VENIPUNCTURE: CPT | Performed by: PHYSICIAN ASSISTANT

## 2022-11-16 PROCEDURE — 80053 COMPREHEN METABOLIC PANEL: CPT | Performed by: PHYSICIAN ASSISTANT

## 2022-11-16 PROCEDURE — 86481 TB AG RESPONSE T-CELL SUSP: CPT | Performed by: PHYSICIAN ASSISTANT

## 2022-11-16 PROCEDURE — 99204 OFFICE O/P NEW MOD 45 MIN: CPT | Performed by: PHYSICIAN ASSISTANT

## 2022-11-16 ASSESSMENT — PAIN SCALES - GENERAL: PAINLEVEL: EXTREME PAIN (8)

## 2022-11-16 NOTE — PROGRESS NOTES
Lakhwinder Jones is an extremely pleasant 42 year old year old male patient here today for psoriasis. He notes has had psoriasis since he was 17 years old more recently developed joint pain. He notes that stelara is not helping with psoriasis or psA. He has already tried and failed methotrexate, otezla, humira, and enbrel. He is interested in finding another treatment. He reports joint pain is difficult to preform normal daily functions. He reports prednisone affect his mood. .  Patient has no other skin complaints today.  Remainder of the HPI, Meds, PMH, Allergies, FH, and SH was reviewed in chart.    Pertinent Hx:   Psoriasis   Past Medical History:   Diagnosis Date     Anxiety      Back pain     chronic     Obesity      Psoriatic arthritis (H)        Past Surgical History:   Procedure Laterality Date     DISCECTOMY LUMBAR POSTERIOR MICROSCOPIC ONE LEVEL Left 11/1/2018    Procedure: Left Lumbar 4-5 Microdisectomy ;  Surgeon: Favio Miranda MD;  Location: [facility]     FOOT SURGERY Right      NOSE SURGERY      3 times     right elbow surgey      12 yo     TESTICLE SURGERY      23 yo        Family History   Problem Relation Age of Onset     Obesity Mother      Diabetes Father      Coronary Artery Disease Father      Hypertension Father      Hyperlipidemia Father      Depression Father      Anxiety Disorder Father      Mental Illness Father      Substance Abuse Father      Breast Cancer Maternal Grandmother      Depression Maternal Grandmother      Mental Illness Maternal Grandmother      Substance Abuse Maternal Grandmother      Prostate Cancer Maternal Grandfather      Diabetes Paternal Grandmother      Breast Cancer Paternal Grandmother      Depression Paternal Grandmother      Mental Illness Paternal Grandmother      Diabetes Paternal Grandfather      Asthma Brother      Diabetes Paternal Uncle      Cerebrovascular Disease No family hx of      Anesthesia Reaction No family hx of      Osteoporosis No  family hx of      Genetic Disorder No family hx of      Thyroid Disease No family hx of      Liver Disease No family hx of        Social History     Socioeconomic History     Marital status:      Spouse name: Shereen      Number of children: 0     Years of education: Not on file     Highest education level: Not on file   Occupational History     Not on file   Tobacco Use     Smoking status: Former     Packs/day: 0.20     Years: 10.00     Pack years: 2.00     Types: Cigarettes     Quit date: 2015     Years since quittin.8     Smokeless tobacco: Never   Substance and Sexual Activity     Alcohol use: Yes     Alcohol/week: 8.0 standard drinks     Types: 8 Standard drinks or equivalent per week     Comment: not for a month     Drug use: Yes     Types: Marijuana     Comment: PRN pain     Sexual activity: Yes     Partners: Female     Birth control/protection: None   Other Topics Concern     Parent/sibling w/ CABG, MI or angioplasty before 65F 55M? Not Asked   Social History Narrative     Not on file     Social Determinants of Health     Financial Resource Strain: Not on file   Food Insecurity: Not on file   Transportation Needs: Not on file   Physical Activity: Not on file   Stress: Not on file   Social Connections: Not on file   Intimate Partner Violence: Not on file   Housing Stability: Not on file       Outpatient Encounter Medications as of 2022   Medication Sig Dispense Refill     Acetaminophen (TYLENOL PO) Take 1,000 mg by mouth as needed for mild pain or fever       citalopram (CELEXA) 40 MG tablet Take 1 tablet (40 mg) by mouth daily (Due for clinic appointment before future refills) 90 tablet 0     cyclobenzaprine (FLEXERIL) 10 MG tablet Take 1 tablet (10 mg) by mouth 3 times daily as needed for muscle spasms 30 tablet 0     lamoTRIgine (LAMICTAL) 150 MG tablet Take 150 mg by mouth daily       multivitamin w/minerals (THERA-VIT-M) tablet Take 1 tablet by mouth every morning       NONFORMULARY  as needed CBD OIL       risperiDONE (RISPERDAL) 1 MG tablet Take 1 mg by mouth 2 times daily       Secukinumab, 300 MG Dose, 150 MG/ML SOAJ Inject 300 mg Subcutaneous once a week for 5 doses 10 mL 0     Secukinumab, 300 MG Dose, 150 MG/ML SOAJ Inject 300 mg Subcutaneous every 28 days 2 mL 5     ustekinumab (STELARA) 90 MG/ML INJECT 90 MG (1 ML) UNDER THE SKIN EVERY 12 WEEKS. WEIGHT 327 LB (GREATER THAN 100 KG) 1 mL 3     augmented betamethasone dipropionate (DIPROLENE-AF) 0.05 % external ointment Apply topically 2 times daily Apply to arms bid for 3 weeks then when needed (Patient not taking: Reported on 11/16/2022) 50 g 3     buPROPion (WELLBUTRIN XL) 150 MG 24 hr tablet Take 1 tablet (150 mg) by mouth every morning (Due for follow up with PCP for future refills) (Patient not taking: Reported on 10/30/2022) 90 tablet 0     chlorthalidone (HYGROTON) 25 MG tablet Take 1 tablet (25 mg) by mouth daily (Patient not taking: Reported on 11/16/2022) 90 tablet 0     gabapentin (NEURONTIN) 100 MG capsule Take 100 mg with 300 mg three times a day for one week (400mg) then 2 tablets with 300mg (500 mg) three times a day for one week then 2 300 mg tablets (600mg)  three times a day (Patient not taking: Reported on 11/16/2022) 63 capsule 0     gabapentin (NEURONTIN) 300 MG capsule 300 mg three times a day then with 100mg for 400 mg three times a day for one week then 500 mg three times a day for one week then 600 mg three times a day (Patient not taking: Reported on 11/16/2022) 180 capsule 0     HYDROcodone-acetaminophen (NORCO) 5-325 MG tablet Take 1 tablet by mouth nightly as needed for pain (Patient not taking: Reported on 10/30/2022) 20 tablet 0     No facility-administered encounter medications on file as of 11/16/2022.             O:   NAD, WDWN, Alert & Oriented, Mood & Affect wnl, Vitals stable   Here today alone   There were no vitals taken for this visit.   General appearance normal   Vitals stable   Alert, oriented  and in no acute distress     Psoriasiform plaques and papules on torso and extremiteis   Subcutaneous nodule on left side of scalp     Eyes: Conjunctivae/lids:Normal     ENT: Lips normal    MSK:Normal    Pulm: Breathing Normal    Neuro/Psych: Orientation:Alert and Orientedx3 ; Mood/Affect:normal     A/P:  1. Psoriasis   It was a pleasure speaking to Lakhwinder Jones today.  Check cmp, quantiferon.   Recommend to start cosentyx.   Patient is aware of side effects.   Recheck in 3-4 months.   Referred to pain clinic to help manage chronic pain.   Discussed we do not prescribe pain medication for psoriatic arthritis in dermatology.   2. Favor cyst on scalp  Schedule excision with Dr. Car since slowly growing and bothersome.

## 2022-11-16 NOTE — LETTER
11/16/2022         RE: Lakhwinder Jones  [address]  [place] [address]        Dear Colleague,    Thank you for referring your patient, Lakhwinder Jones, to the [facility]. Please see a copy of my visit note below.    Lakhwinder Jones is an extremely pleasant 42 year old year old male patient here today for psoriasis. He notes has had psoriasis since he was 17 years old more recently developed joint pain. He notes that stelara is not helping with psoriasis or psA. He has already tried and failed methotrexate, otezla, humira, and enbrel. He is interested in finding another treatment. He reports joint pain is difficult to preform normal daily functions. He reports prednisone affect his mood. .  Patient has no other skin complaints today.  Remainder of the HPI, Meds, PMH, Allergies, FH, and SH was reviewed in chart.    Pertinent Hx:   Psoriasis   Past Medical History:   Diagnosis Date     Anxiety      Back pain     chronic     Obesity      Psoriatic arthritis (H)        Past Surgical History:   Procedure Laterality Date     DISCECTOMY LUMBAR POSTERIOR MICROSCOPIC ONE LEVEL Left 11/1/2018    Procedure: Left Lumbar 4-5 Microdisectomy ;  Surgeon: Favio Miranda MD;  [facility]     FOOT SURGERY Right      NOSE SURGERY      3 times     right elbow surgey      12 yo     TESTICLE SURGERY      23 yo        Family History   Problem Relation Age of Onset     Obesity Mother      Diabetes Father      Coronary Artery Disease Father      Hypertension Father      Hyperlipidemia Father      Depression Father      Anxiety Disorder Father      Mental Illness Father      Substance Abuse Father      Breast Cancer Maternal Grandmother      Depression Maternal Grandmother      Mental Illness Maternal Grandmother      Substance Abuse Maternal Grandmother      Prostate Cancer Maternal Grandfather      Diabetes Paternal Grandmother      Breast Cancer Paternal Grandmother       Depression Paternal Grandmother      Mental Illness Paternal Grandmother      Diabetes Paternal Grandfather      Asthma Brother      Diabetes Paternal Uncle      Cerebrovascular Disease No family hx of      Anesthesia Reaction No family hx of      Osteoporosis No family hx of      Genetic Disorder No family hx of      Thyroid Disease No family hx of      Liver Disease No family hx of        Social History     Socioeconomic History     Marital status:      Spouse name: Shereen      Number of children: 0     Years of education: Not on file     Highest education level: Not on file   Occupational History     Not on file   Tobacco Use     Smoking status: Former     Packs/day: 0.20     Years: 10.00     Pack years: 2.00     Types: Cigarettes     Quit date: 2015     Years since quittin.8     Smokeless tobacco: Never   Substance and Sexual Activity     Alcohol use: Yes     Alcohol/week: 8.0 standard drinks     Types: 8 Standard drinks or equivalent per week     Comment: not for a month     Drug use: Yes     Types: Marijuana     Comment: PRN pain     Sexual activity: Yes     Partners: Female     Birth control/protection: None   Other Topics Concern     Parent/sibling w/ CABG, MI or angioplasty before 65F 55M? Not Asked   Social History Narrative     Not on file     Social Determinants of Health     Financial Resource Strain: Not on file   Food Insecurity: Not on file   Transportation Needs: Not on file   Physical Activity: Not on file   Stress: Not on file   Social Connections: Not on file   Intimate Partner Violence: Not on file   Housing Stability: Not on file       Outpatient Encounter Medications as of 2022   Medication Sig Dispense Refill     Acetaminophen (TYLENOL PO) Take 1,000 mg by mouth as needed for mild pain or fever       citalopram (CELEXA) 40 MG tablet Take 1 tablet (40 mg) by mouth daily (Due for clinic appointment before future refills) 90 tablet 0     cyclobenzaprine (FLEXERIL) 10 MG  tablet Take 1 tablet (10 mg) by mouth 3 times daily as needed for muscle spasms 30 tablet 0     lamoTRIgine (LAMICTAL) 150 MG tablet Take 150 mg by mouth daily       multivitamin w/minerals (THERA-VIT-M) tablet Take 1 tablet by mouth every morning       NONFORMULARY as needed CBD OIL       risperiDONE (RISPERDAL) 1 MG tablet Take 1 mg by mouth 2 times daily       Secukinumab, 300 MG Dose, 150 MG/ML SOAJ Inject 300 mg Subcutaneous once a week for 5 doses 10 mL 0     Secukinumab, 300 MG Dose, 150 MG/ML SOAJ Inject 300 mg Subcutaneous every 28 days 2 mL 5     ustekinumab (STELARA) 90 MG/ML INJECT 90 MG (1 ML) UNDER THE SKIN EVERY 12 WEEKS. WEIGHT 327 LB (GREATER THAN 100 KG) 1 mL 3     augmented betamethasone dipropionate (DIPROLENE-AF) 0.05 % external ointment Apply topically 2 times daily Apply to arms bid for 3 weeks then when needed (Patient not taking: Reported on 11/16/2022) 50 g 3     buPROPion (WELLBUTRIN XL) 150 MG 24 hr tablet Take 1 tablet (150 mg) by mouth every morning (Due for follow up with PCP for future refills) (Patient not taking: Reported on 10/30/2022) 90 tablet 0     chlorthalidone (HYGROTON) 25 MG tablet Take 1 tablet (25 mg) by mouth daily (Patient not taking: Reported on 11/16/2022) 90 tablet 0     gabapentin (NEURONTIN) 100 MG capsule Take 100 mg with 300 mg three times a day for one week (400mg) then 2 tablets with 300mg (500 mg) three times a day for one week then 2 300 mg tablets (600mg)  three times a day (Patient not taking: Reported on 11/16/2022) 63 capsule 0     gabapentin (NEURONTIN) 300 MG capsule 300 mg three times a day then with 100mg for 400 mg three times a day for one week then 500 mg three times a day for one week then 600 mg three times a day (Patient not taking: Reported on 11/16/2022) 180 capsule 0     HYDROcodone-acetaminophen (NORCO) 5-325 MG tablet Take 1 tablet by mouth nightly as needed for pain (Patient not taking: Reported on 10/30/2022) 20 tablet 0     No  facility-administered encounter medications on file as of 11/16/2022.             O:   NAD, WDWN, Alert & Oriented, Mood & Affect wnl, Vitals stable   Here today alone   There were no vitals taken for this visit.   General appearance normal   Vitals stable   Alert, oriented and in no acute distress     Psoriasiform plaques and papules on torso and extremiteis   Subcutaneous nodule on left side of scalp     Eyes: Conjunctivae/lids:Normal     ENT: Lips normal    MSK:Normal    Pulm: Breathing Normal    Neuro/Psych: Orientation:Alert and Orientedx3 ; Mood/Affect:normal     A/P:  1. Psoriasis   It was a pleasure speaking to Lakhwinder Jones today.  Check cmp, quantiferon.   Recommend to start cosentyx.   Patient is aware of side effects.   Recheck in 3-4 months.   Referred to pain clinic to help manage chronic pain.   Discussed we do not prescribe pain medication for psoriatic arthritis in dermatology.   2. Favor cyst on scalp  Schedule excision with Dr. Car since slowly growing and bothersome.       Again, thank you for allowing me to participate in the care of your patient.        Sincerely,        Mirella Ochoa PA-C

## 2022-11-17 ENCOUNTER — TELEPHONE (OUTPATIENT)
Dept: DERMATOLOGY | Facility: CLINIC | Age: 42
End: 2022-11-17

## 2022-11-17 LAB
QUANTIFERON MITOGEN: 10 IU/ML
QUANTIFERON NIL TUBE: 0.01 IU/ML
QUANTIFERON TB1 TUBE: 0.02 IU/ML
QUANTIFERON TB2 TUBE: 0.02

## 2022-11-17 NOTE — TELEPHONE ENCOUNTER
PA Initiation    Medication: Cosentyx- PA Pending  Insurance Company: [employer] [place] - Phone [phone] Fax [phone]  Pharmacy Filling the Rx: [facility] - [place], [place] - [address]  Filling Pharmacy Phone:    Filling Pharmacy Fax:    Start Date: 11/17/2022

## 2022-11-18 ENCOUNTER — TELEPHONE (OUTPATIENT)
Dept: DERMATOLOGY | Facility: CLINIC | Age: 42
End: 2022-11-18

## 2022-11-18 LAB
GAMMA INTERFERON BACKGROUND BLD IA-ACNC: 0.01 IU/ML
M TB IFN-G BLD-IMP: NEGATIVE
M TB IFN-G CD4+ BCKGRND COR BLD-ACNC: 9.99 IU/ML
MITOGEN IGNF BCKGRD COR BLD-ACNC: 0.01 IU/ML
MITOGEN IGNF BCKGRD COR BLD-ACNC: 0.01 IU/ML

## 2022-11-18 NOTE — TELEPHONE ENCOUNTER
Date and Time: 11/18/22 9:36 AM    Call Reason: appointment    Outcome: Left message on cell phone for Lakhwinder Jones to call and schedule an appointment:    Clinic: Dermatology    With: Eduar Car MD    Appointment should be scheduled within:  Next available appointment    Visit Mode: in person visit     Reason for Appointment: Procedure    Provider Approved Double Book:No    Additional Information: Cyst Excision on scalp Consult done by ROSALIO

## 2022-11-21 NOTE — TELEPHONE ENCOUNTER
Prior Authorization Not Needed per Insurance    Medication: Cosentyx- PA Pending  Insurance Company: [employer] [place] - Phone [phone] Fax [phone]  Expected CoPay:      Pharmacy Filling the Rx: [facility] - [place], [place] - [address]   Pharmacy Notified:    Patient Notified:    Yes        Quin Maxwell [facility] [email or web address]  Phone: [phone]  Fax: [phone]

## 2022-12-15 ENCOUNTER — TELEPHONE (OUTPATIENT)
Dept: DERMATOLOGY | Facility: CLINIC | Age: 42
End: 2022-12-15

## 2022-12-15 NOTE — TELEPHONE ENCOUNTER
Called patient to discuss his specialty pharmacy choice. Patient expressed that he really doesn't like [facility] very much and would like to switch to [facility]. Sending order to [facility].    Quin [facility] Liaison  [email or web address]  Phone: [phone]  Fax: [phone]

## 2023-01-31 ENCOUNTER — TELEPHONE (OUTPATIENT)
Dept: DERMATOLOGY | Facility: CLINIC | Age: 43
End: 2023-01-31

## 2023-01-31 ENCOUNTER — OFFICE VISIT (OUTPATIENT)
Dept: DERMATOLOGY | Facility: CLINIC | Age: 43
End: 2023-01-31
Payer: COMMERCIAL

## 2023-01-31 DIAGNOSIS — D17.0 LIPOMA OF SCALP: Primary | ICD-10-CM

## 2023-01-31 PROCEDURE — 11422 EXC H-F-NK-SP B9+MARG 1.1-2: CPT | Performed by: DERMATOLOGY

## 2023-01-31 PROCEDURE — 12031 INTMD RPR S/A/T/EXT 2.5 CM/<: CPT | Performed by: DERMATOLOGY

## 2023-01-31 PROCEDURE — 88304 TISSUE EXAM BY PATHOLOGIST: CPT | Performed by: DERMATOLOGY

## 2023-01-31 NOTE — TELEPHONE ENCOUNTER
Spoke to patient and advised to take 400-800 mg of Ibuprofen every 6 to 8 hours per Dr. Car with a dose of Tylenol in between since the Dual action formula has only 250 mg of Ibuprofen in it, may not be enough to control pain.     Patient verbalized understanding and will try more Ibuprofen with food. Magda Skinner RN

## 2023-01-31 NOTE — LETTER
1/31/2023         RE: Lakhwinder Jones  [address]  [place] [address]        Dear Colleague,    Thank you for referring your patient, Lakhiwnder Jones, to the [facility]. Please see a copy of my visit note below.    Lakhwinder Jones is an extremely pleasant 42 year old year old male patient here today for evaluation and managment of lipoma on scalp.  Patient has no other skin complaints today.  Remainder of the HPI, Meds, PMH, Allergies, FH, and SH was reviewed in chart.      Past Medical History:   Diagnosis Date     Anxiety      Back pain     chronic     Obesity      Psoriatic arthritis (H)        Past Surgical History:   Procedure Laterality Date     DISCECTOMY LUMBAR POSTERIOR MICROSCOPIC ONE LEVEL Left 11/1/2018    Procedure: Left Lumbar 4-5 Microdisectomy ;  Surgeon: Favio Miranda MD;  Location: [facility] OR     FOOT SURGERY Right      NOSE SURGERY      3 times     right elbow surgey      12 yo     TESTICLE SURGERY      21 yo        Family History   Problem Relation Age of Onset     Obesity Mother      Diabetes Father      Coronary Artery Disease Father      Hypertension Father      Hyperlipidemia Father      Depression Father      Anxiety Disorder Father      Mental Illness Father      Substance Abuse Father      Breast Cancer Maternal Grandmother      Depression Maternal Grandmother      Mental Illness Maternal Grandmother      Substance Abuse Maternal Grandmother      Prostate Cancer Maternal Grandfather      Diabetes Paternal Grandmother      Breast Cancer Paternal Grandmother      Depression Paternal Grandmother      Mental Illness Paternal Grandmother      Diabetes Paternal Grandfather      Asthma Brother      Diabetes Paternal Uncle      Cerebrovascular Disease No family hx of      Anesthesia Reaction No family hx of      Osteoporosis No family hx of      Genetic Disorder No family hx of      Thyroid Disease No family hx of      Liver Disease No family  hx of        Social History     Socioeconomic History     Marital status:      Spouse name: Shereen      Number of children: 0     Years of education: Not on file     Highest education level: Not on file   Occupational History     Not on file   Tobacco Use     Smoking status: Former     Packs/day: 0.20     Years: 10.00     Pack years: 2.00     Types: Cigarettes     Quit date: 2015     Years since quittin.0     Smokeless tobacco: Never   Substance and Sexual Activity     Alcohol use: Yes     Alcohol/week: 8.0 standard drinks     Types: 8 Standard drinks or equivalent per week     Comment: not for a month     Drug use: Yes     Types: Marijuana     Comment: PRN pain     Sexual activity: Yes     Partners: Female     Birth control/protection: None   Other Topics Concern     Parent/sibling w/ CABG, MI or angioplasty before 65F 55M? Not Asked   Social History Narrative     Not on file     Social Determinants of Health     Financial Resource Strain: Not on file   Food Insecurity: Not on file   Transportation Needs: Not on file   Physical Activity: Not on file   Stress: Not on file   Social Connections: Not on file   Intimate Partner Violence: Not on file   Housing Stability: Not on file       Outpatient Encounter Medications as of 2023   Medication Sig Dispense Refill     Acetaminophen (TYLENOL PO) Take 1,000 mg by mouth as needed for mild pain or fever       augmented betamethasone dipropionate (DIPROLENE-AF) 0.05 % external ointment Apply topically 2 times daily Apply to arms bid for 3 weeks then when needed (Patient not taking: Reported on 2022) 50 g 3     buPROPion (WELLBUTRIN XL) 150 MG 24 hr tablet Take 1 tablet (150 mg) by mouth every morning (Due for follow up with PCP for future refills) (Patient not taking: Reported on 10/30/2022) 90 tablet 0     chlorthalidone (HYGROTON) 25 MG tablet Take 1 tablet (25 mg) by mouth daily (Patient not taking: Reported on 2022) 90 tablet 0      citalopram (CELEXA) 40 MG tablet Take 1 tablet (40 mg) by mouth daily (Due for clinic appointment before future refills) 90 tablet 0     cyclobenzaprine (FLEXERIL) 10 MG tablet Take 1 tablet (10 mg) by mouth 3 times daily as needed for muscle spasms 30 tablet 0     gabapentin (NEURONTIN) 100 MG capsule Take 100 mg with 300 mg three times a day for one week (400mg) then 2 tablets with 300mg (500 mg) three times a day for one week then 2 300 mg tablets (600mg)  three times a day (Patient not taking: Reported on 11/16/2022) 63 capsule 0     gabapentin (NEURONTIN) 300 MG capsule 300 mg three times a day then with 100mg for 400 mg three times a day for one week then 500 mg three times a day for one week then 600 mg three times a day (Patient not taking: Reported on 11/16/2022) 180 capsule 0     HYDROcodone-acetaminophen (NORCO) 5-325 MG tablet Take 1 tablet by mouth nightly as needed for pain (Patient not taking: Reported on 10/30/2022) 20 tablet 0     lamoTRIgine (LAMICTAL) 150 MG tablet Take 150 mg by mouth daily       multivitamin w/minerals (THERA-VIT-M) tablet Take 1 tablet by mouth every morning       NONFORMULARY as needed CBD OIL       risperiDONE (RISPERDAL) 1 MG tablet Take 1 mg by mouth 2 times daily       Secukinumab, 300 MG Dose, 150 MG/ML SOAJ Inject 300 mg Subcutaneous every 28 days 2 mL 5     ustekinumab (STELARA) 90 MG/ML INJECT 90 MG (1 ML) UNDER THE SKIN EVERY 12 WEEKS. WEIGHT 327 LB (GREATER THAN 100 KG) 1 mL 3     No facility-administered encounter medications on file as of 1/31/2023.             O:   NAD, WDWN, Alert & Oriented, Mood & Affect wnl, Vitals stable   Here today alone   General appearance normal   Vitals stable   Alert, oriented and in no acute distress     L temporal scalp movable 1.4cm nodule      Eyes: Conjunctivae/lids:Normal     ENT: Lips, buccal mucosa, tongue: normal    MSK:Normal    Cardiovascular: peripheral edema none    Pulm: Breathing Normal    Neuro/Psych: Orientation:Alert  and Orientedx3 ; Mood/Affect:normal       A/P:  1. L temporal scalp lipoma  Scar and recurrence discussed with patient   EXCISION OF BENIGN LESION AND INT: After thorough discussion of PGACAC, consent obtained, anesthesia and prep, the margins of the lesion were identified and an incision was made encompassing the lesion. The incisions were made through the skin and down to and including the subcutaneous tissue. The lesion was removed en bloc and submitted for perm  pathologic review. The wound edges were widely undermined until adequate tissue mobility was obtained. hemostasis was achieved. The wound edges were then closed in a layered fashion with 4 Vicryl and staples. , being careful not to leave any dead space. Postoperative length was 1.8 cm.   EBL minimal; complications none; wound care routine. The patient was discharged in good condition and will return in one week for wound evaluation.  It was a pleasure speaking to Lakhwinder Jones today.        Again, thank you for allowing me to participate in the care of your patient.        Sincerely,        Eduar Car MD

## 2023-01-31 NOTE — TELEPHONE ENCOUNTER
Pt calling in with more pain than the Tylenol is able to help with after having a lipoma removed today by Dr. Car.  Please call to advise on what else he can do for pain relief.  May leave detailed message on voicemail if he does not answer.  Would like a call back this afternoon at [phone]

## 2023-01-31 NOTE — PATIENT INSTRUCTIONS
[facility]   [phone]    [facility] [phone]      Stapled Wound Care      No strenuous activity for 48 hours. Resume moderate activity in 48 hours. No heavy exercising until you are seen for follow up in one week.    Take Tylenol as needed for discomfort.                                        Do not drink alcoholic beverages for 48 hours.    Keep the pressure bandage in place for 24 hours. If the bandage becomes blood tinged or loose, reinforce it with gauze and tape.   Remove bandage in 24 hours and begin wound care as follows:     Rinse the stapled area with tap water. (shower / bathe / shampoo normally)  Dry wound with Q tip or gauze pad  Apply Polysporin or Bacitracin Ointment to the staples.  Do NOT use Neosporin Ointment *  Cover the wound with a bandaid or nonstick gauze pad and paper tape.  Repeat wound care once a day until staples are removed.                    Bleeding  Hold pressure for 30 minutes with gauze or a cloth over the wound.  If bleeding continues, hold pressure again for another 30 minutes.  If you can't get bleeding to stop after an hour, please call Dr. Car or go to the ER.     In case of emergency call:  Dr. Car [phone]

## 2023-01-31 NOTE — TELEPHONE ENCOUNTER
"Spoke to patient.     \"I have taken 2 doses of dual action of Ibuprofen/tylenol already..\" \"It is just throbbing and it hurts like heck..\"     Rates pain: \"7\" of 10.    Uses [facility].  ---------------------------------------------------------------------      Per Dr. Car, patient is to increase Ibuprofen dosing to 400-800 mg every 6 to 8 hours with Tylenol in between doses.     Message left to return call. Magda Skinner RN        "

## 2023-01-31 NOTE — PROGRESS NOTES
Lakhwinder Jones is an extremely pleasant 42 year old year old male patient here today for evaluation and managment of lipoma on scalp.  Patient has no other skin complaints today.  Remainder of the HPI, Meds, PMH, Allergies, FH, and SH was reviewed in chart.      Past Medical History:   Diagnosis Date     Anxiety      Back pain     chronic     Obesity      Psoriatic arthritis (H)        Past Surgical History:   Procedure Laterality Date     DISCECTOMY LUMBAR POSTERIOR MICROSCOPIC ONE LEVEL Left 11/1/2018    Procedure: Left Lumbar 4-5 Microdisectomy ;  Surgeon: Favio Miranda MD;  Location: [facility] OR     FOOT SURGERY Right      NOSE SURGERY      3 times     right elbow surgey      14 yo     TESTICLE SURGERY      21 yo        Family History   Problem Relation Age of Onset     Obesity Mother      Diabetes Father      Coronary Artery Disease Father      Hypertension Father      Hyperlipidemia Father      Depression Father      Anxiety Disorder Father      Mental Illness Father      Substance Abuse Father      Breast Cancer Maternal Grandmother      Depression Maternal Grandmother      Mental Illness Maternal Grandmother      Substance Abuse Maternal Grandmother      Prostate Cancer Maternal Grandfather      Diabetes Paternal Grandmother      Breast Cancer Paternal Grandmother      Depression Paternal Grandmother      Mental Illness Paternal Grandmother      Diabetes Paternal Grandfather      Asthma Brother      Diabetes Paternal Uncle      Cerebrovascular Disease No family hx of      Anesthesia Reaction No family hx of      Osteoporosis No family hx of      Genetic Disorder No family hx of      Thyroid Disease No family hx of      Liver Disease No family hx of        Social History     Socioeconomic History     Marital status:      Spouse name: Shereen      Number of children: 0     Years of education: Not on file     Highest education level: Not on file   Occupational History     Not on file   Tobacco  Use     Smoking status: Former     Packs/day: 0.20     Years: 10.00     Pack years: 2.00     Types: Cigarettes     Quit date: 2015     Years since quittin.0     Smokeless tobacco: Never   Substance and Sexual Activity     Alcohol use: Yes     Alcohol/week: 8.0 standard drinks     Types: 8 Standard drinks or equivalent per week     Comment: not for a month     Drug use: Yes     Types: Marijuana     Comment: PRN pain     Sexual activity: Yes     Partners: Female     Birth control/protection: None   Other Topics Concern     Parent/sibling w/ CABG, MI or angioplasty before 65F 55M? Not Asked   Social History Narrative     Not on file     Social Determinants of Health     Financial Resource Strain: Not on file   Food Insecurity: Not on file   Transportation Needs: Not on file   Physical Activity: Not on file   Stress: Not on file   Social Connections: Not on file   Intimate Partner Violence: Not on file   Housing Stability: Not on file       Outpatient Encounter Medications as of 2023   Medication Sig Dispense Refill     Acetaminophen (TYLENOL PO) Take 1,000 mg by mouth as needed for mild pain or fever       augmented betamethasone dipropionate (DIPROLENE-AF) 0.05 % external ointment Apply topically 2 times daily Apply to arms bid for 3 weeks then when needed (Patient not taking: Reported on 2022) 50 g 3     buPROPion (WELLBUTRIN XL) 150 MG 24 hr tablet Take 1 tablet (150 mg) by mouth every morning (Due for follow up with PCP for future refills) (Patient not taking: Reported on 10/30/2022) 90 tablet 0     chlorthalidone (HYGROTON) 25 MG tablet Take 1 tablet (25 mg) by mouth daily (Patient not taking: Reported on 2022) 90 tablet 0     citalopram (CELEXA) 40 MG tablet Take 1 tablet (40 mg) by mouth daily (Due for clinic appointment before future refills) 90 tablet 0     cyclobenzaprine (FLEXERIL) 10 MG tablet Take 1 tablet (10 mg) by mouth 3 times daily as needed for muscle spasms 30 tablet 0      gabapentin (NEURONTIN) 100 MG capsule Take 100 mg with 300 mg three times a day for one week (400mg) then 2 tablets with 300mg (500 mg) three times a day for one week then 2 300 mg tablets (600mg)  three times a day (Patient not taking: Reported on 11/16/2022) 63 capsule 0     gabapentin (NEURONTIN) 300 MG capsule 300 mg three times a day then with 100mg for 400 mg three times a day for one week then 500 mg three times a day for one week then 600 mg three times a day (Patient not taking: Reported on 11/16/2022) 180 capsule 0     HYDROcodone-acetaminophen (NORCO) 5-325 MG tablet Take 1 tablet by mouth nightly as needed for pain (Patient not taking: Reported on 10/30/2022) 20 tablet 0     lamoTRIgine (LAMICTAL) 150 MG tablet Take 150 mg by mouth daily       multivitamin w/minerals (THERA-VIT-M) tablet Take 1 tablet by mouth every morning       NONFORMULARY as needed CBD OIL       risperiDONE (RISPERDAL) 1 MG tablet Take 1 mg by mouth 2 times daily       Secukinumab, 300 MG Dose, 150 MG/ML SOAJ Inject 300 mg Subcutaneous every 28 days 2 mL 5     ustekinumab (STELARA) 90 MG/ML INJECT 90 MG (1 ML) UNDER THE SKIN EVERY 12 WEEKS. WEIGHT 327 LB (GREATER THAN 100 KG) 1 mL 3     No facility-administered encounter medications on file as of 1/31/2023.             O:   NAD, WDWN, Alert & Oriented, Mood & Affect wnl, Vitals stable   Here today alone   General appearance normal   Vitals stable   Alert, oriented and in no acute distress     L temporal scalp movable 1.4cm nodule      Eyes: Conjunctivae/lids:Normal     ENT: Lips, buccal mucosa, tongue: normal    MSK:Normal    Cardiovascular: peripheral edema none    Pulm: Breathing Normal    Neuro/Psych: Orientation:Alert and Orientedx3 ; Mood/Affect:normal       A/P:  1. L temporal scalp lipoma  Scar and recurrence discussed with patient   EXCISION OF BENIGN LESION AND INT: After thorough discussion of PGACAC, consent obtained, anesthesia and prep, the margins of the lesion were  identified and an incision was made encompassing the lesion. The incisions were made through the skin and down to and including the subcutaneous tissue. The lesion was removed en bloc and submitted for perm  pathologic review. The wound edges were widely undermined until adequate tissue mobility was obtained. hemostasis was achieved. The wound edges were then closed in a layered fashion with 4 Vicryl and staples. , being careful not to leave any dead space. Postoperative length was 1.8 cm.   EBL minimal; complications none; wound care routine. The patient was discharged in good condition and will return in one week for wound evaluation.  It was a pleasure speaking to Lakhwinder Jones today.

## 2023-02-03 LAB
PATH REPORT.COMMENTS IMP SPEC: NORMAL
PATH REPORT.COMMENTS IMP SPEC: NORMAL
PATH REPORT.FINAL DX SPEC: NORMAL
PATH REPORT.GROSS SPEC: NORMAL
PATH REPORT.MICROSCOPIC SPEC OTHER STN: NORMAL
PATH REPORT.RELEVANT HX SPEC: NORMAL

## 2023-02-08 ENCOUNTER — TELEPHONE (OUTPATIENT)
Dept: DERMATOLOGY | Facility: CLINIC | Age: 43
End: 2023-02-08

## 2023-02-08 NOTE — TELEPHONE ENCOUNTER
Called and rescheduled staple removal to tomorrow.    Thank you,  Karina MCNULTY RN  Dermatology   [phone]

## 2023-02-09 ENCOUNTER — ALLIED HEALTH/NURSE VISIT (OUTPATIENT)
Dept: DERMATOLOGY | Facility: CLINIC | Age: 43
End: 2023-02-09
Payer: COMMERCIAL

## 2023-02-09 DIAGNOSIS — Z48.02 REMOVAL OF STAPLES: Primary | ICD-10-CM

## 2023-02-09 PROCEDURE — 99207 PR NO CHARGE NURSE ONLY: CPT

## 2023-02-09 NOTE — NURSING NOTE
Patient presents to the clinic for removal of staples. The patient has had sutures and staples in place for 9 days. There has been no patient reported signs or symptoms of infection or drainage. 7 staples are seen and located on the left temporal scalp. All staples were easily removed today. Routine wound care discussed by LPN. The patient will follow up as needed.

## 2023-02-14 ENCOUNTER — OFFICE VISIT (OUTPATIENT)
Dept: URGENT CARE | Facility: URGENT CARE | Age: 43
End: 2023-02-14
Payer: COMMERCIAL

## 2023-02-14 VITALS
BODY MASS INDEX: 42.67 KG/M2 | DIASTOLIC BLOOD PRESSURE: 84 MMHG | TEMPERATURE: 97.3 F | WEIGHT: 315 LBS | OXYGEN SATURATION: 96 % | SYSTOLIC BLOOD PRESSURE: 139 MMHG | HEART RATE: 88 BPM

## 2023-02-14 DIAGNOSIS — M62.830 BACK MUSCLE SPASM: Primary | ICD-10-CM

## 2023-02-14 PROCEDURE — 20552 NJX 1/MLT TRIGGER POINT 1/2: CPT | Performed by: PHYSICIAN ASSISTANT

## 2023-02-14 PROCEDURE — 99213 OFFICE O/P EST LOW 20 MIN: CPT | Mod: 25 | Performed by: PHYSICIAN ASSISTANT

## 2023-02-14 RX ORDER — TRIAMCINOLONE ACETONIDE 40 MG/ML
40 INJECTION, SUSPENSION INTRA-ARTICULAR; INTRAMUSCULAR ONCE
Status: COMPLETED | OUTPATIENT
Start: 2023-02-14 | End: 2023-02-14

## 2023-02-14 RX ADMIN — TRIAMCINOLONE ACETONIDE 40 MG: 40 INJECTION, SUSPENSION INTRA-ARTICULAR; INTRAMUSCULAR at 11:37

## 2023-02-14 ASSESSMENT — ENCOUNTER SYMPTOMS
CHILLS: 0
RESPIRATORY NEGATIVE: 1
WHEEZING: 0
CONSTITUTIONAL NEGATIVE: 1
ABDOMINAL PAIN: 0
FREQUENCY: 0
CARDIOVASCULAR NEGATIVE: 1
VOMITING: 0
SHORTNESS OF BREATH: 0
COUGH: 0
DIARRHEA: 0
NEUROLOGICAL NEGATIVE: 1
ALLERGIC/IMMUNOLOGIC NEGATIVE: 1
HEADACHES: 0
SORE THROAT: 0
DYSURIA: 0
GASTROINTESTINAL NEGATIVE: 1
BACK PAIN: 1
MYALGIAS: 0
FEVER: 0
CHEST TIGHTNESS: 0
HEMATURIA: 0
PALPITATIONS: 0
NAUSEA: 0

## 2023-02-14 NOTE — PROGRESS NOTES
Chief Complaint:    No chief complaint on file.      Medical Decision Making:    Vital signs reviewed by Alfonso Miller PA-C  There were no vitals taken for this visit.    Differential Diagnosis:  {UC Differential Choices:296600}      ASSESSMENT:     No diagnosis found.       PLAN:     ***  Patient instructed to follow up with PCP in 1 week if symptoms are not improving.  Sooner if symptoms worsen.  Worrisome symptoms discussed with instructions to go to the ED.  Patient verbalized understanding and agreed with this plan.    Labs:     No results found for any visits on 02/14/23.    Current Meds:    Current Outpatient Medications:      Acetaminophen (TYLENOL PO), Take 1,000 mg by mouth as needed for mild pain or fever, Disp: , Rfl:      citalopram (CELEXA) 40 MG tablet, Take 1 tablet (40 mg) by mouth daily (Due for clinic appointment before future refills), Disp: 30 tablet, Rfl: 0     cyclobenzaprine (FLEXERIL) 10 MG tablet, Take 1 tablet (10 mg) by mouth 3 times daily as needed for muscle spasms, Disp: 30 tablet, Rfl: 0     lamoTRIgine (LAMICTAL) 150 MG tablet, Take 150 mg by mouth daily, Disp: , Rfl:      multivitamin w/minerals (THERA-VIT-M) tablet, Take 1 tablet by mouth every morning, Disp: , Rfl:      NONFORMULARY, as needed CBD OIL, Disp: , Rfl:      risperiDONE (RISPERDAL) 1 MG tablet, Take 1 mg by mouth 2 times daily, Disp: , Rfl:      Secukinumab, 300 MG Dose, 150 MG/ML SOAJ, Inject 300 mg Subcutaneous every 28 days, Disp: 2 mL, Rfl: 5     ustekinumab (STELARA) 90 MG/ML, INJECT 90 MG (1 ML) UNDER THE SKIN EVERY 12 WEEKS. WEIGHT 327 LB (GREATER THAN 100 KG), Disp: 1 mL, Rfl: 3    Allergies:  Allergies   Allergen Reactions     Tramadol Other (See Comments)     Shellfish-Derived Products        SUBJECTIVE    HPI: Lahkwinder Jones is an 42 year old male who presents for evaluation and treatment of ***.    ROS:      Review of Systems     Family History   Family History   Problem Relation Age of Onset      Obesity Mother      Diabetes Father      Coronary Artery Disease Father      Hypertension Father      Hyperlipidemia Father      Depression Father      Anxiety Disorder Father      Mental Illness Father      Substance Abuse Father      Breast Cancer Maternal Grandmother      Depression Maternal Grandmother      Mental Illness Maternal Grandmother      Substance Abuse Maternal Grandmother      Prostate Cancer Maternal Grandfather      Diabetes Paternal Grandmother      Breast Cancer Paternal Grandmother      Depression Paternal Grandmother      Mental Illness Paternal Grandmother      Diabetes Paternal Grandfather      Asthma Brother      Diabetes Paternal Uncle      Cerebrovascular Disease No family hx of      Anesthesia Reaction No family hx of      Osteoporosis No family hx of      Genetic Disorder No family hx of      Thyroid Disease No family hx of      Liver Disease No family hx of        Social History  Social History     Socioeconomic History     Marital status:      Spouse name: Shereen      Number of children: 0     Years of education: Not on file     Highest education level: Not on file   Occupational History     Not on file   Tobacco Use     Smoking status: Former     Packs/day: 0.20     Years: 10.00     Pack years: 2.00     Types: Cigarettes     Quit date: 2015     Years since quittin.1     Smokeless tobacco: Never   Substance and Sexual Activity     Alcohol use: Yes     Alcohol/week: 8.0 standard drinks     Types: 8 Standard drinks or equivalent per week     Comment: not for a month     Drug use: Yes     Types: Marijuana     Comment: PRN pain     Sexual activity: Yes     Partners: Female     Birth control/protection: None   Other Topics Concern     Parent/sibling w/ CABG, MI or angioplasty before 65F 55M? Not Asked   Social History Narrative     Not on file     Social Determinants of Health     Financial Resource Strain: Not on file   Food Insecurity: Not on file   Transportation Needs:  Not on file   Physical Activity: Not on file   Stress: Not on file   Social Connections: Not on file   Intimate Partner Violence: Not on file   Housing Stability: Not on file        Surgical History:  Past Surgical History:   Procedure Laterality Date     DISCECTOMY LUMBAR POSTERIOR MICROSCOPIC ONE LEVEL Left 11/1/2018    Procedure: Left Lumbar 4-5 Microdisectomy ;  Surgeon: Favio Miranda MD;  Location: [facility] OR     FOOT SURGERY Right      NOSE SURGERY      3 times     right elbow surgey      12 yo     TESTICLE SURGERY      23 yo        Problem List:  Patient Active Problem List   Diagnosis     Anxiety state     Chronic back pain     Moderate episode of recurrent major depressive disorder (H)     Panic disorder without agoraphobia     Abnormal results of liver function studies     Psoriatic arthritis (H)     Morbid obesity (H)     Elevated blood-pressure reading without diagnosis of hypertension     Alcohol abuse, in remission     Vitamin D deficiency     Essential hypertension     Depressive disorder           OBJECTIVE:     Vital signs noted and reviewed by Alfonso Miller PA-C  There were no vitals taken for this visit.     PEFR:    Physical Exam        Alfonso Miller PA-C  2/14/2023, 11:15 AM

## 2023-02-14 NOTE — PROGRESS NOTES
Chief Complaint:     Chief Complaint   Patient presents with     Back Pain     Severe lower back pain, radiating down left leg.     Joint Pain       Medical Decision Making:    Differential Diagnosis:  Low back strain, back muscle spasm, herniated disk.     Ju Keane was seen today for back pain and joint pain.    Diagnoses and all orders for this visit:    Back muscle spasm  -     INJECTION SNGL/MULT TRIGGER POINT, 1 OR 2 MUSCLES  -     triamcinolone (KENALOG-40) injection 40 mg         Plan    Trigger Point Injection    After informed consent was obtained including risks, benefits, and alternatives, I did perform a trigger point injection of the patient's left lumbar region.  After I was able to demarcate the painful area, I cleansed the skin with alcohol. I then used sterile technique and a 27-gauge 1.5-inch needle to infuse 40 mg of Kenalog along with 4 mL of 1% plain lidocaine using sterile technique. Patient tolerated this well with no complications. Patient verbalized significant relief of symptoms.    Ice the area.  Ibuprofen for pain.  Stretching exercises.  Chiropractic may help.  Worrisome symptoms discussed with instructions to go to the ED.  Patient will follow up with PCP if symptoms have not resolved in 2 weeks.  Patient verbalized understanding and agreed with this plan.     HPI: Lakhwinder Jones is a 42 year old male presents with a 2 week history of back pain. Patient has a history lumbar degenerative disc disease s/p left micro diskectomy at L4-5 11/18 who has been dealing with some intermittent back pain for the last couple of years and is followed at [facility].  He does not recall any acute injury.  Since then it has unchanged.  The pain is localized to left lumbar region.      There is no history of disturbance of bowel or bladder dysfunction associated with this present problem.      There is associated sciatica in the L leg. There is no paresthesia in the legs. The patient  does not have a history of weakness in the legs.    ROS:      Review of Systems   Constitutional: Negative.  Negative for chills and fever.   HENT: Negative.  Negative for sore throat.    Respiratory: Negative.  Negative for cough, chest tightness, shortness of breath and wheezing.    Cardiovascular: Negative.  Negative for chest pain and palpitations.   Gastrointestinal: Negative.  Negative for abdominal pain, diarrhea, nausea and vomiting.   Genitourinary: Negative for dysuria, frequency, hematuria and urgency.   Musculoskeletal: Positive for back pain. Negative for myalgias.   Skin: Negative for rash.   Allergic/Immunologic: Negative.  Negative for immunocompromised state.   Neurological: Negative.  Negative for headaches.        Family History   Family History   Problem Relation Age of Onset     Obesity Mother      Diabetes Father      Coronary Artery Disease Father      Hypertension Father      Hyperlipidemia Father      Depression Father      Anxiety Disorder Father      Mental Illness Father      Substance Abuse Father      Breast Cancer Maternal Grandmother      Depression Maternal Grandmother      Mental Illness Maternal Grandmother      Substance Abuse Maternal Grandmother      Prostate Cancer Maternal Grandfather      Diabetes Paternal Grandmother      Breast Cancer Paternal Grandmother      Depression Paternal Grandmother      Mental Illness Paternal Grandmother      Diabetes Paternal Grandfather      Asthma Brother      Diabetes Paternal Uncle      Cerebrovascular Disease No family hx of      Anesthesia Reaction No family hx of      Osteoporosis No family hx of      Genetic Disorder No family hx of      Thyroid Disease No family hx of      Liver Disease No family hx of         Problem history  Patient Active Problem List   Diagnosis     Anxiety state     Chronic back pain     Moderate episode of recurrent major depressive disorder (H)     Panic disorder without agoraphobia     Abnormal results of liver  function studies     Psoriatic arthritis (H)     Morbid obesity (H)     Elevated blood-pressure reading without diagnosis of hypertension     Alcohol abuse, in remission     Vitamin D deficiency     Essential hypertension     Depressive disorder        Allergies  Allergies   Allergen Reactions     Tramadol Other (See Comments)     Shellfish-Derived Products         Social History  Social History     Socioeconomic History     Marital status:      Spouse name: Shereen      Number of children: 0     Years of education: Not on file     Highest education level: Not on file   Occupational History     Not on file   Tobacco Use     Smoking status: Former     Packs/day: 0.20     Years: 10.00     Pack years: 2.00     Types: Cigarettes     Quit date: 2015     Years since quittin.1     Smokeless tobacco: Never   Substance and Sexual Activity     Alcohol use: Yes     Alcohol/week: 8.0 standard drinks     Types: 8 Standard drinks or equivalent per week     Comment: not for a month     Drug use: Yes     Types: Marijuana     Comment: PRN pain     Sexual activity: Yes     Partners: Female     Birth control/protection: None   Other Topics Concern     Parent/sibling w/ CABG, MI or angioplasty before 65F 55M? Not Asked   Social History Narrative     Not on file     Social Determinants of Health     Financial Resource Strain: Not on file   Food Insecurity: Not on file   Transportation Needs: Not on file   Physical Activity: Not on file   Stress: Not on file   Social Connections: Not on file   Intimate Partner Violence: Not on file   Housing Stability: Not on file        Current Meds    Current Outpatient Medications:      Acetaminophen (TYLENOL PO), Take 1,000 mg by mouth as needed for mild pain or fever, Disp: , Rfl:      citalopram (CELEXA) 40 MG tablet, Take 1 tablet (40 mg) by mouth daily (Due for clinic appointment before future refills), Disp: 30 tablet, Rfl: 0     cyclobenzaprine (FLEXERIL) 10 MG tablet, Take 1  tablet (10 mg) by mouth 3 times daily as needed for muscle spasms, Disp: 30 tablet, Rfl: 0     lamoTRIgine (LAMICTAL) 150 MG tablet, Take 150 mg by mouth daily, Disp: , Rfl:      multivitamin w/minerals (THERA-VIT-M) tablet, Take 1 tablet by mouth every morning, Disp: , Rfl:      NONFORMULARY, as needed CBD OIL, Disp: , Rfl:      risperiDONE (RISPERDAL) 1 MG tablet, Take 1 mg by mouth 2 times daily, Disp: , Rfl:      Secukinumab, 300 MG Dose, 150 MG/ML SOAJ, Inject 300 mg Subcutaneous every 28 days, Disp: 2 mL, Rfl: 5     ustekinumab (STELARA) 90 MG/ML, INJECT 90 MG (1 ML) UNDER THE SKIN EVERY 12 WEEKS. WEIGHT 327 LB (GREATER THAN 100 KG), Disp: 1 mL, Rfl: 3  No current facility-administered medications for this visit.        Physical Exam:     Vital signs reviewed by Alfonso Miller PA-C  /84 (BP Location: Left arm, Patient Position: Sitting, Cuff Size: Adult Large)   Pulse 88   Temp 97.3  F (36.3  C) (Tympanic)   Wt (!) 163.2 kg (359 lb 12.8 oz)   SpO2 96%   BMI 42.67 kg/m       PEFR:    Physical Exam  Vitals and nursing note reviewed.   Constitutional:       General: He is not in acute distress.     Appearance: He is well-developed. He is not ill-appearing, toxic-appearing or diaphoretic.   HENT:      Head: Normocephalic and atraumatic.      Right Ear: Hearing, tympanic membrane, ear canal and external ear normal. Tympanic membrane is not perforated, erythematous, retracted or bulging.      Left Ear: Hearing, tympanic membrane, ear canal and external ear normal. Tympanic membrane is not perforated, erythematous, retracted or bulging.      Nose: Nose normal. No mucosal edema, congestion or rhinorrhea.      Mouth/Throat:      Pharynx: No oropharyngeal exudate or posterior oropharyngeal erythema.      Tonsils: No tonsillar exudate or tonsillar abscesses. 0 on the right. 0 on the left.   Eyes:      Pupils: Pupils are equal, round, and reactive to light.   Cardiovascular:      Rate and Rhythm: Normal rate  and regular rhythm.      Heart sounds: Normal heart sounds, S1 normal and S2 normal. Heart sounds not distant. No murmur heard.    No friction rub. No gallop.   Pulmonary:      Effort: Pulmonary effort is normal. No respiratory distress.      Breath sounds: Normal breath sounds. No decreased breath sounds, wheezing, rhonchi or rales.   Abdominal:      General: Bowel sounds are normal. There is no distension.      Palpations: Abdomen is soft.      Tenderness: There is no abdominal tenderness.   Musculoskeletal:      Cervical back: Normal range of motion and neck supple.      Lumbar back: Spasms and tenderness present. No swelling or deformity.        Back:    Lymphadenopathy:      Cervical: No cervical adenopathy.   Skin:     General: Skin is warm and dry.      Findings: No rash.   Neurological:      Mental Status: He is alert.      Cranial Nerves: No cranial nerve deficit.      Motor: Motor function is intact. No weakness, atrophy or abnormal muscle tone.      Coordination: Coordination is intact. Coordination normal.      Gait: Gait is intact. Gait normal.   Psychiatric:         Attention and Perception: He is attentive.         Speech: Speech normal.         Behavior: Behavior normal. Behavior is cooperative.         Thought Content: Thought content normal.         Judgment: Judgment normal.               Alfonso Miller PA-C  2/14/2023, 11:18 AM

## 2023-04-01 ENCOUNTER — HEALTH MAINTENANCE LETTER (OUTPATIENT)
Age: 43
End: 2023-04-01

## 2023-05-15 ENCOUNTER — NURSE TRIAGE (OUTPATIENT)
Dept: FAMILY MEDICINE | Facility: CLINIC | Age: 43
End: 2023-05-15
Payer: COMMERCIAL

## 2023-05-15 NOTE — TELEPHONE ENCOUNTER
Patient calling to report he is weak, dizzy, with a severe headache, shakiness, sweaty and chills. He said his fatigue has been intense the last 4-5 days and it hard for him to get out of bed because he is so tired and weak. He usually does not get headaches. No fevers that he is aware of. He has increased fluids and that is not helped with the dizziness or weakness. Due to severity of headache and ongoing symptoms not resolving for 5 days, patient will go to ED for evaluation.    RAN Manuel, RN      Reason for Disposition    SEVERE headache, states 'worst headache' of life    MODERATE weakness (i.e., interferes with work, school, normal activities) and cause unknown  (Exceptions: Weakness with acute minor illness, or weakness from poor fluid intake.)    Additional Information    Negative: Difficult to awaken or acting confused (e.g., disoriented, slurred speech)    Negative: Weakness of the face, arm or leg on one side of the body and new-onset    Negative: Numbness of the face, arm or leg on one side of the body and new-onset    Negative: Loss of speech or garbled speech and new-onset    Negative: Passed out (i.e., fainted, collapsed and was not responding)    Negative: Sounds like a life-threatening emergency to the triager    Negative: Followed a head injury within last 3 days    Negative: Traumatic Brain Injury (TBI) is suspected    Negative: Sinus pain of forehead and yellow or green nasal discharge    Negative: Pregnant    Negative: Unable to walk without falling    Negative: Stiff neck (can't touch chin to chest)    Negative: Possibility of carbon monoxide exposure    Negative: SEVERE difficulty breathing (e.g., struggling for each breath, speaks in single words)    Negative: Shock suspected (e.g., cold/pale/clammy skin, too weak to stand, low BP, rapid pulse)    Negative: Difficult to awaken or acting confused (e.g., disoriented, slurred speech)    Negative: Fainted > 15 minutes ago and still feels  "too weak or dizzy to stand    Negative: SEVERE weakness (i.e., unable to walk or barely able to walk, requires support) and new-onset or worsening    Negative: Sounds like a life-threatening emergency to the triager    Negative: Weakness of the face, arm or leg on one side of the body    Negative: Has diabetes mellitus and weakness from low blood sugar (i.e., < 60 mg/dL or 3.5 mmol/L)    Negative: Recent heat exposure, suspected cause of weakness    Negative: Vomiting is main symptom    Negative: Diarrhea is main symptom    Negative: Difficulty breathing    Negative: Heart beating < 50 beats per minute OR > 140 beats per minute    Negative: Extra heartbeats OR irregular heart beating (i.e., 'palpitations')    Negative: Follows bleeding (e.g., from vomiting, rectum, vagina)  (Exception: Small transient weakness from sight of a small amount blood.)    Negative: Bloody, black, or tarry bowel movements  (Exception: Chronic-unchanged black-grey bowel movements and is taking iron pills or Pepto-Bismol.)    Negative: MODERATE weakness from poor fluid intake with no improvement after 2 hours of rest and fluids    Negative: Drinking very little and dehydration suspected (e.g., no urine > 12 hours, very dry mouth, very lightheaded)    Negative: Patient sounds very sick or weak to the triager    Answer Assessment - Initial Assessment Questions  1. LOCATION: \"Where does it hurt?\"       Severe headache  2. ONSET: \"When did the headache start?\" (Minutes, hours or days)       today  3. PATTERN: \"Does the pain come and go, or has it been constant since it started?\"      constant  4. SEVERITY: \"How bad is the pain?\" and \"What does it keep you from doing?\"  (e.g., Scale 1-10; mild, moderate, or severe)    - MILD (1-3): doesn't interfere with normal activities     - MODERATE (4-7): interferes with normal activities or awakens from sleep     - SEVERE (8-10): excruciating pain, unable to do any normal activities         severe  5. " "RECURRENT SYMPTOM: \"Have you ever had headaches before?\" If Yes, ask: \"When was the last time?\" and \"What happened that time?\"       No  6. CAUSE: \"What do you think is causing the headache?\"      Unsure, concerned it is cardiac  7. MIGRAINE: \"Have you been diagnosed with migraine headaches?\" If Yes, ask: \"Is this headache similar?\"       No  8. HEAD INJURY: \"Has there been any recent injury to the head?\"       No  9. OTHER SYMPTOMS: \"Do you have any other symptoms?\" (fever, stiff neck, eye pain, sore throat, cold symptoms)      no  10. PREGNANCY: \"Is there any chance you are pregnant?\" \"When was your last menstrual period?\"        n/a    Protocols used: HEADACHE-A-OH, WEAKNESS (GENERALIZED) AND FATIGUE-A-OH      "

## 2023-06-15 DIAGNOSIS — L40.9 PSORIASIS: ICD-10-CM

## 2023-06-16 NOTE — TELEPHONE ENCOUNTER
Routing refill request to provider for review/approval because:  Drug not on the [facility] refill protocol     Daria QURESHI RN  [facility]  [phone]

## 2023-06-16 NOTE — TELEPHONE ENCOUNTER
Left a voicemail asking patient to give us a call back at our main dermatology line at [phone]    Daria QURESHI RN  [facility]  [phone]

## 2023-06-21 NOTE — TELEPHONE ENCOUNTER
2nd attempt    Left a voicemail asking patient to give us a call back at our main dermatology line at [phone]    Daria QURESHI RN  [facility]  [phone]

## 2023-06-23 NOTE — TELEPHONE ENCOUNTER
Patient has scheduled an appointment 10/25/23 with Don.     Routing to provider as fyi.     Daria QURESHI RN  [facility]  [phone]

## 2023-07-14 NOTE — MR AVS SNAPSHOT
After Visit Summary   3/29/2018    Lakhwinder Jones    MRN: [id number]           Patient Information     Date Of Birth          1980        Visit Information        Provider Department      3/29/2018 12:20 PM Suellen Page MD [facility]        Today's Diagnoses     Psoriasis with arthropathy (H)    -  1    Adalimumab (Humira) long-term use        Encounter for long-term (current) use of high-risk medication          Care Instructions    FUTURE APPOINTMENTS  Please get labs done today.  Follow up in 2 month(s)    Continue administering Humira.          Follow-ups after your visit        Your next 10 appointments already scheduled     Mar 29, 2018 12:20 PM CDT   Office Visit with Suellen Page MD   [facility] ([address] [id number] [phone]           Bring a current list of meds and any records pertaining to this visit. For Physicals, please bring immunization records and any forms needing to be filled out. Please arrive 10 minutes early to complete paperwork.            Apr 10, 2018  5:40 PM CDT   (Arrive by 5:25 PM)   NEW FOOT/ANKLE with Ben Rivera MD   [facility] ([facility])    [address] [place] [id number] [phone]            May 24, 2018 12:20 PM CDT   Office Visit with Suellen Page MD   [facility] ([address] [id number] [phone]           Bring a current list of meds and any records pertaining to this visit. For Physicals, please bring immunization records and any forms needing to be filled out. Please arrive 10 minutes early to complete paperwork.              Who to contact     If you have questions or need follow up information about today's clinic visit or your    [facility] Infectious Disease Progress Note    SUBJECTIVE  Afebrile  Remains on  fio2 on 60%      Less anxious today and states shes trying to deal with it   Sob and cough better today as well     Minimal sputum produced per pt mainly feels like its spit   Does endorse sore throat prior to  Sob        MEDICATIONS  Current Facility-Administered Medications   Medication Dose Route Frequency Provider Last Rate Last Admin   • dextrose 50 % injection 25 g  25 g Intravenous PRN Mathew Warren,        • dextrose 50 % injection 12.5 g  12.5 g Intravenous PRN Mathew Warren,        • glucagon (GLUCAGEN) injection 1 mg  1 mg Intramuscular PRN Mathew Warren,        • dextrose (GLUTOSE) 40 % gel 15 g  15 g Oral PRN Mathew Warren DO       • dextrose (GLUTOSE) 40 % gel 30 g  30 g Oral PRN Mathew Warren DO       • insulin lispro (ADMELOG,HumaLOG) - Correction Dose   Subcutaneous TID  Mathew Warren DO   4 Units at 07/14/23 1255   • insulin lispro (ADMELOG,HumaLOG) - Correction Dose   Subcutaneous Nightly Mathew Warren DO       • [START ON 7/15/2023] methylPREDNISolone (SOLU-Medrol) injection 40 mg  40 mg Intravenous Daily Katlin Read CNP       • melatonin tablet 3 mg  3 mg Oral Nightly Abbey Owen CNP   3 mg at 07/13/23 2142   • potassium CHLORIDE (KLOR-CON) packet 20 mEq  20 mEq Oral BID  Katlin Read CNP   20 mEq at 07/14/23 0944   • loperamide (IMODIUM) capsule 4 mg  4 mg Oral Q6H PRN Katlin Read CNP   4 mg at 07/14/23 0944   • selexipag (UPTRAVI) tablet 400 mcg  400 mcg Oral 2 times per day Gloria Childress CNP   400 mcg at 07/14/23 0943   • ALPRAZolam (XANAX) tablet 0.25 mg  0.25 mg Oral Daily PRN Mathew Warren, DO       • ipratropium-albuterol (DUONEB) 0.5-2.5 (3) MG/3ML nebulizer solution 3 mL  3 mL Nebulization TID Resp Katlin Read CNP   3 mL at 07/14/23 0719   • furosemide (LASIX INJECT) injection 60 mg  60 mg Intravenous Q8H Gloria Childress CNP   60 mg at  07/14/23 1256   • sildenafil (REVATIO) tablet 20 mg  20 mg Oral TID Gloria Childress CNP   20 mg at 07/14/23 0942   • vancomycin (VANCOCIN) capsule 125 mg  125 mg Oral 2 times per day Magan Arboleda MD   125 mg at 07/14/23 0942   • cefdinir (OMNICEF) capsule 300 mg  300 mg Oral 2 times per day Magan Arboleda MD   300 mg at 07/14/23 0940   • carvedilol (COREG) tablet 3.125 mg  3.125 mg Oral 2 times per day Gloria Childress CNP   3.125 mg at 07/14/23 0939   • FLUoxetine (PROzac) capsule 40 mg  40 mg Oral Daily Carolyn Bush DO   40 mg at 07/14/23 0942   • leflunomide (ARAVA) tablet 10 mg  10 mg Oral Daily Carolyn Bush DO   10 mg at 07/14/23 0941   • levothyroxine (SYNTHROID, LEVOTHROID) tablet 75 mcg  75 mcg Oral QAM AC Carolyn Bush DO   75 mcg at 07/14/23 0542   • methotrexate (RHEUMATREX) tablet 12.5 mg  12.5 mg Oral Once per day on Wed Carolyn Bush DO   12.5 mg at 07/12/23 2111   • pantoprazole (PROTONIX) EC tablet 40 mg  40 mg Oral Daily Carolyn Bush DO   40 mg at 07/14/23 0943   • sodium chloride 0.9 % flush bag 25 mL  25 mL Intravenous PRN Carolyn Bush DO       • sodium chloride (PF) 0.9 % injection 2 mL  2 mL Intracatheter 2 times per day Carolyn Bush DO   2 mL at 07/14/23 0950   • Potassium Standard Replacement Protocol (Levels 3.5 and lower)   Does not apply See Admin Instructions Carolyn Bush DO       • Potassium Replacement (Levels 3.6 - 4)   Does not apply See Admin Instructions Carolyn Bush DO       • Magnesium Standard Replacement Protocol   Does not apply See Admin Instructions Carolyn Bush DO       • Phosphorus Standard Replacement Protocol   Does not apply See Admin Instructions Carolyn Bush DO       • ondansetron (ZOFRAN) injection 4 mg  4 mg Intravenous Q6H PRN Carolyn Bush DO       • acetaminophen (TYLENOL) tablet 650 mg  650 mg Oral Q4H PRN Carolyn Bush DO   650 mg at 07/13/23 0225   • polyethylene glycol  schedule please contact [facility] [place] directly at [phone].  Normal or non-critical lab and imaging results will be communicated to you by MyChart, letter or phone within 4 business days after the clinic has received the results. If you do not hear from us within 7 days, please contact the clinic through MyChart or phone. If you have a critical or abnormal lab result, we will notify you by phone as soon as possible.  Submit refill requests through [employer] or call your pharmacy and they will forward the refill request to us. Please allow 3 business days for your refill to be completed.          Additional Information About Your Visit        [employer] Information     [employer] gives you secure access to your electronic health record. If you see a primary care provider, you can also send messages to your care team and make appointments. If you have questions, please call your primary care clinic.  If you do not have a primary care provider, please call [phone] and they will assist you.        Care EveryWhere ID     This is your Care EveryWhere ID. This could be used by other organizations to access your [place] medical records  [id number]         Blood Pressure from Last 3 Encounters:   03/29/18 129/80   03/23/18 128/88   03/09/18 (!) 134/98    Weight from Last 3 Encounters:   02/23/18 (!) 344 lb (156 kg)   02/20/18 (!) 344 lb (156 kg)   12/11/17 (!) 341 lb 9.6 oz (154.9 kg)              Today, you had the following     No orders found for display       Primary Care Provider Office Phone # Fax #    Simona Prasad PA-C [phone]       [address] [place]  [facility] [address]        Equal Access to Services     [facility] : Hadii rashid bosso Sorichieali, waaxda luqadaha, qaybta kaalmada adeegyada, adina guerrero. So [facility] [phone].    ATENCIÓN: Si habla español, tiene a rose disposición servicios gratuitos de asistencia lingüística.  (MIRALAX) packet 17 g  17 g Oral Daily PRN Carolyn Bush DO       • docusate sodium-sennosides (SENOKOT S) 50-8.6 MG 2 tablet  2 tablet Oral Daily PRN Carolyn Bush DO       • sodium chloride 0.9 % flush bag 25 mL  25 mL Intravenous PRN Carolyn Bush DO       • sodium chloride (NORMAL SALINE) 0.9 % bolus 500 mL  500 mL Intravenous PRN Carolyn Bush DO       • heparin (porcine) injection 5,000 Units  5,000 Units Subcutaneous 3 times per day Carolyn Bush DO   5,000 Units at 07/14/23 0543       Allergy:  ALLERGIES:   Allergen Reactions   • Diazepam Hallucinations   • Hydrocodone-Acetaminophen Hallucinations       PHYSICAL EXAM  Temp:  [97.4 °F (36.3 °C)-98.4 °F (36.9 °C)] 97.4 °F (36.3 °C)  Heart Rate:  [69-91] 81  Resp:  [18-29] 18  BP: (103-144)/(59-95) 103/75  FiO2 (%):  [60 %] 60 %    Physical Exam   General: Alert on HFNC comfortable ,   HENT: Head AT/NC, ears without discharge,  Eyes: no scleral icterus, no conjunctival erythema , EOMI, PERRLA  Cardio:  RRR,  BLE pitting edema 1+  improved   Pulm: decresed BS   Rales present able to speak with no distress   GI: Soft, non-tender, nondistended. Bowel sounds present.  : No suprapubic tenderness, no CVA tenderness bilaterally  Ext: No upper   extremity edema. Acyanotic, warm to touch, pulses palpable.    Musculoskeletal: . moves all 4 extremities. No joint effusions.  Skin: Warm, dry, no rashes,   Psych: Appropriate mood and affect. Speech clear and appropriate.  Neuro: CN 2-12 grossly in tact, No focal neurologic deficits. Speech clear and coherent.   LABORATORY  Recent Results (from the past 24 hour(s))   GLUCOSE, BEDSIDE - POINT OF CARE    Collection Time: 07/13/23  4:07 PM   Result Value Ref Range    GLUCOSE, BEDSIDE - POINT OF CARE 103 (H) 70 - 99 mg/dL   GLUCOSE, BEDSIDE - POINT OF CARE    Collection Time: 07/13/23  7:47 PM   Result Value Ref Range    GLUCOSE, BEDSIDE - POINT OF CARE 240 (H) 70 - 99 mg/dL   Basic Metabolic Panel     Mary Beth kennedy [phone].    We comply with applicable federal civil rights laws and [place] laws. We do not discriminate on the basis of race, color, national origin, age, disability, sex, sexual orientation, or gender identity.            Thank you!     Thank you for choosing [facility] [place]  for your care. Our goal is always to provide you with excellent care. Hearing back from our patients is one way we can continue to improve our services. Please take a few minutes to complete the written survey that you may receive in the mail after your visit with us. Thank you!             Your Updated Medication List - Protect others around you: Learn how to safely use, store and throw away your medicines at www.disposemymeds.org.          This list is accurate as of 3/29/18 11:55 AM.  Always use your most recent med list.                   Brand Name Dispense Instructions for use Diagnosis    * adalimumab 40 MG/0.8ML prefilled syringe kit    HUMIRA    6 Syringe    Inject 2 40 mg pens first day, inject 1 40 mg pen day 8 and day 22, then 40mg every other week    Plaque psoriasis       * adalimumab 40 MG/0.8ML prefilled syringe kit    HUMIRA    2 Syringe    Inject 0.8 mLs (40 mg) Subcutaneous every 14 days    Plaque psoriasis       * adalimumab 40 MG/0.8ML prefilled syringe kit    HUMIRA    2 Syringe    Inject 0.8 mLs (40 mg) Subcutaneous every 14 days    Plaque psoriasis       benzonatate 100 MG capsule    TESSALON    20 capsule    Take 1 capsule (100 mg) by mouth 3 times daily as needed    Viral URI with cough       citalopram 40 MG tablet    celeXA    90 tablet    Take 1 tablet (40 mg) by mouth daily    Anxiety state, Panic disorder without agoraphobia       cyclobenzaprine 10 MG tablet    FLEXERIL    30 tablet    Take 1 tablet (10 mg) by mouth 2 times daily as needed for muscle spasms    Nondisplaced fracture of fifth metatarsal bone, right foot, sequela       HYDROcodone-acetaminophen 5-325 MG per tablet     Collection Time: 07/14/23  2:28 AM   Result Value Ref Range    Fasting Status      Sodium 141 135 - 145 mmol/L    Potassium 3.8 3.4 - 5.1 mmol/L    Chloride 103 97 - 110 mmol/L    Carbon Dioxide 30 21 - 32 mmol/L    Anion Gap 12 7 - 19 mmol/L    Glucose 180 (H) 70 - 99 mg/dL    BUN 32 (H) 6 - 20 mg/dL    Creatinine 1.48 (H) 0.51 - 0.95 mg/dL    Glomerular Filtration Rate 39 (L) >=60    BUN/Cr 22 7 - 25    Calcium 8.3 (L) 8.4 - 10.2 mg/dL   Magnesium    Collection Time: 07/14/23  2:28 AM   Result Value Ref Range    Magnesium 1.8 1.7 - 2.4 mg/dL   CBC with Automated Differential (performable only)    Collection Time: 07/14/23  2:28 AM   Result Value Ref Range    WBC 3.4 (L) 4.2 - 11.0 K/mcL    RBC 3.89 (L) 4.00 - 5.20 mil/mcL    HGB 11.3 (L) 12.0 - 15.5 g/dL    HCT 36.6 36.0 - 46.5 %    MCV 94.1 78.0 - 100.0 fl    MCH 29.0 26.0 - 34.0 pg    MCHC 30.9 (L) 32.0 - 36.5 g/dL    RDW-CV 18.4 (H) 11.0 - 15.0 %    RDW-SD 59.4 (H) 39.0 - 50.0 fL     (L) 140 - 450 K/mcL    NRBC 0 <=0 /100 WBC    Neutrophil, Percent 81 %    Lymphocytes, Percent 14 %    Mono, Percent 4 %    Eosinophils, Percent 0 %    Basophils, Percent 0 %    Immature Granulocytes 1 %    Absolute Neutrophils 2.7 1.8 - 7.7 K/mcL    Absolute Lymphocytes 0.5 (L) 1.0 - 4.0 K/mcL    Absolute Monocytes 0.2 (L) 0.3 - 0.9 K/mcL    Absolute Eosinophils  0.0 0.0 - 0.5 K/mcL    Absolute Basophils 0.0 0.0 - 0.3 K/mcL    Absolute Immature Granulocytes 0.0 0.0 - 0.2 K/mcL   GLUCOSE, BEDSIDE - POINT OF CARE    Collection Time: 07/14/23  8:19 AM   Result Value Ref Range    GLUCOSE, BEDSIDE - POINT OF CARE 167 (H) 70 - 99 mg/dL   GLUCOSE, BEDSIDE - POINT OF CARE    Collection Time: 07/14/23 11:46 AM   Result Value Ref Range    GLUCOSE, BEDSIDE - POINT OF CARE 204 (H) 70 - 99 mg/dL       Microbiology Results     None           IMAGING  CT CHEST ILD HIGH RESOLUTION WO CONTRAST   Final Result   Bilateral perihilar interstitial and groundglass densities.  Bilateral   medial  NORCO    30 tablet    Take 1-2 tablets by mouth every 8 hours as needed for moderate to severe pain maximum 6 tablet(s) per day    Closed nondisplaced fracture of fifth metatarsal bone of right foot with delayed healing, subsequent encounter       * order for DME     1 Units    Roll-A-Bout Walker. Patient can use for right foot.    Closed nondisplaced fracture of fifth metatarsal bone of right foot, initial encounter       * order for DME     1 Device    Equipment being ordered: [id number] $249  Walking Boot XL Funmilayo Pneumatic    Closed nondisplaced fracture of fifth metatarsal bone of right foot, initial encounter       * Notice:  This list has 5 medication(s) that are the same as other medications prescribed for you. Read the directions carefully, and ask your doctor or other care provider to review them with you.       lower lobe atelectasis versus consolidations.  Posterior right lower   lobe consolidation also noted.  Few scattered reticulo- nodular   interstitial opacities in the right middle lobe, lateral lingula and left   lower lobe noted.  This may represent sarcoidosis.  Superimposed infectious   and inflammatory process cannot be excluded.  No honeycombing.  No   bronchiectasis.        Mildly enlarged mediastinal lymph nodes.      Mild pulmonary arterial hypertension.      Electronically Signed by: ODALYS BROWER MD    Signed on: 7/13/2023 5:41 PM    Workstation ID: [id number]      XR CHEST AP OR PA   Final Result      XR CHEST AP OR PA   Final Result      XR CHEST PA AND LATERAL 2 VIEWS   Final Result      1.   Slight increase in patchy diffuse interstitial opacities in both   lungs.  Differential diagnosis includes acute exacerbation of ILD or an   infectious/inflammatory process superimposed upon ILD.   2.   Stable moderate cardiomegaly.      Electronically Signed by: DYLAN MCLAUGHLIN M.D.    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  # Sarcoid  # Chronic kidney disease  # asymptomatic bacturia      PLAN:   -  continue with pts home meds of omnicef and oral vanco for now   -  Reviewed  chest CT - rule out atypical infec with rvp and legionella  Lower suspicion for pna at this time  - fu blood cx   - UA not indicative of UTI and pt asymptomatic   - pct minimal   - ctm labs   - per pt areva dosage slowly being reduced as an outpt due to  symptoms         D/w pt and staff   d/w MD   Reviewed imaging labs and micro data     will follow        Magan Arboleda MD  7/14/2023 1:11 PM

## 2023-07-17 ENCOUNTER — APPOINTMENT (OUTPATIENT)
Dept: CT IMAGING | Facility: CLINIC | Age: 43
End: 2023-07-17
Attending: EMERGENCY MEDICINE
Payer: COMMERCIAL

## 2023-07-17 ENCOUNTER — NURSE TRIAGE (OUTPATIENT)
Dept: FAMILY MEDICINE | Facility: CLINIC | Age: 43
End: 2023-07-17
Payer: COMMERCIAL

## 2023-07-17 ENCOUNTER — HOSPITAL ENCOUNTER (EMERGENCY)
Facility: CLINIC | Age: 43
Discharge: HOME | End: 2023-07-17
Attending: EMERGENCY MEDICINE | Admitting: EMERGENCY MEDICINE
Payer: COMMERCIAL

## 2023-07-17 VITALS
DIASTOLIC BLOOD PRESSURE: 96 MMHG | WEIGHT: 315 LBS | HEART RATE: 75 BPM | HEIGHT: 78 IN | TEMPERATURE: 98.6 F | SYSTOLIC BLOOD PRESSURE: 159 MMHG | OXYGEN SATURATION: 91 % | BODY MASS INDEX: 36.45 KG/M2 | RESPIRATION RATE: 14 BRPM

## 2023-07-17 DIAGNOSIS — M62.830 BACK MUSCLE SPASM: ICD-10-CM

## 2023-07-17 DIAGNOSIS — M54.50 ACUTE LEFT-SIDED LOW BACK PAIN WITHOUT SCIATICA: ICD-10-CM

## 2023-07-17 LAB
ALBUMIN SERPL BCG-MCNC: 4.9 G/DL (ref 3.5–5.2)
ALBUMIN UR-MCNC: 10 MG/DL
ALP SERPL-CCNC: 84 U/L (ref 40–129)
ALT SERPL W P-5'-P-CCNC: 58 U/L (ref 0–70)
ANION GAP SERPL CALCULATED.3IONS-SCNC: 15 MMOL/L (ref 7–15)
APPEARANCE UR: CLEAR
AST SERPL W P-5'-P-CCNC: 30 U/L (ref 0–45)
ATRIAL RATE - MUSE: 99 BPM
BASOPHILS # BLD AUTO: 0.1 10E3/UL (ref 0–0.2)
BASOPHILS NFR BLD AUTO: 1 %
BILIRUB SERPL-MCNC: 0.4 MG/DL
BILIRUB UR QL STRIP: NEGATIVE
BUN SERPL-MCNC: 13.9 MG/DL (ref 6–20)
CALCIUM SERPL-MCNC: 10 MG/DL (ref 8.6–10)
CHLORIDE SERPL-SCNC: 102 MMOL/L (ref 98–107)
COLOR UR AUTO: YELLOW
CREAT SERPL-MCNC: 0.86 MG/DL (ref 0.67–1.17)
DEPRECATED HCO3 PLAS-SCNC: 21 MMOL/L (ref 22–29)
DIASTOLIC BLOOD PRESSURE - MUSE: NORMAL MMHG
EOSINOPHIL # BLD AUTO: 0.1 10E3/UL (ref 0–0.7)
EOSINOPHIL NFR BLD AUTO: 2 %
ERYTHROCYTE [DISTWIDTH] IN BLOOD BY AUTOMATED COUNT: 11.7 % (ref 10–15)
GFR SERPL CREATININE-BSD FRML MDRD: >90 ML/MIN/1.73M2
GLUCOSE SERPL-MCNC: 120 MG/DL (ref 70–99)
GLUCOSE UR STRIP-MCNC: NEGATIVE MG/DL
HCO3 BLDV-SCNC: 27 MMOL/L (ref 21–28)
HCT VFR BLD AUTO: 42.6 % (ref 40–53)
HGB BLD-MCNC: 15.2 G/DL (ref 13.3–17.7)
HGB UR QL STRIP: NEGATIVE
HOLD SPECIMEN: NORMAL
IMM GRANULOCYTES # BLD: 0 10E3/UL
IMM GRANULOCYTES NFR BLD: 0 %
INTERPRETATION ECG - MUSE: NORMAL
KETONES UR STRIP-MCNC: NEGATIVE MG/DL
LACTATE BLD-SCNC: 1.8 MMOL/L
LEUKOCYTE ESTERASE UR QL STRIP: NEGATIVE
LIPASE SERPL-CCNC: 30 U/L (ref 13–60)
LYMPHOCYTES # BLD AUTO: 1.7 10E3/UL (ref 0.8–5.3)
LYMPHOCYTES NFR BLD AUTO: 23 %
MCH RBC QN AUTO: 29.6 PG (ref 26.5–33)
MCHC RBC AUTO-ENTMCNC: 35.7 G/DL (ref 31.5–36.5)
MCV RBC AUTO: 83 FL (ref 78–100)
MONOCYTES # BLD AUTO: 0.5 10E3/UL (ref 0–1.3)
MONOCYTES NFR BLD AUTO: 7 %
MUCOUS THREADS #/AREA URNS LPF: PRESENT /LPF
NEUTROPHILS # BLD AUTO: 4.9 10E3/UL (ref 1.6–8.3)
NEUTROPHILS NFR BLD AUTO: 67 %
NITRATE UR QL: NEGATIVE
NRBC # BLD AUTO: 0 10E3/UL
NRBC BLD AUTO-RTO: 0 /100
P AXIS - MUSE: 47 DEGREES
PCO2 BLDV: 34 MM HG (ref 40–50)
PH BLDV: 7.5 [PH] (ref 7.32–7.43)
PH UR STRIP: 6 [PH] (ref 5–7)
PLATELET # BLD AUTO: 249 10E3/UL (ref 150–450)
PO2 BLDV: 26 MM HG (ref 25–47)
POTASSIUM SERPL-SCNC: 3.9 MMOL/L (ref 3.4–5.3)
PR INTERVAL - MUSE: 120 MS
PROT SERPL-MCNC: 7.6 G/DL (ref 6.4–8.3)
QRS DURATION - MUSE: 96 MS
QT - MUSE: 342 MS
QTC - MUSE: 438 MS
R AXIS - MUSE: 56 DEGREES
RBC # BLD AUTO: 5.14 10E6/UL (ref 4.4–5.9)
RBC URINE: 1 /HPF
SAO2 % BLDV: 54 % (ref 94–100)
SODIUM SERPL-SCNC: 138 MMOL/L (ref 136–145)
SP GR UR STRIP: 1.03 (ref 1–1.03)
SPERM #/AREA URNS HPF: PRESENT /HPF
SQUAMOUS EPITHELIAL: <1 /HPF
SYSTOLIC BLOOD PRESSURE - MUSE: NORMAL MMHG
T AXIS - MUSE: 68 DEGREES
TROPONIN T SERPL HS-MCNC: <6 NG/L
UROBILINOGEN UR STRIP-MCNC: 2 MG/DL
VENTRICULAR RATE- MUSE: 99 BPM
WBC # BLD AUTO: 7.4 10E3/UL (ref 4–11)
WBC URINE: 1 /HPF

## 2023-07-17 PROCEDURE — 83690 ASSAY OF LIPASE: CPT | Performed by: EMERGENCY MEDICINE

## 2023-07-17 PROCEDURE — 82803 BLOOD GASES ANY COMBINATION: CPT

## 2023-07-17 PROCEDURE — 36415 COLL VENOUS BLD VENIPUNCTURE: CPT | Performed by: EMERGENCY MEDICINE

## 2023-07-17 PROCEDURE — 74176 CT ABD & PELVIS W/O CONTRAST: CPT

## 2023-07-17 PROCEDURE — 96374 THER/PROPH/DIAG INJ IV PUSH: CPT

## 2023-07-17 PROCEDURE — 250N000011 HC RX IP 250 OP 636: Mod: JZ | Performed by: EMERGENCY MEDICINE

## 2023-07-17 PROCEDURE — 81001 URINALYSIS AUTO W/SCOPE: CPT | Performed by: EMERGENCY MEDICINE

## 2023-07-17 PROCEDURE — 85025 COMPLETE CBC W/AUTO DIFF WBC: CPT | Performed by: EMERGENCY MEDICINE

## 2023-07-17 PROCEDURE — 96376 TX/PRO/DX INJ SAME DRUG ADON: CPT

## 2023-07-17 PROCEDURE — 96375 TX/PRO/DX INJ NEW DRUG ADDON: CPT

## 2023-07-17 PROCEDURE — 99285 EMERGENCY DEPT VISIT HI MDM: CPT | Mod: 25

## 2023-07-17 PROCEDURE — 93005 ELECTROCARDIOGRAM TRACING: CPT

## 2023-07-17 PROCEDURE — 80053 COMPREHEN METABOLIC PANEL: CPT | Performed by: EMERGENCY MEDICINE

## 2023-07-17 PROCEDURE — 84484 ASSAY OF TROPONIN QUANT: CPT | Performed by: EMERGENCY MEDICINE

## 2023-07-17 RX ORDER — METHOCARBAMOL 500 MG/1
1000 TABLET, FILM COATED ORAL 4 TIMES DAILY PRN
Qty: 20 TABLET | Refills: 0 | Status: SHIPPED | OUTPATIENT
Start: 2023-07-17

## 2023-07-17 RX ORDER — OXYCODONE HYDROCHLORIDE 5 MG/1
5 TABLET ORAL EVERY 6 HOURS PRN
Qty: 12 TABLET | Refills: 0 | Status: SHIPPED | OUTPATIENT
Start: 2023-07-17 | End: 2023-07-20

## 2023-07-17 RX ORDER — ONDANSETRON 2 MG/ML
4 INJECTION INTRAMUSCULAR; INTRAVENOUS EVERY 30 MIN PRN
Status: DISCONTINUED | OUTPATIENT
Start: 2023-07-17 | End: 2023-07-17 | Stop reason: HOSPADM

## 2023-07-17 RX ORDER — HYDROMORPHONE HYDROCHLORIDE 1 MG/ML
0.5 INJECTION, SOLUTION INTRAMUSCULAR; INTRAVENOUS; SUBCUTANEOUS EVERY 30 MIN PRN
Status: COMPLETED | OUTPATIENT
Start: 2023-07-17 | End: 2023-07-17

## 2023-07-17 RX ORDER — KETOROLAC TROMETHAMINE 15 MG/ML
15 INJECTION, SOLUTION INTRAMUSCULAR; INTRAVENOUS ONCE
Status: COMPLETED | OUTPATIENT
Start: 2023-07-17 | End: 2023-07-17

## 2023-07-17 RX ADMIN — HYDROMORPHONE HYDROCHLORIDE 0.5 MG: 1 INJECTION, SOLUTION INTRAMUSCULAR; INTRAVENOUS; SUBCUTANEOUS at 12:39

## 2023-07-17 RX ADMIN — HYDROMORPHONE HYDROCHLORIDE 0.5 MG: 1 INJECTION, SOLUTION INTRAMUSCULAR; INTRAVENOUS; SUBCUTANEOUS at 12:07

## 2023-07-17 RX ADMIN — ONDANSETRON 4 MG: 2 INJECTION INTRAMUSCULAR; INTRAVENOUS at 12:07

## 2023-07-17 RX ADMIN — KETOROLAC TROMETHAMINE 15 MG: 15 INJECTION, SOLUTION INTRAMUSCULAR; INTRAVENOUS at 11:59

## 2023-07-17 RX ADMIN — HYDROMORPHONE HYDROCHLORIDE 0.5 MG: 1 INJECTION, SOLUTION INTRAMUSCULAR; INTRAVENOUS; SUBCUTANEOUS at 13:12

## 2023-07-17 ASSESSMENT — ACTIVITIES OF DAILY LIVING (ADL)
ADLS_ACUITY_SCORE: 35

## 2023-07-17 NOTE — ED TRIAGE NOTES
Pt complains of abdominal pain that radiates to his back and SOB that started this am.     Triage Assessment     Row Name 07/17/23 1049       Triage Assessment (Adult)    Airway WDL WDL       Respiratory WDL    Respiratory WDL X       Skin Circulation/Temperature WDL    Skin Circulation/Temperature WDL WDL       Cardiac WDL    Cardiac WDL WDL       Peripheral/Neurovascular WDL    Peripheral Neurovascular WDL WDL       Cognitive/Neuro/Behavioral WDL    Cognitive/Neuro/Behavioral WDL WDL

## 2023-07-17 NOTE — ED PROVIDER NOTES
History     Chief Complaint:  Shortness of Breath, Abdominal Pain, and Back Pain       HPI   Lakhwinder Jones is a 42 year old male presents with a sudden onset back pain yesterday with radiation to the abdomen.  He has a history of back surgery in the past but notes that this does not feel anything like that.  He has no radicular symptoms.  Patient notes pain is in the left of midline and goes throughout his entire abdomen.  Feels like it is more on the left side.  He has no history of kidney stones.  He thinks his mom had a kidney stone.  He has had pain throughout the night and then developed chest pain and shortness of breath with some tingling.  This is he feels a bit anxious over the pain.      Independent Historian:    Patient    Review of External Notes:  Chart review    Medications:    methocarbamol (ROBAXIN) 500 MG tablet  oxyCODONE (ROXICODONE) 5 MG tablet  Acetaminophen (TYLENOL PO)  citalopram (CELEXA) 40 MG tablet  cyclobenzaprine (FLEXERIL) 10 MG tablet  lamoTRIgine (LAMICTAL) 150 MG tablet  multivitamin w/minerals (THERA-VIT-M) tablet  NONFORMULARY  risperiDONE (RISPERDAL) 1 MG tablet  Secukinumab, 300 MG Dose, 150 MG/ML SOAJ  ustekinumab (STELARA) 90 MG/ML        Past Medical History:    Past Medical History:   Diagnosis Date     Anxiety      Back pain      Obesity      Psoriatic arthritis (H)        Past Surgical History:    Past Surgical History:   Procedure Laterality Date     DISCECTOMY LUMBAR POSTERIOR MICROSCOPIC ONE LEVEL Left 11/1/2018    Procedure: Left Lumbar 4-5 Microdisectomy ;  Surgeon: Favio Miranda MD;  Location: [facility] OR     FOOT SURGERY Right      NOSE SURGERY      3 times     right elbow surgey      14 yo     TESTICLE SURGERY      21 yo          Physical Exam     Patient Vitals for the past 24 hrs:   BP Temp Temp src Pulse Resp SpO2 Height Weight   07/17/23 1300 (!) 159/96 -- -- 75 14 91 % -- --   07/17/23 1046 115/82 -- -- -- -- -- -- --   07/17/23 1045 (!) 140/85  "98.6  F (37  C) Oral 98 20 100 % 1.981 m (6' 6\") (!) 152.4 kg (336 lb)        Physical Exam  GENERAL: Diaphoretic looks uncomfortable  HEAD: atraumatic  EYES: pupils reactive, extraocular muscles intact, conjunctivae normal  ENT:  mucus membranes moist  NECK:  trachea midline, normal range of motion  RESPIRATORY: no tachypnea, breath sounds clear to auscultation   CVS: normal S1/S2, no murmurs, intact distal pulses  ABDOMEN: Left CVA tenderness and generalized abdominal pain left greater than right  MUSCULOSKELETAL: no deformities  SKIN: warm and dry, no acute rashes or ulceration  NEURO: GCS 15, cranial nerves intact, alert and oriented x3  PSYCH:  Mood/affect normal        Emergency Department Course     Imaging:  CT Abdomen Pelvis w/o Contrast   Final Result   IMPRESSION:    1.  No findings to expand the patient's symptoms. No urinary tract   stone, hydronephrosis or inflammatory process.      SHAINA PHILIPPE MD            SYSTEM ID:  [id number]        Report per radiology    Laboratory:  Labs Ordered and Resulted from Time of ED Arrival to Time of ED Departure   COMPREHENSIVE METABOLIC PANEL - Abnormal       Result Value    Sodium 138      Potassium 3.9      Chloride 102      Carbon Dioxide (CO2) 21 (*)     Anion Gap 15      Urea Nitrogen 13.9      Creatinine 0.86      Calcium 10.0      Glucose 120 (*)     Alkaline Phosphatase 84      AST 30      ALT 58      Protein Total 7.6      Albumin 4.9      Bilirubin Total 0.4      GFR Estimate >90     ISTAT GASES LACTATE VENOUS POCT - Abnormal    Lactic Acid POCT 1.8      Bicarbonate Venous POCT 27      O2 Sat, Venous POCT 54 (*)     pCO2 Venous POCT 34 (*)     pH Venous POCT 7.50 (*)     pO2 Venous POCT 26     ROUTINE UA WITH MICROSCOPIC REFLEX TO CULTURE - Abnormal    Color Urine Yellow      Appearance Urine Clear      Glucose Urine Negative      Bilirubin Urine Negative      Ketones Urine Negative      Specific Gravity Urine 1.030      Blood Urine Negative      pH " Urine 6.0      Protein Albumin Urine 10 (*)     Urobilinogen Urine 2.0      Nitrite Urine Negative      Leukocyte Esterase Urine Negative      Mucus Urine Present (*)     Sperm Urine Present (*)     RBC Urine 1      WBC Urine 1      Squamous Epithelials Urine <1     LIPASE - Normal    Lipase 30     TROPONIN T, HIGH SENSITIVITY - Normal    Troponin T, High Sensitivity <6     CBC WITH PLATELETS AND DIFFERENTIAL    WBC Count 7.4      RBC Count 5.14      Hemoglobin 15.2      Hematocrit 42.6      MCV 83      MCH 29.6      MCHC 35.7      RDW 11.7      Platelet Count 249      % Neutrophils 67      % Lymphocytes 23      % Monocytes 7      % Eosinophils 2      % Basophils 1      % Immature Granulocytes 0      NRBCs per 100 WBC 0      Absolute Neutrophils 4.9      Absolute Lymphocytes 1.7      Absolute Monocytes 0.5      Absolute Eosinophils 0.1      Absolute Basophils 0.1      Absolute Immature Granulocytes 0.0      Absolute NRBCs 0.0          Procedures       Emergency Department Course & Assessments:             Interventions:  Medications   HYDROmorphone (PF) (DILAUDID) injection 0.5 mg (0.5 mg Intravenous $Given 7/17/23 1312)   ketorolac (TORADOL) injection 15 mg (15 mg Intravenous $Given 7/17/23 1159)        Assessments:      Independent Interpretation (X-rays, CTs, rhythm strip):  None    Consultations/Discussion of Management or Tests:  None       Social Determinants of Health affecting care:  na     Disposition:  The patient was discharged to home.     Impression & Plan    CMS Diagnoses: None          Medical Decision Making:    Patient presents with severe left flank pain and low back pain without radicular symptoms that occurred after lifting up his 3-year-old.  Patient is quite uncomfortable with any type of movement in bed.  He is diaphoretic and looks to be in significant distress.  Beyond musculoskeletal etiology kidney stone less likely dissection or other intra-abdominal pathologies are all considered.  Labs  and CT are fairly unremarkable.  Given pain control.  Given the lack of findings most likely musculoskeletal given symptoms starting after picking up an object and history of back problems in the past.  Discussed outpatient management with him.    Critical Care time:  was 0 minutes for this patient excluding procedures.    Diagnosis:    ICD-10-CM    1. Acute left-sided low back pain without sciatica  M54.50       2. Back muscle spasm  M62.830            Discharge Medications:  Discharge Medication List as of 7/17/2023  2:58 PM      START taking these medications    Details   methocarbamol (ROBAXIN) 500 MG tablet Take 2 tablets (1,000 mg) by mouth 4 times daily as needed for muscle spasms, Disp-20 tablet, R-0, E-Prescribe      oxyCODONE (ROXICODONE) 5 MG tablet Take 1 tablet (5 mg) by mouth every 6 hours as needed for pain, Disp-12 tablet, R-0, E-Prescribe                Charan Medellin MD  7/17/2023   Charan Medellin MD Adams, Shaun L, MD  07/17/23 1623

## 2023-07-17 NOTE — TELEPHONE ENCOUNTER
Nurse Triage SBAR    Is this a 2nd Level Triage? NO    Situation:   Lifted son yesterday evening and then back started hurting.    Background:   Pt c/o 9/10 low L back pain, radiating to abd. Pt is continuous. States he is icing back and taking ibuprofen and tylenol with little relief. Started last night after lifting son. Pt states he has hx of lumbar back surgery. Pt denies chest pain or shortness of breath. Pt states it does hurt to breath d/t low back pain, pt speaking in full sentences. Denies weakness numbness in extremities, swelling or bruising. Pt states he is having difficulty moving d/t severity of pain. Pt denies loss of bowel/bladder function.     Assessment:   Pt needs further evaluation, recommend ED.     Protocol Recommended Disposition:   Go To Office Now    Recommendation:   Recommend pt be seen in ED for continuous severe back pain. Pt verbalized understanding and is agreeable with plan.      n/a    Does the patient meet one of the following criteria for ADS visit consideration? 16+ years old, with an [facility] PCP     Liz Colin RN    TIP  Providers, please consider if this condition is appropriate for management at one of our Acute and Diagnostic Services sites.     If patient is a good candidate, please use dotphrase <dot>triageresponse and select Refer to ADS to document.    Reason for Disposition    SEVERE back pain (e.g., excruciating, unable to do any normal activities) and not improved after pain medicine and CARE ADVICE    Additional Information    Negative: Dangerous mechanism of injury (e.g., MVA, contact sports, trampoline, diving, fall > 10 feet or 3 meters)  (Exception: Back pain began > 1 hour after injury.)    Negative: Weakness (i.e., paralysis, loss of muscle strength) of the leg(s) or foot and sudden onset after back injury    Negative: Numbness (i.e., loss of sensation) of the leg(s) or foot and sudden onset after back injury    Negative: Major bleeding (actively dripping or  "spurting) that can't be stopped    Negative: Bullet, knife or other serious penetrating wound    Negative: Shock suspected (e.g., cold/pale/clammy skin, too weak to stand, low BP, rapid pulse)    Negative: Sounds like a life-threatening emergency to the triager    Negative: Back pain not from an injury    Negative: Back pain from overuse (work, exercise, gardening) OR from twisting, lifting, or bending injury    Negative: SEVERE pain in kidney area (flank) that follows a direct blow to that site    Negative: Blood in urine (red, pink, or tea-colored)    Negative: Unable to urinate (or only a few drops) > 4 hours and bladder feels very full (e.g., palpable bladder or strong urge to urinate)    Negative: Loss of bladder or bowel control (urine or bowel incontinence; wetting self, leaking stool) of new-onset    Negative: Numbness (loss of sensation) in groin or rectal area    Negative: Skin is split open or gaping (length > 1/2 inch or 12 mm)    Negative: Puncture wound of back    Negative: Bleeding won't stop after 10 minutes of direct pressure (using correct technique)    Negative: Sounds like a serious injury to the triager    Negative: Weakness of a leg or foot (e.g., unable to bear weight, dragging foot)    Negative: Numbness of a leg or foot (i.e., loss of sensation)    Answer Assessment - Initial Assessment Questions  1. MECHANISM: \"How did the injury happen?\" (Consider the possibility of domestic violence or elder abuse)      Lifted son  2. ONSET: \"When did the injury happen?\" (Minutes or hours ago)      Last night 7/16  3. LOCATION: \"What part of the back is injured?\"      Lower, left side back  4. SEVERITY: \"Can you move the back normally?\"      9/10. Difficulty moving  5. PAIN: \"Is there any pain?\" If Yes, ask: \"How bad is the pain?\"   (Scale 1-10; or mild, moderate, severe)      9/10  6. CORD SYMPTOMS: Any weakness or numbness of the arms or legs?\"      no  7. SIZE: For cuts, bruises, or swelling, ask: \"How " "large is it?\" (e.g., inches or centimeters)      no  8. TETANUS: For any breaks in the skin, ask: \"When was the last tetanus booster?\"      n/a  9. OTHER SYMPTOMS: \"Do you have any other symptoms?\" (e.g., abdominal pain, blood in urine)      abd pain - 9/10  10. PREGNANCY: \"Is there any chance you are pregnant?\" \"When was your last menstrual period?\"        no    Protocols used: BACK INJURY-A-OH      "

## 2023-07-20 ENCOUNTER — OFFICE VISIT (OUTPATIENT)
Dept: INTERNAL MEDICINE | Facility: CLINIC | Age: 43
End: 2023-07-20
Payer: COMMERCIAL

## 2023-07-20 VITALS — BODY MASS INDEX: 41.26 KG/M2 | OXYGEN SATURATION: 98 % | HEART RATE: 76 BPM | WEIGHT: 315 LBS | TEMPERATURE: 97.5 F

## 2023-07-20 DIAGNOSIS — M54.50 ACUTE LEFT-SIDED LOW BACK PAIN WITHOUT SCIATICA: Primary | ICD-10-CM

## 2023-07-20 PROCEDURE — 99213 OFFICE O/P EST LOW 20 MIN: CPT | Performed by: INTERNAL MEDICINE

## 2023-07-20 RX ORDER — CYCLOBENZAPRINE HCL 10 MG
10 TABLET ORAL 3 TIMES DAILY PRN
Qty: 30 TABLET | Refills: 0 | Status: SHIPPED | OUTPATIENT
Start: 2023-07-20 | End: 2023-10-23

## 2023-07-20 RX ORDER — METHYLPREDNISOLONE 4 MG
TABLET, DOSE PACK ORAL
Qty: 21 TABLET | Refills: 0 | Status: SHIPPED | OUTPATIENT
Start: 2023-07-20

## 2023-07-20 RX ORDER — OXYCODONE HYDROCHLORIDE 5 MG/1
5 TABLET ORAL EVERY 6 HOURS PRN
Qty: 12 TABLET | Refills: 0 | Status: SHIPPED | OUTPATIENT
Start: 2023-07-20

## 2023-07-20 ASSESSMENT — PATIENT HEALTH QUESTIONNAIRE - PHQ9
SUM OF ALL RESPONSES TO PHQ QUESTIONS 1-9: 3
SUM OF ALL RESPONSES TO PHQ QUESTIONS 1-9: 3
10. IF YOU CHECKED OFF ANY PROBLEMS, HOW DIFFICULT HAVE THESE PROBLEMS MADE IT FOR YOU TO DO YOUR WORK, TAKE CARE OF THINGS AT HOME, OR GET ALONG WITH OTHER PEOPLE: VERY DIFFICULT

## 2023-07-20 NOTE — LETTER
07/20/23      Lakhwinder Jones  1980  [address]  [place] [address]        To whom it may concern,    Please excuse Mr. Jones from work July 17th-25th. He will be needing time to rest and recuperate from an illness that I am seeing him for. He may return to work July 26th without restrictions.    Please contact me with any question or concerns.        Petra Casey MD   [place] [address] [address] [place] [address]  T: [phone], F: [phone]

## 2023-07-20 NOTE — PROGRESS NOTES
ASSESSMENT/PLAN                                                      (M54.50) Acute left-sided low back pain without sciatica  (primary encounter diagnosis)  Plan: Medrol Dosepak prescribed - patient to use as directed; psoriasis flare anticipated after completing Medrol Dosepak - patient should contact dermatologist preemptively; refills of Flexeril and oxycodone also provided; referral to acute spine physical therapy placed - patient will be contacted to schedule.      Petra Casey MD   [address] [address]  T: [phone], F: [phone]    SUBJECTIVE                                                      Lakhwinder Jones is a very pleasant 42 year old male who presents for ER follow-up:    Patient was seen in the ER 7/17/2023 with acute onset back pain. Symptoms started after lifting his 3-year-old.  Pain is located left lower back with radiation around to the front of his abdomen. CT abdomen pelvis unremarkable. Discharged home with Robaxin and oxycodone and reports no significant improvement with this regimen.    No radiation down leg.  No urinary retention or incontinence.  No fecal incontinence or saddle anesthesia.  No lower extremity numbness, tingling, or weakness.    PMH significant for psoriasis which flares after using oral steroids and history of irritability with oral steroids.    PSH significant for left L4-5 microdiscectomy in 2018.    OBJECTIVE                                                      Pulse 76   Temp 97.5  F (36.4  C) (Tympanic)   Wt (!) 161.9 kg (357 lb)   SpO2 98%   BMI 41.26 kg/m    Constitutional: very uncomfortable appearing   Musculoskeletal: antalgic gait due to pain     ---    (Note documentation was completed, in part, with [employer] voice-recognition software. Documentation was reviewed, but some grammatical, spelling, and word errors may remain.)

## 2023-08-13 ENCOUNTER — MYC MEDICAL ADVICE (OUTPATIENT)
Dept: DERMATOLOGY | Facility: CLINIC | Age: 43
End: 2023-08-13
Payer: COMMERCIAL

## 2023-08-13 DIAGNOSIS — L40.9 PSORIASIS: Primary | ICD-10-CM

## 2023-08-16 ENCOUNTER — TELEPHONE (OUTPATIENT)
Dept: DERMATOLOGY | Facility: CLINIC | Age: 43
End: 2023-08-16

## 2023-08-16 ENCOUNTER — VIRTUAL VISIT (OUTPATIENT)
Dept: DERMATOLOGY | Facility: CLINIC | Age: 43
End: 2023-08-16
Payer: COMMERCIAL

## 2023-08-16 DIAGNOSIS — L40.9 PSORIASIS: ICD-10-CM

## 2023-08-16 PROCEDURE — 99213 OFFICE O/P EST LOW 20 MIN: CPT | Mod: 95 | Performed by: PHYSICIAN ASSISTANT

## 2023-08-16 NOTE — TELEPHONE ENCOUNTER
Prior Authorization Not Needed per Insurance    Medication: SKYRIZI 150 MG/ML SC ManzamaY  Insurance Company: [employer] - Phone [phone] Fax [phone]  Expected CoPay:    $0  Pharmacy Filling the Rx:  [facility]  Pharmacy Notified:  yes  Patient Notified:  yes via gill Maxwell CphT  [facility] [email or web address]  Phone: [phone]  Fax: [phone]

## 2023-08-16 NOTE — PROGRESS NOTES
"Lakhwinder Jones is a 42 year old male who is being evaluated via a video  visit.    The patient has been notified of following:   \"This video visit will be conducted via a call between you and your physician/provider. We have found that certain health care needs can be provided without the need for an in-person physical exam.  This service lets us provide the care you need with a video conversation.  If a prescription is necessary we can send it directly to your pharmacy.  If lab work is needed we can place an order for that and you can then stop by our lab to have the test done at a later time.  Video  visits are billed at different rates depending on your insurance coverage.  Please reach out to your insurance provider with any questions.  If during the course of the call the physician/provider feels a video  visit is not appropriate, you will not be charged for this service.\"  Patient has given verbal consent for video  visit? Yes  Lakhwinder Jones is a 42 year old year old male patient here today for recheck psoriasis. He notes his skin was initially doing well for first few months on cosentyx but then began to flare. He notes has had psoriasis since he was 17 years old more recently developed joint pain. He has already tried and failed methotrexate, otezla, humira, stelara and enbrel. He is interested in finding another treatment. He reports joint pain is difficult to preform normal daily functions. Patient has no other skin complaints today.  Remainder of the HPI, Meds, PMH, Allergies, FH, and SH was reviewed in chart.    Pertinent Hx:  Psoriasis   Past Medical History:   Diagnosis Date    Anxiety     Back pain     chronic    Obesity     Psoriatic arthritis (H)        Past Surgical History:   Procedure Laterality Date    DISCECTOMY LUMBAR POSTERIOR MICROSCOPIC ONE LEVEL Left 11/1/2018    Procedure: Left Lumbar 4-5 Microdisectomy ;  Surgeon: Favio Miranda MD;  Location: [facility]    FOOT SURGERY " Right     NOSE SURGERY      3 times    right elbow surgey      14 yo    TESTICLE SURGERY      23 yo        Family History   Problem Relation Age of Onset    Obesity Mother     Diabetes Father     Coronary Artery Disease Father     Hypertension Father     Hyperlipidemia Father     Depression Father     Anxiety Disorder Father     Mental Illness Father     Substance Abuse Father     Breast Cancer Maternal Grandmother     Depression Maternal Grandmother     Mental Illness Maternal Grandmother     Substance Abuse Maternal Grandmother     Prostate Cancer Maternal Grandfather     Diabetes Paternal Grandmother     Breast Cancer Paternal Grandmother     Depression Paternal Grandmother     Mental Illness Paternal Grandmother     Diabetes Paternal Grandfather     Asthma Brother     Diabetes Paternal Uncle     Cerebrovascular Disease No family hx of     Anesthesia Reaction No family hx of     Osteoporosis No family hx of     Genetic Disorder No family hx of     Thyroid Disease No family hx of     Liver Disease No family hx of        Social History     Socioeconomic History    Marital status:      Spouse name: Shereen     Number of children: 0    Years of education: Not on file    Highest education level: Not on file   Occupational History    Not on file   Tobacco Use    Smoking status: Former     Packs/day: 0.20     Years: 10.00     Pack years: 2.00     Types: Cigarettes     Quit date: 2015     Years since quittin.6    Smokeless tobacco: Never   Vaping Use    Vaping Use: Never used   Substance and Sexual Activity    Alcohol use: Yes     Alcohol/week: 8.0 standard drinks of alcohol     Types: 8 Standard drinks or equivalent per week     Comment: sober 3 years    Drug use: Yes     Types: Marijuana     Comment: PRN pain    Sexual activity: Yes     Partners: Female     Birth control/protection: None   Other Topics Concern    Parent/sibling w/ CABG, MI or angioplasty before 65F 55M? Not Asked   Social History  Narrative    Not on file     Social Determinants of Health     Financial Resource Strain: Not on file   Food Insecurity: Not on file   Transportation Needs: Not on file   Physical Activity: Not on file   Stress: Not on file   Social Connections: Not on file   Intimate Partner Violence: Not on file   Housing Stability: Not on file       Outpatient Encounter Medications as of 8/16/2023   Medication Sig Dispense Refill    Acetaminophen (TYLENOL PO) Take 1,000 mg by mouth as needed for mild pain or fever      citalopram (CELEXA) 40 MG tablet Take 1 tablet (40 mg) by mouth daily (Due for clinic appointment before future refills) 30 tablet 0    cyclobenzaprine (FLEXERIL) 10 MG tablet Take 1 tablet (10 mg) by mouth 3 times daily as needed for muscle spasms 30 tablet 0    lamoTRIgine (LAMICTAL) 150 MG tablet Take 150 mg by mouth daily      methocarbamol (ROBAXIN) 500 MG tablet Take 2 tablets (1,000 mg) by mouth 4 times daily as needed for muscle spasms 20 tablet 0    methylPREDNISolone (MEDROL DOSEPAK) 4 MG tablet therapy pack Follow Package Directions 21 tablet 0    multivitamin w/minerals (THERA-VIT-M) tablet Take 1 tablet by mouth every morning      NONFORMULARY as needed CBD OIL      oxyCODONE (ROXICODONE) 5 MG tablet Take 1 tablet (5 mg) by mouth every 6 hours as needed for pain 12 tablet 0    Risankizumab-rzaa (SKYRIZI) 150 MG/ML subcutaneous Inject 1 mL (150 mg) Subcutaneous See Admin Instructions - 1 injection on day 0, 1 injection on day 28, then 1 injection every 12 weeks (Patient not taking: Reported on 8/16/2023) 1 mL 1    Risankizumab-rzaa (SKYRIZI) 150 MG/ML subcutaneous Inject 1 mL (150 mg) Subcutaneous every 3 months (Patient not taking: Reported on 8/16/2023) 1 mL 2    risperiDONE (RISPERDAL) 1 MG tablet Take 1 mg by mouth 2 times daily      Secukinumab, 300 MG Dose, 150 MG/ML SOAJ Inject 300 mg Subcutaneous every 28 days 2 mL 4    ustekinumab (STELARA) 90 MG/ML INJECT 90 MG (1 ML) UNDER THE SKIN EVERY 12  WEEKS. WEIGHT 327 LB (GREATER THAN 100 KG) 1 mL 3     No facility-administered encounter medications on file as of 8/16/2023.             O:   Alert & Orientedx3, Mood & Affect wnl,    General appearance normal   Alert, oriented and in no acute distress     Photos: psoriasiform plaques on arm, legs, torso >20% BSA      Pulm: Breathing Normal, talking in normal sentences, no shortness of breath during conversation    Neuro/Psych: Orientation:Alert and Orientedx3 ; Mood/Affect:normal ; no anxiety or depression     A/P:  Psoriasis   Stop cosentyx. Start skyrizi.   Check cbc, cmp, quantiferon next office visit.   Skin care regimen reviewed with patient: Eliminate harsh soaps, i.e. Dial, zest, irsih spring; Mild soaps such as Cetaphil or Dove sensitive skin, avoid hot or cold showers, aggressive use of emollients including vanicream, cetaphil or cerave discussed with patient.    Return to clinic 4 months.   Doximity was used.   Provider if office.  Patient at home.   Time spent: 5 minutes.

## 2023-08-16 NOTE — LETTER
"    8/16/2023         RE: Lakhwinder Jones  [address]  [place] [address]        Dear Colleague,    Thank you for referring your patient, Lakhwinder Jones, to the [facility]. Please see a copy of my visit note below.    Lakhwinder Jones is a 42 year old male who is being evaluated via a video  visit.    The patient has been notified of following:   \"This video visit will be conducted via a call between you and your physician/provider. We have found that certain health care needs can be provided without the need for an in-person physical exam.  This service lets us provide the care you need with a video conversation.  If a prescription is necessary we can send it directly to your pharmacy.  If lab work is needed we can place an order for that and you can then stop by our lab to have the test done at a later time.  Video  visits are billed at different rates depending on your insurance coverage.  Please reach out to your insurance provider with any questions.  If during the course of the call the physician/provider feels a video  visit is not appropriate, you will not be charged for this service.\"  Patient has given verbal consent for video  visit? Yes  Lakhwinder Jones is a 42 year old year old male patient here today for recheck psoriasis. He notes his skin was initially doing well for first few months on cosentyx but then began to flare. He notes has had psoriasis since he was 17 years old more recently developed joint pain. He has already tried and failed methotrexate, otezla, humira, stelara and enbrel. He is interested in finding another treatment. He reports joint pain is difficult to preform normal daily functions. Patient has no other skin complaints today.  Remainder of the HPI, Meds, PMH, Allergies, FH, and SH was reviewed in chart.    Pertinent Hx:  Psoriasis   Past Medical History:   Diagnosis Date     Anxiety      Back pain     chronic     Obesity      Psoriatic " arthritis (H)        Past Surgical History:   Procedure Laterality Date     DISCECTOMY LUMBAR POSTERIOR MICROSCOPIC ONE LEVEL Left 2018    Procedure: Left Lumbar 4-5 Microdisectomy ;  Surgeon: Favio Miranda MD;  [facility]     FOOT SURGERY Right      NOSE SURGERY      3 times     right elbow surgey      12 yo     TESTICLE SURGERY      23 yo        Family History   Problem Relation Age of Onset     Obesity Mother      Diabetes Father      Coronary Artery Disease Father      Hypertension Father      Hyperlipidemia Father      Depression Father      Anxiety Disorder Father      Mental Illness Father      Substance Abuse Father      Breast Cancer Maternal Grandmother      Depression Maternal Grandmother      Mental Illness Maternal Grandmother      Substance Abuse Maternal Grandmother      Prostate Cancer Maternal Grandfather      Diabetes Paternal Grandmother      Breast Cancer Paternal Grandmother      Depression Paternal Grandmother      Mental Illness Paternal Grandmother      Diabetes Paternal Grandfather      Asthma Brother      Diabetes Paternal Uncle      Cerebrovascular Disease No family hx of      Anesthesia Reaction No family hx of      Osteoporosis No family hx of      Genetic Disorder No family hx of      Thyroid Disease No family hx of      Liver Disease No family hx of        Social History     Socioeconomic History     Marital status:      Spouse name: Shereen      Number of children: 0     Years of education: Not on file     Highest education level: Not on file   Occupational History     Not on file   Tobacco Use     Smoking status: Former     Packs/day: 0.20     Years: 10.00     Pack years: 2.00     Types: Cigarettes     Quit date: 2015     Years since quittin.6     Smokeless tobacco: Never   Vaping Use     Vaping Use: Never used   Substance and Sexual Activity     Alcohol use: Yes     Alcohol/week: 8.0 standard drinks of alcohol     Types: 8 Standard drinks or  equivalent per week     Comment: sober 3 years     Drug use: Yes     Types: Marijuana     Comment: PRN pain     Sexual activity: Yes     Partners: Female     Birth control/protection: None   Other Topics Concern     Parent/sibling w/ CABG, MI or angioplasty before 65F 55M? Not Asked   Social History Narrative     Not on file     Social Determinants of Health     Financial Resource Strain: Not on file   Food Insecurity: Not on file   Transportation Needs: Not on file   Physical Activity: Not on file   Stress: Not on file   Social Connections: Not on file   Intimate Partner Violence: Not on file   Housing Stability: Not on file       Outpatient Encounter Medications as of 8/16/2023   Medication Sig Dispense Refill     Acetaminophen (TYLENOL PO) Take 1,000 mg by mouth as needed for mild pain or fever       citalopram (CELEXA) 40 MG tablet Take 1 tablet (40 mg) by mouth daily (Due for clinic appointment before future refills) 30 tablet 0     cyclobenzaprine (FLEXERIL) 10 MG tablet Take 1 tablet (10 mg) by mouth 3 times daily as needed for muscle spasms 30 tablet 0     lamoTRIgine (LAMICTAL) 150 MG tablet Take 150 mg by mouth daily       methocarbamol (ROBAXIN) 500 MG tablet Take 2 tablets (1,000 mg) by mouth 4 times daily as needed for muscle spasms 20 tablet 0     methylPREDNISolone (MEDROL DOSEPAK) 4 MG tablet therapy pack Follow Package Directions 21 tablet 0     multivitamin w/minerals (THERA-VIT-M) tablet Take 1 tablet by mouth every morning       NONFORMULARY as needed CBD OIL       oxyCODONE (ROXICODONE) 5 MG tablet Take 1 tablet (5 mg) by mouth every 6 hours as needed for pain 12 tablet 0     Risankizumab-rzaa (SKYRIZI) 150 MG/ML subcutaneous Inject 1 mL (150 mg) Subcutaneous See Admin Instructions - 1 injection on day 0, 1 injection on day 28, then 1 injection every 12 weeks (Patient not taking: Reported on 8/16/2023) 1 mL 1     Risankizumab-rzaa (SKYRIZI) 150 MG/ML subcutaneous Inject 1 mL (150 mg)  Subcutaneous every 3 months (Patient not taking: Reported on 8/16/2023) 1 mL 2     risperiDONE (RISPERDAL) 1 MG tablet Take 1 mg by mouth 2 times daily       Secukinumab, 300 MG Dose, 150 MG/ML SOAJ Inject 300 mg Subcutaneous every 28 days 2 mL 4     ustekinumab (STELARA) 90 MG/ML INJECT 90 MG (1 ML) UNDER THE SKIN EVERY 12 WEEKS. WEIGHT 327 LB (GREATER THAN 100 KG) 1 mL 3     No facility-administered encounter medications on file as of 8/16/2023.             O:   Alert & Orientedx3, Mood & Affect wnl,    General appearance normal   Alert, oriented and in no acute distress     Photos: psoriasiform plaques on arm, legs, torso >20% BSA      Pulm: Breathing Normal, talking in normal sentences, no shortness of breath during conversation    Neuro/Psych: Orientation:Alert and Orientedx3 ; Mood/Affect:normal ; no anxiety or depression     A/P:  Psoriasis   Stop cosentyx. Start skyrizi.   Check cbc, cmp, quantiferon next office visit.   Skin care regimen reviewed with patient: Eliminate harsh soaps, i.e. Dial, zest, irsih spring; Mild soaps such as Cetaphil or Dove sensitive skin, avoid hot or cold showers, aggressive use of emollients including vanicream, cetaphil or cerave discussed with patient.    Return to clinic 4 months.   Doximity was used.   Provider if office.  Patient at home.   Time spent: 5 minutes.       Again, thank you for allowing me to participate in the care of your patient.        Sincerely,        Mirella Ochoa PA-C

## 2023-08-16 NOTE — TELEPHONE ENCOUNTER
Spoke with patient and scheduled for a return video appointment with Mirella Anguiano PA-C at 1:45 pm. Patient has a flexible schedule and is open to a visit prior to this time. Photo link sent and patient o download photos for visit.    Ad JOHNSON

## 2023-10-13 ENCOUNTER — OFFICE VISIT (OUTPATIENT)
Dept: URGENT CARE | Facility: URGENT CARE | Age: 43
End: 2023-10-13
Payer: COMMERCIAL

## 2023-10-13 ENCOUNTER — MYC REFILL (OUTPATIENT)
Dept: FAMILY MEDICINE | Facility: CLINIC | Age: 43
End: 2023-10-13

## 2023-10-13 VITALS
BODY MASS INDEX: 42.86 KG/M2 | TEMPERATURE: 97.1 F | WEIGHT: 315 LBS | SYSTOLIC BLOOD PRESSURE: 168 MMHG | RESPIRATION RATE: 18 BRPM | OXYGEN SATURATION: 97 % | HEART RATE: 86 BPM | DIASTOLIC BLOOD PRESSURE: 101 MMHG

## 2023-10-13 DIAGNOSIS — F41.9 ANXIETY: Primary | ICD-10-CM

## 2023-10-13 PROCEDURE — 99214 OFFICE O/P EST MOD 30 MIN: CPT

## 2023-10-13 ASSESSMENT — ANXIETY QUESTIONNAIRES
IF YOU CHECKED OFF ANY PROBLEMS ON THIS QUESTIONNAIRE, HOW DIFFICULT HAVE THESE PROBLEMS MADE IT FOR YOU TO DO YOUR WORK, TAKE CARE OF THINGS AT HOME, OR GET ALONG WITH OTHER PEOPLE: SOMEWHAT DIFFICULT
7. FEELING AFRAID AS IF SOMETHING AWFUL MIGHT HAPPEN: NOT AT ALL
6. BECOMING EASILY ANNOYED OR IRRITABLE: SEVERAL DAYS
3. WORRYING TOO MUCH ABOUT DIFFERENT THINGS: NEARLY EVERY DAY
2. NOT BEING ABLE TO STOP OR CONTROL WORRYING: NEARLY EVERY DAY
1. FEELING NERVOUS, ANXIOUS, OR ON EDGE: NEARLY EVERY DAY
GAD7 TOTAL SCORE: 13
GAD7 TOTAL SCORE: 13
5. BEING SO RESTLESS THAT IT IS HARD TO SIT STILL: NOT AT ALL

## 2023-10-13 ASSESSMENT — PATIENT HEALTH QUESTIONNAIRE - PHQ9
SUM OF ALL RESPONSES TO PHQ QUESTIONS 1-9: 16
5. POOR APPETITE OR OVEREATING: NEARLY EVERY DAY

## 2023-10-13 NOTE — PROGRESS NOTES
Assessment & Plan   (F41.9) Anxiety  (primary encounter diagnosis)    Discussed the ability to provide the patient with a hydroxyzine prescription which patient indicated does not work for him.  Informed him we do not have any medications to administer for anxiety in the urgent care.  We discussed benadryl as a similar medication to hydroxyzine but the patient indicated he needs something stronger to turn off his thoughts.  Patient was informed to go to an emergency department for further evaluation and treatment.  Patient acknowledged his understanding of the above plan.      The use of Dragon/[employer] dictation services may have been used to construct the content in this note; any grammatical or spelling errors are non-intentional. Please contact the author of this note directly if you are in need of any clarification.      LINNEA Rico [facility]    Vale Keane is a 43 year old male who presents to clinic today for the following health issues:  Chief Complaint   Patient presents with    Anxiety     States he just does not feel good     HPI  The patient reports being under more stress than usual with returning to work, not being able to get into to see his psychiatrist and thinking about things from 15 years ago.  The patient indicates he feels safe and does not have feelings of hurting himself or anyone else.  [id number] 13.      ROS:  Negative except noted above.    Review of Systems        Objective    BP (!) 168/101 (BP Location: Left arm, Patient Position: Sitting, Cuff Size: Adult Large)   Pulse 86   Temp 97.1  F (36.2  C) (Tympanic)   Resp 18   Wt (!) 168.2 kg (370 lb 14.4 oz)   SpO2 97%   BMI 42.86 kg/m    Physical Exam   GENERAL: healthy, alert and no distress  PSYCH: mentation appears normal, anxious, and judgement and insight intact

## 2023-10-14 NOTE — PATIENT INSTRUCTIONS
Given the severity of your symptoms, you should proceed to an emergency department for further evaluation and treatment.

## 2023-10-16 RX ORDER — RISPERIDONE 1 MG/1
1 TABLET ORAL 2 TIMES DAILY
OUTPATIENT
Start: 2023-10-16

## 2023-10-16 NOTE — TELEPHONE ENCOUNTER
I cannot refill Risperdal. This needs to come from his specialist. Also I cannot refill any other meds for him in the future. I only saw him once in 1/2022. He has not return for follow up. He needs an appointment to re-establish care.

## 2023-10-19 NOTE — TELEPHONE ENCOUNTER
Called pt and notified him of provider msg. He was able to get in to see his psychiatrist nd get this renewed. Pt said he will call to set up an appt if he needs anything from PCP-

## 2023-10-23 ENCOUNTER — MYC REFILL (OUTPATIENT)
Dept: INTERNAL MEDICINE | Facility: CLINIC | Age: 43
End: 2023-10-23
Payer: COMMERCIAL

## 2023-10-23 DIAGNOSIS — M54.50 ACUTE LEFT-SIDED LOW BACK PAIN WITHOUT SCIATICA: Primary | ICD-10-CM

## 2023-10-23 RX ORDER — CYCLOBENZAPRINE HCL 10 MG
10 TABLET ORAL 3 TIMES DAILY PRN
Qty: 30 TABLET | Refills: 0 | Status: SHIPPED | OUTPATIENT
Start: 2023-10-23 | End: 2024-01-14

## 2024-01-14 ENCOUNTER — MYC REFILL (OUTPATIENT)
Dept: INTERNAL MEDICINE | Facility: CLINIC | Age: 44
End: 2024-01-14
Payer: COMMERCIAL

## 2024-01-14 DIAGNOSIS — M54.50 ACUTE LEFT-SIDED LOW BACK PAIN WITHOUT SCIATICA: ICD-10-CM

## 2024-01-15 RX ORDER — CYCLOBENZAPRINE HCL 10 MG
10 TABLET ORAL 3 TIMES DAILY PRN
Qty: 30 TABLET | Refills: 0 | Status: SHIPPED | OUTPATIENT
Start: 2024-01-15

## 2024-06-01 ENCOUNTER — HEALTH MAINTENANCE LETTER (OUTPATIENT)
Age: 44
End: 2024-06-01

## 2024-07-22 ENCOUNTER — LAB REQUISITION (OUTPATIENT)
Dept: LAB | Facility: CLINIC | Age: 44
End: 2024-07-22
Payer: COMMERCIAL

## 2024-07-22 DIAGNOSIS — M46.26 OSTEOMYELITIS OF VERTEBRA, LUMBAR REGION (H): ICD-10-CM

## 2024-07-22 LAB
AST SERPL W P-5'-P-CCNC: 41 U/L (ref 0–45)
BASOPHILS # BLD AUTO: 0.1 10E3/UL (ref 0–0.2)
BASOPHILS NFR BLD AUTO: 1 %
BUN SERPL-MCNC: 11.6 MG/DL (ref 6–20)
CREAT SERPL-MCNC: 0.89 MG/DL (ref 0.67–1.17)
CRP SERPL-MCNC: 13.37 MG/L
EGFRCR SERPLBLD CKD-EPI 2021: >90 ML/MIN/1.73M2
EOSINOPHIL # BLD AUTO: 0.3 10E3/UL (ref 0–0.7)
EOSINOPHIL NFR BLD AUTO: 3 %
ERYTHROCYTE [DISTWIDTH] IN BLOOD BY AUTOMATED COUNT: 12.2 % (ref 10–15)
HCT VFR BLD AUTO: 34.2 % (ref 40–53)
HGB BLD-MCNC: 11.8 G/DL (ref 13.3–17.7)
HOLD SPECIMEN: NORMAL
IMM GRANULOCYTES # BLD: 0.1 10E3/UL
IMM GRANULOCYTES NFR BLD: 1 %
LYMPHOCYTES # BLD AUTO: 1.9 10E3/UL (ref 0.8–5.3)
LYMPHOCYTES NFR BLD AUTO: 24 %
MCH RBC QN AUTO: 29.7 PG (ref 26.5–33)
MCHC RBC AUTO-ENTMCNC: 34.5 G/DL (ref 31.5–36.5)
MCV RBC AUTO: 86 FL (ref 78–100)
MONOCYTES # BLD AUTO: 0.7 10E3/UL (ref 0–1.3)
MONOCYTES NFR BLD AUTO: 9 %
NEUTROPHILS # BLD AUTO: 4.8 10E3/UL (ref 1.6–8.3)
NEUTROPHILS NFR BLD AUTO: 62 %
NRBC # BLD AUTO: 0 10E3/UL
NRBC BLD AUTO-RTO: 0 /100
PLATELET # BLD AUTO: 295 10E3/UL (ref 150–450)
RBC # BLD AUTO: 3.97 10E6/UL (ref 4.4–5.9)
WBC # BLD AUTO: 7.8 10E3/UL (ref 4–11)

## 2024-07-22 PROCEDURE — 86140 C-REACTIVE PROTEIN: CPT | Performed by: INTERNAL MEDICINE

## 2024-07-22 PROCEDURE — 84450 TRANSFERASE (AST) (SGOT): CPT | Performed by: INTERNAL MEDICINE

## 2024-07-22 PROCEDURE — 84520 ASSAY OF UREA NITROGEN: CPT | Performed by: INTERNAL MEDICINE

## 2024-07-22 PROCEDURE — 85025 COMPLETE CBC W/AUTO DIFF WBC: CPT | Performed by: INTERNAL MEDICINE

## 2024-07-22 PROCEDURE — 82565 ASSAY OF CREATININE: CPT | Performed by: INTERNAL MEDICINE

## 2024-07-29 ENCOUNTER — LAB REQUISITION (OUTPATIENT)
Dept: LAB | Facility: CLINIC | Age: 44
End: 2024-07-29
Payer: COMMERCIAL

## 2024-07-29 DIAGNOSIS — M46.26 OSTEOMYELITIS OF VERTEBRA, LUMBAR REGION (H): ICD-10-CM

## 2024-07-29 LAB
AST SERPL W P-5'-P-CCNC: 26 U/L (ref 0–45)
BASOPHILS # BLD AUTO: 0.1 10E3/UL (ref 0–0.2)
BASOPHILS NFR BLD AUTO: 1 %
BUN SERPL-MCNC: 14.7 MG/DL (ref 6–20)
CREAT SERPL-MCNC: 0.72 MG/DL (ref 0.67–1.17)
CRP SERPL-MCNC: 3.6 MG/L
EGFRCR SERPLBLD CKD-EPI 2021: >90 ML/MIN/1.73M2
EOSINOPHIL # BLD AUTO: 0.2 10E3/UL (ref 0–0.7)
EOSINOPHIL NFR BLD AUTO: 2 %
ERYTHROCYTE [DISTWIDTH] IN BLOOD BY AUTOMATED COUNT: 12.5 % (ref 10–15)
HCT VFR BLD AUTO: 36.7 % (ref 40–53)
HGB BLD-MCNC: 12.9 G/DL (ref 13.3–17.7)
HOLD SPECIMEN: NORMAL
IMM GRANULOCYTES # BLD: 0.1 10E3/UL
IMM GRANULOCYTES NFR BLD: 1 %
LYMPHOCYTES # BLD AUTO: 1.9 10E3/UL (ref 0.8–5.3)
LYMPHOCYTES NFR BLD AUTO: 22 %
MCH RBC QN AUTO: 29.9 PG (ref 26.5–33)
MCHC RBC AUTO-ENTMCNC: 35.1 G/DL (ref 31.5–36.5)
MCV RBC AUTO: 85 FL (ref 78–100)
MONOCYTES # BLD AUTO: 0.7 10E3/UL (ref 0–1.3)
MONOCYTES NFR BLD AUTO: 8 %
NEUTROPHILS # BLD AUTO: 5.7 10E3/UL (ref 1.6–8.3)
NEUTROPHILS NFR BLD AUTO: 66 %
NRBC # BLD AUTO: 0 10E3/UL
NRBC BLD AUTO-RTO: 0 /100
PLATELET # BLD AUTO: 265 10E3/UL (ref 150–450)
RBC # BLD AUTO: 4.32 10E6/UL (ref 4.4–5.9)
WBC # BLD AUTO: 8.7 10E3/UL (ref 4–11)

## 2024-07-29 PROCEDURE — 84450 TRANSFERASE (AST) (SGOT): CPT | Performed by: INTERNAL MEDICINE

## 2024-07-29 PROCEDURE — 84520 ASSAY OF UREA NITROGEN: CPT | Performed by: INTERNAL MEDICINE

## 2024-07-29 PROCEDURE — 85025 COMPLETE CBC W/AUTO DIFF WBC: CPT | Performed by: INTERNAL MEDICINE

## 2024-07-29 PROCEDURE — 82565 ASSAY OF CREATININE: CPT | Performed by: INTERNAL MEDICINE

## 2024-07-29 PROCEDURE — 86140 C-REACTIVE PROTEIN: CPT | Performed by: INTERNAL MEDICINE

## 2024-08-01 DIAGNOSIS — L40.9 PSORIASIS: ICD-10-CM

## 2024-08-01 NOTE — TELEPHONE ENCOUNTER
I see in the chart it says osteomyelitis, is he currently being treated for this.   If so needs to hold for infection and then we can restart after.

## 2024-08-02 NOTE — TELEPHONE ENCOUNTER
Patient Contact    Attempt #1    Was call answered? No    Left a voicemail asking patient to give us a call back at our main dermatology line at [phone]    Daria QURESHI RN BSN  [facility]  [phone]

## 2024-08-06 ENCOUNTER — LAB REQUISITION (OUTPATIENT)
Dept: LAB | Facility: CLINIC | Age: 44
End: 2024-08-06
Payer: COMMERCIAL

## 2024-08-06 DIAGNOSIS — M46.26 OSTEOMYELITIS OF VERTEBRA, LUMBAR REGION (H): ICD-10-CM

## 2024-08-06 LAB
AST SERPL W P-5'-P-CCNC: 26 U/L (ref 0–45)
BUN SERPL-MCNC: 10.3 MG/DL (ref 6–20)
CREAT SERPL-MCNC: 0.63 MG/DL (ref 0.67–1.17)
CRP SERPL-MCNC: 5.73 MG/L
EGFRCR SERPLBLD CKD-EPI 2021: >90 ML/MIN/1.73M2
HGB BLD-MCNC: 13.6 G/DL (ref 13.3–17.7)
HOLD SPECIMEN: NORMAL

## 2024-08-06 PROCEDURE — 84520 ASSAY OF UREA NITROGEN: CPT | Performed by: INTERNAL MEDICINE

## 2024-08-06 PROCEDURE — 85025 COMPLETE CBC W/AUTO DIFF WBC: CPT | Performed by: INTERNAL MEDICINE

## 2024-08-06 PROCEDURE — 84450 TRANSFERASE (AST) (SGOT): CPT | Performed by: INTERNAL MEDICINE

## 2024-08-06 PROCEDURE — 82565 ASSAY OF CREATININE: CPT | Performed by: INTERNAL MEDICINE

## 2024-08-06 PROCEDURE — 85018 HEMOGLOBIN: CPT | Performed by: INTERNAL MEDICINE

## 2024-08-06 PROCEDURE — 86140 C-REACTIVE PROTEIN: CPT | Performed by: INTERNAL MEDICINE

## 2024-08-07 ENCOUNTER — HOME INFUSION (OUTPATIENT)
Dept: FAMILY MEDICINE | Facility: CLINIC | Age: 44
End: 2024-08-07
Payer: COMMERCIAL

## 2024-08-07 DIAGNOSIS — M46.26 OSTEOMYELITIS OF VERTEBRA OF LUMBAR REGION (H): Primary | ICD-10-CM

## 2024-08-07 LAB
BASOPHILS # BLD AUTO: 0.1 10E3/UL (ref 0–0.2)
BASOPHILS NFR BLD AUTO: 1 %
EOSINOPHIL # BLD AUTO: 0.2 10E3/UL (ref 0–0.7)
EOSINOPHIL NFR BLD AUTO: 3 %
ERYTHROCYTE [DISTWIDTH] IN BLOOD BY AUTOMATED COUNT: 12.4 % (ref 10–15)
HCT VFR BLD AUTO: 38.4 % (ref 40–53)
HGB BLD-MCNC: 13.4 G/DL (ref 13.3–17.7)
IMM GRANULOCYTES # BLD: 0 10E3/UL
IMM GRANULOCYTES NFR BLD: 1 %
LYMPHOCYTES # BLD AUTO: 1 10E3/UL (ref 0.8–5.3)
LYMPHOCYTES NFR BLD AUTO: 15 %
MCH RBC QN AUTO: 29.5 PG (ref 26.5–33)
MCHC RBC AUTO-ENTMCNC: 34.9 G/DL (ref 31.5–36.5)
MCV RBC AUTO: 85 FL (ref 78–100)
MONOCYTES # BLD AUTO: 0.5 10E3/UL (ref 0–1.3)
MONOCYTES NFR BLD AUTO: 7 %
NEUTROPHILS # BLD AUTO: 4.9 10E3/UL (ref 1.6–8.3)
NEUTROPHILS NFR BLD AUTO: 73 %
NRBC # BLD AUTO: 0 10E3/UL
NRBC BLD AUTO-RTO: 0 /100
PLATELET # BLD AUTO: 219 10E3/UL (ref 150–450)
RBC # BLD AUTO: 4.54 10E6/UL (ref 4.4–5.9)
WBC # BLD AUTO: 6.8 10E3/UL (ref 4–11)

## 2024-08-07 NOTE — TELEPHONE ENCOUNTER
Patient Contact    Attempt #2    Was call answered? No    Voicemail full. Sent [employer] message.     Daria QURESHI RN BSN  [facility]  [phone]

## 2024-08-13 ENCOUNTER — LAB REQUISITION (OUTPATIENT)
Dept: LAB | Facility: CLINIC | Age: 44
End: 2024-08-13
Payer: COMMERCIAL

## 2024-08-13 DIAGNOSIS — M46.26 OSTEOMYELITIS OF VERTEBRA, LUMBAR REGION (H): ICD-10-CM

## 2024-08-13 LAB
AST SERPL W P-5'-P-CCNC: 20 U/L (ref 0–45)
BASOPHILS # BLD AUTO: 0.1 10E3/UL (ref 0–0.2)
BASOPHILS NFR BLD AUTO: 2 %
BUN SERPL-MCNC: 10.6 MG/DL (ref 6–20)
CREAT SERPL-MCNC: 0.62 MG/DL (ref 0.67–1.17)
CRP SERPL-MCNC: <3 MG/L
EGFRCR SERPLBLD CKD-EPI 2021: >90 ML/MIN/1.73M2
EOSINOPHIL # BLD AUTO: 0.2 10E3/UL (ref 0–0.7)
EOSINOPHIL NFR BLD AUTO: 3 %
ERYTHROCYTE [DISTWIDTH] IN BLOOD BY AUTOMATED COUNT: 12.8 % (ref 10–15)
HCT VFR BLD AUTO: 39.5 % (ref 40–53)
HGB BLD-MCNC: 13.6 G/DL (ref 13.3–17.7)
IMM GRANULOCYTES # BLD: 0 10E3/UL
IMM GRANULOCYTES NFR BLD: 0 %
LYMPHOCYTES # BLD AUTO: 1.8 10E3/UL (ref 0.8–5.3)
LYMPHOCYTES NFR BLD AUTO: 28 %
MCH RBC QN AUTO: 29.8 PG (ref 26.5–33)
MCHC RBC AUTO-ENTMCNC: 34.4 G/DL (ref 31.5–36.5)
MCV RBC AUTO: 87 FL (ref 78–100)
MONOCYTES # BLD AUTO: 0.4 10E3/UL (ref 0–1.3)
MONOCYTES NFR BLD AUTO: 7 %
NEUTROPHILS # BLD AUTO: 4 10E3/UL (ref 1.6–8.3)
NEUTROPHILS NFR BLD AUTO: 60 %
NRBC # BLD AUTO: 0 10E3/UL
NRBC BLD AUTO-RTO: 0 /100
PLATELET # BLD AUTO: 195 10E3/UL (ref 150–450)
RBC # BLD AUTO: 4.56 10E6/UL (ref 4.4–5.9)
WBC # BLD AUTO: 6.6 10E3/UL (ref 4–11)

## 2024-08-13 PROCEDURE — 82565 ASSAY OF CREATININE: CPT | Performed by: INTERNAL MEDICINE

## 2024-08-13 PROCEDURE — 84450 TRANSFERASE (AST) (SGOT): CPT | Performed by: INTERNAL MEDICINE

## 2024-08-13 PROCEDURE — 86140 C-REACTIVE PROTEIN: CPT | Performed by: INTERNAL MEDICINE

## 2024-08-13 PROCEDURE — 84520 ASSAY OF UREA NITROGEN: CPT | Performed by: INTERNAL MEDICINE

## 2024-08-13 PROCEDURE — 85004 AUTOMATED DIFF WBC COUNT: CPT | Performed by: INTERNAL MEDICINE

## 2024-08-14 NOTE — TELEPHONE ENCOUNTER
Patient Contact    Attempt #3    Was call answered? No    Left a voicemail asking patient to give us a call back at our main dermatology line at [phone] or reply to our [employer] message.     Patient read [employer] message- no reply. Routing to provider.     Daria QURESHI RN BSN  [facility]  [phone]

## 2024-08-15 NOTE — TELEPHONE ENCOUNTER
Sent another [employer] message with note from leander below requesting patient reach out to us once infection is cleared to send in refill or have infectious disease send an okay to restart.    See [employer] encounter from 8/7/24.    Daria QURESHI RN BSN  [facility]  [phone]

## 2024-08-15 NOTE — TELEPHONE ENCOUNTER
He is still being treated with antibiotic per chart review I would hold until infection cleared or ok'd to restart by infectious disease.

## 2024-08-20 ENCOUNTER — LAB REQUISITION (OUTPATIENT)
Dept: LAB | Facility: CLINIC | Age: 44
End: 2024-08-20
Payer: COMMERCIAL

## 2024-08-20 DIAGNOSIS — M46.26 OSTEOMYELITIS OF VERTEBRA, LUMBAR REGION (H): ICD-10-CM

## 2024-08-20 LAB
AST SERPL W P-5'-P-CCNC: 22 U/L (ref 0–45)
BASOPHILS # BLD AUTO: 0.1 10E3/UL (ref 0–0.2)
BASOPHILS NFR BLD AUTO: 1 %
BUN SERPL-MCNC: 9.4 MG/DL (ref 6–20)
CREAT SERPL-MCNC: 0.73 MG/DL (ref 0.67–1.17)
CRP SERPL-MCNC: 3.57 MG/L
EGFRCR SERPLBLD CKD-EPI 2021: >90 ML/MIN/1.73M2
EOSINOPHIL # BLD AUTO: 0.2 10E3/UL (ref 0–0.7)
EOSINOPHIL NFR BLD AUTO: 3 %
ERYTHROCYTE [DISTWIDTH] IN BLOOD BY AUTOMATED COUNT: 12.4 % (ref 10–15)
HCT VFR BLD AUTO: 40.1 % (ref 40–53)
HGB BLD-MCNC: 14 G/DL (ref 13.3–17.7)
IMM GRANULOCYTES # BLD: 0 10E3/UL
IMM GRANULOCYTES NFR BLD: 0 %
LYMPHOCYTES # BLD AUTO: 1.5 10E3/UL (ref 0.8–5.3)
LYMPHOCYTES NFR BLD AUTO: 23 %
MCH RBC QN AUTO: 29.6 PG (ref 26.5–33)
MCHC RBC AUTO-ENTMCNC: 34.9 G/DL (ref 31.5–36.5)
MCV RBC AUTO: 85 FL (ref 78–100)
MONOCYTES # BLD AUTO: 0.4 10E3/UL (ref 0–1.3)
MONOCYTES NFR BLD AUTO: 7 %
NEUTROPHILS # BLD AUTO: 4.4 10E3/UL (ref 1.6–8.3)
NEUTROPHILS NFR BLD AUTO: 66 %
NRBC # BLD AUTO: 0 10E3/UL
NRBC BLD AUTO-RTO: 0 /100
PLATELET # BLD AUTO: 239 10E3/UL (ref 150–450)
RBC # BLD AUTO: 4.73 10E6/UL (ref 4.4–5.9)
WBC # BLD AUTO: 6.6 10E3/UL (ref 4–11)

## 2024-08-20 PROCEDURE — 84450 TRANSFERASE (AST) (SGOT): CPT | Performed by: INTERNAL MEDICINE

## 2024-08-20 PROCEDURE — 85025 COMPLETE CBC W/AUTO DIFF WBC: CPT | Performed by: INTERNAL MEDICINE

## 2024-08-20 PROCEDURE — 84520 ASSAY OF UREA NITROGEN: CPT | Performed by: INTERNAL MEDICINE

## 2024-08-20 PROCEDURE — 82565 ASSAY OF CREATININE: CPT | Performed by: INTERNAL MEDICINE

## 2024-08-20 PROCEDURE — 86140 C-REACTIVE PROTEIN: CPT | Performed by: INTERNAL MEDICINE

## 2025-01-26 ENCOUNTER — HEALTH MAINTENANCE LETTER (OUTPATIENT)
Age: 45
End: 2025-01-26

## 2025-06-06 NOTE — TELEPHONE ENCOUNTER
Discharge Instructions  Diverticulitis    Your provider has diagnosed you with diverticulitis.  Diverticulitis is inflammation of a diverticulum, which is a tiny sack-like structure that protrudes off the wall of the colon (large intestine).  These sacks are created over years of increased pressure in the colon (this is diverticulosis), usually as a result of a diet without enough fruits, vegetables, and whole grains. Diverticulosis doesn't cause symptoms.    When these sacks get inflamed, it is called divericulitis.  Diverticulitis causes abdominal (belly) pain, fever, nausea (sick to stomach), and vomiting (throwing up). Diverticulitis is usually treated at home.  However, sometimes diverticulitis needs treatment in the hospital and may even need surgery.      Generally, every Emergency Department visit should have a follow-up clinic visit with either a primary or a specialty clinic/provider. Please follow-up as instructed by your emergency provider today.    Return to the Emergency Department if:   You get an oral temperature above 102oF or as directed by your provider.  You have blood in your stools (bright red or black, tarry stools), or have blood in your vomit.  You keep vomiting or cannot drink liquids or cannot keep your medicine down.  You cannot have a bowel movement or you cannot pass gas.  Your stomach gets bloated or bigger.  You faint or become very weak.  Your pain is too bad to tolerate.  You have new symptoms or anything that worries you.    What can I do to help myself? How is diverticulitis treated?  Diverticulitis can be treated without antibiotics in many cases. In the past, diverticulitis was routinely treated with antibiotics.  However, research over the past decade has found that antibiotic use in most cases of diverticulitis does not improve healing because the process may be more inflammatory, rather than infectious.  Antibiotics also wipe out good, healthy gut bacteria, which we are  [facility]    Phone Message    May a detailed message be left on voicemail: no    Reason for Call: Other: Pt requesting call back to discuss getting his work restrictions lifted and to discuss an appt w/ Dr. Rivera. His appt for today was cancelled due to the provider being out     Action Taken: Message routed to:  [facility] ortho clinic   "increasingly aware are important to overall health.  Some patients with certain medical conditions, lab results, or duration of symptoms may still need antibiotics. If you were directed to take antibiotics, take them right away and be sure to finish the whole antibiotic prescription.  Regardless of whether or not you are treated with antibiotics, for the first day or two at home drink only clear liquids.  This lets your intestines rest.  If your pain has improved after one or two days, you may start eating mild foods. Soda crackers, toast, plain noodles, gelatin, applesauce and bananas are good first choices.  Avoid foods that have acid, are spicy, fatty or fibrous (such as meats, coarse grains, vegetables). You may start eating these foods again in about 3-4 days when you are better.  Once you are back to normal, eat a high fiber diet of fruits, vegetables and whole grains. Some people think you should avoid eating nuts, seeds, and corn, but there is no definite proof this makes any difference in whether you will get diverticulitis again.   Pain and fever can be treated with acetaminophen (Tylenol ), ibuprofen (Advil ) or you might have been prescribed a pain medication.  Hot packs or a heating pad may also feel good.    When Should I Follow Up After I Am Feeling Better?  Follow-up as directed by your provider during your visit.  If this was your first episode of diverticulitis, you should have a colonoscopy within 6-8 weeks because in a small number of patients, malignancy (cancer) can look like diverticulitis.   You may not need a colonoscopy if you recently had one. Talk to your primary care doctor to determine if you need a colonoscopy.    Probiotics: If you have been given an antibiotic, you may want to also take a probiotic pill or eat yogurt with live cultures. Probiotics have \"good bacteria\" to help your intestines stay healthy. Studies have shown that probiotics help prevent diarrhea and other intestine " problems (including C. diff infection) when you take antibiotics. You can buy these without a prescription in the pharmacy section of the store.    If you were given a prescription for medicine here today, be sure to read all of the information (including the package insert) that comes with your prescription.  This will include important information about the medicine, its side effects, and any warnings that you need to know about.  The pharmacist who fills the prescription can provide more information and answer questions you may have about the medicine.  If you have questions or concerns that the pharmacist cannot address, please call or return to the Emergency Department.     Remember that you can always come back to the Emergency Department if you are not able to see your regular provider in the amount of time listed above, if you get any new symptoms, or if there is anything that worries you.

## (undated) DEVICE — SPONGE KITTNER 31001010

## (undated) DEVICE — GOWN XLG DISP 9545

## (undated) DEVICE — GLOVE PROTEXIS BLUE W/NEU-THERA 7.0  2D73EB70

## (undated) DEVICE — ESU GROUND PAD UNIVERSAL W/O CORD

## (undated) DEVICE — DRSG KERLIX 4 1/2"X4YDS ROLL 6730

## (undated) DEVICE — DRSG AQUACEL AG 3.5X6.0" HYDROFIBER 412010

## (undated) DEVICE — STRAP KNEE/BODY 31143004

## (undated) DEVICE — SU VICRYL 0 CT-1 CR 8X18" J740D

## (undated) DEVICE — DRAPE MAYO STAND 23X54 8337

## (undated) DEVICE — TAPE MEDIPORE 4"X2YD 2864

## (undated) DEVICE — TUBING SUCTION MEDI-VAC SOFT 3/16"X20' N520A

## (undated) DEVICE — DRAPE STERI TOWEL LG 1010

## (undated) DEVICE — SU VICRYL 1 CT-1 36" J347H

## (undated) DEVICE — ESU CORD BIPOLAR GREEN 10-4000

## (undated) DEVICE — BLADE CLIPPER SGL USE 9680

## (undated) DEVICE — MIDAS REX DIFFUSER LUBRICANT LEGEND PA100-A

## (undated) DEVICE — SU MONOCRYL 3-0 PS-2 18" UND Y497G

## (undated) DEVICE — SU ETHILON 3-0 PS-1 18" 1663H

## (undated) DEVICE — DRSG DRAIN 4X4" 7086

## (undated) DEVICE — RX SURGIFLO HEMOSTATIC MATRIX W/THROMBIN 8ML NEXTGEN 2993

## (undated) DEVICE — DRAPE IOBAN INCISE 23X17" 6650EZ

## (undated) DEVICE — GOWN IMPERVIOUS SPECIALTY XLG/XLONG 32474

## (undated) DEVICE — SPONGE SURGIFOAM 12 1972

## (undated) DEVICE — SUCTION MANIFOLD DORNOCH ULTRA CART UL-CL500

## (undated) DEVICE — PREP CHLORAPREP 26ML TINTED ORANGE  260815

## (undated) DEVICE — LINEN BACK PACK 5440

## (undated) DEVICE — Device

## (undated) DEVICE — BASIN SET MAJOR

## (undated) DEVICE — SOL WATER IRRIG 1000ML BOTTLE 2F7114

## (undated) DEVICE — BONE WAX 2.5GM W31G

## (undated) DEVICE — SU VICRYL 2-0 CT-2 8X18" UND D8144

## (undated) DEVICE — MIDAS REX DISSECTING TOOL 4MM BALL 140MM 14BA40

## (undated) DEVICE — SU PROLENE 6-0 BV-1 DA 24" 8805H

## (undated) DEVICE — NDL SPINAL 18GA 3.5" 405184

## (undated) DEVICE — DRSG KERLIX FLUFFS X5

## (undated) DEVICE — BRUSH SURGICAL SCRUB W/4% CHLORHEXIDINE GLUCONATE SOL 4458A

## (undated) DEVICE — DECANTER TRANSFER DEVICE 2008S

## (undated) DEVICE — GLOVE PROTEXIS W/NEU-THERA 7.5  2D73TE75

## (undated) DEVICE — SPONGE COTTONOID 1/2X1/2" 80-1400

## (undated) DEVICE — SYR 50ML LL W/O NDL 309653

## (undated) DEVICE — SOL ISOPROPYL RUBBING ALCOHOL USP 70% 4OZ HDX-20 I0020

## (undated) DEVICE — GLOVE PROTEXIS BLUE W/NEU-THERA 8.0  2D73EB80

## (undated) DEVICE — SPONGE COTTONOID 1X1" 80-1403

## (undated) DEVICE — SU DERMABOND ADVANCED .7ML DNX12

## (undated) DEVICE — ESU ELEC BLADE 2.75" COATED/INSULATED E1455

## (undated) DEVICE — SOL NACL 0.9% IRRIG 1000ML BOTTLE 2F7124

## (undated) DEVICE — GLOVE PROTEXIS W/NEU-THERA 7.0  2D73TE70

## (undated) DEVICE — SU MONOCRYL 2-0 SH 27" UND Y417H

## (undated) DEVICE — DRSG GAUZE 4X8" NON21842

## (undated) RX ORDER — OXYCODONE HYDROCHLORIDE 5 MG/1
TABLET ORAL
Status: DISPENSED
Start: 2018-11-01

## (undated) RX ORDER — HYDROMORPHONE HYDROCHLORIDE 1 MG/ML
INJECTION, SOLUTION INTRAMUSCULAR; INTRAVENOUS; SUBCUTANEOUS
Status: DISPENSED
Start: 2018-11-01

## (undated) RX ORDER — LIDOCAINE HYDROCHLORIDE 20 MG/ML
INJECTION, SOLUTION EPIDURAL; INFILTRATION; INTRACAUDAL; PERINEURAL
Status: DISPENSED
Start: 2018-11-01

## (undated) RX ORDER — ACETAMINOPHEN 325 MG/1
TABLET ORAL
Status: DISPENSED
Start: 2018-11-01

## (undated) RX ORDER — LABETALOL HYDROCHLORIDE 5 MG/ML
INJECTION, SOLUTION INTRAVENOUS
Status: DISPENSED
Start: 2018-11-01

## (undated) RX ORDER — ONDANSETRON 2 MG/ML
INJECTION INTRAMUSCULAR; INTRAVENOUS
Status: DISPENSED
Start: 2018-11-01

## (undated) RX ORDER — CEFAZOLIN SODIUM IN 0.9 % NACL 3 G/100 ML
INTRAVENOUS SOLUTION, PIGGYBACK (ML) INTRAVENOUS
Status: DISPENSED
Start: 2018-11-01

## (undated) RX ORDER — FENTANYL CITRATE 50 UG/ML
INJECTION, SOLUTION INTRAMUSCULAR; INTRAVENOUS
Status: DISPENSED
Start: 2018-11-01

## (undated) RX ORDER — DEXAMETHASONE SODIUM PHOSPHATE 4 MG/ML
INJECTION, SOLUTION INTRA-ARTICULAR; INTRALESIONAL; INTRAMUSCULAR; INTRAVENOUS; SOFT TISSUE
Status: DISPENSED
Start: 2018-11-01

## (undated) RX ORDER — HYDROXYZINE HYDROCHLORIDE 25 MG/1
TABLET, FILM COATED ORAL
Status: DISPENSED
Start: 2018-11-01